# Patient Record
Sex: MALE | Race: WHITE | Employment: OTHER | URBAN - METROPOLITAN AREA
[De-identification: names, ages, dates, MRNs, and addresses within clinical notes are randomized per-mention and may not be internally consistent; named-entity substitution may affect disease eponyms.]

---

## 2017-02-27 PROBLEM — R60.0 LEG EDEMA, LEFT: Status: ACTIVE | Noted: 2017-02-27

## 2017-10-05 PROBLEM — G24.9 DYSKINESIA DUE TO PARKINSON'S DISEASE (HCC): Status: ACTIVE | Noted: 2017-10-05

## 2017-10-05 PROBLEM — G20 DYSKINESIA DUE TO PARKINSON'S DISEASE (HCC): Status: ACTIVE | Noted: 2017-10-05

## 2018-02-15 PROBLEM — M25.552 HIP PAIN, CHRONIC, LEFT: Status: ACTIVE | Noted: 2018-02-15

## 2018-02-15 PROBLEM — G89.29 HIP PAIN, CHRONIC, LEFT: Status: ACTIVE | Noted: 2018-02-15

## 2018-02-16 ENCOUNTER — HOSPITAL ENCOUNTER (OUTPATIENT)
Dept: GENERAL RADIOLOGY | Age: 78
Discharge: HOME | End: 2018-02-16
Attending: NURSE PRACTITIONER
Payer: MEDICARE

## 2018-02-16 DIAGNOSIS — M25.552 HIP PAIN, CHRONIC, LEFT: ICD-10-CM

## 2018-02-16 DIAGNOSIS — G89.29 HIP PAIN, CHRONIC, LEFT: ICD-10-CM

## 2018-02-16 PROCEDURE — 73502 X-RAY EXAM HIP UNI 2-3 VIEWS: CPT

## 2018-02-23 ENCOUNTER — APPOINTMENT (OUTPATIENT)
Dept: PHYSICAL THERAPY | Age: 78
End: 2018-02-23
Attending: PSYCHIATRY & NEUROLOGY

## 2018-02-27 ENCOUNTER — APPOINTMENT (OUTPATIENT)
Dept: PHYSICAL THERAPY | Age: 78
End: 2018-02-27
Attending: PSYCHIATRY & NEUROLOGY

## 2018-03-02 ENCOUNTER — APPOINTMENT (OUTPATIENT)
Dept: PHYSICAL THERAPY | Age: 78
End: 2018-03-02
Attending: PSYCHIATRY & NEUROLOGY

## 2018-03-06 ENCOUNTER — APPOINTMENT (OUTPATIENT)
Dept: PHYSICAL THERAPY | Age: 78
End: 2018-03-06
Attending: PSYCHIATRY & NEUROLOGY

## 2018-03-09 ENCOUNTER — APPOINTMENT (OUTPATIENT)
Dept: PHYSICAL THERAPY | Age: 78
End: 2018-03-09
Attending: PSYCHIATRY & NEUROLOGY

## 2018-03-13 ENCOUNTER — APPOINTMENT (OUTPATIENT)
Dept: PHYSICAL THERAPY | Age: 78
End: 2018-03-13
Attending: PSYCHIATRY & NEUROLOGY

## 2018-03-16 ENCOUNTER — APPOINTMENT (OUTPATIENT)
Dept: PHYSICAL THERAPY | Age: 78
End: 2018-03-16
Attending: PSYCHIATRY & NEUROLOGY

## 2018-03-16 NOTE — PERIOP NOTES
Pt verified name, date of birth, and surgical procedure. Type: 1B   Orders received and dated with pt's date of birth and correct procedure: yes   Labs per surgeon: none  Labs per anesthesia: none  EKG: NA    Phone assessment completed. Pt was given information on NPO, location of surgery, and medications to take on the day of surgery. Pt will arrive at the [facility] location. Pt will take these medications the day of surgery: sinemet, neurontin, thyroid    Pt will hold these medications and supplements: coQ10    Pt will take a shower with antibacterial soap the night prior to the day of surgery and the morning of surgery. Pt also instructed to wear a button down shirt and to bring eye drops on DOS if applicable. Pt verbalized understanding of these instructions to the best of their ability, along with realizing they must have someone to drive them home and stay with them 24 hours post op.     Pt was given the 24 hr contact number of the OR in case there is a need to cancel, the pt has questions, or the pt does not receive a call by 1700 the day prior to surgery regarding the time of arrival.

## 2018-03-18 ENCOUNTER — ANESTHESIA EVENT (OUTPATIENT)
Dept: SURGERY | Age: 78
End: 2018-03-18
Payer: MEDICARE

## 2018-03-19 ENCOUNTER — ANESTHESIA (OUTPATIENT)
Dept: SURGERY | Age: 78
End: 2018-03-19
Payer: MEDICARE

## 2018-03-19 ENCOUNTER — HOSPITAL ENCOUNTER (OUTPATIENT)
Age: 78
Setting detail: OUTPATIENT SURGERY
Discharge: HOME | End: 2018-03-19
Attending: OPHTHALMOLOGY | Admitting: OPHTHALMOLOGY
Payer: MEDICARE

## 2018-03-19 VITALS
TEMPERATURE: 98 F | WEIGHT: 166 LBS | RESPIRATION RATE: 16 BRPM | HEART RATE: 56 BPM | OXYGEN SATURATION: 98 % | DIASTOLIC BLOOD PRESSURE: 84 MMHG | SYSTOLIC BLOOD PRESSURE: 180 MMHG | BODY MASS INDEX: 23.82 KG/M2

## 2018-03-19 PROCEDURE — 74011250636 HC RX REV CODE- 250/636: Performed by: ANESTHESIOLOGY

## 2018-03-19 PROCEDURE — 77030038091: Performed by: OPHTHALMOLOGY

## 2018-03-19 PROCEDURE — 76060000032 HC ANESTHESIA 0.5 TO 1 HR: Performed by: OPHTHALMOLOGY

## 2018-03-19 PROCEDURE — 77030016149 HC PRB OPHTH LSR IRID -C: Performed by: OPHTHALMOLOGY

## 2018-03-19 PROCEDURE — 74011000250 HC RX REV CODE- 250: Performed by: OPHTHALMOLOGY

## 2018-03-19 PROCEDURE — 76210000021 HC REC RM PH II 0.5 TO 1 HR: Performed by: OPHTHALMOLOGY

## 2018-03-19 PROCEDURE — 77030038275 HC DEV VIEW LENS DISP OCLS -B: Performed by: OPHTHALMOLOGY

## 2018-03-19 PROCEDURE — 77030018838 HC SOL IRR OPTH ALCN -B: Performed by: OPHTHALMOLOGY

## 2018-03-19 PROCEDURE — 76010000138 HC OR TIME 0.5 TO 1 HR: Performed by: OPHTHALMOLOGY

## 2018-03-19 PROCEDURE — 74011250637 HC RX REV CODE- 250/637: Performed by: ANESTHESIOLOGY

## 2018-03-19 PROCEDURE — 77030032623 HC KT VITRCMY TOT PLS ALCN -F: Performed by: OPHTHALMOLOGY

## 2018-03-19 PROCEDURE — 77030026083 HC FCPS OPHTH GRSH DSP ALCN -C: Performed by: OPHTHALMOLOGY

## 2018-03-19 PROCEDURE — 77030018846 HC SOL IRR STRL H20 ICUM -A: Performed by: OPHTHALMOLOGY

## 2018-03-19 PROCEDURE — 74011000254 HC RX REV CODE- 254: Performed by: OPHTHALMOLOGY

## 2018-03-19 PROCEDURE — 74011250636 HC RX REV CODE- 250/636

## 2018-03-19 PROCEDURE — 76210000063 HC OR PH I REC FIRST 0.5 HR: Performed by: OPHTHALMOLOGY

## 2018-03-19 RX ORDER — TROPICAMIDE 10 MG/ML
1 SOLUTION/ DROPS OPHTHALMIC
Status: DISCONTINUED | OUTPATIENT
Start: 2018-03-19 | End: 2018-03-19 | Stop reason: HOSPADM

## 2018-03-19 RX ORDER — SODIUM CHLORIDE 0.9 % (FLUSH) 0.9 %
5-10 SYRINGE (ML) INJECTION AS NEEDED
Status: DISCONTINUED | OUTPATIENT
Start: 2018-03-19 | End: 2018-03-19 | Stop reason: HOSPADM

## 2018-03-19 RX ORDER — ACETAMINOPHEN 500 MG
1000 TABLET ORAL ONCE
Status: COMPLETED | OUTPATIENT
Start: 2018-03-19 | End: 2018-03-19

## 2018-03-19 RX ORDER — ATROPINE SULFATE 10 MG/ML
1 SOLUTION/ DROPS OPHTHALMIC
Status: DISPENSED | OUTPATIENT
Start: 2018-03-19 | End: 2018-03-19

## 2018-03-19 RX ORDER — SODIUM CHLORIDE 0.9 % (FLUSH) 0.9 %
5-10 SYRINGE (ML) INJECTION EVERY 8 HOURS
Status: DISCONTINUED | OUTPATIENT
Start: 2018-03-19 | End: 2018-03-19 | Stop reason: HOSPADM

## 2018-03-19 RX ORDER — TETRACAINE HYDROCHLORIDE 5 MG/ML
SOLUTION OPHTHALMIC AS NEEDED
Status: DISCONTINUED | OUTPATIENT
Start: 2018-03-19 | End: 2018-03-19 | Stop reason: HOSPADM

## 2018-03-19 RX ORDER — HYDROMORPHONE HYDROCHLORIDE 2 MG/ML
0.5 INJECTION, SOLUTION INTRAMUSCULAR; INTRAVENOUS; SUBCUTANEOUS
Status: DISCONTINUED | OUTPATIENT
Start: 2018-03-19 | End: 2018-03-19 | Stop reason: HOSPADM

## 2018-03-19 RX ORDER — ALBUTEROL SULFATE 0.83 MG/ML
2.5 SOLUTION RESPIRATORY (INHALATION) AS NEEDED
Status: DISCONTINUED | OUTPATIENT
Start: 2018-03-19 | End: 2018-03-19 | Stop reason: HOSPADM

## 2018-03-19 RX ORDER — BUPIVACAINE HYDROCHLORIDE 7.5 MG/ML
INJECTION, SOLUTION EPIDURAL; RETROBULBAR AS NEEDED
Status: DISCONTINUED | OUTPATIENT
Start: 2018-03-19 | End: 2018-03-19 | Stop reason: HOSPADM

## 2018-03-19 RX ORDER — LIDOCAINE HYDROCHLORIDE 10 MG/ML
0.1 INJECTION INFILTRATION; PERINEURAL AS NEEDED
Status: DISCONTINUED | OUTPATIENT
Start: 2018-03-19 | End: 2018-03-19 | Stop reason: HOSPADM

## 2018-03-19 RX ORDER — CIPROFLOXACIN HYDROCHLORIDE 3.5 MG/ML
1 SOLUTION/ DROPS TOPICAL
Status: DISCONTINUED | OUTPATIENT
Start: 2018-03-19 | End: 2018-03-19 | Stop reason: HOSPADM

## 2018-03-19 RX ORDER — LIDOCAINE HYDROCHLORIDE 40 MG/ML
INJECTION, SOLUTION RETROBULBAR; TOPICAL AS NEEDED
Status: DISCONTINUED | OUTPATIENT
Start: 2018-03-19 | End: 2018-03-19 | Stop reason: HOSPADM

## 2018-03-19 RX ORDER — ONDANSETRON 2 MG/ML
4 INJECTION INTRAMUSCULAR; INTRAVENOUS
Status: DISCONTINUED | OUTPATIENT
Start: 2018-03-19 | End: 2018-03-19 | Stop reason: HOSPADM

## 2018-03-19 RX ORDER — SODIUM CHLORIDE, SODIUM LACTATE, POTASSIUM CHLORIDE, CALCIUM CHLORIDE 600; 310; 30; 20 MG/100ML; MG/100ML; MG/100ML; MG/100ML
1000 INJECTION, SOLUTION INTRAVENOUS CONTINUOUS
Status: DISCONTINUED | OUTPATIENT
Start: 2018-03-19 | End: 2018-03-19 | Stop reason: HOSPADM

## 2018-03-19 RX ORDER — INDOCYANINE GREEN AND WATER 25 MG
KIT INJECTION AS NEEDED
Status: DISCONTINUED | OUTPATIENT
Start: 2018-03-19 | End: 2018-03-19 | Stop reason: HOSPADM

## 2018-03-19 RX ORDER — PROPOFOL 10 MG/ML
INJECTION, EMULSION INTRAVENOUS AS NEEDED
Status: DISCONTINUED | OUTPATIENT
Start: 2018-03-19 | End: 2018-03-19 | Stop reason: HOSPADM

## 2018-03-19 RX ADMIN — TROPICAMIDE 1 DROP: 10 SOLUTION/ DROPS OPHTHALMIC at 07:58

## 2018-03-19 RX ADMIN — PROPOFOL 10 MG: 10 INJECTION, EMULSION INTRAVENOUS at 08:35

## 2018-03-19 RX ADMIN — ACETAMINOPHEN 1000 MG: 500 TABLET, FILM COATED ORAL at 07:58

## 2018-03-19 RX ADMIN — PROPOFOL 10 MG: 10 INJECTION, EMULSION INTRAVENOUS at 08:36

## 2018-03-19 RX ADMIN — ATROPINE SULFATE 1 DROP: 10 SOLUTION/ DROPS OPHTHALMIC at 07:58

## 2018-03-19 RX ADMIN — SODIUM CHLORIDE, SODIUM LACTATE, POTASSIUM CHLORIDE, AND CALCIUM CHLORIDE 1000 ML: 600; 310; 30; 20 INJECTION, SOLUTION INTRAVENOUS at 07:57

## 2018-03-19 NOTE — BRIEF OP NOTE
BRIEF OPERATIVE NOTE    Date of Procedure: 3/19/2018   Preoperative Diagnosis: Macular pucker, right eye [H35.371]  Macular hole, right [H35.341]  Postoperative Diagnosis: Macular pucker and pseudohole, right eye    Procedure(s): VITRECTOMY POSTERIOR 25 GAUGE/ SCAR TISSUE REMOVAL with IC green staining / RIGHT  Surgeon(s) and Role:     * Nicolle Haider MD - Primary         Assistant Staff: None      Surgical Staff:  Circ-1: Radha Moss RN  Scrub Tech-1: Davon Da Silva  Event Time In   Incision Start 6970   Incision Close 0909     Anesthesia: MAC   Estimated Blood Loss: 0 cc  Specimens: * No specimens in log *   Findings:Macular pucker and pseudohole, right eye    Complications: none  Implants: * No implants in log *

## 2018-03-19 NOTE — ANESTHESIA POSTPROCEDURE EVALUATION
Post-Anesthesia Evaluation and Assessment    Patient: Dionne Charlton. MRN: [id number]  SSN: [id number]    YOB: 1940  Age: 78 y.o. Sex: male       Cardiovascular Function/Vital Signs  Visit Vitals    /84    Pulse (!) 56    Temp 36.7 °C (98 °F)    Resp 16    Wt 75.3 kg (166 lb)    SpO2 98%    BMI 23.82 kg/m2       Patient is status post MAC anesthesia for Procedure(s):  VITRECTOMY POSTERIOR 25 GAUGE/ SCAR TISSUE REMOVAL// RIGHT. Nausea/Vomiting: None    Postoperative hydration reviewed and adequate. Pain:  Pain Scale 1: Numeric (0 - 10) (03/19/18 0948)  Pain Intensity 1: 0 (03/19/18 0948)   Managed    Neurological Status:   Neuro (WDL): Within Defined Limits (03/19/18 0948)   At baseline    Mental Status and Level of Consciousness: Arousable    Pulmonary Status:   O2 Device: Room air (03/19/18 0948)   Adequate oxygenation and airway patent    Complications related to anesthesia: None    Post-anesthesia assessment completed.  No concerns    Signed By: Julito Rodriguez MD     March 19, 2018

## 2018-03-19 NOTE — IP AVS SNAPSHOT
[facility] 
 
 
 [address] 
[phone] Patient: Sandra Ware. MRN: [id number] SOS:1/8/9706 About your hospitalization You were admitted on:  March 19, 2018 You last received care in the:  [facility] OP PACU You were discharged on:  March 19, 2018 Why you were hospitalized Your primary diagnosis was:  Not on File Follow-up Information Follow up With Details Comments Contact Info Shruti Shin MD   [address] [place] [address] [address] [facility] [address] 
[phone] Discharge Orders None A check ersi indicates which time of day the medication should be taken. My Medications CHANGE how you take these medications Instructions Each Dose to Equal  
 Morning Noon Evening Bedtime  
 gabapentin 300 mg capsule Commonly known as:  NEURONTIN What changed:  See the new instructions. Your last dose was: Your next dose is: TAKE 1 CAPSULE BY MOUTH 4  TIMES DAILY CONTINUE taking these medications Instructions Each Dose to Equal  
 Morning Noon Evening Bedtime * carbidopa-levodopa  mg per tablet Commonly known as:  SINEMET Your last dose was: Your next dose is:    
   
   
 2 tabs at 7:30am and 3:30pm and 2.5 tabs at 11:30am and 7:30pm  
     
   
   
   
  
 * carbidopa-levodopa ER  mg per tablet Commonly known as:  SINEMET CR Your last dose was: Your next dose is: TAKE 1 TABLET BY MOUTH  NIGHTLY Cholecalciferol (Vitamin D3) 2,000 unit Cap capsule Commonly known as:  VITAMIN D3 Your last dose was: Your next dose is: Take 2,000 Units by mouth. 2000 Units  
    
   
   
   
  
 coenzyme q10 10 mg Cap Your last dose was: Your next dose is: Take 100 mg by mouth.  Last dose 3/16/18  
 100 mg  
    
   
 cyanocobalamin 1,000 mcg tablet Your last dose was: Your next dose is: Take 1,000 mcg by mouth daily. 1000 mcg  
    
   
   
   
  
 levothyroxine 112 mcg tablet Commonly known as:  SYNTHROID Your last dose was: Your next dose is: Take  by mouth Daily (before breakfast). minocycline 100 mg capsule Commonly known as:  Lydia Nurse Your last dose was: Your next dose is: Take  by mouth two (2) times a day. \"breaking out behind my neck\" traZODone 150 mg Tb24 Your last dose was: Your next dose is: Take 75 mg by mouth nightly. 75 mg * Notice: This list has 2 medication(s) that are the same as other medications prescribed for you. Read the directions carefully, and ask your doctor or other care provider to review them with you. STOP taking these medications   
 atropine 1 % ophthalmic solution Discharge Instructions [facility] MD Estrella Castellon MD Annamaria Nares. Trenton Lundborg, MD                    [facility] [place]. [place] Denn[place], [address] or [phone] or [phone] or [phone] Post Operative Instructions for Retina and Vitreous Surgery Patients The following are a few guidelines that you should observe for two weeks after surgery: 1. Avoid stooping over, lifting heavy weights or bumping your head. 2.  Special positioning:Face Down 45 minutes of every hour while awake. Sleep on either side with nose toward mattress 3. No extensive traveling except what is necessary. If a gas air bubble was put in your      eye at surgery - avoid air travel until you consult your doctor. 4.  You may shave after the third day. 5.  You may watch television, read, cook and wash dishes as long as it does not interfere 
      with special positioning instructions. 6.  Alcoholic beverages may be used in moderation. 7.  No sexual intercourse. 8.  You  may bathe the eyelids daily with cotton or a tissue moistened with warm tap 
     water. Do not bathe the eyeball itself. 9.  Some discharge from the operated eye is to be expected. This should gradually  
     improve. 10. Change the eye pad at least once daily. You may discontinue the eye pad whenever 
      comfortable to do so (usually three days after the operation). Wear the eye shield or 
      glasses at all times. 11. If you experience increasing pain, decreasing vision, or pus like discharge, call the 
      Office. 12. Medication Instructions: 
       Prednisolone 1% - one drop in the operated eye 4  times a day. Ofloxacin - one drop in operated eye 4 times a day. BRING ALL EYE DROPS TO YOU POSTOPERATIVE APPOINTMENT! 13. Return appointment at the [facility] On 03/20/2018 at 11:10 Voiced or demonstrated understanding:  Júnior Lorenz from Nurse PATIENT INSTRUCTIONS: 
 
After general anesthesia or intravenous sedation, for 24 hours or while taking prescription Narcotics: · Limit your activities · Do not drive and operate hazardous machinery · Do not make important personal or business decisions · Do  not drink alcoholic beverages · If you have not urinated within 8 hours after discharge, please contact your surgeon on call. *  Please give a list of your current medications to your Primary Care Provider. *  Please update this list whenever your medications are discontinued, doses are 
    changed, or new medications (including over-the-counter products) are added. *  Please carry medication information at all times in case of emergency situations. These are general instructions for a healthy lifestyle: No smoking/ No tobacco products/ Avoid exposure to second hand smoke Surgeon General's Warning:  Quitting smoking now greatly reduces serious risk to your health. Obesity, smoking, and sedentary lifestyle greatly increases your risk for illness A healthy diet, regular physical exercise & weight monitoring are important for maintaining a healthy lifestyle You may be retaining fluid if you have a history of heart failure or if you experience any of the following symptoms:  Weight gain of 3 pounds or more overnight or 5 pounds in a week, increased swelling in our hands or feet or shortness of breath while lying flat in bed. Please call your doctor as soon as you notice any of these symptoms; do not wait until your next office visit. Recognize signs and symptoms of STROKE: 
 
F-face looks uneven A-arms unable to move or move unevenly S-speech slurred or non-existent T-time-call 911 as soon as signs and symptoms begin-DO NOT go Back to bed or wait to see if you get better-TIME IS BRAIN. Introducing [facility]! Carmelita Fothergill introduces [employer] patient portal. Now you can access parts of your medical record, email your doctor's office, and request medication refills online. 1. In your internet browser, go to [email or web address] 2. Click on the First Time User? Click Here link in the Sign In box. You will see the New Member Sign Up page. 3. Enter your [facility] Access Code exactly as it appears below. You will not need to use this code after youve completed the sign-up process. If you do not sign up before the expiration date, you must request a new code. · [facility] Access Code: [email or web address] Expires: 5/7/2018 12:57 PM 
 
4. Enter the last four digits of your Social Security Number (xxxx) and Date of Birth (mm/dd/yyyy) as indicated and click Submit.  You will be taken to the next sign-up page. 5. Create a [employer] ID. This will be your [employer] login ID and cannot be changed, so think of one that is secure and easy to remember. 6. Create a [employer] password. You can change your password at any time. 7. Enter your Password Reset Question and Answer. This can be used at a later time if you forget your password. 8. Enter your e-mail address. You will receive e-mail notification when new information is available in [address] 9. Click Sign Up. You can now view and download portions of your medical record. 10. Click the Download Summary menu link to download a portable copy of your medical information. If you have questions, please visit the Frequently Asked Questions section of the [employer] website. Remember, [employer] is NOT to be used for urgent needs. For medical emergencies, dial 911. Now available from your iPhone and Android! Providers Seen During Your Hospitalization Provider Specialty Primary office phone Anne Laguna MD Ophthalmology [phone] Your Primary Care Physician (PCP) Primary Care Physician Office Phone Office Fax Ted Alejo [phone] You are allergic to the following Allergen Reactions Brompheniramine Maleate Unknown (comments) Pt does not recall med. Carisoprodol Unknown (comments) Other reaction(s): Janit Ohm Pt does not recall med. Meperidine Unknown (comments) Pt does not recall med. Penicillin Rash Phenylephrine Hcl Unknown (comments) Pt does not recall med. Zaleplon Unknown (comments) Other reaction(s): Janit Ohm Pt does not recall med. Dimetapp Cold And Flu Rash Donepezil Other (comments) Altered mental state Penicillins Rash Rofecoxib Rash Recent Documentation Weight BMI Smoking Status 75.3 kg 23.82 kg/m2 Never Smoker Emergency Contacts Name Discharge Info Relation Home Work Mobile Jana Nair  Daughter [21] [phone] Patient Belongings The following personal items are in your possession at time of discharge: 
  Dental Appliances: None  Visual Aid: Glasses      Home Medications: None   Jewelry: None  Clothing: Footwear, Pants, Shirt    Other Valuables: Eyeglasses Please provide this summary of care documentation to your next provider. Signatures-by signing, you are acknowledging that this After Visit Summary has been reviewed with you and you have received a copy. Patient Signature:  ____________________________________________________________ Date:  ____________________________________________________________  
  
Devota Dandy Provider Signature:  ____________________________________________________________ Date:  ____________________________________________________________

## 2018-03-19 NOTE — OP NOTES
[facility] [address] [address]  OPERATIVE REPORT    Erick Sanches  MR#: [id number]  : 1940  ACCOUNT #: [de-identified]   DATE OF SERVICE: 2018    PREOPERATIVE DIAGNOSES  1. Macular pucker, right eye. 2.  Macular pseudohole, right eye. 3.  Pseudophakia, right eye. POSTOPERATIVE DIAGNOSES  1. Macular pucker, right eye. 2.  Macular pseudohole, right eye. 3.  Pseudophakia, right eye. PROCEDURES PERFORMED  1. Pars plana vitrectomy, right eye, 25 gauge. 2.  Epiretinal membrane and internal limiting membrane. Removal after IC green staining. 3.  Air fluid exchange, right eye. SURGEON:  Porfirio Umaña MD    ASSISTANT:  None. ANESTHESIA:  Local monitored using 50:50 mixture of 0.75% Marcaine 4% Xylocaine. COMPLICATIONS:  None. SPECIMENS:  None. IMPLANTS:  None. BLOOD LOSS:  0 mL. DESCRIPTION OF PROCEDURE:  Patient was brought to the operating room and placed in the supine position. He was given a right-sided retrobulbar block using 7 mL of the above anesthetic mixture through a 27-gauge, inch and a quarter needle with the eye in primary position. Adequate akinesia and anesthesia was obtained with this block. The face was then prepped and draped in usual fashion. Lid speculum was put in place in the right eye. Next, 25-gauge cannulas were placed, 3.5 mm back from the limbus. Prior to turning on infusion fluid, the tip of the cannula was visualized in the vitreous space. Core vitrectomy was performed using the light pipe for illumination and vitreous cutter. There was a posterior vitreous separation. The vitreous was trimmed out to the base 360 degrees. Attention was now turned to the macula. It was stained with IC green. Using end gripping forceps, the epiretinal membrane was removed followed by the internal limiting membrane from the central macula. The peripheral retina was inspected carefully with scleral depression.   There were no defects or hemorrhages. An air fluid exchange was performed. The 25-gauge cannulas were removed in sequence. All the sclerotomy sites were tight to air. Subconjunctival space was injected with 0.5 mL of betamethasone. Prednisolone 1% and ofloxacin were applied to the eye. The eye was closed, patched and shielded.       MD MYNOR Jordan / [place]  D: 03/19/2018 09:27     T: 03/19/2018 09:58  JOB #: [id number]

## 2018-03-19 NOTE — ANESTHESIA PREPROCEDURE EVALUATION
Anesthetic History               Review of Systems / Medical History  Patient summary reviewed and pertinent labs reviewed    Pulmonary                   Neuro/Psych       CVA (possible, reports some left leg weakness although none present on exam 3/19/2018)      Comments: parkinsons Cardiovascular                  Exercise tolerance: >4 METS  Comments: Patient denies Chest pain or tightness, SOB at rest, and orthopnea. GI/Hepatic/Renal     GERD: well controlled           Endo/Other      Hypothyroidism       Other Findings   Comments: Neuropathy.          Physical Exam    Airway  Mallampati: III  TM Distance: 4 - 6 cm  Neck ROM: normal range of motion   Mouth opening: Normal     Cardiovascular    Rhythm: regular  Rate: normal      Pertinent negatives: No murmur, JVD and peripheral edema   Dental         Pulmonary  Breath sounds clear to auscultation               Abdominal         Other Findings            Anesthetic Plan    ASA: 3  Anesthesia type: MAC          Induction: Intravenous  Anesthetic plan and risks discussed with: Patient

## 2018-03-19 NOTE — DISCHARGE INSTRUCTIONS
[facility], [address] [facility] Michelle MD Neil Villafuerte MD Azalia Lipoma. MD Johann Valles. Jana Garcia MD    [phone] or [phone] or [phone] or [phone]    Post Operative Instructions for Retina and Vitreous Surgery Patients    The following are a few guidelines that you should observe for two weeks after surgery:    1. Avoid stooping over, lifting heavy weights or bumping your head. 2.  Special positioning:Face Down 45 minutes of every hour while awake. Sleep on either side with nose toward mattress    3. No extensive traveling except what is necessary. If a gas air bubble was put in your      eye at surgery - avoid air travel until you consult your doctor. 4.  You may shave after the third day. 5.  You may watch television, read, cook and wash dishes as long as it does not interfere        with special positioning instructions. 6.  Alcoholic beverages may be used in moderation. 7.  No sexual intercourse. 8.  You  may bathe the eyelids daily with cotton or a tissue moistened with warm tap       water. Do not bathe the eyeball itself. 9.  Some discharge from the operated eye is to be expected. This should gradually        improve. 10. Change the eye pad at least once daily. You may discontinue the eye pad whenever        comfortable to do so (usually three days after the operation). Wear the eye shield or        glasses at all times. 11. If you experience increasing pain, decreasing vision, or pus like discharge, call the        Office. 12. Medication Instructions:         Prednisolone 1% - one drop in the operated eye 4  times a day. Ofloxacin - one drop in operated eye 4 times a day. BRING ALL EYE DROPS TO YOU POSTOPERATIVE APPOINTMENT!     13. Return appointment at the [facility]        On 03/20/2018 at 11:10     Voiced or demonstrated understanding:  YES    DISCHARGE SUMMARY from Nurse    PATIENT INSTRUCTIONS:    After general anesthesia or intravenous sedation, for 24 hours or while taking prescription Narcotics:  · Limit your activities  · Do not drive and operate hazardous machinery  · Do not make important personal or business decisions  · Do  not drink alcoholic beverages  · If you have not urinated within 8 hours after discharge, please contact your surgeon on call. *  Please give a list of your current medications to your Primary Care Provider. *  Please update this list whenever your medications are discontinued, doses are      changed, or new medications (including over-the-counter products) are added. *  Please carry medication information at all times in case of emergency situations. These are general instructions for a healthy lifestyle:    No smoking/ No tobacco products/ Avoid exposure to second hand smoke    Surgeon General's Warning:  Quitting smoking now greatly reduces serious risk to your health. Obesity, smoking, and sedentary lifestyle greatly increases your risk for illness    A healthy diet, regular physical exercise & weight monitoring are important for maintaining a healthy lifestyle    You may be retaining fluid if you have a history of heart failure or if you experience any of the following symptoms:  Weight gain of 3 pounds or more overnight or 5 pounds in a week, increased swelling in our hands or feet or shortness of breath while lying flat in bed. Please call your doctor as soon as you notice any of these symptoms; do not wait until your next office visit. Recognize signs and symptoms of STROKE:    F-face looks uneven    A-arms unable to move or move unevenly    S-speech slurred or non-existent    T-time-call 911 as soon as signs and symptoms begin-DO NOT go       Back to bed or wait to see if you get better-TIME IS BRAIN.

## 2018-03-20 ENCOUNTER — APPOINTMENT (OUTPATIENT)
Dept: PHYSICAL THERAPY | Age: 78
End: 2018-03-20
Attending: PSYCHIATRY & NEUROLOGY

## 2018-03-23 ENCOUNTER — APPOINTMENT (OUTPATIENT)
Dept: PHYSICAL THERAPY | Age: 78
End: 2018-03-23
Attending: PSYCHIATRY & NEUROLOGY

## 2018-04-12 ENCOUNTER — APPOINTMENT (OUTPATIENT)
Dept: PHYSICAL THERAPY | Age: 78
End: 2018-04-12
Attending: PSYCHIATRY & NEUROLOGY
Payer: MEDICARE

## 2018-04-16 ENCOUNTER — HOSPITAL ENCOUNTER (OUTPATIENT)
Dept: PHYSICAL THERAPY | Age: 78
Discharge: HOME | End: 2018-04-16
Attending: PSYCHIATRY & NEUROLOGY
Payer: MEDICARE

## 2018-04-16 PROCEDURE — G8978 MOBILITY CURRENT STATUS: HCPCS

## 2018-04-16 PROCEDURE — 97161 PT EVAL LOW COMPLEX 20 MIN: CPT

## 2018-04-16 PROCEDURE — G8979 MOBILITY GOAL STATUS: HCPCS

## 2018-04-16 NOTE — PROGRESS NOTES
Ambulatory/Rehab Services H2 Model Falls Risk Assessment    Risk Factor Pts. ·   Confusion/Disorientation/Impulsivity  [x]    4 ·   Symptomatic Depression  []   2 ·   Altered Elimination  []   1 ·   Dizziness/Vertigo  []   1 ·   Gender (Male)  [x]   1 ·   Any administered antiepileptics (anticonvulsants):  [x]   2 ·   Any administered benzodiazepines:  [x]   1 ·   Visual Impairment (specify):  []   1 ·   Portable Oxygen Use  []   1 ·   Orthostatic ? BP  []   1 ·   History of Recent Falls (within 3 mos.)  []   5     Ability to Rise from Chair (choose one) Pts. ·   Ability to rise in a single movement  [x]   0 ·   Pushes up, successful in one attempt  []   1 ·   Multiple attempts, but successful  []   3 ·   Unable to rise without assistance  []   4   Total: (5 or greater = High Risk) 8     Falls Prevention Plan:   [x]                Physical Limitations to Exercise (specify): supervision   []                Mobility Assistance Device (type):   []                Exercise/Equipment Adaptation (specify):    ©2010 [facility] [employer] [address] [place] [address] Patent #[id number].  Federal Law prohibits the replication, distribution or use without written permission from [facility] [employer]

## 2018-04-16 NOTE — THERAPY EVALUATION
Wisamtevin Gong : 1940 [facility] [id number], [address] [place] [address],[address], [facility] [address].  [phone]   [phone]          OUTPATIENT PHYSICAL THERAPY:Initial Assessment 2018    ICD-10: Treatment Diagnosis: T47-Jtrvgyfeg's, R29.3-Abnormal posture, R27.8-Lack of coordination, R26.89-other gait abnormalities  Precautions/Allergies:   Brompheniramine maleate; Carisoprodol; Meperidine; Penicillin; Phenylephrine hcl; Zaleplon; Dimetapp cold and flu; Donepezil; Penicillins; and Rofecoxib falls  Fall Risk Score: 8 (? 5 = High Risk)  MD Orders: PT to eval and treat for Parkinson's MEDICAL/REFERRING DIAGNOSIS:  Parkinson's disease [G20]  · Abnormal posture [R29.3]   DATE OF ONSET: 18   REFERRING PHYSICIAN: Lele Hernández  RETURN PHYSICIAN APPOINTMENT: unknown     INITIAL ASSESSMENT:  Mr. Robert Hargrove is a 70year old male who presents to therapy today with the below functional deficits and could benefit from skilled outpatient PT to improve the deficits as well as improve overall quality of life and independence. PT will focus on Parkinson's education as well as Parkinson's specific therapy/PWR program for patient to be able to maintain gains achieved during therapy when therapy is completed. He has completed PWR and PT with me about 3 years ago and is maintaining his movement/function well. His main complaint is stiffness/pain in his low back especially in the am and noted to have decreased PROM in quads, trunk and hamstrings. Patient does not stretch at this point so will take a few sessions for education on PROM and then discharge. Will also focus on his gait technique as he is very rigid in his trunk with his gait and poor weight shift   PROBLEM LIST:  1. Decreased Balance  2. Decreased Ambulation/Gait Technique  3. Decreased Endurance  4. Rigidity  5. Bradykinesia INTERVENTIONS PLANNED:  1. Balance Exercise  2. Gait Training  3.  Home Exercise Program (HEP)  4. Manual Therapy  5. Neuromuscular Re-education/Strengthening  6. Range of Motion (ROM)  7. Therapeutic Activites  8. Therapeutic Exercise/Strengthening  9. Transfer Training   TREATMENT PLAN:  Effective Dates: 4/16/18 TO 7/15/2018 (90 days). Frequency/Duration: 2 times a week for 90 Days  GOALS: (Goals have been discussed and agreed upon with patient.)  Discharge Goals: Time Frame: 12 weeks  1. Patient will be I with HEP to maintain functional gains achieved during therapy  2. Patient will improve FGA to 25/30 for overall improved stability of gait  Rehabilitation Potential For Stated Goals: Excellent  Regarding Carlotta Lemos 's therapy, I certify that the treatment plan above will be carried out by a therapist or under their direction. Thank you for this referral,  Candelario York PT     Referring Physician Signature: Ham Tan,*              Date                    The information in this section was collected on 4/16/18 (except where otherwise noted). HISTORY:   History of Present Injury/Illness (Reason for Referral): Sparkle Rubi is a 70 y.o. male who presents with a history of parkinson's who was seen by [place] Phipps NP to Dr. Elmer Robb on 2/15. C/o leg pain in left leg and now B. It has been happening about 3months. Scans were negative. He apparently started ST around the time of this visit due to difficulty swallowing. Double vision started 2 weeks before this appointment with the MD. His PD exam was stable, so continue all medication and no changes. UA was orderd as well as Xray of Left hip was order. Results were negative for both. Just had surgery on 3/19 for his R eye. Leg pain has resolved. Mostlly slight back pain in the morning. Goes to exercise class-no stretching. Works itself with movement and is inconsistent. No falls.    BP at MD office: 129/63  HR at MD office: 58  Dominant Side:         RIGHT  Past Medical History/Comorbidities:   Mr. Mary Moody  has a past medical history of AAA (abdominal aortic aneurysm) ([facility] (7/8/2016); Pak's esophagus with esophagitis (7/8/2016); Cerebrovascular accident (CVA) ([facility].) (7/8/2016); Chronic fatigue syndrome (2/26/2016); Cognitive deficits (7/8/2016); Colon polyps (2007); Elevated PSA; Enthesopathy of ankle and tarsus (5/29/2014); GERD (gastroesophageal reflux disease) (7/8/2016); Hashimoto's thyroiditis (7/8/2016); History of nuclear stress test (1997, 5/2005); Hypotension (8/25/2015); Impaired cognition (7/8/2016); Inguinal hernia (7/8/2016); Insomnia (7/8/2016); 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 Mr. Teodora Eddy  has a past surgical history that includes hx colonoscopy (2000 & 2005); hx breast biopsy (Bilateral); hx thyroidectomy; hx hernia repair (Left, 2011); hx cataract removal (Right, 2012); hx other surgical (2006); and hx other surgical.  Current Medications:    Current Outpatient Prescriptions:     carbidopa-levodopa ER (SINEMET CR)  mg per tablet, TAKE 1 TABLET BY MOUTH  NIGHTLY, Disp: 90 Tab, Rfl: 3    carbidopa-levodopa (SINEMET)  mg per tablet, 2 tabs at 7:30am and 3:30pm and 2.5 tabs at 11:30am and 7:30pm, Disp: 360 Tab, Rfl: 3    gabapentin (NEURONTIN) 300 mg capsule, TAKE 1 CAPSULE BY MOUTH 4  TIMES DAILY (Patient taking differently: TAKE 1 CAPSULE BY MOUTH 4  TIMES DAILY- for neuropathy feet), Disp: 360 Cap, Rfl: 1    levothyroxine (SYNTHROID) 112 mcg tablet, Take  by mouth Daily (before breakfast). , Disp: , Rfl:     minocycline (MINOCIN, DYNACIN) 100 mg capsule, Take  by mouth two (2) times a day.  \"breaking out behind my neck\", Disp: , Rfl:     traZODone 150 mg Tb24, Take 75 mg by mouth nightly., Disp: 45 Tab, Rfl: 1    Cholecalciferol, Vitamin D3, (VITAMIN D3) 2,000 unit cap capsule, Take 2,000 Units by mouth., Disp: , Rfl:     coenzyme q10 10 mg cap, Take 100 mg by mouth. Last dose 3/16/18, Disp: , Rfl:     cyanocobalamin 1,000 mcg tablet, Take 1,000 mcg by mouth daily. , Disp: , Rfl:    Date Last Reviewed:  4/16/2018  Social History/Living Environment:     lives alone with one story home-1 steps to enter. No steps in home. Has walk in shower-standing to shower. No grab bars installed but is going to have them installed. Regular height toilet. Tall bed but can sit on it without stool. 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 Number of Personal Factors/Comorbidities that affect the Plan of Care: 3+: HIGH COMPLEXITY   EXAMINATION:   Sensation: intact  Skin Integrity:  intact  Neuromuscular/Motor:   · Muscle Tone:  2/4 on L and 1/4 on R    Coordination:  Foot tapping 1 on R/2 on L, finger taps/YRN/open/close 0 B  Observation/Orthostatic Postural Assessment:  Slight flexion at hips  Lower Extremity:  Grossly 5/5 but PROM:hamstring on R in supine with hip at 90-35, L 44, quad in prone: R-90, L-85. Trunk very rigid as well. He has ocmplaints of cervical spine stiffness as well. L LE more rigid than R LE.  Hip extension B 4+/5  Upper Extremity: grossly 5/5 and WFL  Additional Special Tests:  · 10m:   · Self selected: 9.91 seconds  · Fast pace: 7.16 seconds  · 5x sit to stand: 10.69 seconds-posterior lean at times but able to adjust  · 6 minute walk test: 1675 feet   Distance:  Greater than 3000 including incline/decline   Assistive Device:  none   Orthosis/Brace:  none   Amount of Assist:  Mod I   Gait Characteristics:  Rigid trunk, decreased L arm swing, decreased step/stride mostly on L, decreased david   Stairs:    · Rails:  one       · Number of Stairs:  12      · Amount of Assist:   mod I         · Pattern:  Reciprocal pattern. Fair weight shift ascending. Good weight shift descending  · SOT:  NT  · Comments:  patient is motivated and doing well. Without complaints   · Education:  Justina Reyes on purpose of PROM/stretching  FUNCTIONAL ASSESSMENT:  · Wheelchair:  na  · Bed Mobility: mod I to I including prone   · Transfers: sit to stand wihtout UE support mod I. No freezing turns in 3 steps without LOB      Body Structures Involved:  1. Joints  2. Muscles Body Functions Affected:  1. Cardio  2. Respiratory  3. Neuromusculoskeletal  4. Movement Related Activities and Participation Affected:  1. Mobility   Number of elements that affect the Plan of Care: 4+: HIGH COMPLEXITY   CLINICAL PRESENTATION:   Presentation: Stable and uncomplicated: LOW COMPLEXITY   CLINICAL DECISION MAKING:   Outcome Measure: Tool Used: MINI BEST TEST   Score:  Initial: 23/28 Most Recent: X/28    Interpretation of Score: This is a 14 item test on a three level ordinal scale with a total score of 28. There are two sections scored, but only the lower score is recorded. This is a clinical balance assessment tool that aimes to target and identify six different balance control systems. Tool Used: FUNCTIONAL GAIT ASSESSMENT   Score:  Initial: 21/30 Most Recent: X/30   Interpretation of Score: This test is a modification of the Dynamic Gait Index. It is a 10 item test with each item score 0-3 that assesses postural stability during various walking tasks. Score 30 29-24 23-18 17-13 12-7 6-1 0   Modifier CH CI CJ CK CL CM CN ?    Mobility - Walking and Moving Around:     - CURRENT STATUS: CJ - 20%-39% impaired, limited or restricted    - GOAL STATUS: CI - 1%-19% impaired, limited or restricted    - D/C STATUS:             ---------------To be determined---------------    Medical Necessity:   · Patient is expected to demonstrate progress in balance, coordination, functional technique and ambulation to increase independence with all functional tasks above. Reason for Services/Other Comments:  · Patient can benefit from skilled outpatient PT to focus on Parkinson's specific treatment to improve all above functional deficits limiting patient daily as well as increasing fall risk to improve overall quality of life. Use of outcome tool(s) and clinical judgement create a POC that gives a: Clear prediction of patient's progress: LOW COMPLEXITY   TREATMENT:   (In addition to Assessment/Re-Assessment sessions the following treatments were rendered)  Pre-treatment Symptoms/Complaints:  4/16/2018: Patient reports worse-nothing. Does not improve. Does not take any meds. At worst back pain is 8-mostly in the am, at best 0/10. Current 0/10. Leg is 0/10. Reports some occasional soreness in Left leg but otherwise states his L LE has improved and he does not have difficulty with it. His main complaint is the stiffness/pain in his low back. He reports no stretching just exercise/strength/balance  Pain: Initial:   Pain Intensity 1: 0 0 Post Session:  0       Treatment/Session Assessment:    · Response to Treatment:  Do not have previous scores from when patient came to me 3 years ago but does appear to be stable with his above scores with no falls. Will focus on PROM/stretching home program as well as his gait technique to improve trunk rigidity to see if improve back pain/overall function. · Compliance with Program/Exercises: Will assess as treatment progresses. · Recommendations/Intent for next treatment session: \"Next visit will focus on advancements to more challenging activities\".   Child's pose, PWR prone twist, hamstring and quad stretch, quadraped PWR twist/step, supine with arms above head, stand against wall  Total Treatment Duration:  PT Patient Time In/Time Out  Time In: 1030  Time Out: 1126    Lexy Montgomery, PT

## 2018-04-27 ENCOUNTER — HOSPITAL ENCOUNTER (OUTPATIENT)
Dept: PHYSICAL THERAPY | Age: 78
Discharge: HOME | End: 2018-04-27
Attending: PSYCHIATRY & NEUROLOGY
Payer: MEDICARE

## 2018-04-27 PROCEDURE — 97110 THERAPEUTIC EXERCISES: CPT

## 2018-05-01 ENCOUNTER — HOSPITAL ENCOUNTER (OUTPATIENT)
Dept: PHYSICAL THERAPY | Age: 78
Discharge: HOME | End: 2018-05-01
Attending: PSYCHIATRY & NEUROLOGY
Payer: MEDICARE

## 2018-05-01 PROCEDURE — 97110 THERAPEUTIC EXERCISES: CPT

## 2018-05-01 PROCEDURE — 97112 NEUROMUSCULAR REEDUCATION: CPT

## 2018-05-01 NOTE — PROGRESS NOTES
Mandy Glasgow. : 1940 [facility] [place] [address] [facility], [address] [place] [address],[address], Mariya Vieyra.  [phone]   [phone]          OUTPATIENT PHYSICAL THERAPY:Daily Note and Treatment Day: 2018    ICD-10: Treatment Diagnosis: E49-Bbyzpgwuo's, R29.3-Abnormal posture, R27.8-Lack of coordination, R26.89-other gait abnormalities  Precautions/Allergies:   Brompheniramine maleate; Carisoprodol; Meperidine; Penicillin; Phenylephrine hcl; Zaleplon; Dimetapp cold and flu; Donepezil; Penicillins; and Rofecoxib falls  Fall Risk Score: 8 (? 5 = High Risk)  MD Orders: PT to eval and treat for Parkinson's MEDICAL/REFERRING DIAGNOSIS:  Parkinson's disease [G20]  · Abnormal posture [R29.3]   DATE OF ONSET: 18   REFERRING PHYSICIAN: Iza Hernández Estimable  RETURN PHYSICIAN APPOINTMENT: unknown     INITIAL ASSESSMENT:  Mr. Jeri Garland is a 70year old male who presents to therapy today with the below functional deficits and could benefit from skilled outpatient PT to improve the deficits as well as improve overall quality of life and independence. PT will focus on Parkinson's education as well as Parkinson's specific therapy/PWR program for patient to be able to maintain gains achieved during therapy when therapy is completed. He has completed PWR and PT with me about 3 years ago and is maintaining his movement/function well. His main complaint is stiffness/pain in his low back especially in the am and noted to have decreased PROM in quads, trunk and hamstrings. Patient does not stretch at this point so will take a few sessions for education on PROM and then discharge. Will also focus on his gait technique as he is very rigid in his trunk with his gait and poor weight shift   PROBLEM LIST:  1. Decreased Balance  2. Decreased Ambulation/Gait Technique  3. Decreased Endurance  4. Rigidity  5. Bradykinesia INTERVENTIONS PLANNED:  1. Balance Exercise  2. Gait Training  3.  Home Exercise Program (HEP)  4. Manual Therapy  5. Neuromuscular Re-education/Strengthening  6. Range of Motion (ROM)  7. Therapeutic Activites  8. Therapeutic Exercise/Strengthening  9. Transfer Training   TREATMENT PLAN:  Effective Dates: 4/16/18 TO 7/15/2018 (90 days). Frequency/Duration: 2 times a week for 90 Days  GOALS: (Goals have been discussed and agreed upon with patient.)  Discharge Goals: Time Frame: 12 weeks  1. Patient will be I with HEP to maintain functional gains achieved during therapy  2. Patient will improve FGA to 25/30 for overall improved stability of gait  Rehabilitation Potential For Stated Goals: Excellent              The information in this section was collected on 4/16/18 (except where otherwise noted). HISTORY:   History of Present Injury/Illness (Reason for Referral): Titus Jett is a 70 y.o. male who presents with a history of parkinson's who was seen by Shayla Mcbride NP to Dr. Tatyana Mchugh on 2/15. C/o leg pain in left leg and now B. It has been happening about 3months. Scans were negative. He apparently started ST around the time of this visit due to difficulty swallowing. Double vision started 2 weeks before this appointment with the MD. His PD exam was stable, so continue all medication and no changes. UA was orderd as well as Xray of Left hip was order. Results were negative for both. Just had surgery on 3/19 for his R eye. Leg pain has resolved. Mostlly slight back pain in the morning. Goes to exercise class-no stretching. Works itself with movement and is inconsistent. No falls. 5/1/2018-Patient with no falls nor changes in medication. He does report some left elbow soreness right at olecranon but resolved as of yesterday. No hip pain with exercises    BP at MD office: 129/63  HR at MD office: 58  Dominant Side:         RIGHT  Past Medical History/Comorbidities:   Mr. Jeri Garland  has a past medical history of AAA (abdominal aortic aneurysm) ([facility] [address] (7/8/2016);  Pak's esophagus with esophagitis (7/8/2016); Cerebrovascular accident (CVA) ([facility] [address] (7/8/2016); Chronic fatigue syndrome (2/26/2016); Cognitive deficits (7/8/2016); Colon polyps (2007); Elevated PSA; Enthesopathy of ankle and tarsus (5/29/2014); GERD (gastroesophageal reflux disease) (7/8/2016); Hashimoto's thyroiditis (7/8/2016); History of nuclear stress test (1997, 5/2005); Hypotension (8/25/2015); Impaired cognition (7/8/2016); Inguinal hernia (7/8/2016); Insomnia (7/8/2016); 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 Mr. Jeri Garland  has a past surgical history that includes hx colonoscopy (2000 & 2005); hx breast biopsy (Bilateral); hx thyroidectomy; hx hernia repair (Left, 2011); hx cataract removal (Right, 2012); hx other surgical (2006); and hx other surgical.  Current Medications:    Current Outpatient Prescriptions:     carbidopa-levodopa ER (SINEMET CR)  mg per tablet, TAKE 1 TABLET BY MOUTH  NIGHTLY, Disp: 90 Tab, Rfl: 3    carbidopa-levodopa (SINEMET)  mg per tablet, 2 tabs at 7:30am and 3:30pm and 2.5 tabs at 11:30am and 7:30pm, Disp: 360 Tab, Rfl: 3    gabapentin (NEURONTIN) 300 mg capsule, TAKE 1 CAPSULE BY MOUTH 4  TIMES DAILY (Patient taking differently: TAKE 1 CAPSULE BY MOUTH 4  TIMES DAILY- for neuropathy feet), Disp: 360 Cap, Rfl: 1    levothyroxine (SYNTHROID) 112 mcg tablet, Take  by mouth Daily (before breakfast). , Disp: , Rfl:     minocycline (MINOCIN, DYNACIN) 100 mg capsule, Take  by mouth two (2) times a day.  \"breaking out behind my neck\", Disp: , Rfl:     traZODone 150 mg Tb24, Take 75 mg by mouth nightly., Disp: 45 Tab, Rfl: 1    Cholecalciferol, Vitamin D3, (VITAMIN D3) 2,000 unit cap capsule, Take 2,000 Units by mouth., Disp: , Rfl:     coenzyme q10 10 mg cap, Take 100 mg by mouth. Last dose 3/16/18, Disp: , Rfl:     cyanocobalamin 1,000 mcg tablet, Take 1,000 mcg by mouth daily. , Disp: , Rfl:    Date Last Reviewed:  5/1/2018  Social History/Living Environment:     lives alone with one story home-1 steps to enter. No steps in home. Has walk in shower-standing to shower. No grab bars installed but is going to have them installed. Regular height toilet. Tall bed but can sit on it without stool. Still driving  Prior Level of Function:  Mod I  Durable Medical Equipment (DME)  · Current DME: [place]  Work/Activity History: retired from [employer]: Never started therapy  Current Workout: 4 times a week between boxing and [employer]. Number of Personal Factors/Comorbidities that affect the Plan of Care: 3+: HIGH COMPLEXITY   EXAMINATION:   Sensation: intact  Skin Integrity:  intact  Neuromuscular/Motor:   · Muscle Tone:  2/4 on L and 1/4 on R    Coordination:  Foot tapping 1 on R/2 on L, finger taps/YRN/open/close 0 B  Observation/Orthostatic Postural Assessment:  Slight flexion at hips  Lower Extremity:  Grossly 5/5 but PROM:hamstring on R in supine with hip at 90-35, L 44, quad in prone: R-90, L-85. Trunk very rigid as well. He has ocmplaints of cervical spine stiffness as well. L LE more rigid than R LE.  Hip extension B 4+/5  Upper Extremity: grossly 5/5 and WFL  Additional Special Tests:  · 10m:   · Self selected: 9.91 seconds  · Fast pace: 7.16 seconds  · 5x sit to stand: 10.69 seconds-posterior lean at times but able to adjust  · 6 minute walk test: 1675 feet   Distance:  Greater than 3000 including incline/decline   Assistive Device:  none   Orthosis/Brace:  none   Amount of Assist:  Mod I   Gait Characteristics:  Rigid trunk, decreased L arm swing, decreased step/stride mostly on L, decreased david   Stairs:    · Rails:  one       · Number of Stairs:  12 · Amount of Assist:   mod I         · Pattern:  Reciprocal pattern. Fair weight shift ascending. Good weight shift descending  · SOT:  NT  · Comments:  patient is motivated and doing well. Without complaints   · Education:  Jomar Basurto on purpose of PROM/stretching  FUNCTIONAL ASSESSMENT:  · Wheelchair:  na  · Bed Mobility: mod I to I including prone   · Transfers: sit to stand wihtout UE support mod I. No freezing turns in 3 steps without LOB      Body Structures Involved:  1. Joints  2. Muscles Body Functions Affected:  1. Cardio  2. Respiratory  3. Neuromusculoskeletal  4. Movement Related Activities and Participation Affected:  1. Mobility   Number of elements that affect the Plan of Care: 4+: HIGH COMPLEXITY   CLINICAL PRESENTATION:   Presentation: Stable and uncomplicated: LOW COMPLEXITY   CLINICAL DECISION MAKING:   Outcome Measure: Tool Used: MINI BEST TEST   Score:  Initial: 23/28 Most Recent: X/28    Interpretation of Score: This is a 14 item test on a three level ordinal scale with a total score of 28. There are two sections scored, but only the lower score is recorded. This is a clinical balance assessment tool that aimes to target and identify six different balance control systems. Tool Used: FUNCTIONAL GAIT ASSESSMENT   Score:  Initial: 21/30 Most Recent: X/30   Interpretation of Score: This test is a modification of the Dynamic Gait Index. It is a 10 item test with each item score 0-3 that assesses postural stability during various walking tasks. Score 30 29-24 23-18 17-13 12-7 6-1 0   Modifier CH CI CJ CK CL CM CN ?    Mobility - Walking and Moving Around:     - CURRENT STATUS: CJ - 20%-39% impaired, limited or restricted    - GOAL STATUS: CI - 1%-19% impaired, limited or restricted    - D/C STATUS:             ---------------To be determined---------------    Medical Necessity:   · Patient is expected to demonstrate progress in balance, coordination, functional technique and ambulation to increase independence with all functional tasks above. Reason for Services/Other Comments:  · Patient can benefit from skilled outpatient PT to focus on Parkinson's specific treatment to improve all above functional deficits limiting patient daily as well as increasing fall risk to improve overall quality of life. Use of outcome tool(s) and clinical judgement create a POC that gives a: Clear prediction of patient's progress: LOW COMPLEXITY   TREATMENT:   (In addition to Assessment/Re-Assessment sessions the following treatments were rendered)  Pre-treatment Symptoms/Complaints:  5/1/2018: Patient reports worse-nothing. Does not improve. Does not take any meds. At worst back pain is 8-mostly in the am, at best 0/10. Current 0/10. Leg is 0/10. Reports some occasional soreness in Left leg but otherwise states his L LE has improved and he does not have difficulty with it. His main complaint is the stiffness/pain in his low back. He reports no stretching just exercise/strength/balance  Pain: Initial:   Pain Intensity 1: 0 0 Post Session:  0       TREATMENT:   (In addition to Assessment/Re-Assessment sessions the following treatments were rendered)    THERAPEUTIC EXERCISE: (20 minutes):  Exercises per grid below to improve mobility. Required moderate visual and verbal cues to promote proper body alignment. Progressed range and repetitions as indicated. Difficulty with retraction-spent extra time on this  Some pain at left elbow with quadraped PWR twist so ed on completing one at a time throughout the day and to monitor    NEUROMUSCULAR RE-EDUCATION: (34 minutes):  Exercise/activities per grid below to improve balance, coordination and posture. Required moderate visual and verbal cues to promote coordination of bilateral, upper extremity(s), lower extremity(s).   Prone PWR twist with written instructions-added to HEP  Reviewed quadraped PWR/hamstring/piriformis and laying supine at beginning of session and at end for improved recall  Standing against wall for posture then with arms above head-added to [facility] on proper exercise regime    Treatment/Session Assessment:    · Response to Treatment:  Some verbal cues on quadraped PWR moves-needed both time but improved at end of session review post writing more written instructions. Also did well with posture against wall. · Compliance with Program/Exercises: completed daily  Recommendations/Intent for next treatment session: \"Next visit will focus on advancements to more challenging activities\".   Review all HEP, discharge  Total Treatment Duration:  PT Patient Time In/Time Out  Time In: 08  Time Out: [facility], PT

## 2018-05-04 ENCOUNTER — HOSPITAL ENCOUNTER (OUTPATIENT)
Dept: PHYSICAL THERAPY | Age: 78
Discharge: HOME | End: 2018-05-04
Attending: PSYCHIATRY & NEUROLOGY
Payer: MEDICARE

## 2018-05-04 PROCEDURE — G8980 MOBILITY D/C STATUS: HCPCS

## 2018-05-04 PROCEDURE — 97530 THERAPEUTIC ACTIVITIES: CPT

## 2018-05-04 PROCEDURE — 97110 THERAPEUTIC EXERCISES: CPT

## 2018-05-04 PROCEDURE — G8979 MOBILITY GOAL STATUS: HCPCS

## 2018-05-04 NOTE — PROGRESS NOTES
Gilmer Layne. : 1940 [facility] [place] [id number], [address] [place] [address],[address], Mariya Vieyra.  [phone]   [phone]          OUTPATIENT PHYSICAL THERAPY:Daily Note, Discharge and Treatment Day: 2018    ICD-10: Treatment Diagnosis: C62-Wpwdvioqx's, R29.3-Abnormal posture, R27.8-Lack of coordination, R26.89-other gait abnormalities  Precautions/Allergies:   Brompheniramine maleate; Carisoprodol; Meperidine; Penicillin; Phenylephrine hcl; Zaleplon; Dimetapp cold and flu; Donepezil; Penicillins; and Rofecoxib falls  Fall Risk Score: 8 (? 5 = High Risk)  MD Orders: PT to eval and treat for Parkinson's MEDICAL/REFERRING DIAGNOSIS:  Parkinson's disease [G20]  · Abnormal posture [R29.3]   DATE OF ONSET: 18   REFERRING PHYSICIAN: Payam Hernández  RETURN PHYSICIAN APPOINTMENT: unknown     INITIAL ASSESSMENT:  Mr. Carmen Pena is a 70year old male who presents to therapy today with the below functional deficits and could benefit from skilled outpatient PT to improve the deficits as well as improve overall quality of life and independence. PT will focus on Parkinson's education as well as Parkinson's specific therapy/PWR program for patient to be able to maintain gains achieved during therapy when therapy is completed. He has completed PWR and PT with me about 3 years ago and is maintaining his movement/function well. His main complaint is stiffness/pain in his low back especially in the am and noted to have decreased PROM in quads, trunk and hamstrings. Patient does not stretch at this point so will take a few sessions for education on PROM and then discharge. Will also focus on his gait technique as he is very rigid in his trunk with his gait and poor weight shift  2018-patient has done well. Just needed HEP for PROM/stretching as he was very rigid in his trunk/B hips causing functional limitation and pain.  Patient is having no pain at this time and is feeling good. Janae Gonzalez on attention to task is also all patient needed reminding on as he was able to ambualte without difficulty and consistent big step/stride after this. He met all goals 2/2 from initial evlauation. He can complete on his own. \"Yeah I feel more stretched out. \"   PROBLEM LIST:  1. Decreased Balance  2. Decreased Ambulation/Gait Technique  3. Decreased Endurance  4. Rigidity  5. Bradykinesia INTERVENTIONS PLANNED:  1. Balance Exercise  2. Gait Training  3. Home Exercise Program (HEP)  4. Manual Therapy  5. Neuromuscular Re-education/Strengthening  6. Range of Motion (ROM)  7. Therapeutic Activites  8. Therapeutic Exercise/Strengthening  9. Transfer Training   TREATMENT PLAN:  Effective Dates: 4/16/18 TO 7/15/2018 (90 days). Frequency/Duration: 2 times a week for 90 Days  GOALS: (Goals have been discussed and agreed upon with patient.)  Discharge Goals: Time Frame: 12 weeks  1. Patient will be I with HEP to maintain functional gains achieved during therapy -met 5/4/2018  2. Patient will improve FGA to 25/30 for overall improved stability of gait  5/4/2018-met  Rehabilitation Potential For Stated Goals: Excellent              The information in this section was collected on 4/16/18 (except where otherwise noted). HISTORY:   History of Present Injury/Illness (Reason for Referral): Ester Forrester is a 70 y.o. male who presents with a history of parkinson's who was seen by Choco Diaz NP to Dr. Lisa Amin on 2/15. C/o leg pain in left leg and now B. It has been happening about 3months. Scans were negative. He apparently started ST around the time of this visit due to difficulty swallowing. Double vision started 2 weeks before this appointment with the MD. His PD exam was stable, so continue all medication and no changes. UA was orderd as well as Xray of Left hip was order. Results were negative for both. Just had surgery on 3/19 for his R eye. Leg pain has resolved. Mostlly slight back pain in the morning. Goes to exercise class-no stretching. Works itself with movement and is inconsistent. No falls. 0/9/6060-MZXEGHS with no falls nor changes in medication. \"I feel good. \"    BP at MD office: 129/63  HR at MD office: 58  Dominant Side:         RIGHT  Past Medical History/Comorbidities:   Mr. Gerardo Cramer  has a past medical history of AAA (abdominal aortic aneurysm) ([facility] (7/8/2016); Pak's esophagus with esophagitis (7/8/2016); Cerebrovascular accident (CVA) ([facility] (7/8/2016); Chronic fatigue syndrome (2/26/2016); Cognitive deficits (7/8/2016); Colon polyps (2007); Elevated PSA; Enthesopathy of ankle and tarsus (5/29/2014); GERD (gastroesophageal reflux disease) (7/8/2016); Hashimoto's thyroiditis (7/8/2016); History of nuclear stress test (1997, 5/2005); Hypotension (8/25/2015); Impaired cognition (7/8/2016); Inguinal hernia (7/8/2016); Insomnia (7/8/2016); 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   Mr. Gerardo Cramer  has a past surgical history that includes hx colonoscopy (2000 & 2005); hx breast biopsy (Bilateral); hx thyroidectomy; hx hernia repair (Left, 2011); hx cataract removal (Right, 2012); hx other surgical (2006); and hx other surgical.  Current Medications:    Current Outpatient Prescriptions:     carbidopa-levodopa ER (SINEMET CR)  mg per tablet, TAKE 1 TABLET BY MOUTH  NIGHTLY, Disp: 90 Tab, Rfl: 3    carbidopa-levodopa (SINEMET)  mg per tablet, 2 tabs at 7:30am and 3:30pm and 2.5 tabs at 11:30am and 7:30pm, Disp: 360 Tab, Rfl: 3    gabapentin (NEURONTIN) 300 mg capsule, TAKE 1 CAPSULE BY MOUTH 4  TIMES DAILY (Patient taking differently: TAKE 1 CAPSULE BY MOUTH 4  TIMES DAILY- for neuropathy feet), Disp: 360 Cap, Rfl: 1    levothyroxine (SYNTHROID) 112 mcg tablet, Take  by mouth Daily (before breakfast). , Disp: , Rfl:     minocycline (MINOCIN, DYNACIN) 100 mg capsule, Take  by mouth two (2) times a day. \"breaking out behind my neck\", Disp: , Rfl:     traZODone 150 mg Tb24, Take 75 mg by mouth nightly., Disp: 45 Tab, Rfl: 1    Cholecalciferol, Vitamin D3, (VITAMIN D3) 2,000 unit cap capsule, Take 2,000 Units by mouth., Disp: , Rfl:     coenzyme q10 10 mg cap, Take 100 mg by mouth. Last dose 3/16/18, Disp: , Rfl:     cyanocobalamin 1,000 mcg tablet, Take 1,000 mcg by mouth daily. , Disp: , Rfl:    Date Last Reviewed:  5/4/2018  Social History/Living Environment:     lives alone with one story home-1 steps to enter. No steps in home. Has walk in shower-standing to shower. No grab bars installed but is going to have them installed. Regular height toilet. Tall bed but can sit on it without stool. Still driving  Prior Level of Function:  Mod I  Durable Medical Equipment (DME)  · Current DME: [place]  Work/Activity History: retired from [employer]: Never started therapy  Current Workout: 4 times a week between boxing and [employer]. Number of Personal Factors/Comorbidities that affect the Plan of Care: 3+: HIGH COMPLEXITY   EXAMINATION:   Sensation: intact  Skin Integrity:  intact  Neuromuscular/Motor:   · Muscle Tone:  2/4 on L and 1/4 on R    Coordination:  Foot tapping 1 on R/2 on L, finger taps/YRN/open/close 0 B  Observation/Orthostatic Postural Assessment:  Slight flexion at hips  Lower Extremity:  Grossly 5/5 but PROM:hamstring on R in supine with hip at 90-35, L 44, quad in prone: R-90, L-85. Trunk very rigid as well. He has ocmplaints of cervical spine stiffness as well. L LE more rigid than R LE.  Hip extension B 4+/5  Upper Extremity: grossly 5/5 and [facility]  Additional Special Tests:  · 10m:   · Self selected: 9.91 seconds 5/4/2018-6.12 seconds  · Fast pace: 7.16 seconds 5/4/2018-5.22 seconds  · 5x sit to stand: 10.69 seconds-posterior lean at times but able to adjust  5/4/2018-8.22 seconds with good forward lean  · 6 minute walk test: 1675 feet   Distance:  Greater than 3000 including incline/decline   Assistive Device:  none   Orthosis/Brace:  none   Amount of Assist:  Mod I   Gait Characteristics:  Rigid trunk, decreased L arm swing, decreased step/stride mostly on L, decreased david   Stairs:    · Rails:  one       · Number of Stairs:  12      · Amount of Assist:   mod I         · Pattern:  Reciprocal pattern. Fair weight shift ascending. Good weight shift descending  · SOT:  NT  · Comments:  patient is motivated and doing well. Without complaints   · Education:  Terrence Irizarry on purpose of PROM/stretching  FUNCTIONAL ASSESSMENT:  · Wheelchair:  na  · Bed Mobility: mod I to I including prone   · Transfers: sit to stand wihtout UE support mod I. No freezing turns in 3 steps without LOB      Body Structures Involved:  1. Joints  2. Muscles Body Functions Affected:  1. Cardio  2. Respiratory  3. Neuromusculoskeletal  4. Movement Related Activities and Participation Affected:  1. Mobility   Number of elements that affect the Plan of Care: 4+: HIGH COMPLEXITY   CLINICAL PRESENTATION:   Presentation: Stable and uncomplicated: LOW COMPLEXITY   CLINICAL DECISION MAKING:   Outcome Measure: Tool Used: MINI BEST TEST   Score:  Initial: 23/28 Most Recent: 26/28 5/4/2018   Interpretation of Score: This is a 14 item test on a three level ordinal scale with a total score of 28. There are two sections scored, but only the lower score is recorded. This is a clinical balance assessment tool that aimes to target and identify six different balance control systems. Tool Used: FUNCTIONAL GAIT ASSESSMENT   Score:  Initial: 21/30 Most Recent: 27/30 5/4/2018   Interpretation of Score: This test is a modification of the Dynamic Gait Index.   It is a 10 item test with each item score 0-3 that assesses postural stability during various walking tasks. Score 30 29-24 23-18 17-13 12-7 6-1 0   Modifier CH CI CJ CK CL CM CN ? Mobility - Walking and Moving Around:     - GOAL STATUS: CI - 1%-19% impaired, limited or restricted    - D/C STATUS:             CI - 1%-19% impaired, limited or restricted    Medical Necessity:   · Patient is expected to demonstrate progress in balance, coordination, functional technique and ambulation to increase independence with all functional tasks above. Reason for Services/Other Comments:  · Patient can benefit from skilled outpatient PT to focus on Parkinson's specific treatment to improve all above functional deficits limiting patient daily as well as increasing fall risk to improve overall quality of life. Use of outcome tool(s) and clinical judgement create a POC that gives a: Clear prediction of patient's progress: LOW COMPLEXITY   TREATMENT:   (In addition to Assessment/Re-Assessment sessions the following treatments were rendered)  Pre-treatment Symptoms/Complaints:  5/4/2018: Patient reports worse-nothing. Does not improve. Does not take any meds. At worst back pain is 8-mostly in the am, at best 0/10. Current 0/10. Leg is 0/10. Reports some occasional soreness in Left leg but otherwise states his L LE has improved and he does not have difficulty with it. His main complaint is the stiffness/pain in his low back. He reports no stretching just exercise/strength/balance  Pain: Initial:   Pain Intensity 1: 0 0 Post Session:  0       TREATMENT:   (In addition to Assessment/Re-Assessment sessions the following treatments were rendered)    THERAPEUTIC EXERCISE: (13 minutes):  Exercises per grid below to improve mobility. Required minimal visual and verbal cues to promote proper body alignment. Progressed range as indicated. Reviewed all of previous HEP-patient able to complete well. He reports his elbow is feeling better than last time. No hip pain.  Completed them all safely  Also clemencia on attention to task, purpose of PROM and COG    Treatment/Session Assessment:    · Response to Treatment:  Patient toleraetd very well. He is competent with his HEP and is ready for discharge. · Compliance with Program/Exercises: completed daily  Recommendations/Intent for next treatment session: \"Next visit will focus on advancements to more challenging activities\".   discharge  Total Treatment Duration:  PT Patient Time In/Time Out  Time In: 1350  Time Out: 6408 [facility], PT

## 2018-08-20 PROBLEM — G20 DEMENTIA DUE TO PARKINSON'S DISEASE WITHOUT BEHAVIORAL DISTURBANCE (HCC): Status: ACTIVE | Noted: 2018-08-20

## 2018-08-20 PROBLEM — R29.6 FREQUENT FALLS: Status: ACTIVE | Noted: 2018-08-20

## 2018-08-20 PROBLEM — F02.80 DEMENTIA DUE TO PARKINSON'S DISEASE WITHOUT BEHAVIORAL DISTURBANCE (HCC): Status: ACTIVE | Noted: 2018-08-20

## 2019-06-11 PROBLEM — R41.0 EPISODE OF CONFUSION: Status: ACTIVE | Noted: 2019-06-11

## 2019-06-14 ENCOUNTER — HOSPITAL ENCOUNTER (OUTPATIENT)
Dept: CT IMAGING | Age: 79
Discharge: HOME | End: 2019-06-14
Attending: NURSE PRACTITIONER
Payer: MEDICARE

## 2019-06-14 DIAGNOSIS — R41.0 EPISODE OF CONFUSION: ICD-10-CM

## 2019-06-14 PROCEDURE — 70450 CT HEAD/BRAIN W/O DYE: CPT

## 2019-08-14 ENCOUNTER — APPOINTMENT (OUTPATIENT)
Dept: CT IMAGING | Age: 79
End: 2019-08-14
Attending: EMERGENCY MEDICINE
Payer: MEDICARE

## 2019-08-14 ENCOUNTER — HOSPITAL ENCOUNTER (OUTPATIENT)
Age: 79
Setting detail: OBSERVATION
Discharge: HOME HEALTH | End: 2019-08-15
Attending: EMERGENCY MEDICINE | Admitting: FAMILY MEDICINE
Payer: MEDICARE

## 2019-08-14 DIAGNOSIS — R29.6 FREQUENT FALLS: Chronic | ICD-10-CM

## 2019-08-14 DIAGNOSIS — G20 DEMENTIA DUE TO PARKINSON'S DISEASE WITHOUT BEHAVIORAL DISTURBANCE (HCC): Chronic | ICD-10-CM

## 2019-08-14 DIAGNOSIS — R41.0 EPISODE OF CONFUSION: Primary | ICD-10-CM

## 2019-08-14 DIAGNOSIS — F02.80 DEMENTIA DUE TO PARKINSON'S DISEASE WITHOUT BEHAVIORAL DISTURBANCE (HCC): Chronic | ICD-10-CM

## 2019-08-14 PROBLEM — R41.82 ALTERED MENTAL STATUS: Status: ACTIVE | Noted: 2019-08-14

## 2019-08-14 LAB
ALBUMIN SERPL-MCNC: 3.4 G/DL (ref 3.2–4.6)
ALBUMIN/GLOB SERPL: 1 {RATIO} (ref 1.2–3.5)
ALP SERPL-CCNC: 122 U/L (ref 50–136)
ALT SERPL-CCNC: 14 U/L (ref 12–65)
ANION GAP SERPL CALC-SCNC: 8 MMOL/L (ref 7–16)
APPEARANCE UR: CLEAR
AST SERPL-CCNC: 9 U/L (ref 15–37)
ATRIAL RATE: 66 BPM
BASOPHILS # BLD: 0.1 K/UL (ref 0–0.2)
BASOPHILS NFR BLD: 1 % (ref 0–2)
BILIRUB SERPL-MCNC: 0.8 MG/DL (ref 0.2–1.1)
BILIRUB UR QL: NEGATIVE
BUN SERPL-MCNC: 18 MG/DL (ref 8–23)
CALCIUM SERPL-MCNC: 8.8 MG/DL (ref 8.3–10.4)
CALCULATED R AXIS, ECG10: -66 DEGREES
CALCULATED T AXIS, ECG11: 73 DEGREES
CHLORIDE SERPL-SCNC: 109 MMOL/L (ref 98–107)
CK SERPL-CCNC: 217 U/L (ref 21–215)
CO2 SERPL-SCNC: 25 MMOL/L (ref 21–32)
COLOR UR: YELLOW
CREAT SERPL-MCNC: 1.17 MG/DL (ref 0.8–1.5)
DIAGNOSIS, 93000: NORMAL
DIFFERENTIAL METHOD BLD: ABNORMAL
EOSINOPHIL # BLD: 0 K/UL (ref 0–0.8)
EOSINOPHIL NFR BLD: 0 % (ref 0.5–7.8)
ERYTHROCYTE [DISTWIDTH] IN BLOOD BY AUTOMATED COUNT: 12.7 % (ref 11.9–14.6)
GLOBULIN SER CALC-MCNC: 3.3 G/DL (ref 2.3–3.5)
GLUCOSE SERPL-MCNC: 81 MG/DL (ref 65–100)
GLUCOSE UR STRIP.AUTO-MCNC: NEGATIVE MG/DL
HCT VFR BLD AUTO: 41.1 % (ref 41.1–50.3)
HGB BLD-MCNC: 13.5 G/DL (ref 13.6–17.2)
HGB UR QL STRIP: NEGATIVE
IMM GRANULOCYTES # BLD AUTO: 0 K/UL (ref 0–0.5)
IMM GRANULOCYTES NFR BLD AUTO: 0 % (ref 0–5)
KETONES UR QL STRIP.AUTO: NEGATIVE MG/DL
LACTATE BLD-SCNC: 1.36 MMOL/L (ref 0.5–1.9)
LEUKOCYTE ESTERASE UR QL STRIP.AUTO: NEGATIVE
LYMPHOCYTES # BLD: 1.5 K/UL (ref 0.5–4.6)
LYMPHOCYTES NFR BLD: 15 % (ref 13–44)
MCH RBC QN AUTO: 32.4 PG (ref 26.1–32.9)
MCHC RBC AUTO-ENTMCNC: 32.8 G/DL (ref 31.4–35)
MCV RBC AUTO: 98.6 FL (ref 79.6–97.8)
MONOCYTES # BLD: 0.5 K/UL (ref 0.1–1.3)
MONOCYTES NFR BLD: 5 % (ref 4–12)
NEUTS SEG # BLD: 8 K/UL (ref 1.7–8.2)
NEUTS SEG NFR BLD: 79 % (ref 43–78)
NITRITE UR QL STRIP.AUTO: NEGATIVE
NRBC # BLD: 0 K/UL (ref 0–0.2)
PH UR STRIP: 6.5 [PH] (ref 5–9)
PLATELET # BLD AUTO: 260 K/UL (ref 150–450)
PMV BLD AUTO: 9.4 FL (ref 9.4–12.3)
POTASSIUM SERPL-SCNC: 4.2 MMOL/L (ref 3.5–5.1)
PROT SERPL-MCNC: 6.7 G/DL (ref 6.3–8.2)
PROT UR STRIP-MCNC: NEGATIVE MG/DL
Q-T INTERVAL, ECG07: 414 MS
QRS DURATION, ECG06: 90 MS
QTC CALCULATION (BEZET), ECG08: 406 MS
RBC # BLD AUTO: 4.17 M/UL (ref 4.23–5.6)
SODIUM SERPL-SCNC: 142 MMOL/L (ref 136–145)
SP GR UR REFRACTOMETRY: 1.01 (ref 1–1.02)
TROPONIN I BLD-MCNC: 0 NG/ML (ref 0.02–0.05)
UROBILINOGEN UR QL STRIP.AUTO: 1 EU/DL (ref 0.2–1)
VENTRICULAR RATE, ECG03: 58 BPM
WBC # BLD AUTO: 10.1 K/UL (ref 4.3–11.1)

## 2019-08-14 PROCEDURE — 81003 URINALYSIS AUTO W/O SCOPE: CPT

## 2019-08-14 PROCEDURE — 74011250636 HC RX REV CODE- 250/636: Performed by: FAMILY MEDICINE

## 2019-08-14 PROCEDURE — 85025 COMPLETE CBC W/AUTO DIFF WBC: CPT

## 2019-08-14 PROCEDURE — 83605 ASSAY OF LACTIC ACID: CPT

## 2019-08-14 PROCEDURE — 80053 COMPREHEN METABOLIC PANEL: CPT

## 2019-08-14 PROCEDURE — 93005 ELECTROCARDIOGRAM TRACING: CPT | Performed by: EMERGENCY MEDICINE

## 2019-08-14 PROCEDURE — 96360 HYDRATION IV INFUSION INIT: CPT | Performed by: EMERGENCY MEDICINE

## 2019-08-14 PROCEDURE — 84484 ASSAY OF TROPONIN QUANT: CPT

## 2019-08-14 PROCEDURE — 99285 EMERGENCY DEPT VISIT HI MDM: CPT | Performed by: EMERGENCY MEDICINE

## 2019-08-14 PROCEDURE — 99218 HC RM OBSERVATION: CPT

## 2019-08-14 PROCEDURE — 70450 CT HEAD/BRAIN W/O DYE: CPT

## 2019-08-14 PROCEDURE — 82550 ASSAY OF CK (CPK): CPT

## 2019-08-14 RX ORDER — SODIUM CHLORIDE 9 MG/ML
75 INJECTION, SOLUTION INTRAVENOUS CONTINUOUS
Status: DISCONTINUED | OUTPATIENT
Start: 2019-08-14 | End: 2019-08-15

## 2019-08-14 RX ADMIN — SODIUM CHLORIDE 75 ML/HR: 900 INJECTION, SOLUTION INTRAVENOUS at 23:45

## 2019-08-15 ENCOUNTER — HOME HEALTH ADMISSION (OUTPATIENT)
Dept: HOME HEALTH SERVICES | Facility: HOME HEALTH | Age: 79
End: 2019-08-15
Payer: MEDICARE

## 2019-08-15 VITALS
WEIGHT: 144.62 LBS | SYSTOLIC BLOOD PRESSURE: 153 MMHG | HEART RATE: 51 BPM | HEIGHT: 70 IN | BODY MASS INDEX: 20.7 KG/M2 | TEMPERATURE: 98.7 F | DIASTOLIC BLOOD PRESSURE: 75 MMHG | RESPIRATION RATE: 18 BRPM | OXYGEN SATURATION: 99 %

## 2019-08-15 PROBLEM — R41.82 ALTERED MENTAL STATUS: Status: RESOLVED | Noted: 2019-08-14 | Resolved: 2019-08-15

## 2019-08-15 PROBLEM — F02.80 DEMENTIA DUE TO PARKINSON'S DISEASE WITHOUT BEHAVIORAL DISTURBANCE (HCC): Chronic | Status: ACTIVE | Noted: 2018-08-20

## 2019-08-15 PROBLEM — R29.6 FREQUENT FALLS: Chronic | Status: ACTIVE | Noted: 2018-08-20

## 2019-08-15 PROBLEM — G20 DEMENTIA DUE TO PARKINSON'S DISEASE WITHOUT BEHAVIORAL DISTURBANCE (HCC): Chronic | Status: ACTIVE | Noted: 2018-08-20

## 2019-08-15 LAB
ANION GAP SERPL CALC-SCNC: 10 MMOL/L (ref 7–16)
BASOPHILS # BLD: 0.1 K/UL (ref 0–0.2)
BASOPHILS NFR BLD: 1 % (ref 0–2)
BUN SERPL-MCNC: 19 MG/DL (ref 8–23)
CALCIUM SERPL-MCNC: 8.4 MG/DL (ref 8.3–10.4)
CHLORIDE SERPL-SCNC: 109 MMOL/L (ref 98–107)
CO2 SERPL-SCNC: 25 MMOL/L (ref 21–32)
CREAT SERPL-MCNC: 0.97 MG/DL (ref 0.8–1.5)
DIFFERENTIAL METHOD BLD: ABNORMAL
EOSINOPHIL # BLD: 0.1 K/UL (ref 0–0.8)
EOSINOPHIL NFR BLD: 1 % (ref 0.5–7.8)
ERYTHROCYTE [DISTWIDTH] IN BLOOD BY AUTOMATED COUNT: 12.7 % (ref 11.9–14.6)
GLUCOSE SERPL-MCNC: 59 MG/DL (ref 65–100)
HCT VFR BLD AUTO: 40.2 % (ref 41.1–50.3)
HGB BLD-MCNC: 13 G/DL (ref 13.6–17.2)
IMM GRANULOCYTES # BLD AUTO: 0 K/UL (ref 0–0.5)
IMM GRANULOCYTES NFR BLD AUTO: 0 % (ref 0–5)
LYMPHOCYTES # BLD: 1.5 K/UL (ref 0.5–4.6)
LYMPHOCYTES NFR BLD: 20 % (ref 13–44)
MCH RBC QN AUTO: 32.3 PG (ref 26.1–32.9)
MCHC RBC AUTO-ENTMCNC: 32.3 G/DL (ref 31.4–35)
MCV RBC AUTO: 100 FL (ref 79.6–97.8)
MONOCYTES # BLD: 0.5 K/UL (ref 0.1–1.3)
MONOCYTES NFR BLD: 6 % (ref 4–12)
NEUTS SEG # BLD: 5.5 K/UL (ref 1.7–8.2)
NEUTS SEG NFR BLD: 72 % (ref 43–78)
NRBC # BLD: 0 K/UL (ref 0–0.2)
PLATELET # BLD AUTO: 231 K/UL (ref 150–450)
PMV BLD AUTO: 9.6 FL (ref 9.4–12.3)
POTASSIUM SERPL-SCNC: 3.8 MMOL/L (ref 3.5–5.1)
RBC # BLD AUTO: 4.02 M/UL (ref 4.23–5.6)
SODIUM SERPL-SCNC: 144 MMOL/L (ref 136–145)
WBC # BLD AUTO: 7.7 K/UL (ref 4.3–11.1)

## 2019-08-15 PROCEDURE — 99218 HC RM OBSERVATION: CPT

## 2019-08-15 PROCEDURE — 77030032490 HC SLV COMPR SCD KNE COVD -B

## 2019-08-15 PROCEDURE — 80048 BASIC METABOLIC PNL TOTAL CA: CPT

## 2019-08-15 PROCEDURE — 74011000302 HC RX REV CODE- 302: Performed by: FAMILY MEDICINE

## 2019-08-15 PROCEDURE — 85025 COMPLETE CBC W/AUTO DIFF WBC: CPT

## 2019-08-15 PROCEDURE — 96361 HYDRATE IV INFUSION ADD-ON: CPT

## 2019-08-15 PROCEDURE — 74011250637 HC RX REV CODE- 250/637: Performed by: FAMILY MEDICINE

## 2019-08-15 PROCEDURE — 36415 COLL VENOUS BLD VENIPUNCTURE: CPT

## 2019-08-15 PROCEDURE — 86580 TB INTRADERMAL TEST: CPT | Performed by: FAMILY MEDICINE

## 2019-08-15 RX ORDER — RIVASTIGMINE TARTRATE 3 MG/1
6 CAPSULE ORAL 2 TIMES DAILY WITH MEALS
Status: DISCONTINUED | OUTPATIENT
Start: 2019-08-15 | End: 2019-08-15 | Stop reason: HOSPADM

## 2019-08-15 RX ORDER — GABAPENTIN 300 MG/1
300 CAPSULE ORAL 3 TIMES DAILY
Status: DISCONTINUED | OUTPATIENT
Start: 2019-08-15 | End: 2019-08-15 | Stop reason: HOSPADM

## 2019-08-15 RX ORDER — SODIUM CHLORIDE 0.9 % (FLUSH) 0.9 %
5-40 SYRINGE (ML) INJECTION AS NEEDED
Status: DISCONTINUED | OUTPATIENT
Start: 2019-08-15 | End: 2019-08-15 | Stop reason: HOSPADM

## 2019-08-15 RX ORDER — DROXIDOPA 100 MG/1
100 CAPSULE ORAL 3 TIMES DAILY
Status: DISCONTINUED | OUTPATIENT
Start: 2019-08-15 | End: 2019-08-15 | Stop reason: HOSPADM

## 2019-08-15 RX ORDER — SODIUM CHLORIDE 0.9 % (FLUSH) 0.9 %
5-40 SYRINGE (ML) INJECTION EVERY 8 HOURS
Status: DISCONTINUED | OUTPATIENT
Start: 2019-08-15 | End: 2019-08-15 | Stop reason: HOSPADM

## 2019-08-15 RX ORDER — ONDANSETRON 2 MG/ML
4 INJECTION INTRAMUSCULAR; INTRAVENOUS
Status: DISCONTINUED | OUTPATIENT
Start: 2019-08-15 | End: 2019-08-15 | Stop reason: HOSPADM

## 2019-08-15 RX ORDER — CARBIDOPA AND LEVODOPA 25; 100 MG/1; MG/1
2.5 TABLET ORAL 4 TIMES DAILY
Status: DISCONTINUED | OUTPATIENT
Start: 2019-08-15 | End: 2019-08-15 | Stop reason: HOSPADM

## 2019-08-15 RX ORDER — ACETAMINOPHEN 325 MG/1
650 TABLET ORAL
Status: DISCONTINUED | OUTPATIENT
Start: 2019-08-15 | End: 2019-08-15 | Stop reason: HOSPADM

## 2019-08-15 RX ORDER — MIDODRINE HYDROCHLORIDE 5 MG/1
5 TABLET ORAL
Status: DISCONTINUED | OUTPATIENT
Start: 2019-08-15 | End: 2019-08-15

## 2019-08-15 RX ORDER — LANOLIN ALCOHOL/MO/W.PET/CERES
1000 CREAM (GRAM) TOPICAL DAILY
Status: DISCONTINUED | OUTPATIENT
Start: 2019-08-15 | End: 2019-08-15 | Stop reason: HOSPADM

## 2019-08-15 RX ORDER — LEVOTHYROXINE SODIUM 112 UG/1
112 TABLET ORAL
Status: DISCONTINUED | OUTPATIENT
Start: 2019-08-15 | End: 2019-08-15 | Stop reason: HOSPADM

## 2019-08-15 RX ORDER — AMANTADINE HYDROCHLORIDE 100 MG/1
100 CAPSULE, GELATIN COATED ORAL 2 TIMES DAILY
Status: DISCONTINUED | OUTPATIENT
Start: 2019-08-15 | End: 2019-08-15 | Stop reason: HOSPADM

## 2019-08-15 RX ORDER — CARBIDOPA AND LEVODOPA 25; 100 MG/1; MG/1
2 TABLET, EXTENDED RELEASE ORAL
Status: DISCONTINUED | OUTPATIENT
Start: 2019-08-15 | End: 2019-08-15 | Stop reason: HOSPADM

## 2019-08-15 RX ADMIN — RIVASTIGMINE TARTRATE 6 MG: 3 CAPSULE ORAL at 08:40

## 2019-08-15 RX ADMIN — CYANOCOBALAMIN TAB 1000 MCG 1000 MCG: 1000 TAB at 08:50

## 2019-08-15 RX ADMIN — CARBIDOPA AND LEVODOPA 2.5 TABLET: 25; 100 TABLET ORAL at 11:54

## 2019-08-15 RX ADMIN — LEVOTHYROXINE SODIUM 112 MCG: 0.11 TABLET ORAL at 05:18

## 2019-08-15 RX ADMIN — GABAPENTIN 300 MG: 300 CAPSULE ORAL at 08:50

## 2019-08-15 RX ADMIN — CARBIDOPA AND LEVODOPA 2.5 TABLET: 25; 100 TABLET ORAL at 08:40

## 2019-08-15 RX ADMIN — Medication 5 ML: at 01:27

## 2019-08-15 RX ADMIN — AMANTADINE HYDROCHLORIDE 100 MG: 100 CAPSULE ORAL at 08:41

## 2019-08-15 RX ADMIN — CARBIDOPA AND LEVODOPA 2 TABLET: 25; 100 TABLET, EXTENDED RELEASE ORAL at 01:38

## 2019-08-15 RX ADMIN — TUBERCULIN PURIFIED PROTEIN DERIVATIVE 5 UNITS: 5 INJECTION, SOLUTION INTRADERMAL at 05:16

## 2019-08-15 NOTE — ED PROVIDER NOTES
That assist him through the night. He was last seen a little bit before 8:00 with medication set out. He did not answer when phone calls were placed they went to check on him and found him lying in the bushes. Not quite certain how long he had been there. Within the house a shower was running wand 8Am meds were not taken. . No history of hitting his head. No area of pain other than chronic LBP that seems his baseline. Did not appear to try to get up. No speech or vision changes. No change in baseline motor or sensory  Mental status not at baseline  No recent changes in meds  No noted infectious changes    The history is provided by the patient. Fall   The accident occurred 6 to 12 hours ago. He landed on grass. There was no blood loss. Pain location: slight to back. The pain is mild. He was not ambulatory at the scene. There was entrapment after the fall. There was no drug use involved in the accident. There was no alcohol use involved in the accident. Pertinent negatives include no fever, no nausea, no vomiting, no headaches and no laceration. The risk factors include being elderly (Parkinsonism). He has tried nothing for the symptoms.         Past Medical History:   Diagnosis Date    AAA (abdominal aortic aneurysm) ([facility] 7/8/2016    pt is not aware of this-     Pak's esophagus with esophagitis 7/8/2016    no meds- not daily    Cerebrovascular accident (CVA) ([facility] 7/8/2016    They thought I did but they didn't find anything on the tests \"I blacked out\" no admission to the hospital    Chronic fatigue syndrome 2/26/2016    Cognitive deficits 7/8/2016    Colon polyps 2007    Elevated PSA     Enthesopathy of ankle and tarsus 5/29/2014    GERD (gastroesophageal reflux disease) 7/8/2016    Hashimoto's thyroiditis 7/8/2016    History of nuclear stress test 1997, 5/2005    Hypotension 8/25/2015    Impaired cognition 7/8/2016    Inguinal hernia 7/8/2016    Insomnia 7/8/2016    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         Past Surgical History:   Procedure Laterality Date    HX BREAST BIOPSY Bilateral     sterotactic    HX CATARACT REMOVAL Right 2012    HX COLONOSCOPY  2000 & 2005    HX HERNIA REPAIR Left 2011    HX OTHER SURGICAL  2006    esophagogastroduodenoscopy    HX OTHER SURGICAL      deep leg/ankle tumor excision benign leg dumor    HX THYROIDECTOMY      total         Family History:   Problem Relation Age of Onset    Cancer Mother         Pancreas CA    Cancer Father         Brain CA    Heart Disease Father     Heart Failure Father     No Known Problems Sister     Heart Attack Brother         MI at 52 y.o.    Diabetes Maternal Uncle     Diabetes Paternal Uncle     Arthritis-osteo Sister     Thyroid Disease Neg Hx        Social History     Socioeconomic History    Marital status:      Spouse name: Not on file    Number of children: Not on file    Years of education: Not on file    Highest education level: Not on file   Occupational History    Not on file   Social Needs    Financial resource strain: Not on file    Food insecurity:     Worry: Not on file     Inability: Not on file    Transportation needs:     Medical: Not on file     Non-medical: Not on file   Tobacco Use    Smoking status: Never Smoker    Smokeless tobacco: Never Used   Substance and Sexual Activity    Alcohol use: No    Drug use: No    Sexual activity: Not on file   Lifestyle    Physical activity:     Days per week: Not on file     Minutes per session: Not on file    Stress: Not on file   Relationships    Social connections:     Talks on phone: Not on file     Gets together: Not on file     Attends Hindu service: Not on file     Active member of club or organization: Not on file     Attends meetings of clubs or organizations: Not on file     Relationship status: Not on file    Intimate partner violence:     Fear of current or ex partner: Not on file     Emotionally abused: Not on file     Physically abused: Not on file     Forced sexual activity: Not on file   Other Topics Concern    Not on file   Social History Narrative    Not on file         ALLERGIES: Brompheniramine maleate; Carisoprodol; Meperidine; Penicillin; Phenylephrine hcl; Zaleplon; Dimetapp cold and flu; Donepezil; Penicillins; and Rofecoxib    Review of Systems   Constitutional: Negative for diaphoresis and fever. HENT: Negative. Respiratory: Negative. Cardiovascular: Negative. Gastrointestinal: Negative. Negative for nausea and vomiting. Genitourinary:        No incontinence   Neurological: Negative for headaches. Psychiatric/Behavioral: Positive for decreased concentration and hallucinations. Negative for agitation and behavioral problems. All other systems reviewed and are negative. Vitals:    08/14/19 2344 08/15/19 0110 08/15/19 0123 08/15/19 0244   BP: 188/83 162/70  160/78   Pulse: (!) 55 (!) 57  (!) 50   Resp: 26 18  18   Temp:  97.8 °F (36.6 °C)  98.2 °F (36.8 °C)   SpO2: 98% 97%  97%   Weight:   65.6 kg (144 lb 10 oz)    Height:                Physical Exam   Constitutional: He appears well-developed and well-nourished. No distress. HENT:   Head: Atraumatic. Eyes: No scleral icterus. Neck: Neck supple. Cardiovascular: Normal rate and intact distal pulses. Pulmonary/Chest: Effort normal. No stridor. No respiratory distress. Abdominal: Soft. He exhibits no distension. There is no tenderness. There is no guarding. Musculoskeletal: He exhibits no edema or deformity. Right shoulder: Normal.        Left shoulder: Normal.        Right elbow: Normal.       Left elbow: Normal.        Right wrist: Normal.        Left wrist: Normal.        Right hip: Normal.        Left hip: Normal.        Right knee: Normal.        Left knee: Normal.        Right ankle: Normal.        Left ankle: Normal.        Cervical back: Normal.        Thoracic back: He exhibits tenderness. He exhibits no bony tenderness, no swelling, no edema and no spasm. Lumbar back: Normal.   Neurological: No sensory deficit. He exhibits normal muscle tone. Some hallucinations picking at the air. He thinks he is at the emergency room but is uncertain at other times he is less clear than   Skin: Skin is warm and dry. No laceration noted. No bruise or eris   Psychiatric: Thought content normal. His affect is not inappropriate. He is actively hallucinating. He is not aggressive. Thought content is not paranoid and not delusional.   Nursing note and vitals reviewed. MDM  Number of Diagnoses or Management Options  Dementia due to Parkinson's disease without behavioral disturbance [facility]):   Episode of confusion:   Frequent falls:   Diagnosis management comments: Had a prolonged period where he was outside. He has not returned to his baseline clarity. Consideration for infectious issues was undertaken with no identified at present. But no manifestations of head trauma. Head CT is negative for acute changes. He has no fever cough or sputum or significant pulmonary symptoms. Urinalysis is without significant abnormalities. He does not appear to be somebody nor is her medications. He does not have any bruises or marks or even changes on his clothes consistent with trying to get up. He has no chest pain.        Amount and/or Complexity of Data Reviewed  Clinical lab tests: ordered and reviewed  Tests in the radiology section of CPT®: reviewed and ordered  Decide to obtain previous medical records or to obtain history from someone other than the patient: yes  Obtain history from someone other than the patient: yes  Discuss the patient with other providers: yes  Independent visualization of images, tracings, or specimens: yes    Risk of Complications, Morbidity, and/or Mortality  Presenting problems: high  Diagnostic procedures: low  Management options: moderate    Patient Progress  Patient progress: stable         Procedures  Recent Results (from the past 12 hour(s))   EKG, 12 LEAD, INITIAL    Collection Time: 08/14/19  7:38 PM   Result Value Ref Range    Ventricular Rate 58 BPM    Atrial Rate 66 BPM    QRS Duration 90 ms    Q-T Interval 414 ms    QTC Calculation (Bezet) 406 ms    Calculated R Axis -66 degrees    Calculated T Axis 73 degrees    Diagnosis       !!! Poor data quality, interpretation may be adversely affected  Sinus rhythm  Left axis deviation  Nonspecific T wave abnormality  Abnormal ECG  No previous ECGs available  Confirmed by ST RAGHU HERNANDEZ MD (), PAIRS MONTOYA ([id number]) on 8/14/2019 10:48:23 PM     CK    Collection Time: 08/14/19  7:43 PM   Result Value Ref Range     (H) 21 - 215 U/L   CBC WITH AUTOMATED DIFF    Collection Time: 08/14/19  7:45 PM   Result Value Ref Range    WBC 10.1 4.3 - 11.1 K/uL    RBC 4.17 (L) 4.23 - 5.6 M/uL    HGB 13.5 (L) 13.6 - 17.2 g/dL    HCT 41.1 41.1 - 50.3 %    MCV 98.6 (H) 79.6 - 97.8 FL    MCH 32.4 26.1 - 32.9 PG    MCHC 32.8 31.4 - 35.0 g/dL    RDW 12.7 11.9 - 14.6 %    PLATELET 358 163 - 728 K/uL    MPV 9.4 9.4 - 12.3 FL    ABSOLUTE NRBC 0.00 0.0 - 0.2 K/uL    DF AUTOMATED      NEUTROPHILS 79 (H) 43 - 78 %    LYMPHOCYTES 15 13 - 44 %    MONOCYTES 5 4.0 - 12.0 %    EOSINOPHILS 0 (L) 0.5 - 7.8 %    BASOPHILS 1 0.0 - 2.0 %    IMMATURE GRANULOCYTES 0 0.0 - 5.0 %    ABS. NEUTROPHILS 8.0 1.7 - 8.2 K/UL    ABS. LYMPHOCYTES 1.5 0.5 - 4.6 K/UL    ABS. MONOCYTES 0.5 0.1 - 1.3 K/UL    ABS.  EOSINOPHILS 0.0 0.0 - 0.8 K/UL ABS. BASOPHILS 0.1 0.0 - 0.2 K/UL    ABS. IMM. GRANS. 0.0 0.0 - 0.5 K/UL   METABOLIC PANEL, COMPREHENSIVE    Collection Time: 08/14/19  7:45 PM   Result Value Ref Range    Sodium 142 136 - 145 mmol/L    Potassium 4.2 3.5 - 5.1 mmol/L    Chloride 109 (H) 98 - 107 mmol/L    CO2 25 21 - 32 mmol/L    Anion gap 8 7 - 16 mmol/L    Glucose 81 65 - 100 mg/dL    BUN 18 8 - 23 MG/DL    Creatinine 1.17 0.8 - 1.5 MG/DL    GFR est AA >60 >60 ml/min/1.73m2    GFR est non-AA >60 >60 ml/min/1.73m2    Calcium 8.8 8.3 - 10.4 MG/DL    Bilirubin, total 0.8 0.2 - 1.1 MG/DL    ALT (SGPT) 14 12 - 65 U/L    AST (SGOT) 9 (L) 15 - 37 U/L    Alk.  phosphatase 122 50 - 136 U/L    Protein, total 6.7 6.3 - 8.2 g/dL    Albumin 3.4 3.2 - 4.6 g/dL    Globulin 3.3 2.3 - 3.5 g/dL    A-G Ratio 1.0 (L) 1.2 - 3.5     URINALYSIS W/ RFLX MICROSCOPIC    Collection Time: 08/14/19  7:45 PM   Result Value Ref Range    Color YELLOW      Appearance CLEAR      Specific gravity 1.008 1.001 - 1.023      pH (UA) 6.5 5.0 - 9.0      Protein NEGATIVE  NEG mg/dL    Glucose NEGATIVE  mg/dL    Ketone NEGATIVE  NEG mg/dL    Bilirubin NEGATIVE  NEG      Blood NEGATIVE  NEG      Urobilinogen 1.0 0.2 - 1.0 EU/dL    Nitrites NEGATIVE  NEG      Leukocyte Esterase NEGATIVE  NEG     POC TROPONIN-I    Collection Time: 08/14/19  7:47 PM   Result Value Ref Range    Troponin-I (POC) 0 (L) 0.02 - 0.05 ng/ml   POC LACTIC ACID    Collection Time: 08/14/19  7:50 PM   Result Value Ref Range    Lactic Acid (POC) 1.36 0.5 - 1.9 mmol/L

## 2019-08-15 NOTE — PROGRESS NOTES
Dual skin assessment with Monty Sena RN reveals skin intact. Scars noted on back. Moles over torso. Face reddened.

## 2019-08-15 NOTE — ED NOTES
TRANSFER - OUT REPORT:    Verbal report given to 8th floor nurse(name) on [address].  being transferred to 822(unit) for routine progression of care       Report consisted of patients Situation, Background, Assessment and   Recommendations(SBAR). Information from the following report(s) SBAR and ED Summary was reviewed with the receiving nurse. Lines:   Peripheral IV 08/14/19 Left Antecubital (Active)   Site Assessment Clean, dry, & intact 8/14/2019  7:44 PM   Phlebitis Assessment 0 8/14/2019  7:44 PM   Infiltration Assessment 0 8/14/2019  7:44 PM   Dressing Status Clean, dry, & intact 8/14/2019  7:44 PM   Dressing Type 4 X 4 8/14/2019  7:44 PM   Hub Color/Line Status Pink 8/14/2019  7:44 PM   Alcohol Cap Used Yes 8/14/2019  7:44 PM        Opportunity for questions and clarification was provided.       Patient transported with:   [employer]

## 2019-08-15 NOTE — PROGRESS NOTES
Patient discharged via wheelchair to private auto. Respirations even/unlabored. No acute distress noted. No further needs.

## 2019-08-15 NOTE — ED NOTES
Patient in bed resting. Patient denies pain. Family at bedside. Patient continues to have hallucinations. Patient continues to be A&O x1. Call light within reach.

## 2019-08-15 NOTE — PROGRESS NOTES
Patient's family requested home health care at discharge, stating that they \"were promised\" that patient would receive home health. Although PT/OT have not had the opportunity to work with the patient, he has a history of falls, and therefore would benefit from some evaluation and training in the home. Will also give family contact information for [facility] to assist them with decisions for patient's care. Case Management will remain available to assist as appropriate/needed. Care Management Interventions  PCP Verified by CM:  Yes  Transition of Care Consult (CM Consult): Discharge Planning  Discharge Durable Medical Equipment: No  Physical Therapy Consult: Yes  Occupational Therapy Consult: No  Speech Therapy Consult: No  Current Support Network: Lives Alone, Own Home  Confirm Follow Up Transport: Family  Plan discussed with Pt/Family/Caregiver: Yes  Freedom of Choice Offered: Yes  Discharge Location  Discharge Placement: Home with home health

## 2019-08-15 NOTE — DISCHARGE INSTRUCTIONS
DISCHARGE SUMMARY from Nurse    PATIENT INSTRUCTIONS:    After general anesthesia or intravenous sedation, for 24 hours or while taking prescription Narcotics:  · Limit your activities  · Do not drive and operate hazardous machinery  · Do not make important personal or business decisions  · Do  not drink alcoholic beverages  · If you have not urinated within 8 hours after discharge, please contact your surgeon on call. What to do at Home:  Recommended activity: Activity as tolerated. If you experience any of the following symptoms temp > 101.5, unrelieved pain ,nausea or vomiting, shortness of breath or fatigue not relieved with rest, please follow up with MD.    *  Please give a list of your current medications to your Primary Care Provider. *  Please update this list whenever your medications are discontinued, doses are      changed, or new medications (including over-the-counter products) are added. *  Please carry medication information at all times in case of emergency situations. These are general instructions for a healthy lifestyle:    No smoking/ No tobacco products/ Avoid exposure to second hand smoke  Surgeon General's Warning:  Quitting smoking now greatly reduces serious risk to your health. Obesity, smoking, and sedentary lifestyle greatly increases your risk for illness    A healthy diet, regular physical exercise & weight monitoring are important for maintaining a healthy lifestyle    You may be retaining fluid if you have a history of heart failure or if you experience any of the following symptoms:  Weight gain of 3 pounds or more overnight or 5 pounds in a week, increased swelling in our hands or feet or shortness of breath while lying flat in bed. Please call your doctor as soon as you notice any of these symptoms; do not wait until your next office visit. The discharge information has been reviewed with the patient. The patient verbalized understanding.   Discharge medications reviewed with the patient and appropriate educational materials and side effects teaching were provided. Patient Education        Preventing Falls: Care Instructions  Your Care Instructions    Getting around your home safely can be a challenge if you have injuries or health problems that make it easy for you to fall. Loose rugs and furniture in walkways are among the dangers for many older people who have problems walking or who have poor eyesight. People who have conditions such as arthritis, osteoporosis, or dementia also have to be careful not to fall. You can make your home safer with a few simple measures. Follow-up care is a key part of your treatment and safety. Be sure to make and go to all appointments, and call your doctor if you are having problems. It's also a good idea to know your test results and keep a list of the medicines you take. How can you care for yourself at home? Taking care of yourself  · You may get dizzy if you do not drink enough water. To prevent dehydration, drink plenty of fluids, enough so that your urine is light yellow or clear like water. Choose water and other caffeine-free clear liquids. If you have kidney, heart, or liver disease and have to limit fluids, talk with your doctor before you increase the amount of fluids you drink. · Exercise regularly to improve your strength, muscle tone, and balance. Walk if you can. Swimming may be a good choice if you cannot walk easily. · Have your vision and hearing checked each year or any time you notice a change. If you have trouble seeing and hearing, you might not be able to avoid objects and could lose your balance. · Know the side effects of the medicines you take. Ask your doctor or pharmacist whether the medicines you take can affect your balance. Sleeping pills or sedatives can affect your balance. · Limit the amount of alcohol you drink. Alcohol can impair your balance and other senses.   · Ask your doctor whether calluses or corns on your feet need to be removed. If you wear loose-fitting shoes because of calluses or corns, you can lose your balance and fall. · Talk to your doctor if you have numbness in your feet. Preventing falls at home  · Remove raised doorway thresholds, throw rugs, and clutter. Repair loose carpet or raised areas in the floor. · Move furniture and electrical cords to keep them out of walking paths. · Use nonskid floor wax, and wipe up spills right away, especially on ceramic tile floors. · If you use a walker or cane, put rubber tips on it. If you use crutches, clean the bottoms of them regularly with an abrasive pad, such as steel wool. · Keep your house well lit, especially [place], and outside walkways. Use night-lights in areas such as hallways and bathrooms. Add extra light switches or use remote switches (such as switches that go on or off when you clap your hands) to make it easier to turn lights on if you have to get up during the night. · Install sturdy handrails on stairways. · Move items in your cabinets so that the things you use a lot are on the lower shelves (about waist level). · Keep a cordless phone and a flashlight with new batteries by your bed. If possible, put a phone in each of the main rooms of your house, or carry a cell phone in case you fall and cannot reach a phone. Or, you can wear a device around your neck or wrist. You push a button that sends a signal for help. · Wear low-heeled shoes that fit well and give your feet good support. Use footwear with nonskid soles. Check the heels and soles of your shoes for wear. Repair or replace worn heels or soles. · Do not wear socks without shoes on wood floors. · Walk on the grass when the sidewalks are slippery. If you live in an area that gets snow and ice in the winter, sprinkle salt on slippery steps and sidewalks.   Preventing falls in the bath  · Install grab bars and nonskid mats inside and outside your shower or tub and near the toilet and sinks. · Use shower chairs and bath benches. · Use a hand-held shower head that will allow you to sit while showering. · Get into a tub or shower by putting the weaker leg in first. Get out of a tub or shower with your strong side first.  · Repair loose toilet seats and consider installing a raised toilet seat to make getting on and off the toilet easier. · Keep your bathroom door unlocked while you are in the shower. Where can you learn more? Go to http://molly-latosha.info/. Enter [phone] [id number] [phone] in the search box to learn more about \"Preventing Falls: Care Instructions. \"  Current as of: November 7, 2018  Content Version: 12.1  © 0944-8935 [employer]. Care instructions adapted under license by [employer] (which disclaims liability or warranty for this information). If you have questions about a medical condition or this instruction, always ask your healthcare professional. [address] any warranty or liability for your use of this information.          ___________________________________________________________________________________________________________________________________

## 2019-08-15 NOTE — PROGRESS NOTES
TRANSFER - IN REPORT:    [facility] on [address]  being received from ED for routine progression of care      Report consisted of patients Situation, Background, Assessment and   Recommendations(SBAR). Information from the following report(s) ED Summary was reviewed with the receiving nurse. Opportunity for questions and clarification was provided. Assessment completed upon patients arrival to unit and care assumed. Family with patient.

## 2019-08-15 NOTE — PROGRESS NOTES
[address]  Face to Face Encounter    Patients Name: Baltazar Bowen. YOB: 1940    Ordering Physician: Hamilton Ortega MD    Primary Diagnosis: Altered mental status [R41.82]    Date of Face to Face:   8/15/2019                                  Face to Face Encounter findings are related to primary reason for home care:   yes. 1. I certify that the patient needs intermittent care as follows: physical therapy: strengthening, stretching/ROM, transfer training, gait/stair training, balance training and pt/caregiver education  occupational therapy:  ADL safety (ie. cooking, bathing, dressing), ROM and pt/caregiver education    2. I certify that this patient is homebound, that is: 1) patient requires the use of a walker device, special transportation, or assistance of another to leave the home; or 2) patient's condition makes leaving the home medically contraindicated; and 3) patient has a normal inability to leave the home and leaving the home requires considerable and taxing effort. Patient may leave the home for infrequent and short duration for medical reasons, and occasional absences for non-medical reasons. Homebound status is due to the following functional limitations: Patient with strength deficits limiting the performance of all ADL's without caregiver assistance or the use of an assistive device. Patient with poor safety awareness and is at risk for falls without assistance of another person and the use of an assistive device. Patient with poor ambulation endurance limiting their safe ability to ascend/descend the required number of steps to leave the home. 3. I certify that this patient is under my care and that I, or a nurse practitioner or [address] [place] [address], or clinical nurse specialist, or certified nurse midwife, working with me, had a Face-to-Face Encounter that meets the physician Face-to-Face Encounter requirements.   The following are the clinical findings from the Face-to-Face encounter that support the need for skilled services and is a summary of the encounter: Medical Record    See discharge summary and summary of the patient's illness      Janna Munoz RN  8/15/2019      THE FOLLOWING TO BE COMPLETED BY THE COMMUNITY PHYSICIAN:    I concur with the findings described above from the F2F encounter that this patient is homebound and in need of a skilled service.     Certifying Physician: _____________________________________      Printed Certifying Physician Name: _____________________________________    Date: _________________

## 2019-08-15 NOTE — PROGRESS NOTES
Patient family member states they do not take the Northera unless the patient sbp is equal to or below 130 while sitting. Family states medication is $1100.00 a bottle.

## 2019-08-15 NOTE — PROGRESS NOTES
Discharge instructions, follow up information, and medication list provided and explained to the patient and patient's sister at bedside. IV removed from left AC, no remote telemetry on. Opportunity for questions provided. Sister is requesting to speak with Case Management prior to leaving, CM made aware. Per sister, patient's son-in-law is coming to transport patient home. Instructed to call once ready to leave.

## 2019-08-15 NOTE — PROGRESS NOTES
Patient hospitalized under Observation status overnight after a fall at home. Patient reports that it was a mechanical fall after getting his foot caught. He was medically screened and deemed stable for discharge by attending MD. Sister voiced concerns about some ongoing, intermittent confusion. Attending suggested that FDC may be the best solution if the patient and family agree. Unfortunately, this is not something that can be arranged from the hospital. Case Management will remain available to assist as needed. Care Management Interventions  PCP Verified by CM:  Yes  Transition of Care Consult (CM Consult): Discharge Planning  Discharge Durable Medical Equipment: No  Physical Therapy Consult: Yes  Occupational Therapy Consult: No  Speech Therapy Consult: No  Current Support Network: Lives Alone, Own Home  Confirm Follow Up Transport: Family  Plan discussed with Pt/Family/Caregiver: Yes  Freedom of Choice Offered: Yes  Discharge Location  Discharge Placement: Home

## 2019-08-15 NOTE — DISCHARGE SUMMARY
Hospitalist Discharge Summary     Admit Date:  2019  7:37 PM   Name:  Gray Grmim. Age:  70 y.o.  :  1940   MRN:  [id number]   PCP:  Mary Shin MD  Treatment Team: Attending Provider: Edel Fitzgerald MD; Utilization Review: Stu Reid RN; Staff Nurse: Davis Mohan; Physical Therapist: Gregg Lyman    Problem List for this Hospitalization:  Hospital Problems as of 8/15/2019 Date Reviewed: 2019          Codes Class Noted - Resolved POA    * (Principal) Altered mental status ICD-10-CM: R41.82  ICD-9-CM: 780.97  2019 - Present Unknown        Frequent falls ICD-10-CM: R29.6  ICD-9-CM: V15.88  2018 - Present Yes        Dementia due to Parkinson's disease without behavioral disturbance ([facility] [address] ICD-10-CM: Marco A Santos, F02.80  ICD-9-CM: 332.0, 294.10  2018 - Present Yes        Cognitive deficits ICD-10-CM: R41.89  ICD-9-CM: 294.9  2016 - Present Yes        Hypotension ICD-10-CM: I95.9  ICD-9-CM: 458.9  2015 - Present Yes                Admission HPI from 2019:    \"Patient is a 78yoM with PMH significant for dementia associated with Parkinson's disease, hypotension with h/o falls who presents after being found in his yard. He was last seen well this morning around 8am.  His family could not get a hold of him, and went by to check on him, and he was in his front yard, laying in a bush. He reports that he remembers the fall and when the ambulance came to get him. His family reports that he missed his noon and evening doses of medications. Patient has a h/o falls secondary to hypotension. He also has a history of some AMS and hallucinations associated with his dementia/Parkinson's, which improved/resolved after starting a medications earlier this year. He was altered on EMS arrival and was having reported visual hallucinations.     On ER evaluation, he tells me that he feels fine. He has no complaints. He denies any pain/injury 2/2 his fall.   He denies active hallucinations at this time. He is oriented x 2-3 (only did not get month correct, but did know person, place, year). \"    Hospital Course:  Pt was placed in observation. Pt says his foot got caught on something and he simply tripped and fell. He did not have LOC and no subsequent pain. No complaints today and feels fine; alert and oriented. Sister is concerned that he has ongoing chronic issues with hallucinations which can be a manifestation of parkinson's. She says he does not take his meds consistently. I reminded her gently that these issues are not grounds for admission per se. I suggested that at some point he may need to transition to detention and that would be up to the family and patient to decide. He also has an appointment with Neurology next week and he should keep this appointment. He has no indication for hospital stay. CT head in ER was negative as was labwork. No med changes. Compliance stressed. Disposition: [place] Health Care [facility] [place]  Activity: PT/OT per Home Health  Diet: DIET REGULAR  DIET NUTRITIONAL SUPPLEMENTS All Meals; Ensure Enlive  Code Status: Full Code      Follow up instructions, discharge meds at bottom of this note. Plan was discussed with pt, sister. All questions answered. Patient was stable at time of discharge. Patient will call a physician or return if any concerns. Diagnostic Imaging/Tests:   Ct Head Wo Cont    Result Date: 8/14/2019  EXAM: Noncontrast CT head. INDICATION: Altered mental status. COMPARISON: Prior CT head on June 14, 2019. TECHNIQUE: Noncontrast CT images of the head were obtained. Radiation dose reduction techniques were used for this study. Our CT scanners use one or all of the following:  Automated exposure control, adjustment of the mA or kV according to patient size, iterative reconstruction. FINDINGS: Brain volume is appropriate for age. No acute infarct, hemorrhage or mass is identified.  There is no mass effect, midline shift or depressed fracture. The mastoids are clear. There is minimal mucosal thickening in the bilateral maxillary sinuses. IMPRESSION: No acute intracranial process. Echocardiogram results:  No results found for this visit on 08/14/19.     Procedures done this admission:  * No surgery found *    All Micro Results     None          Labs: Results:       BMP, Mg, Phos Recent Labs     08/15/19  0717 08/14/19  1945    142   K 3.8 4.2   * 109*   CO2 25 25   AGAP 10 8   BUN 19 18   CREA 0.97 1.17   CA 8.4 8.8   GLU 59* 81      CBC Recent Labs     08/15/19  0717 08/14/19  1945   WBC 7.7 10.1   RBC 4.02* 4.17*   HGB 13.0* 13.5*   HCT 40.2* 41.1    260   GRANS 72 79*   LYMPH 20 15   EOS 1 0*   MONOS 6 5   BASOS 1 1   IG 0 0   ANEU 5.5 8.0   ABL 1.5 1.5   BIJAN 0.1 0.0   ABM 0.5 0.5   ABB 0.1 0.1   AIG 0.0 0.0      LFT Recent Labs     08/14/19 1945   SGOT 9*   ALT 14      TP 6.7   ALB 3.4   GLOB 3.3   AGRAT 1.0*      Cardiac Testing Lab Results   Component Value Date/Time     (H) 08/14/2019 07:43 PM      Coagulation Tests No results found for: PTP, INR, APTT   A1c No results found for: HBA1C, HGBE8, VGC4SQCO   Lipid Panel No results found for: CHOL, CHOLPOCT, CHOLX, CHLST, CHOLV, 393609, HDL, LDL, LDLC, DLDLP, 702542, VLDLC, VLDL, TGLX, TRIGL, TRIGP, TGLPOCT, CHHD, CHHDX   Thyroid Panel No results found for: TSH, T4, FT4, TT3, T3U, TSHEXT     Most Recent UA Lab Results   Component Value Date/Time    Color YELLOW 08/14/2019 07:45 PM    Appearance CLEAR 08/14/2019 07:45 PM    Specific gravity 1.008 08/14/2019 07:45 PM    pH (UA) 6.5 08/14/2019 07:45 PM    Protein NEGATIVE  08/14/2019 07:45 PM    Glucose NEGATIVE  08/14/2019 07:45 PM    Ketone NEGATIVE  08/14/2019 07:45 PM    Bilirubin NEGATIVE  08/14/2019 07:45 PM    Blood NEGATIVE  08/14/2019 07:45 PM    Urobilinogen 1.0 08/14/2019 07:45 PM    Nitrites NEGATIVE  08/14/2019 07:45 PM    Leukocyte Esterase NEGATIVE  08/14/2019 07:45 PM Allergies   Allergen Reactions    Brompheniramine Maleate Unknown (comments)     Pt does not recall med.  Carisoprodol Unknown (comments)     Other reaction(s): Rash-A, Unknown-A  Pt does not recall med.  Meperidine Unknown (comments)     Pt does not recall med.  Penicillin Rash    Phenylephrine Hcl Unknown (comments)     Pt does not recall med.  Zaleplon Unknown (comments)     Other reaction(s): Rash-A, Unknown-A  Pt does not recall med.      Dimetapp Cold And Flu Rash    Donepezil Other (comments)     Altered mental state      Penicillins Rash    Rofecoxib Rash     Immunization History   Administered Date(s) Administered    Influenza High Dose Vaccine PF 02/03/2014, 12/22/2014    Influenza Vaccine PF 01/02/2012, 01/23/2013    Pneumococcal Conjugate (PCV-13) 02/10/2015    Pneumococcal Polysaccharide (PPSV-23) 12/02/2009    TB Skin Test (PPD) Intradermal 08/15/2019    Td, Adsorbed PF 12/02/2009    Tdap 02/03/2014       All Labs from Last 24 Hrs:  Recent Results (from the past 24 hour(s))   EKG, 12 LEAD, INITIAL    Collection Time: 08/14/19  7:38 PM   Result Value Ref Range    Ventricular Rate 58 BPM    Atrial Rate 66 BPM    QRS Duration 90 ms    Q-T Interval 414 ms    QTC Calculation (Bezet) 406 ms    Calculated R Axis -66 degrees    Calculated T Axis 73 degrees    Diagnosis       !!! Poor data quality, interpretation may be adversely affected  Sinus rhythm  Left axis deviation  Nonspecific T wave abnormality  Abnormal ECG  No previous ECGs available  Confirmed by ST RAGHU HERNANDEZ MD (), PARIS MONTOYA ([id number]) on 8/14/2019 10:48:23 PM     CK    Collection Time: 08/14/19  7:43 PM   Result Value Ref Range     (H) 21 - 215 U/L   CBC WITH AUTOMATED DIFF    Collection Time: 08/14/19  7:45 PM   Result Value Ref Range    WBC 10.1 4.3 - 11.1 K/uL    RBC 4.17 (L) 4.23 - 5.6 M/uL    HGB 13.5 (L) 13.6 - 17.2 g/dL    HCT 41.1 41.1 - 50.3 %    MCV 98.6 (H) 79.6 - 97.8 FL    MCH 32.4 26.1 - 32.9 PG MCHC 32.8 31.4 - 35.0 g/dL    RDW 12.7 11.9 - 14.6 %    PLATELET 467 050 - 820 K/uL    MPV 9.4 9.4 - 12.3 FL    ABSOLUTE NRBC 0.00 0.0 - 0.2 K/uL    DF AUTOMATED      NEUTROPHILS 79 (H) 43 - 78 %    LYMPHOCYTES 15 13 - 44 %    MONOCYTES 5 4.0 - 12.0 %    EOSINOPHILS 0 (L) 0.5 - 7.8 %    BASOPHILS 1 0.0 - 2.0 %    IMMATURE GRANULOCYTES 0 0.0 - 5.0 %    ABS. NEUTROPHILS 8.0 1.7 - 8.2 K/UL    ABS. LYMPHOCYTES 1.5 0.5 - 4.6 K/UL    ABS. MONOCYTES 0.5 0.1 - 1.3 K/UL    ABS. EOSINOPHILS 0.0 0.0 - 0.8 K/UL    ABS. BASOPHILS 0.1 0.0 - 0.2 K/UL    ABS. IMM. GRANS. 0.0 0.0 - 0.5 K/UL   METABOLIC PANEL, COMPREHENSIVE    Collection Time: 08/14/19  7:45 PM   Result Value Ref Range    Sodium 142 136 - 145 mmol/L    Potassium 4.2 3.5 - 5.1 mmol/L    Chloride 109 (H) 98 - 107 mmol/L    CO2 25 21 - 32 mmol/L    Anion gap 8 7 - 16 mmol/L    Glucose 81 65 - 100 mg/dL    BUN 18 8 - 23 MG/DL    Creatinine 1.17 0.8 - 1.5 MG/DL    GFR est AA >60 >60 ml/min/1.73m2    GFR est non-AA >60 >60 ml/min/1.73m2    Calcium 8.8 8.3 - 10.4 MG/DL    Bilirubin, total 0.8 0.2 - 1.1 MG/DL    ALT (SGPT) 14 12 - 65 U/L    AST (SGOT) 9 (L) 15 - 37 U/L    Alk.  phosphatase 122 50 - 136 U/L    Protein, total 6.7 6.3 - 8.2 g/dL    Albumin 3.4 3.2 - 4.6 g/dL    Globulin 3.3 2.3 - 3.5 g/dL    A-G Ratio 1.0 (L) 1.2 - 3.5     URINALYSIS W/ RFLX MICROSCOPIC    Collection Time: 08/14/19  7:45 PM   Result Value Ref Range    Color YELLOW      Appearance CLEAR      Specific gravity 1.008 1.001 - 1.023      pH (UA) 6.5 5.0 - 9.0      Protein NEGATIVE  NEG mg/dL    Glucose NEGATIVE  mg/dL    Ketone NEGATIVE  NEG mg/dL    Bilirubin NEGATIVE  NEG      Blood NEGATIVE  NEG      Urobilinogen 1.0 0.2 - 1.0 EU/dL    Nitrites NEGATIVE  NEG      Leukocyte Esterase NEGATIVE  NEG     POC TROPONIN-I    Collection Time: 08/14/19  7:47 PM   Result Value Ref Range    Troponin-I (POC) 0 (L) 0.02 - 0.05 ng/ml   POC LACTIC ACID    Collection Time: 08/14/19  7:50 PM   Result Value Ref Range Lactic Acid (POC) 1.36 0.5 - 1.9 mmol/L   METABOLIC PANEL, BASIC    Collection Time: 08/15/19  7:17 AM   Result Value Ref Range    Sodium 144 136 - 145 mmol/L    Potassium 3.8 3.5 - 5.1 mmol/L    Chloride 109 (H) 98 - 107 mmol/L    CO2 25 21 - 32 mmol/L    Anion gap 10 7 - 16 mmol/L    Glucose 59 (L) 65 - 100 mg/dL    BUN 19 8 - 23 MG/DL    Creatinine 0.97 0.8 - 1.5 MG/DL    GFR est AA >60 >60 ml/min/1.73m2    GFR est non-AA >60 >60 ml/min/1.73m2    Calcium 8.4 8.3 - 10.4 MG/DL   CBC WITH AUTOMATED DIFF    Collection Time: 08/15/19  7:17 AM   Result Value Ref Range    WBC 7.7 4.3 - 11.1 K/uL    RBC 4.02 (L) 4.23 - 5.6 M/uL    HGB 13.0 (L) 13.6 - 17.2 g/dL    HCT 40.2 (L) 41.1 - 50.3 %    .0 (H) 79.6 - 97.8 FL    MCH 32.3 26.1 - 32.9 PG    MCHC 32.3 31.4 - 35.0 g/dL    RDW 12.7 11.9 - 14.6 %    PLATELET 661 824 - 919 K/uL    MPV 9.6 9.4 - 12.3 FL    ABSOLUTE NRBC 0.00 0.0 - 0.2 K/uL    DF AUTOMATED      NEUTROPHILS 72 43 - 78 %    LYMPHOCYTES 20 13 - 44 %    MONOCYTES 6 4.0 - 12.0 %    EOSINOPHILS 1 0.5 - 7.8 %    BASOPHILS 1 0.0 - 2.0 %    IMMATURE GRANULOCYTES 0 0.0 - 5.0 %    ABS. NEUTROPHILS 5.5 1.7 - 8.2 K/UL    ABS. LYMPHOCYTES 1.5 0.5 - 4.6 K/UL    ABS. MONOCYTES 0.5 0.1 - 1.3 K/UL    ABS. EOSINOPHILS 0.1 0.0 - 0.8 K/UL    ABS. BASOPHILS 0.1 0.0 - 0.2 K/UL    ABS. IMM.  GRANS. 0.0 0.0 - 0.5 K/UL       Current Med List in Hospital:   Current Facility-Administered Medications   Medication Dose Route Frequency    cyanocobalamin tablet 1,000 mcg  1,000 mcg Oral DAILY    levothyroxine (SYNTHROID) tablet 112 mcg  112 mcg Oral ACB    gabapentin (NEURONTIN) capsule 300 mg  300 mg Oral TID    amantadine HCl (SYMMETREL) capsule 100 mg  100 mg Oral BID    carbidopa-levodopa ER (SINEMET CR)  mg per tablet 2 Tab  2 Tab Oral QHS    rivastigmine tartrate (EXELON) capsule 6 mg  6 mg Oral BID WITH MEALS    [Held by provider] droxidopa (NORTHERA) capsule 100 mg (Patient Supplied)  100 mg Oral TID   [facility] carbidopa-levodopa (SINEMET)  mg per tablet 2.5 Tab  2.5 Tab Oral QID    sodium chloride (NS) flush 5-40 mL  5-40 mL IntraVENous Q8H    sodium chloride (NS) flush 5-40 mL  5-40 mL IntraVENous PRN    acetaminophen (TYLENOL) tablet 650 mg  650 mg Oral Q4H PRN    ondansetron (ZOFRAN) injection 4 mg  4 mg IntraVENous Q4H PRN    tuberculin injection 5 Units  5 Units IntraDERMal ONCE       Discharge Exam:  Patient Vitals for the past 24 hrs:   Temp Pulse Resp BP SpO2   08/15/19 0720 98.5 °F (36.9 °C) (!) 55 17 153/72 98 %   08/15/19 0244 98.2 °F (36.8 °C) (!) 50 18 160/78 97 %   08/15/19 0110 97.8 °F (36.6 °C) (!) 57 18 162/70 97 %   08/14/19 2344  (!) 55 26 188/83 98 %   08/14/19 2232  (!) 54  171/81 100 %   08/14/19 1937 98.8 °F (37.1 °C) 61 16 (!) 203/86 99 %     Oxygen Therapy  O2 Sat (%): 98 % (08/15/19 0720)  Pulse via Oximetry: 110 beats per minute (08/14/19 2344)  O2 Device: Room air (08/15/19 0740)    Intake/Output Summary (Last 24 hours) at 8/15/2019 1051  Last data filed at 8/15/2019 0930  Gross per 24 hour   Intake 833.75 ml   Output 775 ml   Net 58.75 ml       *Note that automatically entered I/Os may not be accurate; dependent on patient compliance with collection and accurate  by assistants. General:    Well nourished. Alert. Eyes:   Normal sclerae. Extraocular movements intact. ENT:  Normocephalic, atraumatic. Moist mucous membranes  CV:   Regular rate and rhythm. No murmur, rub, or gallop. Lungs:  Clear to auscultation bilaterally. No wheezing, rhonchi, or rales. Abdomen: Soft, nontender, nondistended. Bowel sounds normal.   Extremities: Warm and dry. No cyanosis or edema. Neurologic: CN II-XII grossly intact. Sensation intact. Skin:     No rashes or jaundice. Psych:  Normal mood and affect. Discharge Info:   Current Discharge Medication List      CONTINUE these medications which have NOT CHANGED    Details   droxidopa (NORTHERA) 300 mg cap Take  by mouth. 300mg TID depending on BP reading      carbidopa-levodopa (SINEMET)  mg per tablet Take 2.5 Tabs by mouth four (4) times daily. Qty: 900 Tab, Refills: 3    Associated Diagnoses: Parkinson's disease ([address]      amantadine HCl (SYMMETREL) 100 mg tablet 1 tab BID  Qty: 180 Tab, Refills: 3      rivastigmine tartrate (EXELON) 6 mg capsule Take  by mouth two (2) times daily (with meals). carbidopa-levodopa ER (SINEMET CR)  mg per tablet Take 1 Tab by mouth nightly. Qty: 90 Tab, Refills: 3      gabapentin (NEURONTIN) 300 mg capsule TAKE 1 CAPSULE BY MOUTH 4  TIMES DAILY  Qty: 360 Cap, Refills: 1    Associated Diagnoses: Neuropathy involving both lower extremities      levothyroxine (SYNTHROID) 112 mcg tablet Take  by mouth Daily (before breakfast). Cholecalciferol, Vitamin D3, (VITAMIN D3) 2,000 unit cap capsule Take 2,000 Units by mouth.      coenzyme q10 10 mg cap Take 100 mg by mouth. Last dose 3/16/18      cyanocobalamin 1,000 mcg tablet Take 1,000 mcg by mouth daily. Follow Up Orders:  No orders of the defined types were placed in this encounter. Follow-up Information     Follow up With Specialties Details Why Contact Info    Neurology   keep your upcoming appointment           Time spent in patient discharge planning and coordination 35 minutes.     Signed:  Stephanie Valdes MD

## 2019-08-15 NOTE — PROGRESS NOTES
Pt alert and oriented to self and place. Reoriented to time and situation. Posey intact, SR up x 3, bed low locked. Door open. Call light within reach. No distress. Will monitor.

## 2019-08-15 NOTE — PROGRESS NOTES
LYUBOV recevied from [address]. Patient remains in stable condition with respirations even/unlabored. No acute distress noted at this time. Patient on room air. Call light remains within reach, patient encouraged to call nurse prn assist. Will continue to monitor per policy.

## 2019-08-15 NOTE — ED NOTES
Incontinent care with linen change given. Patient turned and repositioned in the bed. Patient denies pain.  Family at bedside

## 2019-08-15 NOTE — H&P
HOSPITALIST H&P/CONSULT  NAME:  Tracy Hughes. Age:  70 y.o.  :   1940   MRN:   [id number]  PCP: Ibis Tse MD  Consulting MD:  Treatment Team: Attending Provider: Lux Brizuela MD  HPI:   Patient is a 78yoM with PMH significant for dementia associated with Parkinson's disease, hypotension with h/o falls who presents after being found in his yard. He was last seen well this morning around 8am.  His family could not get a hold of him, and went by to check on him, and he was in his front yard, laying in a bush. He reports that he remembers the fall and when the ambulance came to get him. His family reports that he missed his noon and evening doses of medications. Patient has a h/o falls secondary to hypotension. He also has a history of some AMS and hallucinations associated with his dementia/Parkinson's, which improved/resolved after starting a medications earlier this year. He was altered on EMS arrival and was having reported visual hallucinations. On ER evaluation, he tells me that he feels fine. He has no complaints. He denies any pain/injury 2/2 his fall. He denies active hallucinations at this time. He is oriented x 2-3 (only did not get month correct, but did know person, place, year).     Complete ROS done and is as stated in HPI or otherwise negative  Past Medical History:   Diagnosis Date    AAA (abdominal aortic aneurysm) ([facility] [address] 2016    pt is not aware of this-     Pak's esophagus with esophagitis 2016    no meds- not daily    Cerebrovascular accident (CVA) ([facility] [address] 2016    They thought I did but they didn't find anything on the tests \"I blacked out\" no admission to the hospital    Chronic fatigue syndrome 2016    Cognitive deficits 2016    Colon polyps 2007    Elevated PSA     Enthesopathy of ankle and tarsus 2014    GERD (gastroesophageal reflux disease) 2016    Hashimoto's thyroiditis 2016    History of nuclear stress test , 2005    Hypotension 2015    Impaired cognition 2016    Inguinal hernia 2016    Insomnia 2016    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  Tibial neuropathy 11/3/2015    Unsteady gait 2015    Overview: With 2 falls in past month. Past Surgical History:   Procedure Laterality Date    HX BREAST BIOPSY Bilateral     sterotactic    HX CATARACT REMOVAL Right 2012    HX COLONOSCOPY   &     HX HERNIA REPAIR Left 2011    HX OTHER SURGICAL  2006    esophagogastroduodenoscopy    HX OTHER SURGICAL      deep leg/ankle tumor excision benign leg dumor    HX THYROIDECTOMY      total      Prior to Admission Medications   Prescriptions Last Dose Informant Patient Reported? Taking? Cholecalciferol, Vitamin D3, (VITAMIN D3) 2,000 unit cap capsule Not Taking at Unknown time  Yes No   Sig: Take 2,000 Units by mouth. amantadine HCl (SYMMETREL) 100 mg tablet 2019 at Unknown time  No Yes   Si tab BID   carbidopa-levodopa (SINEMET)  mg per tablet 2019 at Unknown time  No Yes   Sig: Take 2.5 Tabs by mouth four (4) times daily. carbidopa-levodopa ER (SINEMET CR)  mg per tablet 2019  No No   Sig: Take 1 Tab by mouth nightly. coenzyme q10 10 mg cap Not Taking at Unknown time  Yes No   Sig: Take 100 mg by mouth. Last dose 3/16/18   cyanocobalamin 1,000 mcg tablet Unknown at Unknown time  Yes No   Sig: Take 1,000 mcg by mouth daily. droxidopa (NORTHERA) 300 mg cap 2019 at Unknown time  Yes Yes   Sig: Take  by mouth.  300mg TID depending on BP reading gabapentin (NEURONTIN) 300 mg capsule 2019  No No   Sig: TAKE 1 CAPSULE BY MOUTH 4  TIMES DAILY   Patient taking differently: 1 tid and 3 HS   levothyroxine (SYNTHROID) 112 mcg tablet 2019  Yes No   Sig: Take  by mouth Daily (before breakfast). rivastigmine tartrate (EXELON) 6 mg capsule 2019  Yes No   Sig: Take  by mouth two (2) times daily (with meals). Facility-Administered Medications: None     Allergies   Allergen Reactions    Brompheniramine Maleate Unknown (comments)     Pt does not recall med.  Carisoprodol Unknown (comments)     Other reaction(s): Rash-A, Unknown-A  Pt does not recall med.  Meperidine Unknown (comments)     Pt does not recall med.  Penicillin Rash    Phenylephrine Hcl Unknown (comments)     Pt does not recall med.  Zaleplon Unknown (comments)     Other reaction(s): Rash-A, Unknown-A  Pt does not recall med.      Dimetapp Cold And Flu Rash    Donepezil Other (comments)     Altered mental state      Penicillins Rash    Rofecoxib Rash      Social History     Tobacco Use    Smoking status: Never Smoker    Smokeless tobacco: Never Used   Substance Use Topics    Alcohol use: No      Family History   Problem Relation Age of Onset    Cancer Mother         Pancreas CA    Cancer Father         Brain CA    Heart Disease Father     Heart Failure Father     No Known Problems Sister     Heart Attack Brother         MI at 52 y.o.    Diabetes Maternal Uncle     Diabetes Paternal Uncle     Arthritis-osteo Sister     Thyroid Disease Neg Hx       Objective:     Visit Vitals  /78 (BP 1 Location: Right arm, BP Patient Position: At rest)   Pulse (!) 50 Comment: told rn   Temp 98.2 °F (36.8 °C)   Resp 18   Ht 5' 10\" (1.778 m)   Wt 65.6 kg (144 lb 10 oz)   SpO2 97%   BMI 20.75 kg/m²      Temp (24hrs), Av.3 °F (36.8 °C), Min:97.8 °F (36.6 °C), Max:98.8 °F (37.1 °C)    Oxygen Therapy  O2 Sat (%): 97 % (08/15/19 0244)  Pulse via Oximetry: 110 beats per minute (08/14/19 2344)  O2 Device: Room air (08/14/19 1937)  Physical Exam:  General:    Alert, cooperative, no distress, appears stated age. Head:   Normocephalic, without obvious abnormality, atraumatic. Nose:  Nares normal. No drainage or sinus tenderness. Lungs:   Clear to auscultation bilaterally. No Wheezing or Rhonchi. No rales. Heart:   Regular rate and rhythm,  no murmur, rub or gallop. Abdomen:   Soft, non-tender. Not distended. Bowel sounds normal.   Extremities: No cyanosis. No edema. No clubbing  Skin:     Texture, turgor normal. No rashes or lesions.   Not Jaundiced  Neurologic: Alert and oriented x 3, no focal deficits   Data Review:   Recent Results (from the past 24 hour(s))   EKG, 12 LEAD, INITIAL    Collection Time: 08/14/19  7:38 PM   Result Value Ref Range    Ventricular Rate 58 BPM    Atrial Rate 66 BPM    QRS Duration 90 ms    Q-T Interval 414 ms    QTC Calculation (Bezet) 406 ms    Calculated R Axis -66 degrees    Calculated T Axis 73 degrees    Diagnosis       !!! Poor data quality, interpretation may be adversely affected  Sinus rhythm  Left axis deviation  Nonspecific T wave abnormality  Abnormal ECG  No previous ECGs available  Confirmed by ST RAGHU HERNANDEZ MD (), PARIS MONTOYA ([id number]) on 8/14/2019 10:48:23 PM     CK    Collection Time: 08/14/19  7:43 PM   Result Value Ref Range     (H) 21 - 215 U/L   CBC WITH AUTOMATED DIFF    Collection Time: 08/14/19  7:45 PM   Result Value Ref Range    WBC 10.1 4.3 - 11.1 K/uL    RBC 4.17 (L) 4.23 - 5.6 M/uL    HGB 13.5 (L) 13.6 - 17.2 g/dL    HCT 41.1 41.1 - 50.3 %    MCV 98.6 (H) 79.6 - 97.8 FL    MCH 32.4 26.1 - 32.9 PG    MCHC 32.8 31.4 - 35.0 g/dL    RDW 12.7 11.9 - 14.6 %    PLATELET 838 279 - 556 K/uL    MPV 9.4 9.4 - 12.3 FL    ABSOLUTE NRBC 0.00 0.0 - 0.2 K/uL    DF AUTOMATED      NEUTROPHILS 79 (H) 43 - 78 %    LYMPHOCYTES 15 13 - 44 %    MONOCYTES 5 4.0 - 12.0 %    EOSINOPHILS 0 (L) 0.5 - 7.8 %    BASOPHILS 1 0.0 - 2.0 % IMMATURE GRANULOCYTES 0 0.0 - 5.0 %    ABS. NEUTROPHILS 8.0 1.7 - 8.2 K/UL    ABS. LYMPHOCYTES 1.5 0.5 - 4.6 K/UL    ABS. MONOCYTES 0.5 0.1 - 1.3 K/UL    ABS. EOSINOPHILS 0.0 0.0 - 0.8 K/UL    ABS. BASOPHILS 0.1 0.0 - 0.2 K/UL    ABS. IMM. GRANS. 0.0 0.0 - 0.5 K/UL   METABOLIC PANEL, COMPREHENSIVE    Collection Time: 08/14/19  7:45 PM   Result Value Ref Range    Sodium 142 136 - 145 mmol/L    Potassium 4.2 3.5 - 5.1 mmol/L    Chloride 109 (H) 98 - 107 mmol/L    CO2 25 21 - 32 mmol/L    Anion gap 8 7 - 16 mmol/L    Glucose 81 65 - 100 mg/dL    BUN 18 8 - 23 MG/DL    Creatinine 1.17 0.8 - 1.5 MG/DL    GFR est AA >60 >60 ml/min/1.73m2    GFR est non-AA >60 >60 ml/min/1.73m2    Calcium 8.8 8.3 - 10.4 MG/DL    Bilirubin, total 0.8 0.2 - 1.1 MG/DL    ALT (SGPT) 14 12 - 65 U/L    AST (SGOT) 9 (L) 15 - 37 U/L    Alk. phosphatase 122 50 - 136 U/L    Protein, total 6.7 6.3 - 8.2 g/dL    Albumin 3.4 3.2 - 4.6 g/dL    Globulin 3.3 2.3 - 3.5 g/dL    A-G Ratio 1.0 (L) 1.2 - 3.5     URINALYSIS W/ RFLX MICROSCOPIC    Collection Time: 08/14/19  7:45 PM   Result Value Ref Range    Color YELLOW      Appearance CLEAR      Specific gravity 1.008 1.001 - 1.023      pH (UA) 6.5 5.0 - 9.0      Protein NEGATIVE  NEG mg/dL    Glucose NEGATIVE  mg/dL    Ketone NEGATIVE  NEG mg/dL    Bilirubin NEGATIVE  NEG      Blood NEGATIVE  NEG      Urobilinogen 1.0 0.2 - 1.0 EU/dL    Nitrites NEGATIVE  NEG      Leukocyte Esterase NEGATIVE  NEG     POC TROPONIN-I    Collection Time: 08/14/19  7:47 PM   Result Value Ref Range    Troponin-I (POC) 0 (L) 0.02 - 0.05 ng/ml   POC LACTIC ACID    Collection Time: 08/14/19  7:50 PM   Result Value Ref Range    Lactic Acid (POC) 1.36 0.5 - 1.9 mmol/L     Imaging /Procedures /Studies     Assessment and Plan:      Active Hospital Problems    Diagnosis Date Noted    Altered mental status 08/14/2019    Dementia due to Parkinson's disease without behavioral disturbance ([facility] [address] 08/20/2018    Frequent falls 08/20/2018    Cognitive deficits 07/08/2016    Hypotension 08/25/2015       PLAN  AMS  - Somewhat better than presentation  - Did receive home meds in ER from his medications  - Will continue home meds as this has helped with his AMS in the past  - Will hold off on further work up at this time given h/o similar symptoms with missing home medications    Parkinson's disease/Dementia  - Home medications    Hypotension/Falls  - BP is robust of current  - Fall precautions  - Will consult with PT, unclear if pt would benefit from STR        Anticipated discharge: 1-2 days, pending clinical course    Signed By: Veronica Ko MD     August 15, 2019

## 2019-08-15 NOTE — PROGRESS NOTES
Pt awake all night. Confused at present. States, \" I walked down here to get a little exercise. I need to go back to my room\"  Pt reoriented to place, time and situation. Clute alarm  Intact, SR upx 3, bed low locked. Door open. Will update oncoming nurse.

## 2019-08-16 ENCOUNTER — HOME CARE VISIT (OUTPATIENT)
Dept: SCHEDULING | Facility: HOME HEALTH | Age: 79
End: 2019-08-16
Payer: MEDICARE

## 2019-08-16 VITALS
SYSTOLIC BLOOD PRESSURE: 120 MMHG | WEIGHT: 142 LBS | OXYGEN SATURATION: 99 % | TEMPERATURE: 97.8 F | BODY MASS INDEX: 20.33 KG/M2 | RESPIRATION RATE: 15 BRPM | HEART RATE: 60 BPM | HEIGHT: 70 IN | DIASTOLIC BLOOD PRESSURE: 80 MMHG

## 2019-08-16 PROCEDURE — G0151 HHCP-SERV OF PT,EA 15 MIN: HCPCS

## 2019-08-16 PROCEDURE — 400013 HH SOC

## 2019-08-16 PROCEDURE — 3331090001 HH PPS REVENUE CREDIT

## 2019-08-16 PROCEDURE — 3331090002 HH PPS REVENUE DEBIT

## 2019-08-17 PROCEDURE — 3331090002 HH PPS REVENUE DEBIT

## 2019-08-17 PROCEDURE — 3331090001 HH PPS REVENUE CREDIT

## 2019-08-18 PROCEDURE — 3331090002 HH PPS REVENUE DEBIT

## 2019-08-18 PROCEDURE — 3331090001 HH PPS REVENUE CREDIT

## 2019-08-19 PROCEDURE — 3331090002 HH PPS REVENUE DEBIT

## 2019-08-19 PROCEDURE — 3331090001 HH PPS REVENUE CREDIT

## 2019-08-20 ENCOUNTER — HOME CARE VISIT (OUTPATIENT)
Dept: SCHEDULING | Facility: HOME HEALTH | Age: 79
End: 2019-08-20
Payer: MEDICARE

## 2019-08-20 VITALS
RESPIRATION RATE: 16 BRPM | SYSTOLIC BLOOD PRESSURE: 142 MMHG | TEMPERATURE: 97.7 F | DIASTOLIC BLOOD PRESSURE: 70 MMHG | HEART RATE: 60 BPM

## 2019-08-20 VITALS
RESPIRATION RATE: 17 BRPM | SYSTOLIC BLOOD PRESSURE: 142 MMHG | HEART RATE: 52 BPM | DIASTOLIC BLOOD PRESSURE: 70 MMHG | TEMPERATURE: 97.7 F

## 2019-08-20 PROCEDURE — G0151 HHCP-SERV OF PT,EA 15 MIN: HCPCS

## 2019-08-20 PROCEDURE — G0152 HHCP-SERV OF OT,EA 15 MIN: HCPCS

## 2019-08-20 PROCEDURE — 3331090001 HH PPS REVENUE CREDIT

## 2019-08-20 PROCEDURE — 3331090002 HH PPS REVENUE DEBIT

## 2019-08-21 PROCEDURE — 3331090002 HH PPS REVENUE DEBIT

## 2019-08-21 PROCEDURE — 3331090001 HH PPS REVENUE CREDIT

## 2019-08-22 ENCOUNTER — HOME CARE VISIT (OUTPATIENT)
Dept: SCHEDULING | Facility: HOME HEALTH | Age: 79
End: 2019-08-22
Payer: MEDICARE

## 2019-08-22 PROBLEM — G20 PSYCHOSIS DUE TO PARKINSON'S DISEASE (HCC): Status: ACTIVE | Noted: 2019-08-22

## 2019-08-22 PROCEDURE — 3331090001 HH PPS REVENUE CREDIT

## 2019-08-22 PROCEDURE — 3331090002 HH PPS REVENUE DEBIT

## 2019-08-22 PROCEDURE — G0157 HHC PT ASSISTANT EA 15: HCPCS

## 2019-08-23 VITALS
TEMPERATURE: 97.2 F | DIASTOLIC BLOOD PRESSURE: 60 MMHG | SYSTOLIC BLOOD PRESSURE: 120 MMHG | HEART RATE: 76 BPM | RESPIRATION RATE: 16 BRPM

## 2019-08-23 PROCEDURE — 3331090001 HH PPS REVENUE CREDIT

## 2019-08-23 PROCEDURE — 3331090002 HH PPS REVENUE DEBIT

## 2019-08-24 PROCEDURE — 3331090002 HH PPS REVENUE DEBIT

## 2019-08-24 PROCEDURE — 3331090001 HH PPS REVENUE CREDIT

## 2019-08-25 PROCEDURE — 3331090001 HH PPS REVENUE CREDIT

## 2019-08-25 PROCEDURE — 3331090002 HH PPS REVENUE DEBIT

## 2019-08-26 PROCEDURE — 3331090002 HH PPS REVENUE DEBIT

## 2019-08-26 PROCEDURE — 3331090001 HH PPS REVENUE CREDIT

## 2019-08-27 ENCOUNTER — HOME CARE VISIT (OUTPATIENT)
Dept: SCHEDULING | Facility: HOME HEALTH | Age: 79
End: 2019-08-27
Payer: MEDICARE

## 2019-08-27 VITALS
RESPIRATION RATE: 16 BRPM | HEART RATE: 72 BPM | TEMPERATURE: 97.3 F | SYSTOLIC BLOOD PRESSURE: 136 MMHG | DIASTOLIC BLOOD PRESSURE: 70 MMHG

## 2019-08-27 PROCEDURE — G0157 HHC PT ASSISTANT EA 15: HCPCS

## 2019-08-27 PROCEDURE — 3331090001 HH PPS REVENUE CREDIT

## 2019-08-27 PROCEDURE — 3331090002 HH PPS REVENUE DEBIT

## 2019-08-28 PROCEDURE — 3331090001 HH PPS REVENUE CREDIT

## 2019-08-28 PROCEDURE — 3331090002 HH PPS REVENUE DEBIT

## 2019-08-29 ENCOUNTER — HOME CARE VISIT (OUTPATIENT)
Dept: SCHEDULING | Facility: HOME HEALTH | Age: 79
End: 2019-08-29
Payer: MEDICARE

## 2019-08-29 VITALS
DIASTOLIC BLOOD PRESSURE: 78 MMHG | SYSTOLIC BLOOD PRESSURE: 122 MMHG | HEART RATE: 74 BPM | TEMPERATURE: 97.3 F | RESPIRATION RATE: 16 BRPM

## 2019-08-29 PROCEDURE — G0157 HHC PT ASSISTANT EA 15: HCPCS

## 2019-08-29 PROCEDURE — 3331090001 HH PPS REVENUE CREDIT

## 2019-08-29 PROCEDURE — 3331090002 HH PPS REVENUE DEBIT

## 2019-08-30 PROCEDURE — 3331090001 HH PPS REVENUE CREDIT

## 2019-08-30 PROCEDURE — 3331090002 HH PPS REVENUE DEBIT

## 2019-08-31 PROCEDURE — 3331090001 HH PPS REVENUE CREDIT

## 2019-08-31 PROCEDURE — 3331090002 HH PPS REVENUE DEBIT

## 2019-09-01 PROCEDURE — 3331090002 HH PPS REVENUE DEBIT

## 2019-09-01 PROCEDURE — 3331090001 HH PPS REVENUE CREDIT

## 2019-09-02 PROCEDURE — 3331090001 HH PPS REVENUE CREDIT

## 2019-09-02 PROCEDURE — 3331090002 HH PPS REVENUE DEBIT

## 2019-09-03 PROCEDURE — 3331090002 HH PPS REVENUE DEBIT

## 2019-09-03 PROCEDURE — 3331090001 HH PPS REVENUE CREDIT

## 2019-09-04 ENCOUNTER — HOME CARE VISIT (OUTPATIENT)
Dept: SCHEDULING | Facility: HOME HEALTH | Age: 79
End: 2019-09-04
Payer: MEDICARE

## 2019-09-04 VITALS
HEART RATE: 68 BPM | SYSTOLIC BLOOD PRESSURE: 120 MMHG | TEMPERATURE: 97.5 F | DIASTOLIC BLOOD PRESSURE: 70 MMHG | RESPIRATION RATE: 16 BRPM

## 2019-09-04 PROCEDURE — G0157 HHC PT ASSISTANT EA 15: HCPCS

## 2019-09-04 PROCEDURE — 3331090002 HH PPS REVENUE DEBIT

## 2019-09-04 PROCEDURE — 3331090001 HH PPS REVENUE CREDIT

## 2019-09-05 ENCOUNTER — HOME CARE VISIT (OUTPATIENT)
Dept: SCHEDULING | Facility: HOME HEALTH | Age: 79
End: 2019-09-05
Payer: MEDICARE

## 2019-09-05 VITALS
DIASTOLIC BLOOD PRESSURE: 73 MMHG | RESPIRATION RATE: 17 BRPM | HEART RATE: 60 BPM | SYSTOLIC BLOOD PRESSURE: 148 MMHG | TEMPERATURE: 97.7 F

## 2019-09-05 PROCEDURE — 3331090001 HH PPS REVENUE CREDIT

## 2019-09-05 PROCEDURE — G0151 HHCP-SERV OF PT,EA 15 MIN: HCPCS

## 2019-09-05 PROCEDURE — 3331090002 HH PPS REVENUE DEBIT

## 2020-01-26 ENCOUNTER — APPOINTMENT (OUTPATIENT)
Dept: CT IMAGING | Age: 80
End: 2020-01-26
Attending: EMERGENCY MEDICINE
Payer: MEDICARE

## 2020-01-26 ENCOUNTER — HOSPITAL ENCOUNTER (EMERGENCY)
Age: 80
Discharge: HOME | End: 2020-01-26
Attending: EMERGENCY MEDICINE
Payer: MEDICARE

## 2020-01-26 VITALS
HEIGHT: 70 IN | RESPIRATION RATE: 16 BRPM | HEART RATE: 65 BPM | TEMPERATURE: 97.7 F | OXYGEN SATURATION: 98 % | SYSTOLIC BLOOD PRESSURE: 130 MMHG | DIASTOLIC BLOOD PRESSURE: 70 MMHG | BODY MASS INDEX: 18.47 KG/M2 | WEIGHT: 129 LBS

## 2020-01-26 DIAGNOSIS — S01.01XA LACERATION OF SCALP, INITIAL ENCOUNTER: Primary | ICD-10-CM

## 2020-01-26 PROCEDURE — 70450 CT HEAD/BRAIN W/O DYE: CPT

## 2020-01-26 PROCEDURE — 90471 IMMUNIZATION ADMIN: CPT

## 2020-01-26 PROCEDURE — 99283 EMERGENCY DEPT VISIT LOW MDM: CPT

## 2020-01-26 PROCEDURE — 75810000293 HC SIMP/SUPERF WND  RPR

## 2020-01-26 PROCEDURE — 74011250636 HC RX REV CODE- 250/636: Performed by: EMERGENCY MEDICINE

## 2020-01-26 PROCEDURE — 90715 TDAP VACCINE 7 YRS/> IM: CPT | Performed by: EMERGENCY MEDICINE

## 2020-01-26 RX ADMIN — TETANUS TOXOID, REDUCED DIPHTHERIA TOXOID AND ACELLULAR PERTUSSIS VACCINE, ADSORBED 0.5 ML: 5; 2.5; 8; 8; 2.5 SUSPENSION INTRAMUSCULAR at 22:30

## 2020-01-27 NOTE — ED NOTES
I have reviewed discharge instructions with the patient. The patient verbalized understanding. Patient left ED via Discharge Method: wheelchair to Home with son and daughter. Opportunity for questions and clarification provided. Patient given 0 scripts. To continue your aftercare when you leave the hospital, you may receive an automated call from our care team to check in on how you are doing. This is a free service and part of our promise to provide the best care and service to meet your aftercare needs.  If you have questions, or wish to unsubscribe from this service please call [phone]. Thank you for Choosing our [facility] Emergency Department.

## 2020-01-27 NOTE — ED TRIAGE NOTES
Pt states that he lost his balance tonight and fell. Hitting the left side of his. Laceration to the right side of the scalp noted with bleeding controlled.   Questionable LOC

## 2020-01-27 NOTE — ED PROVIDER NOTES
Presents with laceration to the scalp after a fall at home this evening. The patient does have a Parkinson's diagnosis and has occasional falls although he states his Parkinson's has been under good control of late. He denies loss of consciousness but was found lying on the floor by family. He denies any other injuries and has no other complaints    The history is provided by the patient. Fall   The accident occurred 3 to 5 hours ago. The fall occurred while walking. He fell from a height of ground level. He landed on hard floor. The volume of blood lost was minimal. The point of impact was the head. The pain is present in the head. The pain is at a severity of 6/10. The pain is moderate. He was not ambulatory at the scene. There was no entrapment after the fall. There was no drug use involved in the accident. There was no alcohol use involved in the accident. Associated symptoms include laceration. Pertinent negatives include no visual change, no numbness, no nausea, no vomiting, no headaches, no extremity weakness, no loss of consciousness and no tingling. The risk factors include being elderly (Parkinson's disease). The symptoms are aggravated by pressure on injury. He has tried nothing for the symptoms. The treatment provided no relief. It is unknown when the patient last had a tetanus shot. Head Injury    The incident occurred 3 to 5 hours ago. He came to the ER via walk-in. The injury mechanism was a direct blow. The volume of blood lost was minimal. The quality of the pain is described as dull. The pain is at a severity of 6/10. The pain is moderate. The pain has been constant since the injury. Pertinent negatives include no numbness, no blurred vision, no vomiting, no disorientation, no weakness and no memory loss. He was found alert and oriented by EMS personnel. There was no loss of consciousness.         Past Medical History:   Diagnosis Date    AAA (abdominal aortic aneurysm) ([facility] [address] 7/8/2016    pt is not aware of this-     Pak's esophagus with esophagitis 7/8/2016    no meds- not daily    Cerebrovascular accident (CVA) ([facility] [address] 7/8/2016    They thought I did but they didn't find anything on the tests \"I blacked out\" no admission to the hospital    Chronic fatigue syndrome 2/26/2016    Cognitive deficits 7/8/2016    Colon polyps 2007    Elevated PSA     Enthesopathy of ankle and tarsus 5/29/2014    GERD (gastroesophageal reflux disease) 7/8/2016    Hashimoto's thyroiditis 7/8/2016    History of nuclear stress test 1997, 5/2005    Hypotension 8/25/2015    Impaired cognition 7/8/2016    Inguinal hernia 7/8/2016    Insomnia 7/8/2016    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         Past Surgical History:   Procedure Laterality Date    HX BREAST BIOPSY Bilateral     sterotactic    HX CATARACT REMOVAL Right 2012    HX COLONOSCOPY  2000 & 2005    HX HERNIA REPAIR Left 2011    HX OTHER SURGICAL  2006    esophagogastroduodenoscopy    HX OTHER SURGICAL      deep leg/ankle tumor excision benign leg dumor    HX THYROIDECTOMY      total         Family History:   Problem Relation Age of Onset    Cancer Mother         Pancreas CA    Cancer Father         Brain CA    Heart Disease Father     Heart Failure Father     No Known Problems Sister     Heart Attack Brother         MI at 52 y.o.    Diabetes Maternal Uncle     Diabetes Paternal Uncle     Arthritis-osteo Sister     Thyroid Disease Neg Hx        Social History Socioeconomic History    Marital status:      Spouse name: Not on file    Number of children: Not on file    Years of education: Not on file    Highest education level: Not on file   Occupational History    Not on file   Social Needs    Financial resource strain: Not on file    Food insecurity:     Worry: Not on file     Inability: Not on file    Transportation needs:     Medical: Not on file     Non-medical: Not on file   Tobacco Use    Smoking status: Never Smoker    Smokeless tobacco: Never Used   Substance and Sexual Activity    Alcohol use: No    Drug use: No    Sexual activity: Not Currently   Lifestyle    Physical activity:     Days per week: Not on file     Minutes per session: Not on file    Stress: Not on file   Relationships    Social connections:     Talks on phone: Not on file     Gets together: Not on file     Attends Mosque service: Not on file     Active member of club or organization: Not on file     Attends meetings of clubs or organizations: Not on file     Relationship status: Not on file    Intimate partner violence:     Fear of current or ex partner: Not on file     Emotionally abused: Not on file     Physically abused: Not on file     Forced sexual activity: Not on file   Other Topics Concern    Not on file   Social History Narrative    Not on file         ALLERGIES: Brompheniramine maleate; Carisoprodol; Meperidine; Penicillin; Phenylephrine hcl; Zaleplon; Dimetapp cold and flu; Donepezil; Penicillins; and Rofecoxib    Review of Systems   Eyes: Negative for blurred vision. Gastrointestinal: Negative for nausea and vomiting. Musculoskeletal: Negative for extremity weakness. Neurological: Negative for tingling, loss of consciousness, weakness, numbness and headaches. Psychiatric/Behavioral: Negative for memory loss. All other systems reviewed and are negative.       Vitals:    01/26/20 2046   BP: 134/73   Pulse: 63   Resp: 16   Temp: 97.7 °F (36.5 °C) SpO2: 98%   Weight: 58.5 kg (129 lb)   Height: 5' 10\" (1.778 m)            Physical Exam  Vitals signs and nursing note reviewed. Constitutional:       General: He is not in acute distress. Appearance: Normal appearance. He is normal weight. He is not ill-appearing, toxic-appearing or diaphoretic. HENT:      Head: Normocephalic. Comments: 3 cm laceration noted to the right occipital area. No active bleeding. Dried blood in the hairline on the scalp. Right Ear: Tympanic membrane normal.      Left Ear: Tympanic membrane normal.      Nose: Nose normal.      Mouth/Throat:      Mouth: Mucous membranes are moist.      Pharynx: Oropharynx is clear. Eyes:      Extraocular Movements: Extraocular movements intact. Conjunctiva/sclera: Conjunctivae normal.      Pupils: Pupils are equal, round, and reactive to light. Neck:      Musculoskeletal: Normal range of motion and neck supple. No muscular tenderness. Cardiovascular:      Rate and Rhythm: Normal rate. Pulmonary:      Effort: Pulmonary effort is normal.   Chest:      Chest wall: No tenderness. Abdominal:      Tenderness: There is no tenderness. There is no right CVA tenderness or left CVA tenderness. Musculoskeletal: Normal range of motion. General: No tenderness or signs of injury. Skin:     General: Skin is warm and dry. Capillary Refill: Capillary refill takes less than 2 seconds. Findings: Laceration present. Neurological:      General: No focal deficit present. Mental Status: He is alert and oriented to person, place, and time. Mental status is at baseline.    Psychiatric:         Mood and Affect: Mood normal.         Behavior: Behavior normal.          MDM  Number of Diagnoses or Management Options  Laceration of scalp, initial encounter:      Amount and/or Complexity of Data Reviewed  Tests in the radiology section of CPT®: ordered and reviewed  Independent visualization of images, tracings, or specimens: yes    Risk of Complications, Morbidity, and/or Mortality  Presenting problems: low  Diagnostic procedures: low  Management options: low    Patient Progress  Patient progress: improved         Wound Repair  Date/Time: 1/26/2020 10:44 PM  Performed by: attendingPreparation: skin prepped with ChloraPrep  Pre-procedure re-eval: Immediately prior to the procedure, the patient was reevaluated and found suitable for the planned procedure and any planned medications. Time out: Immediately prior to the procedure a time out was called to verify the correct patient, procedure, equipment, staff and marking as appropriate. .  Location details: scalp  Wound length:2.6 - 7.5 cm  Anesthesia: local infiltration    Anesthesia:  Local Anesthetic: lidocaine 1% with epinephrine  Anesthetic total: 3 mL  Foreign bodies: no foreign bodies  Irrigation solution: saline  Irrigation method: jet lavage  Skin closure: staples  Number of sutures: 6  Approximation: close  Patient tolerance: Patient tolerated the procedure well with no immediate complications  My total time at bedside, performing this procedure was 1-15 minutes.

## 2020-01-27 NOTE — DISCHARGE INSTRUCTIONS
Patient Education        Cuts Closed With Staples: Care Instructions  Your Care Instructions  A cut can happen anywhere on your body. The doctor used staples to close the cut. Staples easily and quickly close a cut, which helps the cut heal.  Sometimes a cut can injure tendons, blood vessels, or nerves. If the cut went deep and through the skin, the doctor may have put in a layer of stitches below the staples. The deeper layer of stitches brings the deep part of the cut together. These stitches will dissolve and don't need to be removed. The staples in the upper layer are what you see on the cut. You may have a bandage. You will need to have the staples removed, usually in 7 to 14 days. The doctor has checked you carefully, but problems can develop later. If you notice any problems or new symptoms, get medical treatment right away. Follow-up care is a key part of your treatment and safety. Be sure to make and go to all appointments, and call your doctor if you are having problems. It's also a good idea to know your test results and keep a list of the medicines you take. How can you care for yourself at home? · Keep the cut dry for the first 24 to 48 hours. After this, you can shower if your doctor okays it. Pat the cut dry. · Don't soak the cut, such as in a bathtub. Your doctor will tell you when it's safe to get the cut wet. · If your doctor told you how to care for your cut, follow your doctor's instructions. If you did not get instructions, follow this general advice:  ? After the first 24 to 48 hours, wash around the cut with clean water 2 times a day. Don't use hydrogen peroxide or alcohol, which can slow healing. ? You may cover the cut with a thin layer of petroleum jelly, such as Vaseline, and a nonstick bandage. ? Apply more petroleum jelly and replace the bandage as needed. · Avoid any activity that could cause your cut to reopen. · Do not remove the staples on your own.  Your doctor will tell you when to come back to have the staples removed. · Take pain medicines exactly as directed. ? If the doctor gave you a prescription medicine for pain, take it as prescribed. ? If you are not taking a prescription pain medicine, ask your doctor if you can take an over-the-counter medicine. When should you call for help? Call your doctor now or seek immediate medical care if:    · You have new pain, or your pain gets worse.     · The skin near the cut is cold or pale or changes color.     · You have tingling, weakness, or numbness near the cut.     · The cut starts to bleed, and blood soaks through the bandage. Oozing small amounts of blood is normal.     · You have trouble moving the area near the cut.     · You have symptoms of infection, such as:  ? Increased pain, swelling, warmth, or redness around the cut.  ? Red streaks leading from the cut.  ? Pus draining from the cut.  ? A fever.    Watch closely for changes in your health, and be sure to contact your doctor if:    · You do not get better as expected. Where can you learn more? Go to http://molly-latosha.info/. Enter M937 in the search box to learn more about \"Cuts Closed With Staples: Care Instructions. \"  Current as of: June 26, 2019  Content Version: 12.2  © 5151-8169 [employer]. Care instructions adapted under license by [employer] (which disclaims liability or warranty for this information). If you have questions about a medical condition or this instruction, always ask your healthcare professional. [place] [address] any warranty or liability for your use of this information.

## 2020-06-12 PROBLEM — R35.1 NOCTURIA: Status: ACTIVE | Noted: 2020-06-12

## 2020-12-02 ENCOUNTER — HOSPICE ADMISSION (OUTPATIENT)
Dept: HOSPICE | Facility: HOSPICE | Age: 80
End: 2020-12-02

## 2021-04-01 ENCOUNTER — HOSPITAL ENCOUNTER (EMERGENCY)
Age: 81
Discharge: ANOTHER HOSPITAL | DRG: 083 | End: 2021-04-01
Attending: EMERGENCY MEDICINE
Payer: MEDICARE

## 2021-04-01 ENCOUNTER — APPOINTMENT (OUTPATIENT)
Dept: GENERAL RADIOLOGY | Age: 81
DRG: 083 | End: 2021-04-01
Attending: EMERGENCY MEDICINE
Payer: MEDICARE

## 2021-04-01 ENCOUNTER — APPOINTMENT (OUTPATIENT)
Dept: CT IMAGING | Age: 81
DRG: 083 | End: 2021-04-01
Attending: EMERGENCY MEDICINE
Payer: MEDICARE

## 2021-04-01 ENCOUNTER — HOSPITAL ENCOUNTER (INPATIENT)
Age: 81
LOS: 5 days | Discharge: NURSING, REHAB OR LONG-TERM CARE | DRG: 083 | End: 2021-04-06
Attending: INTERNAL MEDICINE | Admitting: INTERNAL MEDICINE
Payer: MEDICARE

## 2021-04-01 VITALS
DIASTOLIC BLOOD PRESSURE: 94 MMHG | OXYGEN SATURATION: 100 % | HEART RATE: 99 BPM | RESPIRATION RATE: 20 BRPM | SYSTOLIC BLOOD PRESSURE: 154 MMHG | TEMPERATURE: 97.8 F

## 2021-04-01 DIAGNOSIS — R41.0 CONFUSION: ICD-10-CM

## 2021-04-01 DIAGNOSIS — R41.89 COGNITIVE DEFICITS: Chronic | ICD-10-CM

## 2021-04-01 DIAGNOSIS — I95.1 ORTHOSTATIC HYPOTENSION: Chronic | ICD-10-CM

## 2021-04-01 DIAGNOSIS — E87.1 HYPONATREMIA: ICD-10-CM

## 2021-04-01 DIAGNOSIS — G90.3 NEUROLOGIC ORTHOSTATIC HYPOTENSION (HCC): Chronic | ICD-10-CM

## 2021-04-01 DIAGNOSIS — G20 DYSKINESIA DUE TO PARKINSON'S DISEASE (HCC): ICD-10-CM

## 2021-04-01 DIAGNOSIS — G20 DEMENTIA DUE TO PARKINSON'S DISEASE WITHOUT BEHAVIORAL DISTURBANCE (HCC): Chronic | ICD-10-CM

## 2021-04-01 DIAGNOSIS — G20 PARKINSON DISEASE (HCC): Chronic | ICD-10-CM

## 2021-04-01 DIAGNOSIS — F02.80 DEMENTIA DUE TO PARKINSON'S DISEASE WITHOUT BEHAVIORAL DISTURBANCE (HCC): Chronic | ICD-10-CM

## 2021-04-01 DIAGNOSIS — S06.5XAA SUBDURAL HEMATOMA: ICD-10-CM

## 2021-04-01 DIAGNOSIS — G24.9 DYSKINESIA DUE TO PARKINSON'S DISEASE (HCC): ICD-10-CM

## 2021-04-01 DIAGNOSIS — S09.90XA CLOSED HEAD INJURY, INITIAL ENCOUNTER: ICD-10-CM
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
## 2021-04-01 DIAGNOSIS — S06.5XAA BILATERAL SUBDURAL HEMATOMAS: Primary | ICD-10-CM

## 2021-04-01 DIAGNOSIS — R29.6 FREQUENT FALLS: Chronic | ICD-10-CM

## 2021-04-01 LAB
ALBUMIN SERPL-MCNC: 3.8 G/DL (ref 3.2–4.6)
ALBUMIN/GLOB SERPL: 1.1 {RATIO} (ref 1.2–3.5)
ALP SERPL-CCNC: 122 U/L (ref 50–136)
ALT SERPL-CCNC: 9 U/L (ref 12–65)
ANION GAP SERPL CALC-SCNC: 7 MMOL/L (ref 7–16)
APPEARANCE UR: CLEAR
AST SERPL-CCNC: 19 U/L (ref 15–37)
BASOPHILS # BLD: 0.1 K/UL (ref 0–0.2)
BASOPHILS NFR BLD: 1 % (ref 0–2)
BILIRUB SERPL-MCNC: 0.6 MG/DL (ref 0.2–1.1)
BILIRUB UR QL: ABNORMAL
BUN SERPL-MCNC: 28 MG/DL (ref 8–23)
CALCIUM SERPL-MCNC: 9.3 MG/DL (ref 8.3–10.4)
CHLORIDE SERPL-SCNC: 100 MMOL/L (ref 98–107)
CO2 SERPL-SCNC: 25 MMOL/L (ref 21–32)
COLOR UR: YELLOW
CREAT SERPL-MCNC: 1.18 MG/DL (ref 0.8–1.5)
DIFFERENTIAL METHOD BLD: ABNORMAL
EOSINOPHIL # BLD: 0.1 K/UL (ref 0–0.8)
EOSINOPHIL NFR BLD: 1 % (ref 0.5–7.8)
ERYTHROCYTE [DISTWIDTH] IN BLOOD BY AUTOMATED COUNT: 12.3 % (ref 11.9–14.6)
GLOBULIN SER CALC-MCNC: 3.5 G/DL (ref 2.3–3.5)
GLUCOSE SERPL-MCNC: 81 MG/DL (ref 65–100)
GLUCOSE UR STRIP.AUTO-MCNC: NEGATIVE MG/DL
HCT VFR BLD AUTO: 35 % (ref 41.1–50.3)
HGB BLD-MCNC: 12.1 G/DL (ref 13.6–17.2)
HGB UR QL STRIP: NEGATIVE
IMM GRANULOCYTES # BLD AUTO: 0 K/UL (ref 0–0.5)
IMM GRANULOCYTES NFR BLD AUTO: 0 % (ref 0–5)
KETONES UR QL STRIP.AUTO: ABNORMAL MG/DL
LACTATE SERPL-SCNC: 1.1 MMOL/L (ref 0.4–2)
LEUKOCYTE ESTERASE UR QL STRIP.AUTO: NEGATIVE
LYMPHOCYTES # BLD: 1.6 K/UL (ref 0.5–4.6)
LYMPHOCYTES NFR BLD: 20 % (ref 13–44)
MCH RBC QN AUTO: 33.9 PG (ref 26.1–32.9)
MCHC RBC AUTO-ENTMCNC: 34.6 G/DL (ref 31.4–35)
MCV RBC AUTO: 98 FL (ref 79.6–97.8)
MONOCYTES # BLD: 0.6 K/UL (ref 0.1–1.3)
MONOCYTES NFR BLD: 7 % (ref 4–12)
NEUTS SEG # BLD: 6 K/UL (ref 1.7–8.2)
NEUTS SEG NFR BLD: 72 % (ref 43–78)
NITRITE UR QL STRIP.AUTO: NEGATIVE
NRBC # BLD: 0 K/UL (ref 0–0.2)
PH UR STRIP: 5 [PH] (ref 5–9)
PLATELET # BLD AUTO: 232 K/UL (ref 150–450)
PMV BLD AUTO: 8.9 FL (ref 9.4–12.3)
POTASSIUM SERPL-SCNC: 4.5 MMOL/L (ref 3.5–5.1)
PROT SERPL-MCNC: 7.3 G/DL (ref 6.3–8.2)
PROT UR STRIP-MCNC: NEGATIVE MG/DL
RBC # BLD AUTO: 3.57 M/UL (ref 4.23–5.6)
SODIUM SERPL-SCNC: 132 MMOL/L (ref 136–145)
SP GR UR REFRACTOMETRY: 1.02 (ref 1–1.02)
UROBILINOGEN UR QL STRIP.AUTO: 1 EU/DL (ref 0.2–1)
WBC # BLD AUTO: 8.4 K/UL (ref 4.3–11.1)

## 2021-04-01 PROCEDURE — 86580 TB INTRADERMAL TEST: CPT | Performed by: INTERNAL MEDICINE

## 2021-04-01 PROCEDURE — 83605 ASSAY OF LACTIC ACID: CPT

## 2021-04-01 PROCEDURE — 70450 CT HEAD/BRAIN W/O DYE: CPT

## 2021-04-01 PROCEDURE — 74011250637 HC RX REV CODE- 250/637: Performed by: INTERNAL MEDICINE

## 2021-04-01 PROCEDURE — 85025 COMPLETE CBC W/AUTO DIFF WBC: CPT

## 2021-04-01 PROCEDURE — 99285 EMERGENCY DEPT VISIT HI MDM: CPT

## 2021-04-01 PROCEDURE — 73502 X-RAY EXAM HIP UNI 2-3 VIEWS: CPT

## 2021-04-01 PROCEDURE — 93005 ELECTROCARDIOGRAM TRACING: CPT | Performed by: EMERGENCY MEDICINE

## 2021-04-01 PROCEDURE — 81003 URINALYSIS AUTO W/O SCOPE: CPT

## 2021-04-01 PROCEDURE — 74011000250 HC RX REV CODE- 250: Performed by: INTERNAL MEDICINE

## 2021-04-01 PROCEDURE — 80053 COMPREHEN METABOLIC PANEL: CPT

## 2021-04-01 PROCEDURE — 71045 X-RAY EXAM CHEST 1 VIEW: CPT

## 2021-04-01 PROCEDURE — 74011250636 HC RX REV CODE- 250/636: Performed by: EMERGENCY MEDICINE

## 2021-04-01 PROCEDURE — 2709999900 HC NON-CHARGEABLE SUPPLY

## 2021-04-01 PROCEDURE — 65610000001 HC ROOM ICU GENERAL

## 2021-04-01 PROCEDURE — 96374 THER/PROPH/DIAG INJ IV PUSH: CPT

## 2021-04-01 PROCEDURE — 74011000302 HC RX REV CODE- 302: Performed by: INTERNAL MEDICINE

## 2021-04-01 RX ORDER — LABETALOL HYDROCHLORIDE 5 MG/ML
10 INJECTION, SOLUTION INTRAVENOUS ONCE
Status: COMPLETED | OUTPATIENT
Start: 2021-04-02 | End: 2021-04-01

## 2021-04-01 RX ORDER — HYDRALAZINE HYDROCHLORIDE 20 MG/ML
20 INJECTION INTRAMUSCULAR; INTRAVENOUS
Status: COMPLETED | OUTPATIENT
Start: 2021-04-01 | End: 2021-04-01

## 2021-04-01 RX ORDER — LEVOTHYROXINE SODIUM 112 UG/1
112 TABLET ORAL
Status: DISCONTINUED | OUTPATIENT
Start: 2021-04-02 | End: 2021-04-04

## 2021-04-01 RX ORDER — CARBIDOPA AND LEVODOPA 25; 100 MG/1; MG/1
2 TABLET, EXTENDED RELEASE ORAL
Status: DISCONTINUED | OUTPATIENT
Start: 2021-04-01 | End: 2021-04-06 | Stop reason: HOSPADM

## 2021-04-01 RX ORDER — ESCITALOPRAM OXALATE 10 MG/1
10 TABLET ORAL DAILY
Status: DISCONTINUED | OUTPATIENT
Start: 2021-04-02 | End: 2021-04-06 | Stop reason: HOSPADM

## 2021-04-01 RX ORDER — ACETAMINOPHEN 325 MG/1
650 TABLET ORAL
Status: DISCONTINUED | OUTPATIENT
Start: 2021-04-01 | End: 2021-04-06 | Stop reason: HOSPADM

## 2021-04-01 RX ORDER — DROXIDOPA 300 MG/1
600 CAPSULE ORAL 3 TIMES DAILY
Status: DISCONTINUED | OUTPATIENT
Start: 2021-04-01 | End: 2021-04-02

## 2021-04-01 RX ORDER — AMANTADINE HYDROCHLORIDE 100 MG/1
100 CAPSULE, GELATIN COATED ORAL 2 TIMES DAILY
Status: DISCONTINUED | OUTPATIENT
Start: 2021-04-02 | End: 2021-04-06 | Stop reason: HOSPADM

## 2021-04-01 RX ORDER — CARBIDOPA AND LEVODOPA 25; 250 MG/1; MG/1
1 TABLET ORAL 4 TIMES DAILY
Status: DISCONTINUED | OUTPATIENT
Start: 2021-04-02 | End: 2021-04-06 | Stop reason: HOSPADM

## 2021-04-01 RX ORDER — LATANOPROST 50 UG/ML
1 SOLUTION/ DROPS OPHTHALMIC
Status: DISCONTINUED | OUTPATIENT
Start: 2021-04-01 | End: 2021-04-06 | Stop reason: HOSPADM

## 2021-04-01 RX ORDER — SODIUM CHLORIDE 0.9 % (FLUSH) 0.9 %
5-40 SYRINGE (ML) INJECTION EVERY 8 HOURS
Status: DISCONTINUED | OUTPATIENT
Start: 2021-04-01 | End: 2021-04-06 | Stop reason: HOSPADM

## 2021-04-01 RX ORDER — POLYETHYLENE GLYCOL 3350 17 G/17G
17 POWDER, FOR SOLUTION ORAL DAILY PRN
Status: DISCONTINUED | OUTPATIENT
Start: 2021-04-01 | End: 2021-04-06 | Stop reason: HOSPADM

## 2021-04-01 RX ORDER — POLYETHYLENE GLYCOL 3350 17 G/17G
17 POWDER, FOR SOLUTION ORAL DAILY
Status: DISCONTINUED | OUTPATIENT
Start: 2021-04-02 | End: 2021-04-06 | Stop reason: HOSPADM

## 2021-04-01 RX ORDER — NALOXONE HYDROCHLORIDE 0.4 MG/ML
0.4 INJECTION, SOLUTION INTRAMUSCULAR; INTRAVENOUS; SUBCUTANEOUS AS NEEDED
Status: DISCONTINUED | OUTPATIENT
Start: 2021-04-01 | End: 2021-04-06 | Stop reason: HOSPADM

## 2021-04-01 RX ORDER — SODIUM CHLORIDE 0.9 % (FLUSH) 0.9 %
5-40 SYRINGE (ML) INJECTION AS NEEDED
Status: DISCONTINUED | OUTPATIENT
Start: 2021-04-01 | End: 2021-04-06 | Stop reason: HOSPADM

## 2021-04-01 RX ORDER — FLUDROCORTISONE ACETATE 0.1 MG/1
0.1 TABLET ORAL EVERY 24 HOURS
Status: DISCONTINUED | OUTPATIENT
Start: 2021-04-02 | End: 2021-04-06 | Stop reason: HOSPADM

## 2021-04-01 RX ORDER — LORAZEPAM 2 MG/ML
1 INJECTION INTRAMUSCULAR
Status: CANCELLED | OUTPATIENT
Start: 2021-04-01

## 2021-04-01 RX ORDER — RIVASTIGMINE TARTRATE 3 MG/1
6 CAPSULE ORAL 2 TIMES DAILY WITH MEALS
Status: DISCONTINUED | OUTPATIENT
Start: 2021-04-02 | End: 2021-04-06 | Stop reason: HOSPADM

## 2021-04-01 RX ORDER — OXYBUTYNIN CHLORIDE 5 MG/1
5 TABLET ORAL
Status: DISCONTINUED | OUTPATIENT
Start: 2021-04-01 | End: 2021-04-06 | Stop reason: HOSPADM

## 2021-04-01 RX ORDER — GABAPENTIN 100 MG/1
100 CAPSULE ORAL 3 TIMES DAILY
Status: DISCONTINUED | OUTPATIENT
Start: 2021-04-01 | End: 2021-04-06 | Stop reason: HOSPADM

## 2021-04-01 RX ADMIN — SODIUM CHLORIDE 1000 ML: 900 INJECTION, SOLUTION INTRAVENOUS at 16:00

## 2021-04-01 RX ADMIN — HYDRALAZINE HYDROCHLORIDE 20 MG: 20 INJECTION INTRAMUSCULAR; INTRAVENOUS at 18:52

## 2021-04-01 RX ADMIN — TUBERCULIN PURIFIED PROTEIN DERIVATIVE 5 UNITS: 5 INJECTION, SOLUTION INTRADERMAL at 23:05

## 2021-04-01 RX ADMIN — LATANOPROST 1 DROP: 50 SOLUTION OPHTHALMIC at 23:06

## 2021-04-01 RX ADMIN — GABAPENTIN 100 MG: 100 CAPSULE ORAL at 23:06

## 2021-04-01 RX ADMIN — Medication 10 ML: at 23:23

## 2021-04-01 RX ADMIN — LABETALOL HYDROCHLORIDE 10 MG: 5 INJECTION INTRAVENOUS at 23:17

## 2021-04-01 NOTE — ED TRIAGE NOTES
Pt presents from [address] with increased falls, weakness and confusion since the weekend. He had 2 falls on 3/29 and 1 fall on 3/30. Today pt was unable to follow simple commands. He presents to ED with with sister. Pt has a hx of Parkinson's.

## 2021-04-01 NOTE — ED PROVIDER NOTES
Patient with a history of Parkinson's and orthostatic hypotension. Has had a couple of falls recently with 1 on March 29 and one on March 30. Injured his right hip and has a left forehead contusion with small hematoma. Unsure if he lost consciousness. States he was evaluated at that time. Has had increased weakness and confusion over the past couple of days. Denies any headache. Has chronic blurry vision. No chest pain or shortness of breath. No abdominal pain. No diarrhea or constipation. No dysuria hematuria. Patient has baseline hallucinations which have been worse recently. Nursing home was more concerned about his recent confusion. The history is provided by the patient. No  was used. Altered mental status   This is a new problem. The current episode started more than 2 days ago. The problem has been gradually worsening. Associated symptoms include confusion, weakness and hallucinations. Pertinent negatives include no numbness. Mental status baseline is normal.  His past medical history is significant for CVA and head trauma. Fall  Pertinent negatives include no fever, no numbness, no abdominal pain, no nausea, no vomiting, no hematuria and no headaches.         Past Medical History:   Diagnosis Date    AAA (abdominal aortic aneurysm) ([facility] [address] 7/8/2016    pt is not aware of this-     Pak's esophagus with esophagitis 7/8/2016    no meds- not daily    Cerebrovascular accident (CVA) ([address] 7/8/2016    They thought I did but they didn't find anything on the tests \"I blacked out\" no admission to the hospital    Chronic fatigue syndrome 2/26/2016    Cognitive deficits 7/8/2016    Colon polyps 2007    Elevated PSA     Enthesopathy of ankle and tarsus 5/29/2014    GERD (gastroesophageal reflux disease) 7/8/2016    Hashimoto's thyroiditis 7/8/2016    History of nuclear stress test 1997, 5/2005    Hypotension 8/25/2015    Impaired cognition 7/8/2016    Inguinal hernia 7/8/2016    Insomnia 7/8/2016    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         Past Surgical History:   Procedure Laterality Date    HX BREAST BIOPSY Bilateral     sterotactic    HX CATARACT REMOVAL Right 2012    HX COLONOSCOPY  2000 & 2005    HX HERNIA REPAIR Left 2011    HX OTHER SURGICAL  2006    esophagogastroduodenoscopy    HX OTHER SURGICAL      deep leg/ankle tumor excision benign leg dumor    HX THYROIDECTOMY      total         Family History:   Problem Relation Age of Onset    Cancer Mother         Pancreas CA    Cancer Father         Brain CA    Heart Disease Father     Heart Failure Father     No Known Problems Sister     Heart Attack Brother         MI at 52 y.o.    Diabetes Maternal Uncle     Diabetes Paternal Uncle     Arthritis-osteo Sister     Thyroid Disease Neg Hx        Social History     Socioeconomic History    Marital status:      Spouse name: Not on file    Number of children: Not on file    Years of education: Not on file    Highest education level: Not on file   Occupational History    Not on file   Social Needs    Financial resource strain: Not on file    Food insecurity     Worry: Not on file     Inability: Not on file    Transportation needs     Medical: Not on file     Non-medical: Not on file   Tobacco Use    Smoking status: Never Smoker    Smokeless tobacco: Never Used   Substance and Sexual Activity    Alcohol use: No    Drug use: No    Sexual activity: Not Currently Lifestyle    Physical activity     Days per week: Not on file     Minutes per session: Not on file    Stress: Not on file   Relationships    Social connections     Talks on phone: Not on file     Gets together: Not on file     Attends [employer] service: Not on file     Active member of club or organization: Not on file     Attends meetings of clubs or organizations: Not on file     Relationship status: Not on file    Intimate partner violence     Fear of current or ex partner: Not on file     Emotionally abused: Not on file     Physically abused: Not on file     Forced sexual activity: Not on file   Other Topics Concern    Not on file   Social History Narrative    Not on file         ALLERGIES: Brompheniramine maleate, Carisoprodol, Meperidine, Penicillin, Phenylephrine hcl, Zaleplon, Dimetapp cold and flu, Donepezil, Penicillins, and Rofecoxib    Review of Systems   Constitutional: Positive for fatigue. Negative for chills and fever. HENT: Negative for rhinorrhea and sore throat. Eyes: Negative for pain and redness. Respiratory: Negative for chest tightness, shortness of breath and wheezing. Cardiovascular: Negative for chest pain and leg swelling. Gastrointestinal: Negative for abdominal pain, diarrhea, nausea and vomiting. Genitourinary: Negative for dysuria and hematuria. Musculoskeletal: Negative for back pain, gait problem, neck pain and neck stiffness. Skin: Negative for color change and rash. Neurological: Positive for weakness. Negative for facial asymmetry, speech difficulty, light-headedness, numbness and headaches. Psychiatric/Behavioral: Positive for confusion and hallucinations. All other systems reviewed and are negative. Vitals:    04/01/21 1448   BP: 103/68   Pulse: 87   Resp: 18   Temp: 97.8 °F (36.6 °C)   SpO2: 98%            Physical Exam  Constitutional:       Appearance: He is well-developed. HENT:      Head: Normocephalic.      Eyes:      Extraocular Movements: Extraocular movements intact. Pupils: Pupils are equal, round, and reactive to light. Neck:      Musculoskeletal: Normal range of motion and neck supple. No muscular tenderness. Cardiovascular:      Rate and Rhythm: Normal rate and regular rhythm. Pulmonary:      Effort: Pulmonary effort is normal.      Breath sounds: Normal breath sounds. Chest:      Chest wall: No tenderness. Abdominal:      General: Bowel sounds are normal.      Palpations: Abdomen is soft. Tenderness: There is no abdominal tenderness. Musculoskeletal: Normal range of motion. General: Tenderness (right hip) present. Skin:     General: Skin is warm and dry. Neurological:      General: No focal deficit present. Mental Status: He is alert and oriented to person, place, and time. MDM  Number of Diagnoses or Management Options  Bilateral subdural hematomas (HCC)  Closed head injury, initial encounter  Confusion  Fall, initial encounter  Diagnosis management comments: Patient with bilateral acute on chronic subdural hematomas. Fall yesterday and the day before. Left forehead hematoma with ecchymosis. Some confusion and weakness today at the nursing home. Discussed with neurosurgery with no acute intervention at this time. Will admit to the hospitalist.    ICH (Intracerebral Hemorrhage Score)   Component ICH Score Points  GCS score 0  13-15 0  5-12 1  3-4 2  ICH volume, cm3             Use ABC/2 score   0  <30 0  =30 1  Intraventricular hemorrhage 0  No 0  Yes 1  Infratentorial origin of ICH 0  No 0  Yes 1  Age, years 0  <80 0  =80 1  Total ICH Score 0-6 0        ICH Score Mortality Prediction   6    100% mortality in studies  5    100% mortality in studies   4    97% mortality in studies   3    72% mortality in studies   2    26% mortality in studies   1    13% mortality in studies     ICH volume, volume on initial CT calculated using ABC/2 method.              Amount and/or Complexity of Data Reviewed  Clinical lab tests: ordered and reviewed  Tests in the radiology section of CPT®: ordered and reviewed  Tests in the medicine section of CPT®: ordered and reviewed    Patient Progress  Patient progress: stable         Critical Care  Performed by: Emil Pan MD  Authorized by: Emil Pan MD     Critical care provider statement:     Critical care time (minutes):  65    Critical care time was exclusive of:  Separately billable procedures and treating other patients    Critical care was necessary to treat or prevent imminent or life-threatening deterioration of the following conditions:  CNS failure or compromise and trauma    Critical care was time spent personally by me on the following activities:  Development of treatment plan with patient or surrogate, discussions with consultants, evaluation of patient's response to treatment, examination of patient, obtaining history from patient or surrogate, ordering and performing treatments and interventions, ordering and review of laboratory studies, ordering and review of radiographic studies, pulse oximetry, re-evaluation of patient's condition and review of old charts    I assumed direction of critical care for this patient from another provider in my specialty: no            EKG: nonspecific ST and T waves changes. Sinus arrhythmia rate 77         CT HEAD WO CONT (Final result)  Result time 04/01/21 16:40:26  Final result by Miladis Antunez MD (04/01/21 16:40:26)                Impression:    1.  Small bilateral acute on chronic subdural hematomas about the frontal lobes   without significant mass effect at this time. This report was made using voice transcription. Despite my best efforts to avoid   any, transcription errors may persist. If there is any question about the   accuracy of the report or need for clarification, then please call [phone], or text me through perfectserv for clarification or correction.      The critical finding of acute on chronic subdural hematomas was discussed with   Dr. Dmitry Rico by myself personally at 4:35 PM on 4/1/2021. Narrative:    CT HEAD WITHOUT CONTRAST, 4/1/2021     History: Increased falls. Weakness and confusion since the weekend. Comparison: CT head without contrast 1/26/2020     Technique:   5 mm axial scans from the skull base to the vertex. All CT scans   performed at this facility use one or all of the following: Automated exposure   control, adjustment of the mA and/or kVp according to patient's size, iterative   reconstruction. Findings:  No abnormal hyperdense collections are seen along the bilateral   frontal lobes best appreciated on axial image 10. The appearance suggests acute   on chronic subdural hematomas with the left subdural hematoma measuring up to 5   mm in width, and the right subdural hematoma measuring up to 3 mm in width.  No   significant mass effect is currently seen. Specifically, no significant   herniation is suggested.  No evidence for acute hydrocephalus is seen.  No   abnormal edema pattern is seen in a vascular distribution to suggest large   artery infarction. Evaluation with bone windows shows no acute osseous changes.  The visualized   sinuses, middle ears, and mastoid air cells are well aerated.                     XR CHEST PORT (Final result)  Result time 04/01/21 15:54:22  Final result by Kaylen Espino MD (04/01/21 15:54:22)                Impression:    No acute findings in the chest             Narrative:    Chest X-ray     INDICATION: Increasing falls and confusion     A portable AP view of the chest was obtained. FINDINGS: The lungs are clear.  There are no infiltrates or effusions.  The heart   size is normal.  The bony thorax is intact.                       XR HIP RT W OR WO PELV 2-3 VWS (Final result)  Result time 04/01/21 15:55:06  Final result by Kaylen Espino MD (04/01/21 15:55:06)                Impression: No evidence of right hip fracture             Narrative:    Right Hip     INDICATION: Nallely Sienna, Pain     Three views of the right hip were obtained     FINDINGS: There is no significant joint space narrowing.  There is no evidence   of fracture or dislocation. No bony abnormality is seen in the proximal right   femur or adjacent pelvis.                       Results Include:    Recent Results (from the past 24 hour(s))   CBC WITH AUTOMATED DIFF    Collection Time: 04/01/21  3:15 PM   Result Value Ref Range    WBC 8.4 4.3 - 11.1 K/uL    RBC 3.57 (L) 4.23 - 5.6 M/uL    HGB 12.1 (L) 13.6 - 17.2 g/dL    HCT 35.0 (L) 41.1 - 50.3 %    MCV 98.0 (H) 79.6 - 97.8 FL    MCH 33.9 (H) 26.1 - 32.9 PG    MCHC 34.6 31.4 - 35.0 g/dL    RDW 12.3 11.9 - 14.6 %    PLATELET 530 734 - 700 K/uL    MPV 8.9 (L) 9.4 - 12.3 FL    ABSOLUTE NRBC 0.00 0.0 - 0.2 K/uL    DF AUTOMATED      NEUTROPHILS 72 43 - 78 %    LYMPHOCYTES 20 13 - 44 %    MONOCYTES 7 4.0 - 12.0 %    EOSINOPHILS 1 0.5 - 7.8 %    BASOPHILS 1 0.0 - 2.0 %    IMMATURE GRANULOCYTES 0 0.0 - 5.0 %    ABS. NEUTROPHILS 6.0 1.7 - 8.2 K/UL    ABS. LYMPHOCYTES 1.6 0.5 - 4.6 K/UL    ABS. MONOCYTES 0.6 0.1 - 1.3 K/UL    ABS. EOSINOPHILS 0.1 0.0 - 0.8 K/UL    ABS. BASOPHILS 0.1 0.0 - 0.2 K/UL    ABS. IMM. GRANS. 0.0 0.0 - 0.5 K/UL   METABOLIC PANEL, COMPREHENSIVE    Collection Time: 04/01/21  3:15 PM   Result Value Ref Range    Sodium 132 (L) 136 - 145 mmol/L    Potassium 4.5 3.5 - 5.1 mmol/L    Chloride 100 98 - 107 mmol/L    CO2 25 21 - 32 mmol/L    Anion gap 7 7 - 16 mmol/L    Glucose 81 65 - 100 mg/dL    BUN 28 (H) 8 - 23 MG/DL    Creatinine 1.18 0.8 - 1.5 MG/DL    GFR est AA >60 >60 ml/min/1.73m2    GFR est non-AA >60 >60 ml/min/1.73m2    Calcium 9.3 8.3 - 10.4 MG/DL    Bilirubin, total 0.6 0.2 - 1.1 MG/DL    ALT (SGPT) 9 (L) 12 - 65 U/L    AST (SGOT) 19 15 - 37 U/L    Alk.  phosphatase 122 50 - 136 U/L    Protein, total 7.3 6.3 - 8.2 g/dL    Albumin 3.8 3.2 - 4.6 g/dL    Globulin 3.5 2.3 - 3.5 g/dL    A-G Ratio 1.1 (L) 1.2 - 3.5     URINALYSIS W/ RFLX MICROSCOPIC    Collection Time: 04/01/21  4:12 PM   Result Value Ref Range    Color YELLOW      Appearance CLEAR      Specific gravity 1.023 1.001 - 1.023      pH (UA) 5.0 5.0 - 9.0      Protein Negative NEG mg/dL    Glucose Negative mg/dL    Ketone TRACE (A) NEG mg/dL    Bilirubin SMALL (A) NEG      Blood Negative NEG      Urobilinogen 1.0 0.2 - 1.0 EU/dL    Nitrites Negative NEG      Leukocyte Esterase Negative NEG     LACTIC ACID    Collection Time: 04/01/21  4:12 PM   Result Value Ref Range    Lactic acid 1.1 0.4 - 2.0 MMOL/L

## 2021-04-01 NOTE — ED NOTES
ICU Bed request initiated with Sagar Diaz. Pt to be admitted to Dr. Braxton Gonzalez for subdural hematomas.

## 2021-04-01 NOTE — H&P
[employer] Hospitalist   History and Physical       Name:  Mady Clark. Age:80 y.o. Sex:male   :  1940    MRN:  [id number]   PCP:  Carolina Harley MD      Admit Date:  No admission date for patient encounter. 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 male with medical history of parkinsons disease, frequent falls and orthostatic hypotension who presented to ED with falls. He had 2 falls on 3-29 and 1 on 3-31. CT head shows small bilateral acute on chronic SDH of frontal lobes without mass effect. He is accompanied per his sister Rimma Phipps who give history. ROS limited per mentation and interactions.        Review of Systems:  A 14 point review of systems not done due to mentation      Past Medical History:   Diagnosis Date    AAA (abdominal aortic aneurysm) ([facility] 2016    pt is not aware of this-     Pak's esophagus with esophagitis 2016    no meds- not daily    Cerebrovascular accident (CVA) ([facility] 2016    They thought I did but they didn't find anything on the tests \"I blacked out\" no admission to the hospital    Chronic fatigue syndrome 2016    Cognitive deficits 2016    Colon polyps 2007    Elevated PSA     Enthesopathy of ankle and tarsus 2014    GERD (gastroesophageal reflux disease) 2016    Hashimoto's thyroiditis 2016  History of nuclear stress test 1997, 5/2005    Hypotension 8/25/2015    Impaired cognition 7/8/2016    Inguinal hernia 7/8/2016    Insomnia 7/8/2016    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 Past Surgical History:   Procedure Laterality Date    HX BREAST BIOPSY Bilateral     sterotactic    HX CATARACT REMOVAL Right 2012    HX COLONOSCOPY  2000 & 2005    HX HERNIA REPAIR Left 2011    HX OTHER SURGICAL  2006    esophagogastroduodenoscopy    HX OTHER SURGICAL      deep leg/ankle tumor excision benign leg dumor    HX THYROIDECTOMY      total       Family History : reviewed  Family History   Problem Relation Age of Onset    Cancer Mother         Pancreas CA    Cancer Father         Brain CA    Heart Disease Father     Heart Failure Father     No Known Problems Sister     Heart Attack Brother         MI at 52 y.o.    Diabetes Maternal Uncle     Diabetes Paternal Uncle     Arthritis-osteo Sister     Thyroid Disease Neg Hx         Social History     Tobacco Use    Smoking status: Never Smoker    Smokeless tobacco: Never Used   Substance Use Topics    Alcohol use: No       Allergies   Allergen Reactions    Brompheniramine Maleate Unknown (comments)     Pt does not recall med.  Carisoprodol Unknown (comments)     Other reaction(s): Rash-A, Unknown-A  Pt does not recall med.  Meperidine Unknown (comments)     Pt does not recall med.      Penicillin Rash    Phenylephrine Hcl Unknown (comments)     Pt does not recall med.  Zaleplon Unknown (comments)     Other reaction(s): Rash-A, Unknown-A  Pt does not recall med.  Dimetapp Cold And Flu Rash    Donepezil Other (comments)     Altered mental state      Penicillins Rash    Rofecoxib Rash       Immunization History   Administered Date(s) Administered    Influenza High Dose Vaccine PF 02/03/2014, 12/22/2014    Influenza Vaccine PF 01/02/2012, 01/23/2013    Pneumococcal Conjugate (PCV-13) 02/10/2015    Pneumococcal Polysaccharide (PPSV-23) 12/02/2009    TB Skin Test (PPD) Intradermal 08/15/2019    Td, Adsorbed PF 12/02/2009    Tdap 02/03/2014, 01/26/2020         PTA Medications:  Current Outpatient Medications   Medication Instructions    amantadine HCL (SYMMETREL) 100 mg tablet TAKE 1 TABLET BY MOUTH TWO  TIMES DAILY    carbidopa-levodopa (SINEMET)  mg per tablet TAKE 1 TABLET BY MOUTH AT  6AM, 10AM, 2PM, AND 6PM  STRICT TIMES DUE TO  ADVANCED PD    carbidopa-levodopa ER (SINEMET CR)  mg per tablet 1 tab at 10 pm strict time    droxidopa (Northera) 300 mg cap 2 caps at 6am, 1 cap at 12pm, 1 cap at 5pm for 3 days then increase to 2 caps 6am, 2 caps 12pm, 1 cap 5pm for 3 days then increase to 2 caps TID. <BR>Monitor BP& HR sitting and standing in the AM and PM for 10 days and send readings to Dr. Ruthie Nuñez.  escitalopram oxalate (LEXAPRO) 10 mg, Oral, DAILY    fludrocortisone (FLORINEF) 0.1 mg tablet TAKE 1 TABLET BY MOUTH AT  6AM    gabapentin (NEURONTIN) 100 mg, Oral, 3 TIMES DAILY    latanoprost (XALATAN) 0.005 % ophthalmic solution 1 Drop, Right Eye, EVERY BEDTIME    levothyroxine (SYNTHROID) 112 mcg tablet Oral, DAILY BEFORE BREAKFAST    melatonin 3 mg tablet 3 mg tab Q 9pm for RBD    OTHER 1) mestinon 1/2 tab Q day for 5 days and stop<BR><BR>2)Florinef 0.1mg 1 tab Qday (stays on 1 tab BID until northera can be increased).     oxybutynin (DITROPAN) 5 mg tablet 1 tab at 9pm (not to start until 11/10/20)    polyethylene glycol (MIRALAX) 17 g, Oral, DAILY    rivastigmine tartrate (EXELON) 6 mg, Oral, 2 TIMES DAILY WITH MEALS       Objective:     No data found. There is no height or weight on file to calculate BMI. Physical Exam:    General: No acute distress, elderly, no distress   HEENT: Pupils equal and reactive to light and accommodation, oropharynx is clear, hematoma left forehead   Neck: Supple  Lungs: Clear to auscultation bilaterally anterior   Cardiovascular: Regular rate and rhythm with normal S1 and S2 , no edema   Abdomen: Soft, nontender, nondistended, normoactive bowel sounds   Extremities: No cyanosis   Neuro: diffuse tremor   Psych: flat  affect       Data Reviewed: I have reviewed all labs, meds, and studies. Recent Results (from the past 24 hour(s))   CBC WITH AUTOMATED DIFF    Collection Time: 04/01/21  3:15 PM   Result Value Ref Range    WBC 8.4 4.3 - 11.1 K/uL    RBC 3.57 (L) 4.23 - 5.6 M/uL    HGB 12.1 (L) 13.6 - 17.2 g/dL    HCT 35.0 (L) 41.1 - 50.3 %    MCV 98.0 (H) 79.6 - 97.8 FL    MCH 33.9 (H) 26.1 - 32.9 PG    MCHC 34.6 31.4 - 35.0 g/dL    RDW 12.3 11.9 - 14.6 %    PLATELET 809 227 - 352 K/uL    MPV 8.9 (L) 9.4 - 12.3 FL    ABSOLUTE NRBC 0.00 0.0 - 0.2 K/uL    DF AUTOMATED      NEUTROPHILS 72 43 - 78 %    LYMPHOCYTES 20 13 - 44 %    MONOCYTES 7 4.0 - 12.0 %    EOSINOPHILS 1 0.5 - 7.8 %    BASOPHILS 1 0.0 - 2.0 %    IMMATURE GRANULOCYTES 0 0.0 - 5.0 %    ABS. NEUTROPHILS 6.0 1.7 - 8.2 K/UL    ABS. LYMPHOCYTES 1.6 0.5 - 4.6 K/UL    ABS. MONOCYTES 0.6 0.1 - 1.3 K/UL    ABS. EOSINOPHILS 0.1 0.0 - 0.8 K/UL    ABS. BASOPHILS 0.1 0.0 - 0.2 K/UL    ABS. IMM.  GRANS. 0.0 0.0 - 0.5 K/UL   METABOLIC PANEL, COMPREHENSIVE    Collection Time: 04/01/21  3:15 PM   Result Value Ref Range    Sodium 132 (L) 136 - 145 mmol/L    Potassium 4.5 3.5 - 5.1 mmol/L    Chloride 100 98 - 107 mmol/L    CO2 25 21 - 32 mmol/L    Anion gap 7 7 - 16 mmol/L    Glucose 81 65 - 100 mg/dL    BUN 28 (H) 8 - 23 MG/DL    Creatinine 1.18 0.8 - 1.5 MG/DL    GFR est AA >60 >60 ml/min/1.73m2    GFR est non-AA >60 >60 ml/min/1.73m2    Calcium 9.3 8.3 - 10.4 MG/DL    Bilirubin, total 0.6 0.2 - 1.1 MG/DL    ALT (SGPT) 9 (L) 12 - 65 U/L    AST (SGOT) 19 15 - 37 U/L    Alk. phosphatase 122 50 - 136 U/L    Protein, total 7.3 6.3 - 8.2 g/dL    Albumin 3.8 3.2 - 4.6 g/dL    Globulin 3.5 2.3 - 3.5 g/dL    A-G Ratio 1.1 (L) 1.2 - 3.5     URINALYSIS W/ RFLX MICROSCOPIC    Collection Time: 04/01/21  4:12 PM   Result Value Ref Range    Color YELLOW      Appearance CLEAR      Specific gravity 1.023 1.001 - 1.023      pH (UA) 5.0 5.0 - 9.0      Protein Negative NEG mg/dL    Glucose Negative mg/dL    Ketone TRACE (A) NEG mg/dL    Bilirubin SMALL (A) NEG      Blood Negative NEG      Urobilinogen 1.0 0.2 - 1.0 EU/dL    Nitrites Negative NEG      Leukocyte Esterase Negative NEG     LACTIC ACID    Collection Time: 04/01/21  4:12 PM   Result Value Ref Range    Lactic acid 1.1 0.4 - 2.0 MMOL/L       EKG Results     None          All Micro Results     None          Other Studies:  Xr Hip Rt W Or Wo Pelv 2-3 Vws    Result Date: 4/1/2021  Right Hip INDICATION: Abby Sizer, Pain Three views of the right hip were obtained FINDINGS: There is no significant joint space narrowing. There is no evidence of fracture or dislocation. No bony abnormality is seen in the proximal right femur or adjacent pelvis. No evidence of right hip fracture     Ct Head Wo Cont    Result Date: 4/1/2021  CT HEAD WITHOUT CONTRAST, 4/1/2021 History: Increased falls. Weakness and confusion since the weekend. Comparison: CT head without contrast 1/26/2020 Technique:   5 mm axial scans from the skull base to the vertex. All CT scans performed at this facility use one or all of the following: Automated exposure control, adjustment of the mA and/or kVp according to patient's size, iterative reconstruction.  Findings:  No abnormal hyperdense collections are seen along the bilateral frontal lobes best appreciated on axial image 10. The appearance suggests acute on chronic subdural hematomas with the left subdural hematoma measuring up to 5 mm in width, and the right subdural hematoma measuring up to 3 mm in width. No significant mass effect is currently seen. Specifically, no significant herniation is suggested. No evidence for acute hydrocephalus is seen. No abnormal edema pattern is seen in a vascular distribution to suggest large artery infarction. Evaluation with bone windows shows no acute osseous changes. The visualized sinuses, middle ears, and mastoid air cells are well aerated. 1.  Small bilateral acute on chronic subdural hematomas about the frontal lobes without significant mass effect at this time. This report was made using voice transcription. Despite my best efforts to avoid any, transcription errors may persist. If there is any question about the accuracy of the report or need for clarification, then please call [phone], or text me through [employer]serv for clarification or correction. The critical finding of acute on chronic subdural hematomas was discussed with Dr. Choco Bassett by myself personally at 4:35 PM on 4/1/2021. Xr Chest Port    Result Date: 4/1/2021  Chest X-ray INDICATION: Increasing falls and confusion A portable AP view of the chest was obtained. FINDINGS: The lungs are clear. There are no infiltrates or effusions. The heart size is normal.  The bony thorax is intact.       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    April 1, 2021  6:46 PM

## 2021-04-02 ENCOUNTER — APPOINTMENT (OUTPATIENT)
Dept: MRI IMAGING | Age: 81
DRG: 083 | End: 2021-04-02
Attending: INTERNAL MEDICINE
Payer: MEDICARE

## 2021-04-02 ENCOUNTER — APPOINTMENT (OUTPATIENT)
Dept: CT IMAGING | Age: 81
DRG: 083 | End: 2021-04-02
Attending: FAMILY MEDICINE
Payer: MEDICARE

## 2021-04-02 LAB
ANION GAP SERPL CALC-SCNC: 6 MMOL/L (ref 7–16)
ATRIAL RATE: 92 BPM
BUN SERPL-MCNC: 20 MG/DL (ref 8–23)
CALCIUM SERPL-MCNC: 8.9 MG/DL (ref 8.3–10.4)
CALCULATED P AXIS, ECG09: 52 DEGREES
CALCULATED R AXIS, ECG10: -73 DEGREES
CALCULATED T AXIS, ECG11: 64 DEGREES
CHLORIDE SERPL-SCNC: 102 MMOL/L (ref 98–107)
CO2 SERPL-SCNC: 28 MMOL/L (ref 21–32)
CREAT SERPL-MCNC: 0.88 MG/DL (ref 0.8–1.5)
DIAGNOSIS, 93000: NORMAL
ERYTHROCYTE [DISTWIDTH] IN BLOOD BY AUTOMATED COUNT: 12.2 % (ref 11.9–14.6)
GLUCOSE SERPL-MCNC: 73 MG/DL (ref 65–100)
HCT VFR BLD AUTO: 38.2 % (ref 41.1–50.3)
HGB BLD-MCNC: 12.7 G/DL (ref 13.6–17.2)
MCH RBC QN AUTO: 33.2 PG (ref 26.1–32.9)
MCHC RBC AUTO-ENTMCNC: 33.2 G/DL (ref 31.4–35)
MCV RBC AUTO: 99.7 FL (ref 79.6–97.8)
MM INDURATION POC: 1 MM (ref 0–5)
NRBC # BLD: 0 K/UL (ref 0–0.2)
P-R INTERVAL, ECG05: 168 MS
PLATELET # BLD AUTO: 227 K/UL (ref 150–450)
PMV BLD AUTO: 8.9 FL (ref 9.4–12.3)
POTASSIUM SERPL-SCNC: 3.9 MMOL/L (ref 3.5–5.1)
PPD POC: NEGATIVE NEGATIVE
Q-T INTERVAL, ECG07: 390 MS
QRS DURATION, ECG06: 96 MS
QTC CALCULATION (BEZET), ECG08: 482 MS
RBC # BLD AUTO: 3.83 M/UL (ref 4.23–5.6)
SODIUM SERPL-SCNC: 136 MMOL/L (ref 138–145)
VENTRICULAR RATE, ECG03: 92 BPM
WBC # BLD AUTO: 6.9 K/UL (ref 4.3–11.1)

## 2021-04-02 PROCEDURE — 99222 1ST HOSP IP/OBS MODERATE 55: CPT | Performed by: PHYSICIAN ASSISTANT

## 2021-04-02 PROCEDURE — 99233 SBSQ HOSP IP/OBS HIGH 50: CPT | Performed by: PSYCHIATRY & NEUROLOGY

## 2021-04-02 PROCEDURE — 93005 ELECTROCARDIOGRAM TRACING: CPT

## 2021-04-02 PROCEDURE — 97112 NEUROMUSCULAR REEDUCATION: CPT

## 2021-04-02 PROCEDURE — 70551 MRI BRAIN STEM W/O DYE: CPT

## 2021-04-02 PROCEDURE — 97166 OT EVAL MOD COMPLEX 45 MIN: CPT

## 2021-04-02 PROCEDURE — 80048 BASIC METABOLIC PNL TOTAL CA: CPT

## 2021-04-02 PROCEDURE — APPSS45 APP SPLIT SHARED TIME 31-45 MINUTES: Performed by: NURSE PRACTITIONER

## 2021-04-02 PROCEDURE — 85027 COMPLETE CBC AUTOMATED: CPT

## 2021-04-02 PROCEDURE — 97530 THERAPEUTIC ACTIVITIES: CPT

## 2021-04-02 PROCEDURE — 74011000250 HC RX REV CODE- 250: Performed by: PSYCHIATRY & NEUROLOGY

## 2021-04-02 PROCEDURE — 92610 EVALUATE SWALLOWING FUNCTION: CPT

## 2021-04-02 PROCEDURE — 2709999900 HC NON-CHARGEABLE SUPPLY

## 2021-04-02 PROCEDURE — 36415 COLL VENOUS BLD VENIPUNCTURE: CPT

## 2021-04-02 PROCEDURE — 97162 PT EVAL MOD COMPLEX 30 MIN: CPT

## 2021-04-02 PROCEDURE — 70450 CT HEAD/BRAIN W/O DYE: CPT

## 2021-04-02 PROCEDURE — 65610000001 HC ROOM ICU GENERAL

## 2021-04-02 PROCEDURE — 74011250637 HC RX REV CODE- 250/637: Performed by: INTERNAL MEDICINE

## 2021-04-02 RX ORDER — LABETALOL HYDROCHLORIDE 5 MG/ML
10 INJECTION, SOLUTION INTRAVENOUS
Status: DISCONTINUED | OUTPATIENT
Start: 2021-04-02 | End: 2021-04-06 | Stop reason: HOSPADM

## 2021-04-02 RX ORDER — DROXIDOPA 300 MG/1
300 CAPSULE ORAL 3 TIMES DAILY
Status: DISCONTINUED | OUTPATIENT
Start: 2021-04-02 | End: 2021-04-06 | Stop reason: HOSPADM

## 2021-04-02 RX ADMIN — LEVOTHYROXINE SODIUM 112 MCG: 0.11 TABLET ORAL at 08:47

## 2021-04-02 RX ADMIN — POLYETHYLENE GLYCOL 3350 17 G: 17 POWDER, FOR SOLUTION ORAL at 08:47

## 2021-04-02 RX ADMIN — LATANOPROST 1 DROP: 50 SOLUTION OPHTHALMIC at 21:04

## 2021-04-02 RX ADMIN — ESCITALOPRAM OXALATE 10 MG: 10 TABLET ORAL at 08:47

## 2021-04-02 RX ADMIN — CARBIDOPA AND LEVODOPA 1 TABLET: 25; 250 TABLET ORAL at 17:03

## 2021-04-02 RX ADMIN — CARBIDOPA AND LEVODOPA 1 TABLET: 25; 250 TABLET ORAL at 09:42

## 2021-04-02 RX ADMIN — CARBIDOPA AND LEVODOPA 1 TABLET: 25; 250 TABLET ORAL at 14:13

## 2021-04-02 RX ADMIN — OXYBUTYNIN CHLORIDE 5 MG: 5 TABLET ORAL at 01:17

## 2021-04-02 RX ADMIN — GABAPENTIN 100 MG: 100 CAPSULE ORAL at 21:04

## 2021-04-02 RX ADMIN — FLUDROCORTISONE ACETATE 0.1 MG: 0.1 TABLET ORAL at 05:23

## 2021-04-02 RX ADMIN — GABAPENTIN 100 MG: 100 CAPSULE ORAL at 15:16

## 2021-04-02 RX ADMIN — CARBIDOPA AND LEVODOPA 1 TABLET: 25; 250 TABLET ORAL at 05:23

## 2021-04-02 RX ADMIN — RIVASTIGMINE TARTRATE 6 MG: 3 CAPSULE ORAL at 08:47

## 2021-04-02 RX ADMIN — CARBIDOPA AND LEVODOPA 2 TABLET: 25; 100 TABLET, EXTENDED RELEASE ORAL at 21:04

## 2021-04-02 RX ADMIN — Medication 10 ML: at 05:23

## 2021-04-02 RX ADMIN — OXYBUTYNIN CHLORIDE 5 MG: 5 TABLET ORAL at 21:04

## 2021-04-02 RX ADMIN — AMANTADINE HYDROCHLORIDE 100 MG: 100 CAPSULE ORAL at 14:13

## 2021-04-02 RX ADMIN — AMANTADINE HYDROCHLORIDE 100 MG: 100 CAPSULE ORAL at 05:23

## 2021-04-02 RX ADMIN — RIVASTIGMINE TARTRATE 6 MG: 3 CAPSULE ORAL at 17:03

## 2021-04-02 RX ADMIN — Medication 20 ML: at 21:04

## 2021-04-02 RX ADMIN — Medication 10 ML: at 14:13

## 2021-04-02 RX ADMIN — LABETALOL HYDROCHLORIDE 10 MG: 5 INJECTION INTRAVENOUS at 20:16

## 2021-04-02 RX ADMIN — GABAPENTIN 100 MG: 100 CAPSULE ORAL at 08:47

## 2021-04-02 RX ADMIN — CARBIDOPA AND LEVODOPA 2 TABLET: 25; 100 TABLET, EXTENDED RELEASE ORAL at 01:17

## 2021-04-02 NOTE — ROUTINE PROCESS
TRANSFER - OUT REPORT: 
 
Verbal report given to Mitali on Maylon Gabriel.  being transferred to ICU rm # [phone]  for routine progression of care Report consisted of patients Situation, Background, Assessment and  
Recommendations(SBAR). Information from the following report(s) SBAR was reviewed with the receiving nurse. Lines:  
Peripheral IV 04/01/21 Right Antecubital (Active) Opportunity for questions and clarification was provided. Patient transported with: 
 Monitor

## 2021-04-02 NOTE — PROGRESS NOTES
Review of the HCT performed today shows a small bilateral frontal acute SDH. No significant mass affect or midline shift. Unchanged from yesterdays HCT. This requires no neurosurgical intervention. Recommend repeat scan in 5-7 days or sooner if neurological deterioration.     Soniya Kelley MD  Neurosurgery  [phone]

## 2021-04-02 NOTE — PROGRESS NOTES
0405 Code S called - patient with new left sided facial droop; left arm drift; patient with new difficulty finding words - Dr. Shonda Eddy notified patient taken to CT STAT - Per MD no teleneuro needed to monitor results of patient condition and CT results.

## 2021-04-02 NOTE — PROGRESS NOTES
SPEECH LANGUAGE PATHOLOGY: DYSPHAGIA- Initial Assessment    NAME/AGE/GENDER: Chadd Caldera is a 68 y.o. male  DATE: 4/2/2021  PRIMARY DIAGNOSIS: Subdural hematoma ([facility] [S06.5X9A]      ICD-10: Treatment Diagnosis: R13.12 Dysphagia, Oropharyngeal Phase    RECOMMENDATIONS   DIET:    Regular Consistency   Thin Liquids    MEDICATIONS: With liquid     ASPIRATION PRECAUTIONS  · Slow rate of intake  · Small bites/sips  · Upright at 90 degrees during meal     COMPENSATORY STRATEGIES/MODIFICATIONS  · 1:1 assistance with meals due to vision issues     RECOMMENDATIONS for CONTINUED SPEECH THERAPY:   YES: Anticipate need for ongoing speech therapy during this hospitalization and at next level of care. ASSESSMENT   Patient presents with oropharyngeal swallow function that is within normal limits. No s/sx of dysphagia identified. Recommend regular diet diet/thin liquids. Medications with liquid wash. He will require 1:1 assistance with meals due to visual deficits. No dysphagia treatment needed. Will follow for cognitive-linguistic assessment at next session. EDUCATION:  · Recommendations discussed with Patient, MD and RN  CONTINUATION OF SKILLED SERVICES/MEDICAL NECESSITY:   Patient continues to require skilled intervention due to cognitive-linguistic deficits. REHABILITATION POTENTIAL FOR STATED GOALS: Good    PLAN    FREQUENCY/DURATION: Assessment of cognitive-linguistic abilities to be completed at next session. Frequency and duration to be determined by findings/recommendations.     - Recommendations for next treatment session: Next treatment session will address assessment of cognitive-linguistic abilities     SUBJECTIVE   Seen with AM meal. Attempting to self feed. Oriented to person and place.      Oxygen Device: Room air  Pain: Pain Scale 1: Numeric (0 - 10)  Pain Intensity 1: 0    History of Present Injury/Illness: Mr. Isaac Shell  has a past medical history of AAA (abdominal aortic aneurysm) ([facility] (7/8/2016), Pak's esophagus with esophagitis (7/8/2016), Cerebrovascular accident (CVA) ([facility] (7/8/2016), Chronic fatigue syndrome (2/26/2016), Cognitive deficits (7/8/2016), Colon polyps (2007), Elevated PSA, Enthesopathy of ankle and tarsus (5/29/2014), GERD (gastroesophageal reflux disease) (7/8/2016), Hashimoto's thyroiditis (7/8/2016), History of nuclear stress test (1997, 5/2005), Hypotension (8/25/2015), Impaired cognition (7/8/2016), Inguinal hernia (7/8/2016), Insomnia (7/8/2016), Lesion of lateral popliteal nerve (10/10/2015), Mixed anxiety depressive disorder (7/20/2015), Neuropathy involving both lower extremities (11/3/2015), Parkinson's disease ([facility] (07/08/2016), Peyronie's disease (7/8/2016), Polyneuropathy (2/26/2016), Postoperative hypothyroidism (6/9/2016), 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 Colby Kurtz He also  has a past surgical history that includes hx colonoscopy (2000 & 2005); hx breast biopsy (Bilateral); hx thyroidectomy; hx hernia repair (Left, 2011); hx cataract removal (Right, 2012); hx other surgical (2006); and hx other surgical. PRECAUTIONS/ALLERGIES: Brompheniramine maleate, Carisoprodol, Meperidine, Penicillin, Phenylephrine hcl, Zaleplon, Dimetapp cold and flu, Donepezil, Penicillins, and Rofecoxib     Problem List:  (Impairments causing functional limitations):  1. Oropharyngeal dysphagia- No symptoms identified  2. Previous Dysphagia: NONE REPORTED  Diet Prior to Evaluation: Regular diet/thin liquids    Orientation:  Person  Place    Cognitive-Linguistic Screening:   Alertness  o Alert   Speech Production:   o Fully intelligible   Expressive Language:  o Fluent speech and Able to effectively communicate wants and needs   Receptive Language:  o Answers yes/no questions appropriately and Follows commands   Cognition:   o Pleasantly confused. Impulsive and attempting to get out of bed.  Poor insight into visual deficits and need for assistance  Prior Level of Function: Lives in snf  Recommendations: Given results of screening, further evaluation is indicated to assess cognitive-linguistic abilities. OBJECTIVE   Oral Motor:   · Labial: No impairment  · Dentition: Intact  · Oral Hygiene: Adequate  · Lingual: No impairment    Dysphagia evaluation:   Patient presented with thin liquid via cup and straw, puree, mixed, and solid consistencies. Appropriate oral prep with all textures. Timely swallow initiation, and single swallows upon palpation. Adequate oral clearing. No overt signs or symptoms of airway compromise observed with liquid or solid textures. During session, patient attempting to self-feed AM meal. He was noted to overshoot objects when attempting to pick them up. Impaired ability to place objects in mouth despite assistance. 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 He will require 1:1 assistance with meals. Tool Used: Dysphagia Outcome and Severity Scale (AICHA)    Score Comments   Normal Diet  [x] 7 With no strategies or extra time needed   Functional Swallow  [] 6 May have mild oral or pharyngeal delay   Mild Dysphagia  [] 5 Which may require one diet consistency restricted    Mild-Moderate Dysphagia  [] 4 With 1-2 diet consistencies restricted   Moderate Dysphagia  [] 3 With 2 or more diet consistencies restricted   Moderate-Severe Dysphagia  [] 2 With partial PO strategies (trials with ST only)   Severe Dysphagia  [] 1 With inability to tolerate any PO safely      Score:  Initial: 7 Most Recent: x (Date 04/02/21 )   Interpretation of Tool: The Dysphagia Outcome and Severity Scale (AICHA) is a simple, easy-to-use, 7-point scale developed to systematically rate the functional severity of dysphagia based on objective assessment and make recommendations for diet level, independence level, and type of nutrition.        Current Medications:   Current Facility-Administered Medications on File Prior to Encounter Medication Dose Route Frequency Provider Last Rate Last Admin    [COMPLETED] sodium chloride 0.9 % bolus infusion 1,000 mL  1,000 mL IntraVENous ONCE Emmanuelle Lemus III, MD   Stopped at 04/01/21 1900    [COMPLETED] hydrALAZINE (APRESOLINE) 20 mg/mL injection 20 mg  20 mg IntraVENous NOW Emmanuelle Lemus III, MD   20 mg at 04/01/21 9045     Current Outpatient Medications on File Prior to Encounter   Medication Sig Dispense Refill    carbidopa-levodopa (SINEMET)  mg per tablet TAKE 1 TABLET BY MOUTH AT  6AM, 10AM, 2PM, AND 6PM  STRICT TIMES DUE TO  ADVANCED  Tab 3    fludrocortisone (FLORINEF) 0.1 mg tablet TAKE 1 TABLET BY MOUTH AT  6AM 90 Tab 1    melatonin 3 mg tablet 3 mg tab Q 9pm for RBD 90 Tab 3    OTHER 1) mestinon 1/2 tab Q day for 5 days and stop    2)Florinef 0.1mg 1 tab Qday (stays on 1 tab BID until northera can be increased). 1 Act 0    droxidopa (Northera) 300 mg cap 2 caps at 6am, 1 cap at 12pm, 1 cap at 5pm for 3 days then increase to 2 caps 6am, 2 caps 12pm, 1 cap 5pm for 3 days then increase to 2 caps TID. Monitor BP& HR sitting and standing in the AM and PM for 10 days and send readings to Dr. Rosa Skinner. 270 Cap 3    gabapentin (NEURONTIN) 100 mg capsule Take 1 Cap by mouth three (3) times daily. 270 Cap 3    escitalopram oxalate (LEXAPRO) 10 mg tablet Take 1 Tab by mouth daily. 90 Tab 3    oxybutynin (DITROPAN) 5 mg tablet 1 tab at 9pm (not to start until 11/10/20) 90 Tab 3    polyethylene glycol (MIRALAX) 17 gram packet Take 1 Packet by mouth daily. 90 Packet 3    rivastigmine tartrate (EXELON) 6 mg capsule Take 1 Cap by mouth two (2) times daily (with meals).  180 Cap 3    amantadine HCL (SYMMETREL) 100 mg tablet TAKE 1 TABLET BY MOUTH TWO  TIMES DAILY (Patient taking differently: TKE 1 TABLET BY MOUTH AT 6AM AND 2PM) 180 Tab 3    carbidopa-levodopa ER (SINEMET CR)  mg per tablet 1 tab at 10 pm strict time (Patient taking differently: 1 tab at 9pm strict time) 90 Tab 3    latanoprost (XALATAN) 0.005 % ophthalmic solution Administer 1 Drop to right eye nightly.  levothyroxine (SYNTHROID) 112 mcg tablet Take  by mouth Daily (before breakfast).           INTERDISCIPLINARY COLLABORATION: RN and MD    After treatment position/precautions:  · Upright in bed  · RN notified   · Neurology at bedside    Total Treatment Duration:   Time In: 0170  Time Out: [facility]

## 2021-04-02 NOTE — PROGRESS NOTES
Bedside and Verbal shift change report given to [address] Fernando RN (oncoming nurse) by Zaida Duarte RN (offgoing nurse). Report included the following information SBAR, Kardex, ED Summary, OR Summary, Procedure Summary, Intake/Output, Accordion, Recent Results, Med Rec Status, Cardiac Rhythm NSR, Alarm Parameters , Pre Procedure Checklist, Quality Measures and Dual Neuro Assessment.

## 2021-04-02 NOTE — PROGRESS NOTES
Hospitalist Progress Note     Admit Date:  2021 11:00 PM   Name:  Wendy Matamoros. Age:  68 y.o.  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 is a 68 y.o. male with medical history of parkinsons disease, frequent falls and orthostatic hypotension who presented to ED with falls. He had 2 falls on 3-29 and 1 on 3-31. CT head shows small bilateral acute on chronic SDH of frontal lobes without mass effect. He is accompanied per his sister Wei Rose who give history. ROS limited per mentation and interactions.      2021  Pt awake alert  So doing ok  Repeat ct head done early this - no change in hematoma    Objective:     Patient Vitals for the past 24 hrs:   Temp Pulse Resp BP SpO2   21 0700  89 (!) 40 139/64 100 %   21 0630  (!) 112 (!) 40 104/61 100 %   21 0600  (!) 102 15 130/81 100 %   21 0530  92 27 134/62 100 %   21 0500  (!) 111 29 132/74 100 %   21 0430  97 14 129/72 100 %   21 0400  99 29 134/72 100 %   21 0330  94 30 125/75 100 %   21 0300 97.6 °F (36.4 °C) 97 29 116/65 100 %   21 0230  (!) 102 15 (!) 146/67 100 %   21 0200  100 20 138/63 100 %   21 0130  (!) 59 14 (!) 152/69 100 %   04/02/21 0100  61 (!) 36 133/71 100 %   04/02/21 0030  83 15 136/63 100 %   04/02/21 0000  90 25 (!) 141/79 100 %   04/01/21 2330  92 13 132/70 98 %   04/01/21 2317  98  (!) 170/89    04/01/21 2303  (!) 101 19  92 %   04/01/21 2302 98.6 °F (37 °C) 96 22 (!) 155/89 93 %   04/01/21 2301  97 17  93 %     Oxygen Therapy  O2 Sat (%): 100 % (04/02/21 0700)  Pulse via Oximetry: 94 beats per minute (04/02/21 0700)  O2 Device: Room air (04/02/21 0300)    Intake/Output Summary (Last 24 hours) at 4/2/2021 0802  Last data filed at 4/2/2021 0514  Gross per 24 hour   Intake    Output 300 ml   Net -300 ml         General:    Well nourished. Alert. HEENT- left pupil dilated ,both reacting to light . Visual field problems. ? Left facial droop  CV:   RRR. No murmur, rub, or gallop. Lungs:   Clear to auscultation bilaterally. No wheezing, rhonchi, or rales. Abdomen:   Soft, nontender, nondistended. Extremities: Warm and dry. No cyanosis or edema. Skin:     No rashes or jaundice. Data Review:  I have reviewed all labs, meds, telemetry events, and studies from the last 24 hours. Recent Results (from the past 24 hour(s))   CBC WITH AUTOMATED DIFF    Collection Time: 04/01/21  3:15 PM   Result Value Ref Range    WBC 8.4 4.3 - 11.1 K/uL    RBC 3.57 (L) 4.23 - 5.6 M/uL    HGB 12.1 (L) 13.6 - 17.2 g/dL    HCT 35.0 (L) 41.1 - 50.3 %    MCV 98.0 (H) 79.6 - 97.8 FL    MCH 33.9 (H) 26.1 - 32.9 PG    MCHC 34.6 31.4 - 35.0 g/dL    RDW 12.3 11.9 - 14.6 %    PLATELET 517 055 - 778 K/uL    MPV 8.9 (L) 9.4 - 12.3 FL    ABSOLUTE NRBC 0.00 0.0 - 0.2 K/uL    DF AUTOMATED      NEUTROPHILS 72 43 - 78 %    LYMPHOCYTES 20 13 - 44 %    MONOCYTES 7 4.0 - 12.0 %    EOSINOPHILS 1 0.5 - 7.8 %    BASOPHILS 1 0.0 - 2.0 %    IMMATURE GRANULOCYTES 0 0.0 - 5.0 %    ABS. NEUTROPHILS 6.0 1.7 - 8.2 K/UL    ABS. LYMPHOCYTES 1.6 0.5 - 4.6 K/UL    ABS. MONOCYTES 0.6 0.1 - 1.3 K/UL    ABS.  EOSINOPHILS 0.1 0.0 - 0.8 K/UL    ABS. BASOPHILS 0.1 0.0 - 0.2 K/UL    ABS. IMM. GRANS. 0.0 0.0 - 0.5 K/UL   METABOLIC PANEL, COMPREHENSIVE    Collection Time: 04/01/21  3:15 PM   Result Value Ref Range    Sodium 132 (L) 136 - 145 mmol/L    Potassium 4.5 3.5 - 5.1 mmol/L    Chloride 100 98 - 107 mmol/L    CO2 25 21 - 32 mmol/L    Anion gap 7 7 - 16 mmol/L    Glucose 81 65 - 100 mg/dL    BUN 28 (H) 8 - 23 MG/DL    Creatinine 1.18 0.8 - 1.5 MG/DL    GFR est AA >60 >60 ml/min/1.73m2    GFR est non-AA >60 >60 ml/min/1.73m2    Calcium 9.3 8.3 - 10.4 MG/DL    Bilirubin, total 0.6 0.2 - 1.1 MG/DL    ALT (SGPT) 9 (L) 12 - 65 U/L    AST (SGOT) 19 15 - 37 U/L    Alk. phosphatase 122 50 - 136 U/L    Protein, total 7.3 6.3 - 8.2 g/dL    Albumin 3.8 3.2 - 4.6 g/dL    Globulin 3.5 2.3 - 3.5 g/dL    A-G Ratio 1.1 (L) 1.2 - 3.5     URINALYSIS W/ RFLX MICROSCOPIC    Collection Time: 04/01/21  4:12 PM   Result Value Ref Range    Color YELLOW      Appearance CLEAR      Specific gravity 1.023 1.001 - 1.023      pH (UA) 5.0 5.0 - 9.0      Protein Negative NEG mg/dL    Glucose Negative mg/dL    Ketone TRACE (A) NEG mg/dL    Bilirubin SMALL (A) NEG      Blood Negative NEG      Urobilinogen 1.0 0.2 - 1.0 EU/dL    Nitrites Negative NEG      Leukocyte Esterase Negative NEG     LACTIC ACID    Collection Time: 04/01/21  4:12 PM   Result Value Ref Range    Lactic acid 1.1 0.4 - 2.0 MMOL/L   EKG, 12 LEAD, INITIAL    Collection Time: 04/02/21  2:39 AM   Result Value Ref Range    Ventricular Rate 92 BPM    Atrial Rate 92 BPM    P-R Interval 168 ms    QRS Duration 96 ms    Q-T Interval 390 ms    QTC Calculation (Bezet) 482 ms    Calculated P Axis 52 degrees    Calculated R Axis -73 degrees    Calculated T Axis 64 degrees    Diagnosis       Sinus rhythm with Premature supraventricular complexes  Left anterior fascicular block  Prolonged QT  Abnormal ECG  When compared with ECG of 14-AUG-2019 19:38,  Premature supraventricular complexes are now Present  Vent.  rate has increased BY  34 BPM  Criteria for Septal infarct are no longer Present  QT has lengthened  Confirmed by WILMA WILKINS (), DESHAWN POND ([id number]) on 4/2/2021 3:27:81 AM     METABOLIC PANEL, BASIC    Collection Time: 04/02/21  3:29 AM   Result Value Ref Range    Sodium 136 (L) 138 - 145 mmol/L    Potassium 3.9 3.5 - 5.1 mmol/L    Chloride 102 98 - 107 mmol/L    CO2 28 21 - 32 mmol/L    Anion gap 6 (L) 7 - 16 mmol/L    Glucose 73 65 - 100 mg/dL    BUN 20 8 - 23 MG/DL    Creatinine 0.88 0.8 - 1.5 MG/DL    GFR est AA >60 >60 ml/min/1.73m2    GFR est non-AA >60 >60 ml/min/1.73m2    Calcium 8.9 8.3 - 10.4 MG/DL   CBC W/O DIFF    Collection Time: 04/02/21  3:29 AM   Result Value Ref Range    WBC 6.9 4.3 - 11.1 K/uL    RBC 3.83 (L) 4.23 - 5.6 M/uL    HGB 12.7 (L) 13.6 - 17.2 g/dL    HCT 38.2 (L) 41.1 - 50.3 %    MCV 99.7 (H) 79.6 - 97.8 FL    MCH 33.2 (H) 26.1 - 32.9 PG    MCHC 33.2 31.4 - 35.0 g/dL    RDW 12.2 11.9 - 14.6 %    PLATELET 038 079 - 010 K/uL    MPV 8.9 (L) 9.4 - 12.3 FL    ABSOLUTE NRBC 0.00 0.0 - 0.2 K/uL        All Micro Results     None          Current Meds:  Current Facility-Administered Medications   Medication Dose Route Frequency    tuberculin injection 5 Units  5 Units IntraDERMal ONCE    sodium chloride (NS) flush 5-40 mL  5-40 mL IntraVENous Q8H    sodium chloride (NS) flush 5-40 mL  5-40 mL IntraVENous PRN    acetaminophen (TYLENOL) tablet 650 mg  650 mg Oral Q6H PRN    naloxone (NARCAN) injection 0.4 mg  0.4 mg IntraVENous PRN    polyethylene glycol (MIRALAX) packet 17 g  17 g Oral DAILY PRN    amantadine HCL (SYMMETREL) capsule 100 mg  100 mg Oral BID    carbidopa-levodopa (SINEMET)  mg per tablet 1 Tab  1 Tab Oral QID    carbidopa-levodopa ER (SINEMET CR)  mg per tablet 2 Tab  2 Tab Oral QHS    droxidopa cap 600 mg (Patient Supplied)  600 mg Oral TID    escitalopram oxalate (LEXAPRO) tablet 10 mg  10 mg Oral DAILY    fludrocortisone (FLORINEF) tablet 0.1 mg  0.1 mg Oral Q24H    gabapentin (NEURONTIN) capsule 100 mg  100 mg Oral TID    latanoprost (XALATAN) 0.005 % ophthalmic solution 1 Drop  1 Drop Right Eye QHS    levothyroxine (SYNTHROID) tablet 112 mcg  112 mcg Oral ACB    oxybutynin (DITROPAN) tablet 5 mg  5 mg Oral QHS    polyethylene glycol (MIRALAX) packet 17 g  17 g Oral DAILY    rivastigmine tartrate (EXELON) capsule 6 mg  6 mg Oral BID WITH MEALS    tuberculin injection 5 Units  5 Units IntraDERMal ONCE       Other Studies (last 24 hours):  Xr Hip Rt W Or Wo Pelv 2-3 Vws    Result Date: 4/1/2021  Right Hip INDICATION: Amada Levels, Pain Three views of the right hip were obtained FINDINGS: There is no significant joint space narrowing. There is no evidence of fracture or dislocation. No bony abnormality is seen in the proximal right femur or adjacent pelvis. No evidence of right hip fracture     Ct Head Wo Cont    Result Date: 4/1/2021  CT HEAD WITHOUT CONTRAST, 4/1/2021 History: Increased falls. Weakness and confusion since the weekend. Comparison: CT head without contrast 1/26/2020 Technique:   5 mm axial scans from the skull base to the vertex. All CT scans performed at this facility use one or all of the following: Automated exposure control, adjustment of the mA and/or kVp according to patient's size, iterative reconstruction. Findings:  No abnormal hyperdense collections are seen along the bilateral frontal lobes best appreciated on axial image 10. The appearance suggests acute on chronic subdural hematomas with the left subdural hematoma measuring up to 5 mm in width, and the right subdural hematoma measuring up to 3 mm in width. No significant mass effect is currently seen. Specifically, no significant herniation is suggested. No evidence for acute hydrocephalus is seen. No abnormal edema pattern is seen in a vascular distribution to suggest large artery infarction. Evaluation with bone windows shows no acute osseous changes.   The visualized sinuses, middle ears, and mastoid air cells are well aerated. 1.  Small bilateral acute on chronic subdural hematomas about the frontal lobes without significant mass effect at this time. This report was made using voice transcription. Despite my best efforts to avoid any, transcription errors may persist. If there is any question about the accuracy of the report or need for clarification, then please call [phone], or text me through perfectserv for clarification or correction. The critical finding of acute on chronic subdural hematomas was discussed with Dr. Judi Sheikh by myself personally at 4:35 PM on 4/1/2021. Xr Chest Port    Result Date: 4/1/2021  Chest X-ray INDICATION: Increasing falls and confusion A portable AP view of the chest was obtained. FINDINGS: The lungs are clear. There are no infiltrates or effusions. The heart size is normal.  The bony thorax is intact. No acute findings in the chest     Ct Code Neuro Head Wo Contrast    Result Date: 4/2/2021  Clinical history: Code stroke. Left-sided gaze facial droop TECHNIQUE: Axial and coronal CT of the head without IV contrast. CT dose reduction was achieved through use of a standardized protocol tailored for this examination and automatic exposure control for dose modulation. COMPARISON: Yesterday. FINDINGS: Small right frontal acute subdural hemorrhage, measuring approximately 3 mm in thickness. Similar small left frontal acute subdural hemorrhage measuring approximately 4 mm in thickness. No significant mass effect. There is no midline shift or hydrocephalus. There is no CT evidence of acute territorial infarction. Cerebellum on the brainstem are grossly unremarkable. Periventricular hypodensities, nonspecific and likely due to chronic small vessel changes. Included globes appear intact. The paranasal sinuses and the mastoid air cells are aerated. There is no skull fracture. 1. Small bilateral frontal lobes acute subdural hemorrhage. No significant mass effect. No midline shift. 2. No CT evidence of acute territorial infarction. 3. No significant change compared to CT from yesterday.       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

## 2021-04-02 NOTE — PROGRESS NOTES
Problem: Falls - Risk of  Goal: *Absence of Falls  Description: Document Nathaniel Montgomery Fall Risk and appropriate interventions in the flowsheet. Outcome: Progressing Towards Goal  Note: Fall Risk Interventions:  Mobility Interventions: Bed/chair exit alarm, Communicate number of staff needed for ambulation/transfer, Patient to call before getting OOB, PT Consult for mobility concerns, PT Consult for assist device competence, Strengthening exercises (ROM-active/passive), Utilize gait belt for transfers/ambulation              Elimination Interventions: Bed/chair exit alarm, Call light in reach, Patient to call for help with toileting needs, Toileting schedule/hourly rounds    History of Falls Interventions: Bed/chair exit alarm, Door open when patient unattended, Evaluate medications/consider consulting pharmacy, Room close to nurse's station, Utilize gait belt for transfer/ambulation, Assess for delayed presentation/identification of injury for 48 hrs (comment for end date), Vital signs minimum Q4HRs X 24 hrs (comment for end date)         Problem: Patient Education: Go to Patient Education Activity  Goal: Patient/Family Education  Outcome: Progressing Towards Goal     Problem: Pressure Injury - Risk of  Goal: *Prevention of pressure injury  Description: Document Reza Scale and appropriate interventions in the flowsheet.   Outcome: Progressing Towards Goal  Note: Pressure Injury Interventions:       Moisture Interventions: Absorbent underpads, Apply protective barrier, creams and emollients, Assess need for specialty bed, Check for incontinence Q2 hours and as needed, Contain wound drainage, Internal/External urinary devices, Maintain skin hydration (lotion/cream), Minimize layers, Moisture barrier, Offer toileting Q_hr    Activity Interventions: Assess need for specialty bed, PT/OT evaluation, Pressure redistribution bed/mattress(bed type)    Mobility Interventions: Assess need for specialty bed, Float heels, HOB 30 degrees or less, Pressure redistribution bed/mattress (bed type), PT/OT evaluation, Turn and reposition approx.  every two hours(pillow and wedges)    Nutrition Interventions: Document food/fluid/supplement intake    Friction and Shear Interventions: Apply protective barrier, creams and emollients, Feet elevated on foot rest, Foam dressings/transparent film/skin sealants, HOB 30 degrees or less, Lift sheet, Lift team/patient mobility team, Minimize layers, Transferring/repositioning devices                Problem: Patient Education: Go to Patient Education Activity  Goal: Patient/Family Education  Outcome: Progressing Towards Goal     Problem: Patient Education: Go to Patient Education Activity  Goal: Patient/Family Education  Outcome: Progressing Towards Goal

## 2021-04-02 NOTE — PROGRESS NOTES
Code stroke    Code stroke was called due to patient having a change in his neuro evaluation. Nursing noted that his pupils were now unequal, he had left facial droop, left arm drift, and a change in mental status. He is admitted for acute on chronic subdural hematomas. Repeat CT scan was ordered and has now been read as no significant changes. Exam of patient after CT now shows his pupils to be equal and reactive. He has a little bit of facial flattening on the left. He did not have any left arm drift on my exam.  He is only oriented to person, but he is alert and attempts to follow commands. Neurology and neurosurgery have been consulted for later today.   No change to current management    Macie Al MD

## 2021-04-02 NOTE — CONSULTS
Rajwinder Catherine MD  Medical Director  [address] [facility], [address] [facility]  Tel: [phone]       Physical Medicine & Rehabilitation Consult    Subjective:     Date of Consultation:  April 2, 2021  Referring Physician: Hospitalist service / MD Nick Chenellis Esteban. is a 68 y.o. white male who is being evaluated at the request of Hospitalist service for consideration for inpatient rehabilitation following fall, subdural hematoma. HPI: The patient is a right-hand dominant 72yo WM with PMH including Parkinson disease with PD dementia, hypotension and B LE neuropathy with 3 recent falls (3/29 x 2, 3/31 x 1). He presented to the ED from [facility] [address] with increased falls, weakness and confusion and was admitted to the ICU after imaging identified small bilateral frontal acute-on-chronic subdural hematomas. Neurosurgery declined intervention. Labs with hyponatremia, U/A non-infectious. Code S initiated overnight due to change in neuro evaluation; repeat head CT unchanged. SLP cleared patient for regular diet, thin liquids. Neurology recommended brain MRI and noted patient not safe to live alone. Physical and occupational therapies were initiated, and patient was found to have mobility and self-care deficits. He was awake, alert and agreeable to exam this afternoon. He exhibited clear thoughts alternating with confusion and confabulation. He reports he was previously functionally independent at [facility]; per discussion between CM and daughter, patient requires assistance from staff for ADLs due to PDD and visual deficits. Patient reports using a wheelchair for mobility. He is retired but was previously [employer].       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 ([facility] (10/5/2017)    Dementia due to Parkinson's disease without behavioral disturbance (HCC) (8/20/2018)    Orthostatic hypotension (4/1/2021)    Hyponatremia (4/1/2021)      Past Medical History:   Diagnosis Date    AAA (abdominal aortic aneurysm) ([facility] 7/8/2016    pt is not aware of this-     Pak's esophagus with esophagitis 7/8/2016    no meds- not daily    Cerebrovascular accident (CVA) ([facility] [address] 7/8/2016    They thought I did but they didn't find anything on the tests \"I blacked out\" no admission to the hospital    Chronic fatigue syndrome 2/26/2016    Cognitive deficits 7/8/2016    Colon polyps 2007    Elevated PSA     Enthesopathy of ankle and tarsus 5/29/2014    GERD (gastroesophageal reflux disease) 7/8/2016    Hashimoto's thyroiditis 7/8/2016    History of nuclear stress test 1997, 5/2005    Hypotension 8/25/2015    Impaired cognition 7/8/2016    Inguinal hernia 7/8/2016    Insomnia 7/8/2016    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        Past Surgical History:   Procedure Laterality Date    HX BREAST BIOPSY Bilateral     sterotactic    HX CATARACT REMOVAL Right 2012    HX COLONOSCOPY  2000 & 2005    HX HERNIA REPAIR Left 2011    HX OTHER SURGICAL  2006    esophagogastroduodenoscopy    HX OTHER SURGICAL      deep leg/ankle tumor excision benign leg dumor    HX THYROIDECTOMY      total      Family History   Problem Relation Age of Onset    Cancer Mother         Pancreas CA    Cancer Father         Brain CA    Heart Disease Father     Heart Failure Father     No Known Problems Sister     Heart Attack Brother         MI at 52 y.o.    Diabetes Maternal Uncle     Diabetes Paternal Uncle     Arthritis-osteo Sister     Thyroid Disease Neg Hx       Social History     Tobacco Use    Smoking status: Never Smoker    Smokeless tobacco: Never Used   Substance Use Topics    Alcohol use: No     Prior to Admission medications    Medication Sig Start Date End Date Taking? Authorizing Provider   carbidopa-levodopa (SINEMET)  mg per tablet TAKE 1 TABLET BY MOUTH AT  6AM, 10AM, 2PM, AND 6PM  STRICT TIMES DUE TO  ADVANCED PD 11/15/20   iAlyn Hernández MD   fludrocortisone (FLORINEF) 0.1 mg tablet TAKE 1 TABLET BY MOUTH AT  6AM 11/3/20   Ailyn Hernández MD   melatonin 3 mg tablet 3 mg tab Q 9pm for RBD 10/30/20   Jacey Otto MD   OTHER 1) mestinon 1/2 tab Q day for 5 days and stop    2)Florinef 0.1mg 1 tab Qday (stays on 1 tab BID until northera can be increased). 10/30/20   Ailyn Hernández MD   droxidopa (Northera) 300 mg cap 2 caps at 6am, 1 cap at 12pm, 1 cap at 5pm for 3 days then increase to 2 caps 6am, 2 caps 12pm, 1 cap 5pm for 3 days then increase to 2 caps TID. Monitor BP& HR sitting and standing in the AM and PM for 10 days and send readings to Dr. Meenu Cevallos. 10/30/20   Ailyn Hernández MD   gabapentin (NEURONTIN) 100 mg capsule Take 1 Cap by mouth three (3) times daily. 10/30/20   Ailyn Hernández MD   escitalopram oxalate (LEXAPRO) 10 mg tablet Take 1 Tab by mouth daily. 10/30/20   Ailyn Hernández MD   oxybutynin (DITROPAN) 5 mg tablet 1 tab at 9pm (not to start until 11/10/20) 10/30/20   Jacey Otto MD   polyethylene glycol (MIRALAX) 17 gram packet Take 1 Packet by mouth daily. 10/30/20   Ailyn Hernández MD   rivastigmine tartrate (EXELON) 6 mg capsule Take 1 Cap by mouth two (2) times daily (with meals).  7/22/20   Ailyn Hernández MD   amantadine HCL (SYMMETREL) 100 mg tablet TAKE 1 TABLET BY MOUTH TWO  TIMES DAILY  Patient taking differently: TKE 1 TABLET BY MOUTH AT 6AM AND 2PM 7/13/20   Agustín Hernández MD   carbidopa-levodopa ER (SINEMET CR)  mg per tablet 1 tab at 10 pm strict time  Patient taking differently: 1 tab at 9pm strict time 3/10/20   He Mahoney MD   latanoprost (XALATAN) 0.005 % ophthalmic solution Administer 1 Drop to right eye nightly. Provider, Historical   levothyroxine (SYNTHROID) 112 mcg tablet Take  by mouth Daily (before breakfast). Provider, Historical     Allergies   Allergen Reactions    Brompheniramine Maleate Unknown (comments)     Pt does not recall med.  Carisoprodol Unknown (comments)     Other reaction(s): Rash-A, Unknown-A  Pt does not recall med.  Meperidine Unknown (comments)     Pt does not recall med.  Penicillin Rash    Phenylephrine Hcl Unknown (comments)     Pt does not recall med.  Zaleplon Unknown (comments)     Other reaction(s): Rash-A, Unknown-A  Pt does not recall med.      Dimetapp Cold And Flu Rash    Donepezil Other (comments)     Altered mental state      Penicillins Rash    Rofecoxib Rash        Review of Systems:   Constitutional: negative for fevers, chills, fatigue and malaise  Eyes: negative for visual disturbance  Ears, nose, mouth, throat, and face: negative for hearing loss, nasal congestion, sore throat and hoarseness  Respiratory: negative for cough, sputum, hemoptysis or dyspnea on exertion  Cardiovascular: negative for chest pain, palpitations  Gastrointestinal: negative for dysphagia, nausea, vomiting, change in bowel habits and abdominal pain  Genitourinary: negative for frequency, dysuria and urinary incontinence  Musculoskeletal: positive for arthralgias and muscle weakness, negative for neck pain and back pain  Neurological: positive for paresthesia, coordination problems, gait problems, tremor and weakness, negative for headaches, dizziness, seizures, memory problems and speech problems    Objective:     Vitals:  Blood pressure 111/66, pulse (!) 101, temperature 98.4 °F (36.9 °C), resp. rate 29, height 5' 10\" (1.778 m), weight 126 lb 1.7 oz (57.2 kg), SpO2 98 %.   Temp (24hrs), Av.3 °F (36.8 °C), Min:97.6 °F (36.4 °C), Max:98.6 °F (37 °C)    Intake and Output:   1901 -  0700  In: -   Out: 300 [Urine:300]    Physical Exam:  General: Alert, thin, cooperative WM in no acute distress; pleasantly interactive but confused at times  Skin:  Warm, moist with thin texture, scattered ecchymoses and abrasions on exposed extremities from recent falls  Eyes:  Sclera are clear, extraocular muscles intact without nystagmus  HENT:  Normocephalic; nares patent, oral mucosa moist, neck supple; trachea midline  Respiratory: Lungs clear to auscultation bilaterally, breathing is non-labored   Chest:  Symmetric throughout one respiratory excursion  CV:  Regular rate, somewhat irregular underlying rhythm, no appreciable murmur  Abdomen: Soft, nontender, not distended; bowel sounds normoactive   Extremities: UE strength at biceps, triceps, finger flexion 4+/5 (R), 4-/5 (L),  strength symmetric; lifts B LE off bed against gravity, strength at hip flexion 4-/5, knee flexion 4/5, ankle DF/PF 4/5 bilaterally; no edema, cyanosis, clubbing  Neurological: Oriented to person, city, president, self-corrects year from \"\" to \"2-0-2-1\"; exhibits clear thoughts alternating with confusion and confabulation, impaired attention, easily distracted and tangential; follows ~75% 1-step commands, <25% multi-step commands, facial symmetry to cheek puff / brow furrow / grin / smile; EOM intact without nystagmus, visual fields are full to finger confrontation; dysmetria with finger-to-nose, L>>R, diminished but symmetric rapid alternating movements; able to read, perform simple math, identify common object and state its use      Labs/Studies:  Recent Results (from the past 72 hour(s))   CBC WITH AUTOMATED DIFF    Collection Time: 04/01/21  3:15 PM   Result Value Ref Range    WBC 8.4 4.3 - 11.1 K/uL    RBC 3.57 (L) 4.23 - 5.6 M/uL    HGB 12.1 (L) 13.6 - 17.2 g/dL    HCT 35.0 (L) 41.1 - 50.3 %    MCV 98.0 (H) 79.6 - 97.8 FL    MCH 33.9 (H) 26.1 - 32.9 PG    MCHC 34.6 31.4 - 35.0 g/dL    RDW 12.3 11.9 - 14.6 %    PLATELET 291 790 - 823 K/uL    MPV 8.9 (L) 9.4 - 12.3 FL    ABSOLUTE NRBC 0.00 0.0 - 0.2 K/uL    DF AUTOMATED      NEUTROPHILS 72 43 - 78 %    LYMPHOCYTES 20 13 - 44 %    MONOCYTES 7 4.0 - 12.0 %    EOSINOPHILS 1 0.5 - 7.8 %    BASOPHILS 1 0.0 - 2.0 %    IMMATURE GRANULOCYTES 0 0.0 - 5.0 %    ABS. NEUTROPHILS 6.0 1.7 - 8.2 K/UL    ABS. LYMPHOCYTES 1.6 0.5 - 4.6 K/UL    ABS. MONOCYTES 0.6 0.1 - 1.3 K/UL    ABS. EOSINOPHILS 0.1 0.0 - 0.8 K/UL    ABS. BASOPHILS 0.1 0.0 - 0.2 K/UL    ABS. IMM. GRANS. 0.0 0.0 - 0.5 K/UL   METABOLIC PANEL, COMPREHENSIVE    Collection Time: 04/01/21  3:15 PM   Result Value Ref Range    Sodium 132 (L) 136 - 145 mmol/L    Potassium 4.5 3.5 - 5.1 mmol/L    Chloride 100 98 - 107 mmol/L    CO2 25 21 - 32 mmol/L    Anion gap 7 7 - 16 mmol/L    Glucose 81 65 - 100 mg/dL    BUN 28 (H) 8 - 23 MG/DL    Creatinine 1.18 0.8 - 1.5 MG/DL    GFR est AA >60 >60 ml/min/1.73m2    GFR est non-AA >60 >60 ml/min/1.73m2    Calcium 9.3 8.3 - 10.4 MG/DL    Bilirubin, total 0.6 0.2 - 1.1 MG/DL    ALT (SGPT) 9 (L) 12 - 65 U/L    AST (SGOT) 19 15 - 37 U/L    Alk.  phosphatase 122 50 - 136 U/L    Protein, total 7.3 6.3 - 8.2 g/dL    Albumin 3.8 3.2 - 4.6 g/dL    Globulin 3.5 2.3 - 3.5 g/dL    A-G Ratio 1.1 (L) 1.2 - 3.5     URINALYSIS W/ RFLX MICROSCOPIC    Collection Time: 04/01/21  4:12 PM   Result Value Ref Range    Color YELLOW      Appearance CLEAR      Specific gravity 1.023 1.001 - 1.023      pH (UA) 5.0 5.0 - 9.0      Protein Negative NEG mg/dL    Glucose Negative mg/dL    Ketone TRACE (A) NEG mg/dL    Bilirubin SMALL (A) NEG      Blood Negative NEG      Urobilinogen 1.0 0.2 - 1.0 EU/dL    Nitrites Negative NEG      Leukocyte Esterase Negative NEG     LACTIC ACID    Collection Time: 04/01/21  4:12 PM   Result Value Ref Range    Lactic acid 1.1 0.4 - 2.0 MMOL/L   EKG, 12 LEAD, INITIAL    Collection Time: 04/02/21  2:39 AM   Result Value Ref Range    Ventricular Rate 92 BPM    Atrial Rate 92 BPM    P-R Interval 168 ms    QRS Duration 96 ms    Q-T Interval 390 ms    QTC Calculation (Bezet) 482 ms    Calculated P Axis 52 degrees    Calculated R Axis -73 degrees    Calculated T Axis 64 degrees    Diagnosis       Sinus rhythm with Premature supraventricular complexes  Left anterior fascicular block  Prolonged QT  Abnormal ECG  When compared with ECG of 14-AUG-2019 19:38,  Premature supraventricular complexes are now Present  Vent.  rate has increased BY  34 BPM  Criteria for Septal infarct are no longer Present  QT has lengthened  Confirmed by WILMA WILKINS (), DESHAWN POND ([id number]) on 4/2/2021 5:13:82 AM     METABOLIC PANEL, BASIC    Collection Time: 04/02/21  3:29 AM   Result Value Ref Range    Sodium 136 (L) 138 - 145 mmol/L    Potassium 3.9 3.5 - 5.1 mmol/L    Chloride 102 98 - 107 mmol/L    CO2 28 21 - 32 mmol/L    Anion gap 6 (L) 7 - 16 mmol/L    Glucose 73 65 - 100 mg/dL    BUN 20 8 - 23 MG/DL    Creatinine 0.88 0.8 - 1.5 MG/DL    GFR est AA >60 >60 ml/min/1.73m2    GFR est non-AA >60 >60 ml/min/1.73m2    Calcium 8.9 8.3 - 10.4 MG/DL   CBC W/O DIFF    Collection Time: 04/02/21  3:29 AM   Result Value Ref Range    WBC 6.9 4.3 - 11.1 K/uL    RBC 3.83 (L) 4.23 - 5.6 M/uL    HGB 12.7 (L) 13.6 - 17.2 g/dL    HCT 38.2 (L) 41.1 - 50.3 %    MCV 99.7 (H) 79.6 - 97.8 FL    MCH 33.2 (H) 26.1 - 32.9 PG    MCHC 33.2 31.4 - 35.0 g/dL    RDW 12.2 11.9 - 14.6 %    PLATELET 912 657 - 975 K/uL    MPV 8.9 (L) 9.4 - 12.3 FL    ABSOLUTE NRBC 0.00 0.0 - 0.2 K/uL       Functional Exam:   Per PT: required min assist for bed mobility and transfers, orthostatics with mobility limited assessment and treatment; patient unsteady with mobility, slight posterior lean in standing at EOB, performed marching and sitting/standing at bedside, fair standing balance  Per OT: required min assist for bathing, dressing, toileting and mod assist for feeding/grooming    Ambulation:  Activity and Safety  Activity Level: Bed Rest  Ambulate: No (Comment)  Activity: Chair position in bed, In bed, Resting quietly  Activity Assistance: Complete care  Weight Bearing Status: WBAT (Weight Bearing as Tolerated)  Mode of Transportation: Stretcher  Repositioned: Sitting(bed in chair mode)  Patient Turned: Other (comment)(Bed in chair mode )  Head of Bed Elevated: HOB 90  Activity Response:  Tolerated well  Assistive Device: Fall prevention device  Safety Measures: Bed/Chair alarm on, Bed/Chair-Wheels locked, Bed in low position, Call light within reach, Emergency bedside equipment, Fall prevention (comment), Gripper socks, Self-releasing roll belt, Side rails X 3     Impression / Assessment:     Principal Problem:    Subdural hematoma ([facility] (4/1/2021)    Active Problems:    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 ([facility] [address] (10/5/2017)      Dementia due to Parkinson's disease without behavioral disturbance ([facility] (8/20/2018)      Orthostatic hypotension (4/1/2021)      Hyponatremia (4/1/2021)      Plan / Recommendations / Medical Decision Making:     Recommendations:   Continue Acute Rehab Program  Coordination of rehab / medical care  Counseling of PM&R care issues management  Monitoring and management of medical conditions per plan of care / orders     · 72yo WM with Parkinson disease with PD dementia, hypotension, hypoNa and B LE neuropathy with 3 recent falls, small bilateral frontal acute-on-chronic subdural hematomas  · Concerned that patient may not tolerate 3h of daily intense therapy as required by Medicare and provided at [facility]; he may be better served at skilled nursing facility rehab  · We will continue to follow    Discussion with Family / Caregiver / Staff    Reviewed Therapies / [place] / Dianna Stage / Records    Thank you very much for this referral. We appreciate the opportunity to participate in this patient's care. We will continue to follow. Lico Ravi PA-C  Physician Assistant with [facility]  Milena Gonzáles MD, Medical Director

## 2021-04-02 NOTE — CONSULTS
Consult    Patient: Bel Garrett MRN: [id number]     YOB: 1940  Age: 68 y.o. Sex: male      Subjective: Bel Garrett is a 68 y.o. male who is being seen for subdural hematoma in the setting of Parkinson disease and recent falls. The patient is followed as an outpatient by neurology for Parkinson disease and Parkinson disease dementia. The patient has a history of multiple falls. Patient has a history of orthostatic hypotension and is on droxidopa. The patient presented to the emergency department after having 2 falls on 3/29 and one fall on 3/31. CT scan of the head revealed small acute subdural hematomas. No reason for surgical intervention. The patient is now in the ICU being monitored. He has signs and symptoms of delirium.     Past Medical History:   Diagnosis Date    AAA (abdominal aortic aneurysm) ([address] 7/8/2016    pt is not aware of this-     Pak's esophagus with esophagitis 7/8/2016    no meds- not daily    Cerebrovascular accident (CVA) ([address] 7/8/2016    They thought I did but they didn't find anything on the tests \"I blacked out\" no admission to the hospital    Chronic fatigue syndrome 2/26/2016    Cognitive deficits 7/8/2016    Colon polyps 2007    Elevated PSA     Enthesopathy of ankle and tarsus 5/29/2014    GERD (gastroesophageal reflux disease) 7/8/2016    Hashimoto's thyroiditis 7/8/2016    History of nuclear stress test 1997, 5/2005    Hypotension 8/25/2015    Impaired cognition 7/8/2016    Inguinal hernia 7/8/2016    Insomnia 7/8/2016    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       Past Surgical History:   Procedure Laterality Date    HX BREAST BIOPSY Bilateral     sterotactic    HX CATARACT REMOVAL Right 2012    HX COLONOSCOPY  2000 & 2005    HX HERNIA REPAIR Left 2011    HX OTHER SURGICAL  2006    esophagogastroduodenoscopy    HX OTHER SURGICAL      deep leg/ankle tumor excision benign leg dumor    HX THYROIDECTOMY      total      Family History   Problem Relation Age of Onset    Cancer Mother         Pancreas CA    Cancer Father         Brain CA    Heart Disease Father     Heart Failure Father     No Known Problems Sister     Heart Attack Brother         MI at 52 y.o.    Diabetes Maternal Uncle     Diabetes Paternal Uncle     Arthritis-osteo Sister     Thyroid Disease Neg Hx      Social History     Tobacco Use    Smoking status: Never Smoker    Smokeless tobacco: Never Used   Substance Use Topics    Alcohol use: No      Current Facility-Administered Medications   Medication Dose Route Frequency Provider Last Rate Last Admin    labetaloL (NORMODYNE;TRANDATE) injection 10 mg  10 mg IntraVENous Q6H PRN Luis Grewal DO        tuberculin injection 5 Units  5 Units IntraDERMal ONCE Nathan Segundo MD        sodium chloride (NS) flush 5-40 mL  5-40 mL IntraVENous Q8H Adwoa Segundo MD   10 mL at 04/02/21 0523    sodium chloride (NS) flush 5-40 mL  5-40 mL IntraVENous PRN Nathan Segundo MD        acetaminophen (TYLENOL) tablet 650 mg  650 mg Oral Q6H PRN Nathan Segundo MD        naloxone (NARCAN) injection 0.4 mg  0.4 mg IntraVENous PRN Nathan Segundo MD        polyethylene glycol (MIRALAX) packet 17 g  17 g Oral DAILY PRN Nathan Segundo MD        amantadine HCL (SYMMETREL) capsule 100 mg  100 mg Oral BID Nathan Segundo MD   100 mg at 04/02/21 0523    carbidopa-levodopa (SINEMET)  mg per tablet 1 Tab  1 Tab Oral QID Nnamdi Whitten MD   1 Tab at 04/02/21 0942    carbidopa-levodopa ER (SINEMET CR)  mg per tablet 2 Tab  2 Tab Oral QHS Danny Segundo MD   2 Tab at 04/02/21 0117    droxidopa cap 600 mg (Patient Supplied)  600 mg Oral TID Nnamdi Whitten MD   Stopped at 04/01/21 2300    escitalopram oxalate (LEXAPRO) tablet 10 mg  10 mg Oral DAILY Danny Segundo MD   10 mg at 04/02/21 0847    fludrocortisone (FLORINEF) tablet 0.1 mg  0.1 mg Oral Q24H Adwoa Segundo MD   0.1 mg at 04/02/21 3697    gabapentin (NEURONTIN) capsule 100 mg  100 mg Oral TID Danny Segundo MD   100 mg at 04/02/21 0847    latanoprost (XALATAN) 0.005 % ophthalmic solution 1 Drop  1 Drop Right Eye QHS Danny Segundo MD   1 Drop at 04/01/21 2306    levothyroxine (SYNTHROID) tablet 112 mcg  112 mcg Oral ACB Danny Segundo MD   112 mcg at 04/02/21 0847    oxybutynin (DITROPAN) tablet 5 mg  5 mg Oral QHS Adwoa Segundo MD   5 mg at 04/02/21 0117    polyethylene glycol (MIRALAX) packet 17 g  17 g Oral DAILY Nnamdi Whitten MD   17 g at 04/02/21 0847    rivastigmine tartrate (EXELON) capsule 6 mg  6 mg Oral BID WITH MEALS Danny Segundo MD   6 mg at 04/02/21 0847    tuberculin injection 5 Units  5 Units IntraDERMal ONCE Danny Segundo MD   5 Units at 04/01/21 2305        Allergies   Allergen Reactions    Brompheniramine Maleate Unknown (comments)     Pt does not recall med.  Carisoprodol Unknown (comments)     Other reaction(s): Rash-A, Unknown-A  Pt does not recall med.  Meperidine Unknown (comments)     Pt does not recall med.  Penicillin Rash    Phenylephrine Hcl Unknown (comments)     Pt does not recall med.  Zaleplon Unknown (comments)     Other reaction(s): Rash-A, Unknown-A  Pt does not recall med.      Dimetapp Cold And Flu Rash    Donepezil Other (comments)     Altered mental state      Penicillins Rash    Rofecoxib Rash       Review of Systems:  Could not accurately obtain due to delirium        Objective:     Vitals:    04/02/21 0530 04/02/21 0600 04/02/21 0630 04/02/21 0700   BP: 134/62 130/81 104/61 139/64   Pulse: 92 (!) 102 (!) 112 89   Resp: 27 15 (!) 40 (!) 40   Temp:       SpO2: 100% 100% 100% 100%   Weight:       Height:            Physical Exam:  General - Well developed, well nourished, in no apparent distress. Pleasant and conversant but confused. HEENT - Normocephalic, atraumatic. Conjunctiva, tympanic membranes, and oropharynx are clear. Neck - Supple without masses, no bruits   Cardiovascular - Regular rate and rhythm. Normal S1, S2 without murmurs, rubs, or gallops. Lungs - Clear to auscultation. Abdomen - Soft, nontender with normal bowel sounds. Extremities - Peripheral pulses intact. No edema and no rashes. Neurological examination -  Comprehension is impaired to complex commands. Attention is poor. 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 On cranial nerve examination pupils are equal round and reactive to light. Fundoscopic examination is normal. Face is symmetric and sensation is intact to light touch. Hearing is intact to finger rustle bilaterally. Motor examination - There is normal muscle tone and bulk. Power is full throughout. Muscle stretch reflexes are normoactive and there are no pathological reflexes present. Sensation is intact to light touch, pinprick, vibration and proprioception in all extremities. Subtle bilateral pill-rolling tremor.           Results for orders placed or performed during the hospital encounter of 04/01/21   EKG, 12 LEAD, INITIAL   Result Value Ref Range    Ventricular Rate 92 BPM    Atrial Rate 92 BPM    P-R Interval 168 ms    QRS Duration 96 ms    Q-T Interval 390 ms    QTC Calculation (Bezet) 482 ms    Calculated P Axis 52 degrees    Calculated R Axis -73 degrees    Calculated T Axis 64 degrees    Diagnosis       Sinus rhythm with Premature supraventricular complexes  Left anterior fascicular block  Prolonged QT  Abnormal ECG  When compared with ECG of 14-AUG-2019 19:38,  Premature supraventricular complexes are now Present  Vent. rate has increased BY  34 BPM  Criteria for Septal infarct are no longer Present  QT has lengthened  Confirmed by Lonnie Manzo MD (), Jaxson Arnold ([id number]) on 4/2/2021 6:41:27 AM          Most recent CTA Personally Reviewed  Results from [place] encounter on 04/01/21   CT CODE NEURO HEAD WO CONTRAST    Narrative Clinical history: Code stroke. Left-sided gaze facial droop    TECHNIQUE: Axial and coronal CT of the head without IV contrast. CT dose  reduction was achieved through use of a standardized protocol tailored for this  examination and automatic exposure control for dose modulation. COMPARISON: Yesterday. FINDINGS:    Small right frontal acute subdural hemorrhage, measuring approximately 3 mm in  thickness. Similar small left frontal acute subdural hemorrhage measuring  approximately 4 mm in thickness. No significant mass effect. There is no midline  shift or hydrocephalus. There is no CT evidence of acute territorial infarction. Cerebellum on the  brainstem are grossly unremarkable. Periventricular hypodensities, nonspecific  and likely due to chronic small vessel changes. Included globes appear intact. The paranasal sinuses and the mastoid air cells  are aerated. There is no skull fracture. Impression 1. Small bilateral frontal lobes acute subdural hemorrhage. No significant mass  effect. No midline shift. 2. No CT evidence of acute territorial infarction. 3. No significant change compared to CT from yesterday. Assessment:     79-year-old man with Parkinson disease, PDD, and orthostatic hypotension who has acute subdural hematomas after multiple falls.   Orthostatic hypotension is likely playing a role in his falls. 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 This should be correlated with brain MRI. Plan:     Recommend brain MRI    Continue home Parkinson medications, including droxidopa for orthostatic hypotension    If blood pressure is greater than 160 then give labetalol 10 mg given his small subdural hematomas. More strict blood pressure parameters are ill-advised in the setting of Parkinson disease and orthostatic hypotension    I am unaware of the patient's living situation. He would not be safe to live alone.     Signed By: Idania Lebron DO     April 2, 2021

## 2021-04-02 NOTE — PROGRESS NOTES
Bedside and Verbal shift change report given to [address] (oncoming nurse) by Bridgett Taylor RN (offgoing nurse). Report included the following information SBAR, Kardex, ED Summary, Intake/Output, MAR, Recent Results, Med Rec Status, Cardiac Rhythm NSR/ST, Alarm Parameters  and Dual Neuro Assessment.

## 2021-04-02 NOTE — PROGRESS NOTES
Transfer report received from Mele Macdonald, [address] [facility] at 2110 from [facility] ER. SBAR/Review of Systems completed. Patient arrived to unit via EMS / stretcher. Patient A&OX4 with some intermittent confusion. No immediate s/sx distress noted. Assessment completed. Patient with scattered bruises from previous falls, an abrasion to his mid back and bilateral posterior elbows. Per primary diagnosis patient with left sided bruising from fall  (MD confirmed hematoma). Patient with several recent falls prior to this hospitalization. Patient oriented to unit, fall/safety education provided and patient instructed how to use call light to request help and to not get OOB without assistance with the patient with good recall and teachback.

## 2021-04-02 NOTE — PROGRESS NOTES
Pt seen in ICU s/p admission SDH. Currently slightly confused but able to follow commands. Spoke with daughter, Palak Conklin. Pt lives at [facility]. Staff assist with dressing and feeding due to visual deficits and Parkinson's. He has access to w/c, walker, and cane if needed. Discussed possible d/c needs of HH or STR. Will ask Dr. Matilde Lubin to Count includes the [facility] after discussing with PT. Pt has been to [facility] in past and daughter would agree to this if needed. PPD for any potential rehab needs. PT/OT/ST following. Care Management Interventions  PCP Verified by CM: Yes  Mode of Transport at Discharge:  Other (see comment)  Transition of Care Consult (CM Consult): Assisted Living([facility])  Discharge Durable Medical Equipment: (has access to walker, cane, w/c)  Current Support Network: Assisted Living  Confirm Follow Up Transport: Family  The Plan for Transition of Care is Related to the Following Treatment Goals : HH vs STR/IRC  Name of the Patient Representative Who was Provided with a Choice of Provider and Agrees with the Discharge Plan: Kathie lawton of Choice List was Provided with Basic Dialogue that Supports the Patient's Individualized Plan of Care/Goals, Treatment Preferences and Shares the Quality Data Associated with the Providers?: Yes  [employer] Information Provided?: (MCR/supplemental)  Discharge Location  Discharge Placement: Unable to determine at this time

## 2021-04-02 NOTE — ACP (ADVANCE CARE PLANNING)
Advance Care Planning     General Advance Care Planning (ACP) Conversation      Date of Conversation: 4/1/2021  Conducted with: Healthcare Decision Maker: Next of Kin by law (only applies in absence of a Healthcare Power of  or Legal Guardian)    Healthcare Decision Maker:     Primary Decision Maker: Tamica Danielson - Daughter - [phone]    Secondary Decision Maker: Ashley gamble - Sister - [phone]  Click here to [address] including selection of the Healthcare Decision Maker Relationship (ie \"Primary\")  Today we documented Decision Maker(s) consistent with ACP documents on file. Content/Action Overview: Daughter Kennedy Avila is only child, and legal NOK. Pt does have LW/HCPOA per daughter, but not on file. States she is primary and sister is secondary. Has ACP document(s) NOT on file - requested patient to provide/requested daughter to send.      Length of Voluntary ACP Conversation in minutes:  16 minutes    Radha Deluca RN

## 2021-04-02 NOTE — PROGRESS NOTES
ACUTE PHYSICAL THERAPY GOALS:  (Developed with and agreed upon by patient and/or caregiver. )  LTG:  (1.)Mr. Lemos will move from supine to sit and sit to supine , scoot up and down and roll side to side in bed with INDEPENDENT within 7 treatment day(s). (2.)Mr. Lemos will transfer from bed to chair and chair to bed with CONTACT GUARD ASSIST using the least restrictive device within 7 treatment day(s). (3.)Mr. Lemos will ambulate with CONTACT GUARD ASSIST for 250+ feet with the least restrictive device within 7 treatment day(s). (4.)Mr. Lemos will perform static and dynamic standing balance activities x 10 min with CGA within 7 treatment days. ________________________________________________________________________________________________       PHYSICAL THERAPY ASSESSMENT: Initial Assessment and AM PT Treatment Day # 1      Diana Godoy is a 68 y.o. male   PRIMARY DIAGNOSIS: Subdural hematoma (HCC)  Subdural hematoma ([facility] [S06.5X9A]       Reason for Referral:    ICD-10: Treatment Diagnosis: Generalized Muscle Weakness (M62.81)  Difficulty in walking, Not elsewhere classified (R26.2)  History of falling (Z91.81)  INPATIENT[place] / [place] / Product Type: Medicare /     ASSESSMENT:     REHAB RECOMMENDATIONS:   Recommendation to date pending progress:  [address] [place] [address] vs. Short-term Rehab  Equipment:    To Be Determined     PRIOR LEVEL OF FUNCTION:  (Prior to Hospitalization) INITIAL/CURRENT LEVEL OF FUNCTION:  (Most Recently Demonstrated)   Bed Mobility:   Independent  Sit to Stand:   Independent  Transfers:   Independent  Gait/Mobility:   Independent -falls with mobility, Parkinson's Bed Mobility:   Minimal Assistance  Sit to Stand:   Minimal Assistance  Transfers:   Not tested -due to BP  Gait/Mobility:   Not tested     ASSESSMENT:  Mr. Kiel Garcia presents to hospital on 21 with 2 falls, unable to follow commands, lives in [place]. Pt alert but confused this date, some possible aphasia noted. Pt with history of Parkinson's, CVA, orthostatics. Pt reports independent at baseline for ADLs, ambulation. Pt states has cane, walker, w/c but not using at this time. Pt MIN A for bed mobility and transfers. Pt with orthostatics with mobility, BP noted below, limited assessment and treatment to EOB this date. Pt unsteady at times with mobility, slight posterior lean in standing at EOB. Pt performed marching in sitting and standing at beside, fair + standing balance. Pt is a fall risk at this time, safety concerns for returning home to [place] alone. PT to cont to follow for acute care needs to address deficits noted above. SUBJECTIVE:   Mr. Ana Irby states, \"I have fallen alot. \"    SOCIAL HISTORY/LIVING ENVIRONMENT: states independent at baseline, falls  Home Environment: Assisted living  One/Two Story Residence: One story  Living Alone: Yes  Support Systems: Family member(s)  OBJECTIVE:     PAIN: VITAL SIGNS: LINES/DRAINS:   Pre Treatment: Pain Screen  Pain Scale 1: Numeric (0 - 10)  Pain Intensity 1: 0  Post Treatment: 0 Vital Signs  BP: 132/70  MAP (Calculated): 91  BP Patient Position: Supine  Additional Blood Pressure/Pulse Data  BP 2: 115/68  MAP 2 (Calculated): 84  Patient Position 2: Sitting  BP 3: 92/53  MAP 3 (Calculated): 66  Patient Position 3: Standing  BP 4: (!) 78/50  MAP 4 (Calculated): (!) 59  Patient Position 4: Sitting(after standing and marching in place) Silverio Catheter and IV  O2 Device: Room air     GROSS EVALUATION:  B LE Within Functional Limits Abnormal/ Functional Abnormal/ Non-Functional (see comments) Not Tested Comments:   AROM [x] [] [] []    PROM [x] [] [] []    Strength [x] [] [] []    Balance [] [x] [] [] Unsteady sitting and standing   Posture [] [x] [] []    Sensation [] [x] [] [] neuropathy   Coordination [] [x] [] []    Tone [x] [] [] []    Edema [x] [] [] []    Activity Tolerance [] [x] [] []     [] [] [] [] COGNITION/  PERCEPTION: Intact Impaired   (see comments) Comments:   Orientation [] [x] Confused, oriented to self only   Vision [x] []    Hearing [x] []    Command Following [x] []    Safety Awareness [] [x]     [] []      MOBILITY: I Mod I S SBA CGA Min Mod Max Total  NT x2 Comments:   Bed Mobility    Rolling [] [] [] [] [] [x] [] [] [] [] []    Supine to Sit [] [] [] [] [] [x] [] [] [] [] []    Scooting [] [] [] [] [x] [x] [] [] [] [] []    Sit to Supine [] [] [] [] [] [x] [] [] [] [] []    Transfers    Sit to Stand [] [] [] [] [] [x] [] [] [] [] [x]    Bed to Chair [] [] [] [] [] [] [] [] [] [x] []    Stand to Sit [] [] [] [] [] [x] [] [] [] [] [x]    I=Independent, Mod I=Modified Independent, S=Supervision, SBA=Standby Assistance, CGA=Contact Guard Assistance,   Min=Minimal Assistance, Mod=Moderate Assistance, Max=Maximal Assistance, Total=Total Assistance, NT=Not Tested  GAIT: I Mod I S SBA CGA Min Mod Max Total  NT x2 Comments:   Level of Assistance [] [] [] [] [] [] [] [] [] [x] [] Due to low BP no ambulation today   Distance     DME N/A    Gait Quality     Weightbearing Status N/A     I=Independent, Mod I=Modified Independent, S=Supervision, SBA=Standby Assistance, CGA=Contact Guard Assistance,   Min=Minimal Assistance, Mod=Moderate Assistance, Max=Maximal Assistance, Total=Total Assistance, NT=Not Tested    [employer] AM-PAC 6 Clicks   Basic Mobility Inpatient Short Form       How much difficulty does the patient currently have. .. Unable A Lot A Little None   1. Turning over in bed (including adjusting bedclothes, sheets and blankets)? [] 1   [] 2   [x] 3   [] 4   2. Sitting down on and standing up from a chair with arms ( e.g., wheelchair, bedside commode, etc.)   [] 1   [] 2   [x] 3   [] 4   3. Moving from lying on back to sitting on the side of the bed? [] 1   [] 2   [x] 3   [] 4   How much help from another person does the patient currently need. .. Total A Lot A Little None   4. Moving to and from a bed to a chair (including a wheelchair)? [] 1   [] 2   [x] 3   [] 4   5. Need to walk in hospital room? [] 1   [] 2   [x] 3   [] 4   6. Climbing 3-5 steps with a railing? [] 1   [x] 2   [] 3   [] 4   © 2007, Trustees of [facility] [employer], under license to [employer]. All rights reserved     Score:  Initial: 17 Most Recent: X (Date: -- )    Interpretation of Tool:  Represents activities that are increasingly more difficult (i.e. Bed mobility, Transfers, Gait). PLAN:   FREQUENCY/DURATION: PT Plan of Care: 3 times/week for duration of hospital stay or until stated goals are met, whichever comes first.    PROBLEM LIST:   (Skilled intervention is medically necessary to address:)  1. Decreased ADL/Functional Activities  2. Decreased Activity Tolerance  3. Decreased Balance  4. Decreased Cognition  5. Decreased Coordination  6. Decreased Gait Ability  7. Decreased Strength  8. Decreased Transfer Abilities   INTERVENTIONS PLANNED:   (Benefits and precautions of physical therapy have been discussed with the patient.)  1. Therapeutic Activity  2. Therapeutic Exercise/HEP  3. Neuromuscular Re-education  4. Gait Training  5. Manual Therapy  6. Education     TREATMENT:     EVALUATION: Moderate Complexity : (Untimed Charge)    TREATMENT:   ($$ Therapeutic Activity: 23-37 mins    )  Therapeutic Activity (23 Minutes): Therapeutic activity included Rolling, Supine to Sit, Sit to Supine, Transfer Training, Ambulation on level ground, Sitting balance  and Standing balance to improve functional Mobility, Strength and Activity tolerance.      Co-Treatment PT/OT necessary due to patient's decreased overall endurance/tolerance levels, as well as need for high level skilled assistance to complete functional transfers/mobility and functional tasks    TREATMENT GRID:  N/A    AFTER TREATMENT POSITION/PRECAUTIONS:  Bed, Needs within reach, RN notified and chair position    INTERDISCIPLINARY COLLABORATION:  RN/PCT, PT/PTA and OT/DHALIWAL    TOTAL TREATMENT DURATION:  PT Patient Time In/Time Out  Time In: 0950  Time Out: [facility], PT

## 2021-04-02 NOTE — PROGRESS NOTES
ACUTE OT GOALS:  (Developed with and agreed upon by patient and/or caregiver.)  1. Patient will bathe and dress total body with supervision and adaptive device as needed. 2. Patient will toilet with modified independence and adaptive device as needed. 3. Patient will tolerate 30 minutes of OT treatment with up to 2 rest breaks to increase activity tolerance for ADLs. 4. Patient will complete functional mobility for ADLs with supervision. 5. Patient will demonstrate modified independence with HEP to increase strength/coordination/proprioception in BUEs for increased safety and independence with functional tasks. 6. Patient will demonstrate improved visual skills for ADLs with min cues.      Timeframe: 7 visits     OCCUPATIONAL THERAPY ASSESSMENT: Initial Assessment and Daily Note OT Treatment Day # 1    Von Sierra is a 68 y.o. male   PRIMARY DIAGNOSIS: Subdural hematoma ([address]  Subdural hematoma ([facility] [M86.1A1O]       Reason for Referral: SDH, falls  ICD-10: Treatment Diagnosis: Generalized Muscle Weakness (M62.81)  Other lack of cordination (R27.8)  Repeated Falls (R29.6)  IN[place] / [place] / Product Type: Medicare /   ASSESSMENT:     REHAB RECOMMENDATIONS:   Recommendation to date pending progress:  [address] [place] [address] vs. Short-term Rehab    May eventually need OP vision therapy   Equipment:    To Be Determined     PRIOR LEVEL OF FUNCTION:  (Prior to Hospitalization)  INITIAL/CURRENT LEVEL OF FUNCTION:  (Based on today's evaluation)   Bathing:   Independent  Dressing:   Independent  Feeding/Grooming:   Independent  Toileting:   Independent  Functional Mobility:   Independent Bathing:   Minimal Assistance  Dressing:   Minimal Assistance  Feeding/Grooming:   Moderate Assistance  Toileting:   Minimal Assistance  Functional Mobility:   Minimal Assistance     ASSESSMENT:  Mr. 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 Hx PD, [place]. Pt typically independent with ADLs and ambulation. Pt with some expressive aphasia, deficits in vision, proprioception vs depth perception, balance, BP, and endurance impacting mobility and ADLs. Pt practiced bed mobility with SBA and functional transfers with CGA-Charlie. Orthostatics positive, see below. Practiced seated exercises before sit > stand however pt still with significant BP drop in standing. Pt demonstrates mild confusion and difficulty with word finding. Pt is functioning below baseline and would benefit from continued OT to increase safety and independence. SUBJECTIVE:   Mr. Luciano Wen states, \"I'm at [address]. \"    SOCIAL HISTORY/LIVING ENVIRONMENT: Hx [place], [place]. Pt typically independent with ADLs and ambulation.    Home Environment: Assisted living  One/Two Story Residence: One story  Living Alone: Yes  Support Systems: Family member(s)    OBJECTIVE:     PAIN: VITAL SIGNS: LINES/DRAINS:   Pre Treatment: Pain Screen  Pain Scale 1: Numeric (0 - 10)  Pain Intensity 1: 0  Post Treatment: same Vital Signs  BP: 132/70  MAP (Calculated): 91  BP 1 Location: Right arm  BP 1 Method: Automatic  BP Patient Position: Supine  Additional Blood Pressure/Pulse Data  BP 2: 115/68  MAP 2 (Calculated): 84  Patient Position 2: Sitting  BP 3: 92/53  MAP 3 (Calculated): 66  Patient Position 3: Standing  BP 4: (!) 78/50  MAP 4 (Calculated): (!) 59  Patient Position 4: Standing;Post activity Silverio Catheter, IV and ICU monitoring   O2 Device: Room air     GROSS EVALUATION:  BUEs Within Functional Limits Abnormal/ Functional Abnormal/ Non-Functional (see comments) Not Tested Comments:   AROM [x] [] [] []    PROM [] [] [] []    Strength [] [x] [] [] generally decreased, equal   Balance [] [x] [] []    Posture [] [x] [] []    Sensation [] [] [] [] Likely intact, difficult to assess 2° difficulty with word finding   Coordination [] [x] [x] [] Impaired, pt over reaching, decreased proprio vs depth perception   Tone [] [] [] []    Edema [x] [] [] []    Activity Tolerance [] [x] [] []     [] [] [] []      COGNITION/  PERCEPTION: Intact Impaired   (see comments) Comments:   Orientation [] [x]    Vision [] [x] Decreased but equal visual fields. Near/distance acuity intact with glasses. Decreased tracking B   Hearing [x] []    Judgment/ Insight [] [x]    Attention [x] []    Memory [] []    Command Following [x] []    Emotional Regulation [x] []     [] []      ACTIVITIES OF DAILY LIVING: I Mod I S SBA CGA Min Mod Max Total NT Comments   BASIC ADLs:              Bathing/ Showering [] [] [] [] [] [] [] [] [] [x]    Toileting [] [] [] [] [] [] [] [] [] [x]    Dressing [] [] [] [] [x] [] [] [] [] [] Doff/don sock   Feeding [] [] [] [] [] [] [] [] [] [x]    Grooming [] [] [] [] [] [] [] [] [] [x]    Personal Device Care [] [] [] [] [] [] [] [] [] [x]    Functional Mobility [] [] [] [] [x] [x] [] [] [] [] Sit > stand trials   I=Independent, Mod I=Modified Independent, S=Supervision, SBA=Standby Assistance, CGA=Contact Guard Assistance,   Min=Minimal Assistance, Mod=Moderate Assistance, Max=Maximal Assistance, Total=Total Assistance, NT=Not Tested    MOBILITY: I Mod I S SBA CGA Min Mod Max Total  NT x2 Comments:   Supine to sit [] [] [] [x] [] [] [] [] [] [] []    Sit to supine [] [] [] [x] [] [] [] [] [] [] []    Sit to stand [] [] [] [] [x] [] [] [] [] [] [x]    Bed to chair [] [] [] [] [] [] [] [] [] [x] []    I=Independent, Mod I=Modified Independent, S=Supervision, SBA=Standby Assistance, CGA=Contact Guard Assistance,   Min=Minimal Assistance, Mod=Moderate Assistance, Max=Maximal Assistance, Total=Total Assistance, NT=Not Tested    [employer] AM-PAC 6 Clicks   Daily Activity Inpatient Short Form        How much help from another person does the patient currently need. .. Total A Lot A Little None   1. Putting on and taking off regular lower body clothing? [] 1   [] 2   [x] 3   [] 4   2. Bathing (including washing, rinsing, drying)? [] 1   [] 2   [x] 3   [] 4   3. Toileting, which includes using toilet, bedpan or urinal?   [] 1   [] 2   [x] 3   [] 4   4. Putting on and taking off regular upper body clothing? [] 1   [] 2   [x] 3   [] 4   5. Taking care of personal grooming such as brushing teeth? [] 1   [x] 2   [] 3   [] 4   6. Eating meals? [] 1   [x] 2   [] 3   [] 4   © 2007, Trustees of [address] [place] [address] [address] [address], under license to [employer]. All rights reserved     Score:  Initial: 16 4/2/21 Most Recent: X (Date: -- )   Interpretation of Tool:  Represents activities that are increasingly more difficult (i.e. Bed mobility, Transfers, Gait). PLAN:   FREQUENCY/DURATION: OT Plan of Care: 3 times/week for duration of hospital stay or until stated goals are met, whichever comes first.    PROBLEM LIST:   (Skilled intervention is medically necessary to address:)  1. Decreased ADL/Functional Activities  2. Decreased Activity Tolerance  3. Decreased Balance  4. Decreased Cognition  5. Decreased Coordination  6. Decreased Gait Ability  7. Decreased Strength  8. Decreased Transfer Abilities   INTERVENTIONS PLANNED:   (Benefits and precautions of occupational therapy have been discussed with the patient.)  1. Self Care Training  2. Therapeutic Activity  3. Therapeutic Exercise/HEP  4. Neuromuscular Re-education  5. Gait Training  6.  Education     TREATMENT:     EVALUATION: Moderate Complexity : (Untimed Charge)    TREATMENT:   ( $$ Neuromuscular Re-Education: 23-37 mins   )  Co-Treatment PT/OT necessary due to patient's decreased overall endurance/tolerance levels, as well as need for high level skilled assistance to complete functional transfers/mobility and functional tasks  Neuromuscular Re-education (23 Minutes): Neuromuscular Re-education included Balance Training, Coordination training, Postural training, Sitting balance training, Standing balance training and Visual field awareness training to improve Balance, Coordination, Functional Mobility, Postural Control and Proprioception.     TREATMENT GRID:  N/A    AFTER TREATMENT POSITION/PRECAUTIONS:  Bed, Needs within reach, Restraints , RN notified, Visitors at bedside and Lap belt in place    INTERDISCIPLINARY COLLABORATION:  RN/PCT, PT/PTA and OT/DHALIWAL    TOTAL TREATMENT DURATION:  OT Patient Time In/Time Out  Time In: 0950  Time Out: [address], OTR/L

## 2021-04-02 NOTE — PROGRESS NOTES
Initial visit was made, emotional support and a spiritual presence were provided. He was transferred from HOS[facility] [address].        Kush Forrester, [address] [facility], [facility]

## 2021-04-03 PROBLEM — G20 PSYCHOSIS DUE TO PARKINSON'S DISEASE (HCC): Chronic | Status: ACTIVE | Noted: 2019-08-22

## 2021-04-03 PROBLEM — G24.9 DYSKINESIA DUE TO PARKINSON'S DISEASE (HCC): Chronic | Status: ACTIVE | Noted: 2017-10-05

## 2021-04-03 PROBLEM — G20 DYSKINESIA DUE TO PARKINSON'S DISEASE (HCC): Chronic | Status: ACTIVE | Noted: 2017-10-05

## 2021-04-03 LAB
ANION GAP SERPL CALC-SCNC: 6 MMOL/L (ref 7–16)
BASOPHILS # BLD: 0.1 K/UL (ref 0–0.2)
BASOPHILS NFR BLD: 1 % (ref 0–2)
BUN SERPL-MCNC: 20 MG/DL (ref 8–23)
CALCIUM SERPL-MCNC: 9 MG/DL (ref 8.3–10.4)
CHLORIDE SERPL-SCNC: 102 MMOL/L (ref 98–107)
CO2 SERPL-SCNC: 27 MMOL/L (ref 21–32)
CREAT SERPL-MCNC: 0.77 MG/DL (ref 0.8–1.5)
DIFFERENTIAL METHOD BLD: ABNORMAL
EOSINOPHIL # BLD: 0.1 K/UL (ref 0–0.8)
EOSINOPHIL NFR BLD: 2 % (ref 0.5–7.8)
ERYTHROCYTE [DISTWIDTH] IN BLOOD BY AUTOMATED COUNT: 12.2 % (ref 11.9–14.6)
GLUCOSE SERPL-MCNC: 93 MG/DL (ref 65–100)
HCT VFR BLD AUTO: 37.2 % (ref 41.1–50.3)
HGB BLD-MCNC: 12.4 G/DL (ref 13.6–17.2)
IMM GRANULOCYTES # BLD AUTO: 0 K/UL (ref 0–0.5)
IMM GRANULOCYTES NFR BLD AUTO: 0 % (ref 0–5)
LYMPHOCYTES # BLD: 1.5 K/UL (ref 0.5–4.6)
LYMPHOCYTES NFR BLD: 22 % (ref 13–44)
MCH RBC QN AUTO: 33.2 PG (ref 26.1–32.9)
MCHC RBC AUTO-ENTMCNC: 33.3 G/DL (ref 31.4–35)
MCV RBC AUTO: 99.7 FL (ref 79.6–97.8)
MM INDURATION POC: 0 MM (ref 0–5)
MM INDURATION POC: 0 MM (ref 0–5)
MONOCYTES # BLD: 0.5 K/UL (ref 0.1–1.3)
MONOCYTES NFR BLD: 8 % (ref 4–12)
NEUTS SEG # BLD: 4.6 K/UL (ref 1.7–8.2)
NEUTS SEG NFR BLD: 67 % (ref 43–78)
NRBC # BLD: 0 K/UL (ref 0–0.2)
PLATELET # BLD AUTO: 229 K/UL (ref 150–450)
PMV BLD AUTO: 8.8 FL (ref 9.4–12.3)
POTASSIUM SERPL-SCNC: 4.2 MMOL/L (ref 3.5–5.1)
PPD POC: NEGATIVE NEGATIVE
PPD POC: NEGATIVE NEGATIVE
RBC # BLD AUTO: 3.73 M/UL (ref 4.23–5.6)
SODIUM SERPL-SCNC: 135 MMOL/L (ref 138–145)
WBC # BLD AUTO: 6.8 K/UL (ref 4.3–11.1)

## 2021-04-03 PROCEDURE — 80048 BASIC METABOLIC PNL TOTAL CA: CPT

## 2021-04-03 PROCEDURE — 85025 COMPLETE CBC W/AUTO DIFF WBC: CPT

## 2021-04-03 PROCEDURE — 36415 COLL VENOUS BLD VENIPUNCTURE: CPT

## 2021-04-03 PROCEDURE — 99232 SBSQ HOSP IP/OBS MODERATE 35: CPT | Performed by: PSYCHIATRY & NEUROLOGY

## 2021-04-03 PROCEDURE — 65660000000 HC RM CCU STEPDOWN

## 2021-04-03 PROCEDURE — 92526 ORAL FUNCTION THERAPY: CPT

## 2021-04-03 PROCEDURE — 74011250637 HC RX REV CODE- 250/637: Performed by: INTERNAL MEDICINE

## 2021-04-03 PROCEDURE — 74011000250 HC RX REV CODE- 250: Performed by: PSYCHIATRY & NEUROLOGY

## 2021-04-03 RX ADMIN — AMANTADINE HYDROCHLORIDE 100 MG: 100 CAPSULE ORAL at 05:36

## 2021-04-03 RX ADMIN — OXYBUTYNIN CHLORIDE 5 MG: 5 TABLET ORAL at 21:15

## 2021-04-03 RX ADMIN — RIVASTIGMINE TARTRATE 6 MG: 3 CAPSULE ORAL at 09:41

## 2021-04-03 RX ADMIN — CARBIDOPA AND LEVODOPA 1 TABLET: 25; 250 TABLET ORAL at 17:33

## 2021-04-03 RX ADMIN — Medication 10 ML: at 22:00

## 2021-04-03 RX ADMIN — AMANTADINE HYDROCHLORIDE 100 MG: 100 CAPSULE ORAL at 14:19

## 2021-04-03 RX ADMIN — LABETALOL HYDROCHLORIDE 10 MG: 5 INJECTION INTRAVENOUS at 17:34

## 2021-04-03 RX ADMIN — CARBIDOPA AND LEVODOPA 2 TABLET: 25; 100 TABLET, EXTENDED RELEASE ORAL at 21:15

## 2021-04-03 RX ADMIN — DROXIDOPA 300 MG: 300 CAPSULE ORAL at 17:33

## 2021-04-03 RX ADMIN — DROXIDOPA 300 MG: 300 CAPSULE ORAL at 09:41

## 2021-04-03 RX ADMIN — CARBIDOPA AND LEVODOPA 1 TABLET: 25; 250 TABLET ORAL at 14:19

## 2021-04-03 RX ADMIN — CARBIDOPA AND LEVODOPA 1 TABLET: 25; 250 TABLET ORAL at 05:36

## 2021-04-03 RX ADMIN — ESCITALOPRAM OXALATE 10 MG: 10 TABLET ORAL at 09:41

## 2021-04-03 RX ADMIN — Medication 5 ML: at 14:19

## 2021-04-03 RX ADMIN — GABAPENTIN 100 MG: 100 CAPSULE ORAL at 21:15

## 2021-04-03 RX ADMIN — DROXIDOPA 300 MG: 300 CAPSULE ORAL at 21:21

## 2021-04-03 RX ADMIN — LEVOTHYROXINE SODIUM 112 MCG: 0.11 TABLET ORAL at 05:36

## 2021-04-03 RX ADMIN — GABAPENTIN 100 MG: 100 CAPSULE ORAL at 09:41

## 2021-04-03 RX ADMIN — RIVASTIGMINE TARTRATE 6 MG: 3 CAPSULE ORAL at 17:33

## 2021-04-03 RX ADMIN — GABAPENTIN 100 MG: 100 CAPSULE ORAL at 17:33

## 2021-04-03 RX ADMIN — CARBIDOPA AND LEVODOPA 1 TABLET: 25; 250 TABLET ORAL at 09:41

## 2021-04-03 RX ADMIN — Medication 10 ML: at 05:36

## 2021-04-03 RX ADMIN — FLUDROCORTISONE ACETATE 0.1 MG: 0.1 TABLET ORAL at 05:36

## 2021-04-03 NOTE — PROGRESS NOTES
SPEECH LANGUAGE PATHOLOGY: DYSPHAGIA- Daily Note 1    NAME/AGE/GENDER: Diana Godoy is a 80 y.o. male  DATE: 4/3/2021  PRIMARY DIAGNOSIS: [facility] ([facility] [S06.5X9A]      ICD-10: Treatment Diagnosis: R13.12 Dysphagia, Oropharyngeal Phase    RECOMMENDATIONS   DIET:    Regular Consistency   Thin Liquids    MEDICATIONS: With liquid     ASPIRATION PRECAUTIONS  · Slow rate of intake  · Small bites/sips  · Upright at 90 degrees during meal     COMPENSATORY STRATEGIES/MODIFICATIONS  · 1:1 assistance with meals due to vision issues and MITTS     RECOMMENDATIONS for CONTINUED SPEECH THERAPY:   YES: Anticipate need for ongoing speech therapy during this hospitalization and at next level of care. ASSESSMENT   Patient presents with oropharyngeal swallow function that is within normal limits. No s/sx of dysphagia identified. Recommend regular diet diet/thin liquids. Medications with liquid wash. He will require 1:1 assistance with meals due to visual deficits and in MITTS. No dysphagia treatment needed. Will follow for cognitive-linguistic assessment. EDUCATION:  · Recommendations discussed with RN  CONTINUATION OF SKILLED SERVICES/MEDICAL NECESSITY:   Patient continues to require skilled intervention due to cognitive-linguistic deficits. REHABILITATION POTENTIAL FOR STATED GOALS: Good    PLAN    FREQUENCY/DURATION: Assessment of cognitive-linguistic abilities to be completed at next session.  Frequency and duration to be determined by findings/recommendations.     - Recommendations for next treatment session: Next treatment session will address assessment of cognitive-linguistic abilities     SUBJECTIVE   Seen with AM meal.   Oxygen Device: Room air  Pain: Pain Scale 1: Numeric (0 - 10)  Pain Intensity 1: 0    History of Present Injury/Illness: Mr. Kiel Garcia  has a past medical history of AAA (abdominal aortic aneurysm) ([facility] [address] (7/8/2016), Pak's esophagus with esophagitis (7/8/2016), Cerebrovascular accident (CVA) ([facility] (7/8/2016), Chronic fatigue syndrome (2/26/2016), Cognitive deficits (7/8/2016), Colon polyps (2007), Elevated PSA, Enthesopathy of ankle and tarsus (5/29/2014), GERD (gastroesophageal reflux disease) (7/8/2016), Hashimoto's thyroiditis (7/8/2016), History of nuclear stress test (1997, 5/2005), Hypotension (8/25/2015), Impaired cognition (7/8/2016), Inguinal hernia (7/8/2016), Insomnia (7/8/2016), 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 Nuno Mike He also  has a past surgical history that includes hx colonoscopy (2000 & 2005); hx breast biopsy (Bilateral); hx thyroidectomy; hx hernia repair (Left, 2011); hx cataract removal (Right, 2012); hx other surgical (2006); and hx other surgical. PRECAUTIONS/ALLERGIES: Brompheniramine maleate, Carisoprodol, Meperidine, Penicillin, Phenylephrine hcl, Zaleplon, Dimetapp cold and flu, Donepezil, Penicillins, and Rofecoxib     Problem List:  (Impairments causing functional limitations):  1. Oropharyngeal dysphagia- No symptoms identified  2. Previous Dysphagia: NONE REPORTED  Diet Prior to Evaluation: Regular diet/thin liquids      OBJECTIVE   Oral Motor:   · Labial: No impairment  · Dentition: Intact  · Oral Hygiene: Adequate  · Lingual: No impairment    Dysphagia treatment:   Patient set up with breakfast meal. Pt needs to be 1;1 assist with meals; poor vision and in mitts. SLP fed pt. Pt only wanted a few bites. Pt presented with thin liquid via cup (spilled) did better with straw; no overt signs/sx of aspiration. Pt accepted 1 bit of mechanical soft, hash brown. Refused muffin and eggs. Reported to RN to be on feeding list and ?  Ensures on all trays. Tool Used: Dysphagia Outcome and Severity Scale (AICHA)    Score Comments   Normal Diet  [x] 7 With no strategies or extra time needed   Functional Swallow  [] 6 May have mild oral or pharyngeal delay   Mild Dysphagia  [] 5 Which may require one diet consistency restricted    Mild-Moderate Dysphagia  [] 4 With 1-2 diet consistencies restricted   Moderate Dysphagia  [] 3 With 2 or more diet consistencies restricted   Moderate-Severe Dysphagia  [] 2 With partial PO strategies (trials with ST only)   Severe Dysphagia  [] 1 With inability to tolerate any PO safely      Score:  Initial: 7 Most Recent: x (Date 04/03/21 )   Interpretation of Tool: The Dysphagia Outcome and Severity Scale (AICHA) is a simple, easy-to-use, 7-point scale developed to systematically rate the functional severity of dysphagia based on objective assessment and make recommendations for diet level, independence level, and type of nutrition. Current Medications:   No current facility-administered medications on file prior to encounter. Current Outpatient Medications on File Prior to Encounter   Medication Sig Dispense Refill    carbidopa-levodopa (SINEMET)  mg per tablet TAKE 1 TABLET BY MOUTH AT  6AM, 10AM, 2PM, AND 6PM  STRICT TIMES DUE TO  ADVANCED  Tab 3    fludrocortisone (FLORINEF) 0.1 mg tablet TAKE 1 TABLET BY MOUTH AT  6AM 90 Tab 1    melatonin 3 mg tablet 3 mg tab Q 9pm for RBD 90 Tab 3    OTHER 1) mestinon 1/2 tab Q day for 5 days and stop    2)Florinef 0.1mg 1 tab Qday (stays on 1 tab BID until northera can be increased). 1 Act 0    droxidopa (Northera) 300 mg cap 2 caps at 6am, 1 cap at 12pm, 1 cap at 5pm for 3 days then increase to 2 caps 6am, 2 caps 12pm, 1 cap 5pm for 3 days then increase to 2 caps TID. Monitor BP& HR sitting and standing in the AM and PM for 10 days and send readings to Dr. Keri Shah.  270 Cap 3    gabapentin (NEURONTIN) 100 mg capsule Take 1 Cap by mouth three (3) times daily. 270 Cap 3    escitalopram oxalate (LEXAPRO) 10 mg tablet Take 1 Tab by mouth daily. 90 Tab 3    oxybutynin (DITROPAN) 5 mg tablet 1 tab at 9pm (not to start until 11/10/20) 90 Tab 3    polyethylene glycol (MIRALAX) 17 gram packet Take 1 Packet by mouth daily. 90 Packet 3    rivastigmine tartrate (EXELON) 6 mg capsule Take 1 Cap by mouth two (2) times daily (with meals). 180 Cap 3    amantadine HCL (SYMMETREL) 100 mg tablet TAKE 1 TABLET BY MOUTH TWO  TIMES DAILY (Patient taking differently: TKE 1 TABLET BY MOUTH AT 6AM AND 2PM) 180 Tab 3    carbidopa-levodopa ER (SINEMET CR)  mg per tablet 1 tab at 10 pm strict time (Patient taking differently: 1 tab at 9pm strict time) 90 Tab 3    latanoprost (XALATAN) 0.005 % ophthalmic solution Administer 1 Drop to right eye nightly.  levothyroxine (SYNTHROID) 112 mcg tablet Take  by mouth Daily (before breakfast).           INTERDISCIPLINARY COLLABORATION: RN    After treatment position/precautions:  · Upright in bed  · Mitts in place  · RN notified       Total Treatment Duration:   Time In: 0815  Time Out: Williams Ware MA/CCC/SLP

## 2021-04-03 NOTE — PROGRESS NOTES
04/03/21 1918   NIH Stroke Scale   LOC 0   LOC Questions 0   LOC Commands 0   Best Gaze 1   Visual 0   Facial Palsy 1   Motor Right Arm 0   Motor Left Arm 0   Motor Right Leg 0   Motor Left Leg 0   Limb Ataxia 2   Sensory 0   Best Language 0   Dysarthria 0   Extinction and Inattention 0   Total 4        04/03/21 1918   NIH Stroke Scale   LOC 0   LOC Questions 0   LOC Commands 0   Best Gaze 1   Visual 0   Facial Palsy 1   Motor Right Arm 0   Motor Left Arm 0   Motor Right Leg 0   Motor Left Leg 0   Limb Ataxia 2   Sensory 0   Best Language 0   Dysarthria 0   Extinction and Inattention 0   Total 4

## 2021-04-03 NOTE — PROGRESS NOTES
TRANSFER - OUT REPORT:    Verbal report given to Shannon MICHAEL(name) on Felicia Benedict.  being transferred to 7th floor (unit) for routine progression of care       Report consisted of patients Situation, Background, Assessment and   Recommendations(SBAR). Information from the following report(s) SBAR, Kardex, Procedure Summary, Intake/Output, MAR, Med Rec Status, Cardiac Rhythm NSR/SB and Dual Neuro Assessment was reviewed with the receiving nurse. Opportunity for questions and clarification was provided.       Patient transported with:   Patient's medications from home  Registered Nurse

## 2021-04-03 NOTE — PROGRESS NOTES
Hospitalist Progress Note     Admit Date:  2021 11:00 PM   Name:  Cole Gillis. Age:  80 y.o.  :  1940   MRN:  [id number]   PCP:  Mark Golden MD  Treatment Team: Attending Provider: Jade Palm MD; Consulting Provider: Pippa Diaz DO; Consulting Provider: Sammie Freire MD; Occupational Therapist: Bobby De La Rosa OTR/CARON; Care Manager: Dorinda Garcia RN; Utilization Review: Dav Montemayor; Consulting Provider: Erick Meléndez MD  Presenting Complaint: No chief complaint on file.     Initial Admission Diagnosis: Subdural hematoma ([facility] [S06.5X9A]     Assessment and Plan:     Hospital Problems as of 4/3/2021 Date Reviewed: 2020          Codes Class Noted - Resolved POA    * (Principal) Subdural hematoma (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) ICD-10-CM: G20  ICD-9-CM: 332.0  2019 - Present Yes        Frequent falls (Chronic) ICD-10-CM: R29.6  ICD-9-CM: V15.88  2018 - Present Yes        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) ICD-10-CM: G20, F02.80  ICD-9-CM: 332.0, 294.10  2018 - Present Yes        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 (Chronic) ICD-10-CM: G20  ICD-9-CM: 332.0  2016 - Present Yes        Cognitive deficits (Chronic) ICD-10-CM: R41.89  ICD-9-CM: 294.9  2016 - Present Yes              Plan:  SDH  21 - No changes to plan   -no antiplatelets or AC  -repeat CT - per neurosurgery as outpatient    HypoNa  04/03/21 - No changes to plan   -monitor    Parkinsons  04/03/21 - No changes to plan   -cont home meds as shown  -no neuroleptics    Hypothyroid  04/03/21 - no TSH on file. Check in AM  -cont home synthroid    DC planning:    -would not be safe to live alone per neurology. Referral to SNF then family will need to place in [facility] or other arrangements most likely unless he has supervision at home. -CM following  -PPD done.    -Ordered COVID testing for 4/4    Other listed chronic conditions stable, continue current management. Diet:  DIET CARDIAC  DVT ppx:  SCDs    Hospital Course: Leah Spangler is a 68 y.o. male with medical history of parkinsons disease, frequent falls and orthostatic hypotension who presented to ED with falls. He had 2 falls on 3-29 and 1 on 3-31. CT head shows small bilateral acute on chronic SDH of frontal lobes without mass effect. Admitted to ICU per protocol. Stabilized and transferred to floor. CM consulted to work on placement at SNF    24hr Events/Subjective:   Pleasantly confused. Denies HA, vision changes. Wants his mittens off.   No fevers    ROS/history limited by chronic dementia  Objective:     Patient Vitals for the past 24 hrs:   Temp Pulse Resp BP SpO2   04/03/21 1200 98 °F (36.7 °C) 86 20 (!) 146/83 97 %   04/03/21 0800 97.5 °F (36.4 °C) 89 20 (!) 150/81 96 %   04/03/21 0400 97.9 °F (36.6 °C) 94 20 (!) 153/77 96 %   04/03/21 0115 97.4 °F (36.3 °C) 65 20 (!) 156/70 100 %   04/03/21 0046  60 20 130/71 100 %   04/03/21 0032  (!) 54 22 (!) 146/88 92 %   04/03/21 0015  (!) 59 20 113/79 100 %   04/03/21 0000  67 18 119/69 100 %   04/02/21 2346  98 (!) 31 112/61 98 %   04/02/21 2331  60 18 114/66 99 %   04/02/21 2315  65 29 119/61 100 %   04/02/21 2300 98.2 °F (36.8 °C) 65 19 120/63 100 %   04/02/21 2246  95 18 119/73 100 %   04/02/21 2232  69 21 (!) 131/57 100 %   04/02/21 2223  72  (!) 151/94 100 %   04/02/21 2215  (!) 57 14 (!) 145/70 100 %   04/02/21 2200  (!) 59 21 (!) 150/74 99 %   04/02/21 2146  99 19 (!) 165/76 100 %   04/02/21 2131  (!) 58 23 (!) 166/87 92 %   04/02/21 2115  63 26 (!) 161/80 100 %   04/02/21 2104  86 16  100 %   04/02/21 2046  62  (!) 166/105 96 %   04/02/21 2031  60 22 (!) 173/82 100 %   04/02/21 2016  97  (!) 184/92    04/02/21 2015  79 19 (!) 184/92 98 %   04/02/21 1950  (!) 59 29 (!) 185/86 100 %   04/02/21 1931  97 (!) 32 (!) 155/93 98 %   04/02/21 1909 98 °F (36.7 °C) 96 16 (!) 144/77 100 %   04/02/21 1802  100 25 (!) 140/66 (!) 80 %   04/02/21 1745  96 (!) 31 121/70 97 %   04/02/21 1731  (!) 105 (!) 36 122/72 (!) 84 %   04/02/21 1716  95 (!) 58 127/68 (!) 83 %   04/02/21 1701  (!) 104 29 136/79 99 %   04/02/21 1645  (!) 119 (!) 31 107/61 100 %   04/02/21 1630  98 20 (!) 106/59 100 %   04/02/21 1616  (!) 101 29 111/66 98 %   04/02/21 1601 98.7 °F (37.1 °C) (!) 103 (!) 40 113/67 100 %   04/02/21 1545  96 (!) 33 111/60 92 %   04/02/21 1530  94 26 (!) 103/55 100 %   04/02/21 1516  93 30 116/66 97 %   04/02/21 1501  (!) 105 27 116/60 100 %   04/02/21 1445  98 27 120/68 100 %   04/02/21 1430  (!) 106 30 132/67 (!) 78 %   04/02/21 1416  (!) 107 (!) 46 124/63 91 %   04/02/21 1401  95 17 (!) 100/56 94 %     Oxygen Therapy  O2 Sat (%): 97 % (04/03/21 1200)  Pulse via Oximetry: 63 beats per minute (04/03/21 0046)  O2 Device: Room air (04/02/21 2300)    Estimated body mass index is 18.09 kg/m² as calculated from the following:    Height as of this encounter: 5' 10\" (1.778 m). Weight as of this encounter: 57.2 kg (126 lb 1.7 oz). Intake/Output Summary (Last 24 hours) at 4/3/2021 1359  Last data filed at 4/2/2021 1707  Gross per 24 hour   Intake 240 ml   Output 150 ml   Net 90 ml       *Note that automatically entered I/Os may not be accurate; dependent on patient compliance with collection and accurate  by techs. General:    Well nourished.   No overt distress  CV:   RRR. No edema. No JVD  Lungs:   Even,  Unlabored  Abdomen:    nondistended. Extremities: Warm and dry. No cyanosis   Skin:     No rashes. Normal coloration  Neuro:  No gross focal deficits. Data Reviewed:  I have reviewed all labs, meds, and studies from the last 24 hours. See all results below. Last 24hr Labs:  Recent Results (from the past 24 hour(s))   PLEASE READ & DOCUMENT PPD TEST IN 24 HRS    Collection Time: 04/02/21 10:38 PM   Result Value Ref Range    PPD Negative Negative    mm Induration 1 0 - 5 mm   METABOLIC PANEL, BASIC    Collection Time: 04/03/21  5:54 AM   Result Value Ref Range    Sodium 135 (L) 138 - 145 mmol/L    Potassium 4.2 3.5 - 5.1 mmol/L    Chloride 102 98 - 107 mmol/L    CO2 27 21 - 32 mmol/L    Anion gap 6 (L) 7 - 16 mmol/L    Glucose 93 65 - 100 mg/dL    BUN 20 8 - 23 MG/DL    Creatinine 0.77 (L) 0.8 - 1.5 MG/DL    GFR est AA >60 >60 ml/min/1.73m2    GFR est non-AA >60 >60 ml/min/1.73m2    Calcium 9.0 8.3 - 10.4 MG/DL   CBC WITH AUTOMATED DIFF    Collection Time: 04/03/21  5:54 AM   Result Value Ref Range    WBC 6.8 4.3 - 11.1 K/uL    RBC 3.73 (L) 4.23 - 5.6 M/uL    HGB 12.4 (L) 13.6 - 17.2 g/dL    HCT 37.2 (L) 41.1 - 50.3 %    MCV 99.7 (H) 79.6 - 97.8 FL    MCH 33.2 (H) 26.1 - 32.9 PG    MCHC 33.3 31.4 - 35.0 g/dL    RDW 12.2 11.9 - 14.6 %    PLATELET 946 326 - 581 K/uL    MPV 8.8 (L) 9.4 - 12.3 FL    ABSOLUTE NRBC 0.00 0.0 - 0.2 K/uL    DF AUTOMATED      NEUTROPHILS 67 43 - 78 %    LYMPHOCYTES 22 13 - 44 %    MONOCYTES 8 4.0 - 12.0 %    EOSINOPHILS 2 0.5 - 7.8 %    BASOPHILS 1 0.0 - 2.0 %    IMMATURE GRANULOCYTES 0 0.0 - 5.0 %    ABS. NEUTROPHILS 4.6 1.7 - 8.2 K/UL    ABS. LYMPHOCYTES 1.5 0.5 - 4.6 K/UL    ABS. MONOCYTES 0.5 0.1 - 1.3 K/UL    ABS. EOSINOPHILS 0.1 0.0 - 0.8 K/UL    ABS. BASOPHILS 0.1 0.0 - 0.2 K/UL    ABS. IMM.  GRANS. 0.0 0.0 - 0.5 K/UL       Current Meds:  Current Facility-Administered Medications   Medication Dose Route Frequency    labetaloL (NORMODYNE;TRANDATE) injection 10 mg  10 mg IntraVENous Q6H PRN    droxidopa cap 300 mg (Patient Supplied)  300 mg Oral TID    tuberculin injection 5 Units  5 Units IntraDERMal ONCE    sodium chloride (NS) flush 5-40 mL  5-40 mL IntraVENous Q8H    sodium chloride (NS) flush 5-40 mL  5-40 mL IntraVENous PRN    acetaminophen (TYLENOL) tablet 650 mg  650 mg Oral Q6H PRN    naloxone (NARCAN) injection 0.4 mg  0.4 mg IntraVENous PRN    polyethylene glycol (MIRALAX) packet 17 g  17 g Oral DAILY PRN    amantadine HCL (SYMMETREL) capsule 100 mg  100 mg Oral BID    carbidopa-levodopa (SINEMET)  mg per tablet 1 Tab  1 Tab Oral QID    carbidopa-levodopa ER (SINEMET CR)  mg per tablet 2 Tab  2 Tab Oral QHS    escitalopram oxalate (LEXAPRO) tablet 10 mg  10 mg Oral DAILY    fludrocortisone (FLORINEF) tablet 0.1 mg  0.1 mg Oral Q24H    gabapentin (NEURONTIN) capsule 100 mg  100 mg Oral TID    latanoprost (XALATAN) 0.005 % ophthalmic solution 1 Drop  1 Drop Right Eye QHS    levothyroxine (SYNTHROID) tablet 112 mcg  112 mcg Oral ACB    oxybutynin (DITROPAN) tablet 5 mg  5 mg Oral QHS    polyethylene glycol (MIRALAX) packet 17 g  17 g Oral DAILY    rivastigmine tartrate (EXELON) capsule 6 mg  6 mg Oral BID WITH MEALS       Other Studies:  No results found for this visit on 04/01/21. Mri Brain Wo Cont    Result Date: 4/2/2021  MRI OF THE BRAIN WITHOUT CONTRAST HISTORY: Subdural hematoma COMPARISON: CT brain dated 4/2/2021 TECHNIQUE: Multiplanar multiecho imaging was performed without intravenous contrast on a 1.5 Isabella MRI scanner. FINDINGS: * BRAIN: -  Bilateral frontal subdural hematomas subacute in appearance. No evidence of significant mass effect on the brain. Hemosiderin deposition in the left posterior inferior temporal lobe without corresponding diffusion or FLAIR signal. -  No hydrocephalus. -  There are scattered nonspecific white matter changes on FLAIR sequence.  Given patient's age, these are usually chronic small vessel white matter ischemic changes. -  Unremarkable sella turcica and craniocervical junction. * PARANASAL SINUSES: Clear * MASTOIDS: Bilateral mastoiditis. * CALVARIUM AND SCALP: No calvarial or scalp abnormality is seen. * OTHER FINDINGS: None. Bilateral subacute frontal subdural hematomas without significant mass effect in the brain. Chronic hemorrhage in left posterior inferior temporal lobe. Bilateral mastoiditis. Date Of Dictation: 4/2/2021 7:12 PM This report will be available to the patient on the [facility] patient portal 36 hours after dictation.       All Micro Results     None          SARS-CoV-2 Lab Results  \"Novel Coronavirus\" Test: No results found for: COV2NT   \"Emergent Disease\" Test: No results found for: EDPR  \"SARS-COV-2\" Test: No results found for: XGCOVT  Rapid Test: No results found for: COVR         Signed:  Hamlet Beckett MD

## 2021-04-03 NOTE — PROGRESS NOTES
Arterial line started by anesthesia in left radial artery. Patient was given a patient stroke education book. Patient educated on signs and symptoms of a stroke and importance of getting to the nearest ED if symptoms occur. Pt educated on the risk factors of stroke .      Medication side effect sheet provided to patient upon arrival.

## 2021-04-03 NOTE — PROGRESS NOTES
TRANSFER - IN REPORT:    Verbal report received from Josesito Richey RN(name) on [employer].  being received from ICU(unit) for routine progression of care      Report consisted of patients Situation, Background, Assessment and   Recommendations(SBAR). Information from the following report(s) SBAR, Kardex, Intake/Output, MAR, Recent Results and Cardiac Rhythm NSR was reviewed with the receiving nurse. Opportunity for questions and clarification was provided. Assessment will be completed upon patients arrival to unit and care assumed.

## 2021-04-03 NOTE — PROGRESS NOTES
04/03/21 0648   NIH Stroke Scale   Interval Other (comment)  (dual with Adrian Ramirez RN)   LOC 0   LOC Questions 0   LOC Commands 0   Best Gaze 1   Visual 0   Facial Palsy 1   Motor Right Arm 0   Motor Left Arm 0   Motor Right Leg 0   Motor Left Leg 0   Limb Ataxia 2   Sensory 0   Best Language 0   Dysarthria 0   Extinction and Inattention 1   Total 5

## 2021-04-03 NOTE — PROGRESS NOTES
Neurology Daily Progress Note     Assessment:     82-year-old man with Parkinson disease, PDD, and orthostatic hypotension who has acute subdural hematomas after multiple falls. Orthostatic hypotension is likely playing a role in his falls. 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 Plan:     Continue home Parkinson medications, including droxidopa for orthostatic hypotension     If blood pressure is greater than 160 then give labetalol 10 mg given his small subdural hematomas. More strict blood pressure parameters are ill-advised in the setting of Parkinson disease and orthostatic hypotension     I am unaware of the patient's living situation. He would not be safe to live alone. No antithrombotics     Subjective: Interval history:    Stable but confused. No complaints today. History:    Wendy Mercado is a 80 y.o. male who is being seen for subdural hematoma    Review of systems negative with exception of pertinent positives and negatives noted above.        Objective:     Vitals:    04/03/21 0046 04/03/21 0115 04/03/21 0400 04/03/21 0800   BP: 130/71 (!) 156/70 (!) 153/77 (!) 150/81   Pulse: 60 65 94 89   Resp: 20 20 20 20   Temp:  97.4 °F (36.3 °C) 97.9 °F (36.6 °C) 97.5 °F (36.4 °C)   SpO2: 100% 100% 96% 96%   Weight:       Height:              Current Facility-Administered Medications:     labetaloL (NORMODYNE;TRANDATE) injection 10 mg, 10 mg, IntraVENous, Q6H PRN, Luis Grewal DO, 10 mg at 04/02/21 2016    droxidopa cap 300 mg (Patient Supplied), 300 mg, Oral, TID, Galina Hou MD, 300 mg at 04/03/21 0941    tuberculin injection 5 Units, 5 Units, IntraDERMal, ONCE, Adwoa Segundo MD    sodium chloride (NS) flush 5-40 mL, 5-40 mL, IntraVENous, Q8H, Adwoa Segundo MD, 10 mL at 04/03/21 0536    sodium chloride (NS) flush 5-40 mL, 5-40 mL, IntraVENous, PRN, Sharon Segundo MD    acetaminophen (TYLENOL) tablet 650 mg, 650 mg, Oral, Q6H PRN, Sharon Segundo MD    naloxone (NARCAN) injection 0.4 mg, 0.4 mg, IntraVENous, PRN, Sharon Segundo MD    polyethylene glycol (MIRALAX) packet 17 g, 17 g, Oral, DAILY PRN, Sharon Segundo MD    amantadine HCL (SYMMETREL) capsule 100 mg, 100 mg, Oral, BID, Sharon Segundo MD, 100 mg at 04/03/21 0536    carbidopa-levodopa (SINEMET)  mg per tablet 1 Tab, 1 Tab, Oral, QID, Boris Marquez MD, 1 Tab at 04/03/21 0941    carbidopa-levodopa ER (SINEMET CR)  mg per tablet 2 Tab, 2 Tab, Oral, QHS, Boris Marquez MD, 2 Tab at 04/02/21 2104    escitalopram oxalate (LEXAPRO) tablet 10 mg, 10 mg, Oral, DAILY, Sharon Segundo MD, 10 mg at 04/03/21 0941    fludrocortisone (FLORINEF) tablet 0.1 mg, 0.1 mg, Oral, Q24H, Sharon Segundo MD, 0.1 mg at 04/03/21 0536    gabapentin (NEURONTIN) capsule 100 mg, 100 mg, Oral, TID, Adwoa Segundo MD, 100 mg at 04/03/21 0941    latanoprost (XALATAN) 0.005 % ophthalmic solution 1 Drop, 1 Drop, Right Eye, QHS, Sharon Segundo MD, 1 Drop at 04/02/21 2104    levothyroxine (SYNTHROID) tablet 112 mcg, 112 mcg, Oral, ACB, Sharon Segundo MD, 112 mcg at 04/03/21 0536    oxybutynin (DITROPAN) tablet 5 mg, 5 mg, Oral, QHS, Adwoa Segundo MD, 5 mg at 04/02/21 2104    polyethylene glycol (MIRALAX) packet 17 g, 17 g, Oral, DAILY, Adwoa Segundo MD, 17 g at 04/02/21 0847    rivastigmine tartrate (EXELON) capsule 6 mg, 6 mg, Oral, BID WITH MEALS, Sharon Segundo MD, 6 mg at 04/03/21 0941    Recent Results (from the past 12 hour(s))   METABOLIC PANEL, BASIC    Collection Time: 04/03/21  5:54 AM   Result Value Ref Range    Sodium 135 (L) 138 - 145 mmol/L    Potassium 4.2 3.5 - 5.1 mmol/L    Chloride 102 98 - 107 mmol/L    CO2 27 21 - 32 mmol/L    Anion gap 6 (L) 7 - 16 mmol/L    Glucose 93 65 - 100 mg/dL    BUN 20 8 - 23 MG/DL    Creatinine 0.77 (L) 0.8 - 1.5 MG/DL    GFR est AA >60 >60 ml/min/1.73m2    GFR est non-AA >60 >60 ml/min/1.73m2    Calcium 9.0 8.3 - 10.4 MG/DL   CBC WITH AUTOMATED DIFF    Collection Time: 04/03/21  5:54 AM   Result Value Ref Range    WBC 6.8 4.3 - 11.1 K/uL    RBC 3.73 (L) 4.23 - 5.6 M/uL    HGB 12.4 (L) 13.6 - 17.2 g/dL    HCT 37.2 (L) 41.1 - 50.3 %    MCV 99.7 (H) 79.6 - 97.8 FL    MCH 33.2 (H) 26.1 - 32.9 PG    MCHC 33.3 31.4 - 35.0 g/dL    RDW 12.2 11.9 - 14.6 %    PLATELET 168 184 - 245 K/uL    MPV 8.8 (L) 9.4 - 12.3 FL    ABSOLUTE NRBC 0.00 0.0 - 0.2 K/uL    DF AUTOMATED      NEUTROPHILS 67 43 - 78 %    LYMPHOCYTES 22 13 - 44 %    MONOCYTES 8 4.0 - 12.0 %    EOSINOPHILS 2 0.5 - 7.8 %    BASOPHILS 1 0.0 - 2.0 %    IMMATURE GRANULOCYTES 0 0.0 - 5.0 %    ABS. NEUTROPHILS 4.6 1.7 - 8.2 K/UL    ABS. LYMPHOCYTES 1.5 0.5 - 4.6 K/UL    ABS. MONOCYTES 0.5 0.1 - 1.3 K/UL    ABS. EOSINOPHILS 0.1 0.0 - 0.8 K/UL    ABS. BASOPHILS 0.1 0.0 - 0.2 K/UL    ABS. IMM. GRANS. 0.0 0.0 - 0.5 K/UL         Physical Exam:  Neurological examination -  Comprehension is impaired to complex commands. Attention is poor. 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 On cranial nerve examination pupils are equal round and reactive to light. Fundoscopic examination is normal. Face is symmetric and sensation is intact to light touch. Hearing is intact to finger rustle bilaterally. Motor examination - There is normal muscle tone and bulk. Power is full throughout. Muscle stretch reflexes are normoactive and there are no pathological reflexes present. Sensation is intact to light touch, pinprick, vibration and proprioception in all extremities. Subtle bilateral pill-rolling tremor.       Signed By: Alex Davis DO     April 3, 2021

## 2021-04-04 PROBLEM — E43 SEVERE PROTEIN-CALORIE MALNUTRITION (HCC): Status: ACTIVE | Noted: 2021-04-04

## 2021-04-04 PROBLEM — I10 HTN (HYPERTENSION): Status: ACTIVE | Noted: 2021-04-04

## 2021-04-04 LAB
ANION GAP SERPL CALC-SCNC: 6 MMOL/L (ref 7–16)
BUN SERPL-MCNC: 16 MG/DL (ref 8–23)
CALCIUM SERPL-MCNC: 9.5 MG/DL (ref 8.3–10.4)
CHLORIDE SERPL-SCNC: 101 MMOL/L (ref 98–107)
CO2 SERPL-SCNC: 28 MMOL/L (ref 21–32)
CREAT SERPL-MCNC: 0.82 MG/DL (ref 0.8–1.5)
GLUCOSE SERPL-MCNC: 93 MG/DL (ref 65–100)
POTASSIUM SERPL-SCNC: 3.8 MMOL/L (ref 3.5–5.1)
SODIUM SERPL-SCNC: 135 MMOL/L (ref 136–145)
TSH SERPL DL<=0.005 MIU/L-ACNC: 6.15 UIU/ML (ref 0.36–3.74)

## 2021-04-04 PROCEDURE — 99232 SBSQ HOSP IP/OBS MODERATE 35: CPT | Performed by: PSYCHIATRY & NEUROLOGY

## 2021-04-04 PROCEDURE — 65660000000 HC RM CCU STEPDOWN

## 2021-04-04 PROCEDURE — 80048 BASIC METABOLIC PNL TOTAL CA: CPT

## 2021-04-04 PROCEDURE — 84443 ASSAY THYROID STIM HORMONE: CPT

## 2021-04-04 PROCEDURE — 74011250637 HC RX REV CODE- 250/637: Performed by: INTERNAL MEDICINE

## 2021-04-04 PROCEDURE — 2709999900 HC NON-CHARGEABLE SUPPLY

## 2021-04-04 PROCEDURE — 74011000250 HC RX REV CODE- 250: Performed by: PSYCHIATRY & NEUROLOGY

## 2021-04-04 PROCEDURE — 36415 COLL VENOUS BLD VENIPUNCTURE: CPT

## 2021-04-04 RX ORDER — LEVOTHYROXINE SODIUM 125 UG/1
125 TABLET ORAL
Status: DISCONTINUED | OUTPATIENT
Start: 2021-04-05 | End: 2021-04-06 | Stop reason: HOSPADM

## 2021-04-04 RX ORDER — LISINOPRIL 5 MG/1
10 TABLET ORAL DAILY
Status: DISCONTINUED | OUTPATIENT
Start: 2021-04-04 | End: 2021-04-06 | Stop reason: HOSPADM

## 2021-04-04 RX ADMIN — CARBIDOPA AND LEVODOPA 1 TABLET: 25; 250 TABLET ORAL at 18:00

## 2021-04-04 RX ADMIN — LABETALOL HYDROCHLORIDE 10 MG: 5 INJECTION INTRAVENOUS at 16:48

## 2021-04-04 RX ADMIN — RIVASTIGMINE TARTRATE 6 MG: 3 CAPSULE ORAL at 08:27

## 2021-04-04 RX ADMIN — RIVASTIGMINE TARTRATE 6 MG: 3 CAPSULE ORAL at 16:46

## 2021-04-04 RX ADMIN — LABETALOL HYDROCHLORIDE 10 MG: 5 INJECTION INTRAVENOUS at 12:10

## 2021-04-04 RX ADMIN — CARBIDOPA AND LEVODOPA 1 TABLET: 25; 250 TABLET ORAL at 05:42

## 2021-04-04 RX ADMIN — Medication 10 ML: at 16:47

## 2021-04-04 RX ADMIN — Medication 5 ML: at 21:22

## 2021-04-04 RX ADMIN — DROXIDOPA 300 MG: 300 CAPSULE ORAL at 16:47

## 2021-04-04 RX ADMIN — ESCITALOPRAM OXALATE 10 MG: 10 TABLET ORAL at 08:27

## 2021-04-04 RX ADMIN — CARBIDOPA AND LEVODOPA 1 TABLET: 25; 250 TABLET ORAL at 08:27

## 2021-04-04 RX ADMIN — GABAPENTIN 100 MG: 100 CAPSULE ORAL at 08:27

## 2021-04-04 RX ADMIN — OXYBUTYNIN CHLORIDE 5 MG: 5 TABLET ORAL at 21:22

## 2021-04-04 RX ADMIN — Medication 10 ML: at 05:43

## 2021-04-04 RX ADMIN — GABAPENTIN 100 MG: 100 CAPSULE ORAL at 16:47

## 2021-04-04 RX ADMIN — AMANTADINE HYDROCHLORIDE 100 MG: 100 CAPSULE ORAL at 16:47

## 2021-04-04 RX ADMIN — CARBIDOPA AND LEVODOPA 1 TABLET: 25; 250 TABLET ORAL at 16:47

## 2021-04-04 RX ADMIN — LATANOPROST 1 DROP: 50 SOLUTION OPHTHALMIC at 21:27

## 2021-04-04 RX ADMIN — DROXIDOPA 300 MG: 300 CAPSULE ORAL at 21:23

## 2021-04-04 RX ADMIN — AMANTADINE HYDROCHLORIDE 100 MG: 100 CAPSULE ORAL at 05:42

## 2021-04-04 RX ADMIN — LISINOPRIL 10 MG: 5 TABLET ORAL at 11:44

## 2021-04-04 RX ADMIN — DROXIDOPA 300 MG: 300 CAPSULE ORAL at 08:29

## 2021-04-04 RX ADMIN — FLUDROCORTISONE ACETATE 0.1 MG: 0.1 TABLET ORAL at 05:42

## 2021-04-04 RX ADMIN — LEVOTHYROXINE SODIUM 112 MCG: 0.11 TABLET ORAL at 05:43

## 2021-04-04 RX ADMIN — CARBIDOPA AND LEVODOPA 2 TABLET: 25; 100 TABLET, EXTENDED RELEASE ORAL at 21:21

## 2021-04-04 RX ADMIN — GABAPENTIN 100 MG: 100 CAPSULE ORAL at 21:21

## 2021-04-04 NOTE — PROGRESS NOTES
Hospitalist Progress Note     Admit Date:  2021 11:00 PM   Name:  Ruthie Esteban. Age:  80 y.o.  :  1940   MRN:  [id number]   PCP:  Darling Barrera MD  Treatment Team: Attending Provider: Rasheed Bowers MD; Consulting Provider: Vane Latif DO; Consulting Provider: Kingston Chance MD; Care Manager: Gin Craig RN; Utilization Review: Liya Pride; Consulting Provider: Micah Hurtado MD; Charge Nurse: Judit Castano  Presenting Complaint: No chief complaint on file.     Initial Admission Diagnosis: Subdural hematoma ([facility] [S06.5X9A]     Assessment and Plan:     Hospital Problems as of 2021 Date Reviewed: 2020          Codes Class Noted - Resolved POA    HTN (hypertension) ICD-10-CM: I10  ICD-9-CM: 401.9  2021 - Present Yes        Severe protein-calorie malnutrition ([facility] ICD-10-CM: E43  ICD-9-CM: 262  2021 - Present Yes        * (Principal) Subdural hematoma (HCC) ICD-10-CM: J82.5C1H  ICD-9-CM: 432.1  2021 - Present Yes        Orthostatic hypotension (Chronic) ICD-10-CM: I95.1  ICD-9-CM: 458.0  2021 - Present Yes        Hyponatremia ICD-10-CM: E87.1  ICD-9-CM: 276.1  2021 - Present Yes        Psychosis due to Parkinson's disease ([facility] (Chronic) ICD-10-CM: Ron Prose  ICD-9-CM: 332.0  2019 - Present Yes        Frequent falls (Chronic) ICD-10-CM: R29.6  ICD-9-CM: V15.88  2018 - Present Yes        Dementia due to Parkinson's disease without behavioral disturbance ([facility] (Chronic) ICD-10-CM: Ron Prose, F02.80  ICD-9-CM: 332.0, 294.10  2018 - Present Yes        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 Start lisinopril 10mg, labetalol 10mg PRN. Goal SBP <160. HypoNa  04/04/21 - No changes to plan   -monitor  -encourage fluids    Malnutrition, looks severe  04/04/21-new issue. RD consulted. feeding assist and encouragement    Parkinsons  04/04/21 - No changes to plan   -cont home meds as shown  -no neuroleptics    Hypothyroid  04/04/21 - TSH low, increase synthroid    DC planning:    -would not be safe to live alone per neurology. Referral to SNF then family will need to place in [facility] or other arrangements most likely unless he has supervision at home. -CM following  -PPD done.    -Ordered COVID testing for 4/4    Other listed chronic conditions stable, continue current management. Diet:  DIET CARDIAC  DVT ppx:  SCDs    Hospital Course: Lesa Hogan is a 68 y.o. male with medical history of parkinsons disease, frequent falls and orthostatic hypotension who presented to ED with falls. He had 2 falls on 3-29 and 1 on 3-31. CT head shows small bilateral acute on chronic SDH of frontal lobes without mass effect. Admitted to ICU per protocol. Stabilized and transferred to floor. CM consulted to work on placement at SNF    24hr Events/Subjective:   Pt denies complaints but would like to eat, needs assistance. I informed unit secretary to get patient care aide to assist.  He denies pain, other complaints.   No fevers, SOB    ROS/history limited by chronic dementia  Objective:     Patient Vitals for the past 24 hrs:   Temp Pulse Resp BP SpO2   04/04/21 0800 98.4 °F (36.9 °C) 82 19 (!) 154/68 99 %   04/04/21 0400 98 °F (36.7 °C) 64 20 (!) 163/75 99 %   04/04/21 0000 98.1 °F (36.7 °C) 87 19 (!) 143/78 98 %   04/03/21 2000 98 °F (36.7 °C) 80 19 (!) 157/96 96 %   04/03/21 1600 98.8 °F (37.1 °C) 68 19 (!) 164/79 98 %   04/03/21 1200 98 °F (36.7 °C) 86 20 (!) 146/83 97 %     Oxygen Therapy  O2 Sat (%): 99 % (04/04/21 0800)  Pulse via Oximetry: 63 beats per minute (04/03/21 0046)  O2 Device: Room air (04/02/21 2300)    Estimated body mass index is 18.09 kg/m² as calculated from the following:    Height as of this encounter: 5' 10\" (1.778 m). Weight as of this encounter: 57.2 kg (126 lb 1.7 oz). No intake or output data in the 24 hours ending 04/04/21 1024    *Note that automatically entered I/Os may not be accurate; dependent on patient compliance with collection and accurate  by techs. General:    Well nourished. No overt distress  CV:   RRR. No edema. No JVD  Lungs:   Even,  Unlabored  Abdomen:    nondistended. Extremities: Warm and dry. No cyanosis   Skin:     No rashes. Normal coloration  Neuro:  No gross focal deficits. Data Reviewed:  I have reviewed all labs, meds, and studies from the last 24 hours. See all results below.     Last 24hr Labs:  Recent Results (from the past 24 hour(s))   PLEASE READ & DOCUMENT PPD TEST IN 48 HRS    Collection Time: 04/03/21 11:05 PM   Result Value Ref Range    PPD Negative Negative    mm Induration 0 0 - 5 mm   METABOLIC PANEL, BASIC    Collection Time: 04/04/21  5:56 AM   Result Value Ref Range    Sodium 135 (L) 136 - 145 mmol/L    Potassium 3.8 3.5 - 5.1 mmol/L    Chloride 101 98 - 107 mmol/L    CO2 28 21 - 32 mmol/L    Anion gap 6 (L) 7 - 16 mmol/L    Glucose 93 65 - 100 mg/dL    BUN 16 8 - 23 MG/DL    Creatinine 0.82 0.8 - 1.5 MG/DL    GFR est AA >60 >60 ml/min/1.73m2    GFR est non-AA >60 >60 ml/min/1.73m2    Calcium 9.5 8.3 - 10.4 MG/DL   TSH 3RD GENERATION    Collection Time: 04/04/21  5:56 AM   Result Value Ref Range    TSH 6.150 (H) 0.358 - 3.740 uIU/mL       Current Meds:  Current Facility-Administered Medications   Medication Dose Route Frequency    [START ON 4/5/2021] levothyroxine (SYNTHROID) tablet 125 mcg 125 mcg Oral ACB    lisinopriL (PRINIVIL, ZESTRIL) tablet 10 mg  10 mg Oral DAILY    labetaloL (NORMODYNE;TRANDATE) injection 10 mg  10 mg IntraVENous Q6H PRN    droxidopa cap 300 mg (Patient Supplied)  300 mg Oral TID    sodium chloride (NS) flush 5-40 mL  5-40 mL IntraVENous Q8H    sodium chloride (NS) flush 5-40 mL  5-40 mL IntraVENous PRN    acetaminophen (TYLENOL) tablet 650 mg  650 mg Oral Q6H PRN    naloxone (NARCAN) injection 0.4 mg  0.4 mg IntraVENous PRN    polyethylene glycol (MIRALAX) packet 17 g  17 g Oral DAILY PRN    amantadine HCL (SYMMETREL) capsule 100 mg  100 mg Oral BID    carbidopa-levodopa (SINEMET)  mg per tablet 1 Tab  1 Tab Oral QID    carbidopa-levodopa ER (SINEMET CR)  mg per tablet 2 Tab  2 Tab Oral QHS    escitalopram oxalate (LEXAPRO) tablet 10 mg  10 mg Oral DAILY    fludrocortisone (FLORINEF) tablet 0.1 mg  0.1 mg Oral Q24H    gabapentin (NEURONTIN) capsule 100 mg  100 mg Oral TID    latanoprost (XALATAN) 0.005 % ophthalmic solution 1 Drop  1 Drop Right Eye QHS    oxybutynin (DITROPAN) tablet 5 mg  5 mg Oral QHS    polyethylene glycol (MIRALAX) packet 17 g  17 g Oral DAILY    rivastigmine tartrate (EXELON) capsule 6 mg  6 mg Oral BID WITH MEALS       Other Studies:  No results found for this visit on 04/01/21. No results found.     All Micro Results     None          SARS-CoV-2 Lab Results  \"Novel Coronavirus\" Test: No results found for: COV2NT   \"Emergent Disease\" Test: No results found for: EDPR  \"SARS-COV-2\" Test: No results found for: XGCOVT  Rapid Test: No results found for: COVR         Signed:  Anyi Patel MD

## 2021-04-04 NOTE — PROGRESS NOTES
Problem: Falls - Risk of  Goal: *Absence of Falls  Description: Document Lisset James Fall Risk and appropriate interventions in the flowsheet. Outcome: Progressing Towards Goal  Note: Fall Risk Interventions:  Mobility Interventions: Communicate number of staff needed for ambulation/transfer    Mentation Interventions: Door open when patient unattended    Medication Interventions: Teach patient to arise slowly    Elimination Interventions: Toileting schedule/hourly rounds    History of Falls Interventions: Bed/chair exit alarm         Problem: Pressure Injury - Risk of  Goal: *Prevention of pressure injury  Description: Document Reza Scale and appropriate interventions in the flowsheet.   Outcome: Progressing Towards Goal  Note: Pressure Injury Interventions:       Moisture Interventions: Absorbent underpads    Activity Interventions: Increase time out of bed    Mobility Interventions: Pressure redistribution bed/mattress (bed type)    Nutrition Interventions: Offer support with meals,snacks and hydration    Friction and Shear Interventions: Minimize layers

## 2021-04-04 NOTE — PROGRESS NOTES
04/04/21 0714   NIH Stroke Scale   Interval Other (comment)  (Dual NIH with  Karla Azul)   LOC 0   LOC Questions 0   LOC Commands 0   Best Gaze 1   Visual 0   Facial Palsy 1   Motor Right Arm 0   Motor Left Arm 0   Motor Right Leg 0   Motor Left Leg 0   Limb Ataxia 2   Sensory 0   Best Language 0   Dysarthria 0   Extinction and Inattention 0   Total 4

## 2021-04-04 NOTE — PROGRESS NOTES
Neurology Daily Progress Note     Assessment:     79-year-old man with Parkinson disease, PDD, and orthostatic hypotension who has acute subdural hematomas after multiple falls. Orthostatic hypotension is likely playing a role in his falls. 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 Plan:     Continue home Parkinson medications, including droxidopa for orthostatic hypotension     If blood pressure is greater than 160 then give labetalol 10 mg given his small subdural hematomas. More strict blood pressure parameters are ill-advised in the setting of Parkinson disease and orthostatic hypotension     I am unaware of the patient's living situation. He would not be safe to live alone. No antithrombotics     Subjective: Interval history:    More awake and alert. Able to tell me he is at [address] [place] [address] due to falls. Knows age. History:    Cole Gillis. is a 80 y.o. male who is being seen for subdural hematoma    Review of systems negative with exception of pertinent positives and negatives noted above.        Objective:     Vitals:    04/01/21 2247 04/04/21 0000 04/04/21 0400 04/04/21 0800   BP:  (!) 143/78 (!) 163/75 (!) 154/68   Pulse:  87 64 82   Resp:  19 20 19   Temp:  98.1 °F (36.7 °C) 98 °F (36.7 °C) 98.4 °F (36.9 °C)   SpO2:  98% 99% 99%   Weight: 126 lb 1.7 oz (57.2 kg)      Height: 5' 10\" (1.778 m)             Current Facility-Administered Medications:     labetaloL (NORMODYNE;TRANDATE) injection 10 mg, 10 mg, IntraVENous, Q6H PRN, Luis Grewal DO, 10 mg at 04/03/21 1734    droxidopa cap 300 mg (Patient Supplied), 300 mg, Oral, TID, Galina Fuentes MD, 300 mg at 04/04/21 0829    sodium chloride (NS) flush 5-40 mL, 5-40 mL, IntraVENous, Q8H, Adwoa Segundo MD, 10 mL at 04/04/21 0543    sodium chloride (NS) flush 5-40 mL, 5-40 mL, IntraVENous, PRN, Obi Segundo MD    acetaminophen (TYLENOL) tablet 650 mg, 650 mg, Oral, Q6H PRN, Obi Segundo MD    naloxone (NARCAN) injection 0.4 mg, 0.4 mg, IntraVENous, PRN, Obi Segundo MD    polyethylene glycol (MIRALAX) packet 17 g, 17 g, Oral, DAILY PRN, Obi Segundo MD    amantadine HCL (SYMMETREL) capsule 100 mg, 100 mg, Oral, BID, Obi Segundo MD, 100 mg at 04/04/21 0542    carbidopa-levodopa (SINEMET)  mg per tablet 1 Tab, 1 Tab, Oral, QID, Irma Thompson MD, 1 Tab at 04/04/21 0827    carbidopa-levodopa ER (SINEMET CR)  mg per tablet 2 Tab, 2 Tab, Oral, QHS, Irma Thompson MD, 2 Tab at 04/03/21 2115    escitalopram oxalate (LEXAPRO) tablet 10 mg, 10 mg, Oral, DAILY, Irma Thompson MD, 10 mg at 04/04/21 0827    fludrocortisone (FLORINEF) tablet 0.1 mg, 0.1 mg, Oral, Q24H, Irma Thompson MD, 0.1 mg at 04/04/21 0542    gabapentin (NEURONTIN) capsule 100 mg, 100 mg, Oral, TID, Irma Thompson MD, 100 mg at 04/04/21 0827    latanoprost (XALATAN) 0.005 % ophthalmic solution 1 Drop, 1 Drop, Right Eye, QHS, Obi Segundo MD, 1 Drop at 04/02/21 2104    levothyroxine (SYNTHROID) tablet 112 mcg, 112 mcg, Oral, ACB, Adwoa Segundo MD, 112 mcg at 04/04/21 0543    oxybutynin (DITROPAN) tablet 5 mg, 5 mg, Oral, QHS, Obi Segundo MD, 5 mg at 04/03/21 2115    polyethylene glycol (MIRALAX) packet 17 g, 17 g, Oral, DAILY, Adwoa Segundo MD, 17 g at 04/02/21 0847    rivastigmine tartrate (EXELON) capsule 6 mg, 6 mg, Oral, BID WITH MEALS, Obi Segundo MD, 6 mg at 04/04/21 0827    Recent Results (from the past 12 hour(s))   PLEASE READ & DOCUMENT PPD TEST IN 48 HRS    Collection Time: 04/03/21 11:05 PM   Result Value Ref Range    PPD Negative Negative    mm Induration 0 0 - 5 mm   METABOLIC PANEL, BASIC    Collection Time: 04/04/21  5:56 AM   Result Value Ref Range Sodium 135 (L) 136 - 145 mmol/L    Potassium 3.8 3.5 - 5.1 mmol/L    Chloride 101 98 - 107 mmol/L    CO2 28 21 - 32 mmol/L    Anion gap 6 (L) 7 - 16 mmol/L    Glucose 93 65 - 100 mg/dL    BUN 16 8 - 23 MG/DL    Creatinine 0.82 0.8 - 1.5 MG/DL    GFR est AA >60 >60 ml/min/1.73m2    GFR est non-AA >60 >60 ml/min/1.73m2    Calcium 9.5 8.3 - 10.4 MG/DL   TSH 3RD GENERATION    Collection Time: 04/04/21  5:56 AM   Result Value Ref Range    TSH 6.150 (H) 0.358 - 3.740 uIU/mL         Physical Exam:  Neurological examination -  Comprehension is impaired to complex commands. Attention is improved. 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 On cranial nerve examination pupils are equal round and reactive to light. Fundoscopic examination is normal. Face is symmetric and sensation is intact to light touch. Hearing is intact to finger rustle bilaterally. Motor examination - There is normal muscle tone and bulk. Power is full throughout. Muscle stretch reflexes are normoactive and there are no pathological reflexes present. Sensation is intact to light touch, pinprick, vibration and proprioception in all extremities. Subtle bilateral pill-rolling tremor.       Signed By: Christo Gottlieb DO     April 4, 2021

## 2021-04-04 NOTE — PROGRESS NOTES
04/04/21 1900   NIH Stroke Scale   Interval Other (comment)   LOC 0   LOC Questions 0   LOC Commands 0   Best Gaze 1   Visual 0   Facial Palsy 1   Motor Right Arm 0   Motor Left Arm 0   Motor Right Leg 0   Motor Left Leg 0   Limb Ataxia 1   Sensory 0   Best Language 0   Dysarthria 0   Extinction and Inattention 0   Total 3

## 2021-04-05 PROBLEM — E44.0 MODERATE PROTEIN-CALORIE MALNUTRITION (HCC): Status: ACTIVE | Noted: 2021-04-04

## 2021-04-05 PROCEDURE — 74011250637 HC RX REV CODE- 250/637: Performed by: INTERNAL MEDICINE

## 2021-04-05 PROCEDURE — 87635 SARS-COV-2 COVID-19 AMP PRB: CPT

## 2021-04-05 PROCEDURE — 97535 SELF CARE MNGMENT TRAINING: CPT

## 2021-04-05 PROCEDURE — 2709999900 HC NON-CHARGEABLE SUPPLY

## 2021-04-05 PROCEDURE — 99232 SBSQ HOSP IP/OBS MODERATE 35: CPT | Performed by: NURSE PRACTITIONER

## 2021-04-05 PROCEDURE — 97530 THERAPEUTIC ACTIVITIES: CPT

## 2021-04-05 PROCEDURE — 65660000000 HC RM CCU STEPDOWN

## 2021-04-05 PROCEDURE — U0005 INFEC AGEN DETEC AMPLI PROBE: HCPCS

## 2021-04-05 PROCEDURE — 92523 SPEECH SOUND LANG COMPREHEN: CPT

## 2021-04-05 RX ADMIN — CARBIDOPA AND LEVODOPA 1 TABLET: 25; 250 TABLET ORAL at 17:29

## 2021-04-05 RX ADMIN — Medication 5 ML: at 04:53

## 2021-04-05 RX ADMIN — LEVOTHYROXINE SODIUM 125 MCG: 0.12 TABLET ORAL at 04:53

## 2021-04-05 RX ADMIN — CARBIDOPA AND LEVODOPA 2 TABLET: 25; 100 TABLET, EXTENDED RELEASE ORAL at 21:37

## 2021-04-05 RX ADMIN — DROXIDOPA 300 MG: 300 CAPSULE ORAL at 17:28

## 2021-04-05 RX ADMIN — GABAPENTIN 100 MG: 100 CAPSULE ORAL at 10:50

## 2021-04-05 RX ADMIN — Medication 10 ML: at 21:37

## 2021-04-05 RX ADMIN — ESCITALOPRAM OXALATE 10 MG: 10 TABLET ORAL at 10:50

## 2021-04-05 RX ADMIN — GABAPENTIN 100 MG: 100 CAPSULE ORAL at 17:30

## 2021-04-05 RX ADMIN — GABAPENTIN 100 MG: 100 CAPSULE ORAL at 21:37

## 2021-04-05 RX ADMIN — RIVASTIGMINE TARTRATE 6 MG: 3 CAPSULE ORAL at 17:29

## 2021-04-05 RX ADMIN — AMANTADINE HYDROCHLORIDE 100 MG: 100 CAPSULE ORAL at 04:52

## 2021-04-05 RX ADMIN — POLYETHYLENE GLYCOL 3350 17 G: 17 POWDER, FOR SOLUTION ORAL at 10:50

## 2021-04-05 RX ADMIN — LATANOPROST 1 DROP: 50 SOLUTION OPHTHALMIC at 21:38

## 2021-04-05 RX ADMIN — RIVASTIGMINE TARTRATE 6 MG: 3 CAPSULE ORAL at 10:50

## 2021-04-05 RX ADMIN — DROXIDOPA 300 MG: 300 CAPSULE ORAL at 21:37

## 2021-04-05 RX ADMIN — Medication 5 ML: at 14:00

## 2021-04-05 RX ADMIN — OXYBUTYNIN CHLORIDE 5 MG: 5 TABLET ORAL at 21:37

## 2021-04-05 RX ADMIN — ACETAMINOPHEN 650 MG: 325 TABLET ORAL at 10:50

## 2021-04-05 RX ADMIN — DROXIDOPA 300 MG: 300 CAPSULE ORAL at 10:51

## 2021-04-05 RX ADMIN — CARBIDOPA AND LEVODOPA 1 TABLET: 25; 250 TABLET ORAL at 04:52

## 2021-04-05 RX ADMIN — AMANTADINE HYDROCHLORIDE 100 MG: 100 CAPSULE ORAL at 14:52

## 2021-04-05 RX ADMIN — LISINOPRIL 10 MG: 5 TABLET ORAL at 10:50

## 2021-04-05 RX ADMIN — CARBIDOPA AND LEVODOPA 1 TABLET: 25; 250 TABLET ORAL at 10:50

## 2021-04-05 RX ADMIN — CARBIDOPA AND LEVODOPA 1 TABLET: 25; 250 TABLET ORAL at 14:52

## 2021-04-05 RX ADMIN — FLUDROCORTISONE ACETATE 0.1 MG: 0.1 TABLET ORAL at 04:52

## 2021-04-05 NOTE — CONSULTS
Comprehensive Nutrition Assessment    Type and Reason for Visit: Initial, Consult  Consult for Malnutrition, general nutrition (Jassi WILKINS)    Nutrition Recommendations/Plan:    Continue with current diet   Start Ensure [employer] with all meals (provides 350 calories and 20 grams protein per bottle)     Malnutrition Assessment:  Malnutrition Status: Moderate malnutrition  Context: Social/environmental circumstances  Findings of clinical characteristics of malnutrition:   Energy Intake:  Mild decrease in energy intake (specify)  Weight Loss:  Unable to assess     Body Fat Loss:  1 - Mild body fat loss, Triceps, Fat overlying ribs   Muscle Mass Loss:  1 - Mild muscle mass loss, Hand (interosseous), Clavicles (pectoralis &deltoids)  Fluid Accumulation:  No significant fluid accumulation,     Strength:  Not performed     Nutrition Assessment:   Nutrition History: Patient reports that last year while at a nursing home he ate poorly due to how food was prepared, he states he was there 9 months in 2020. Nutrition Background: medical history of parkinsons disease, frequent falls and orthostatic hypotension who presented to ED with falls. He had 2 falls on 3-29 and 1 on 3-31. CT head shows small bilateral acute on chronic SDH of frontal lobes without mass effect. Daily Update:  Spoke with patient who reports that he has not been doing great. He reports that he has lost almost 40 lbs in 2 months, however not verified by EMR weight history. He reports he is eating less than normal but unsure if his appetite is low or it is another reason. He reports he normally does not like food at hospitals and nursing homes but the food here is okay and hes eating some. He is willing to have ensure with all meals. Spoke with RN who reports patient is hit or miss with eating and with his mind. But he reports patient can feed himself just needs to be setup with items opened.     Nutrition Related Findings:   Muslce wasting and fat loss      Current Nutrition Therapies:  DIET CARDIAC Regular    Current Intake:   Average Meal Intake: 26-50% Average Supplement Intake: None ordered      Anthropometric Measures:  Height: 5' 10\" (177.8 cm)  Current Body Wt: 57.2 kg (126 lb 1.7 oz)(4/1), Weight source: Not specified  BMI: 18.1, Underweight (BMI less than 22) age over 72     Ideal Body Weight (lbs) (Calculated): 166 lbs (75 kg), 76 %  Usual Body Wt: 70.3 kg (155 lb)(per pt; however not confirmed by EMR), Percent weight change: -18.6           WT / BMI 4/1/2021 10/30/2020 3/10/2020 1/26/2020   WEIGHT 126 lb 1.7 oz 130 lb 138 lb 129 lb   BODY MASS INDEX 18.09 kg/m2 18.65 kg/m2 19.8 kg/m2 18.51 kg/m2     WT / BMI 11/22/2019 8/22/2019 8/16/2019 8/15/2019   WEIGHT 145 lb 149 lb 142 lb 144 lb 10 oz   BODY MASS INDEX 20.81 kg/m2 21.38 kg/m2 20.37 kg/m2 20.75 kg/m2     WT / BMI 6/11/2019 4/17/2019   WEIGHT 143 lb 166 lb   BODY MASS INDEX 20.52 kg/m2 23.82 kg/m2   Weight loss over 2 years which is not significant. Edema: No data recorded   Estimated Daily Nutrient Needs:  Energy (kcal/day): 9949-4534 (Kcal/kg(30-35), Weight Used: Current(57.2 kg))  Protein (g/day): 68-86 (1.2-1.5 g/kg) Weight Used: (Current)  Fluid (ml/day):   (1 ml/kcal)    Nutrition Diagnosis:   · Inadequate oral intake related to inadequate protein-energy intake as evidenced by intake 26-50%, weight loss, poor intake prior to admission    · Moderate malnutrition, In context of social or environmental circumstances related to inadequate protein-energy intake as evidenced by intake 26-50%, poor intake prior to admission, mild muscle loss, mild loss of subcutaneous fat    Nutrition Interventions:   Food and/or Nutrient Delivery: Continue current diet, Start oral nutrition supplement     Coordination of Nutrition Care: Continue to monitor while inpatient  Plan of Care discussed with Al, RN    Goals:       Active Goal: Intake >75% of daily nutritional needs within 7 days    Nutrition Monitoring and Evaluation:      Food/Nutrient Intake Outcomes: Food and nutrient intake, Supplement intake       Discharge Planning:     Too soon to determine    Electronically signed by Wily Montanez MS, RD, LD on 4/5/2021 at 4:03 PM.  Contact: [phone]

## 2021-04-05 NOTE — PROGRESS NOTES
ACUTE OT GOALS:  (Developed with and agreed upon by patient and/or caregiver.)  1. Patient will bathe and dress total body with supervision and adaptive device as needed. 2. Patient will toilet with modified independence and adaptive device as needed. 3. Patient will tolerate 30 minutes of OT treatment with up to 2 rest breaks to increase activity tolerance for ADLs. 4. Patient will complete functional mobility for ADLs with supervision. 5. Patient will demonstrate modified independence with HEP to increase strength/coordination/proprioception in BUEs for increased safety and independence with functional tasks. 6. Patient will demonstrate improved visual skills for ADLs with min cues.      Timeframe: 7 visits     OCCUPATIONAL THERAPY: Daily Note OT Treatment Day # 2    Demi Barbour is a 80 y.o. male   PRIMARY DIAGNOSIS: Subdural hematoma (HCC)  Subdural hematoma ([facility] [address] [S06.5X9A]       [place] / [place] / Product Type: Medicare /   ASSESSMENT:     REHAB RECOMMENDATIONS: CURRENT LEVEL OF FUNCTION:  (Most Recently Demonstrated)   Recommendation to date pending progress:  Setting:   Short-term Rehab  Equipment:    To Be Determined Bathing:   Not tested  Dressing:   Set Up  Feeding/Grooming:   Set Up  Toileting:   Not tested  Functional Mobility:   Minimal Assistance     ASSESSMENT:  Mr. Luciano Wen presents from shelter with falls, increased confusion, acute on chronic B SDHs. Hx PD, [place]. Pt typically independent with ADLs and ambulation. Pt AAO x4 today. Pt presents supine and agreeable to therapy. Pt CGA bed mobility supine>sit and pt denied dizziness/lightheadedness. BP taken 102/59. Pt completed grooming ADLs and UE dressing with set up EOB. Pt stood RW with min A for ~30 seconds before c/o dizziness and quickly returned to supine and demonstrated pursed lip breathing. BP taken 119/86. Pt repositioned in bed with lap restraint donned.  Pt does not seem to have R sided visual deficits anymore, however c/o double vision which pt reports is at baseline. Pt left in supine with PT at bedside. Pt is generally weak and would continue to benefit from skilled OT services to address deficits and goals. Rec STR at d/c.      SUBJECTIVE:   Mr. Weston Cruz states, \"I haven't stood up yet. \"    SOCIAL HISTORY/LIVING ENVIRONMENT: Hx PD, [place]. Pt typically independent with ADLs and ambulation. Home Environment: Assisted living  One/Two Story Residence: One story  Living Alone: Yes  Support Systems: Family member(s)    OBJECTIVE:     PAIN: VITAL SIGNS: LINES/DRAINS:   Pre Treatment: Pain Screen  Pain Scale 1: Numeric (0 - 10)  Pain Intensity 1: 0  Post Treatment: 0 Vital Signs  BP: (!) 102/59  MAP (Calculated): 73  BP 1 Location: Left upper arm  BP 1 Method: Automatic  BP Patient Position: Sitting(EOB)  Additional Blood Pressure/Pulse Data  BP 2: 119/86  MAP 2 (Calculated): 97  BP 2 Location: Left arm  BP Method 2: Automatic  Patient Position 2: Supine; Post activity none  O2 Device: Room air     ACTIVITIES OF DAILY LIVING: I Mod I S SBA CGA Min Mod Max Total NT Comments   BASIC ADLs:              Bathing/ Showering [] [] [] [] [] [] [] [] [] []    Toileting [] [] [] [] [] [] [] [] [] []    Dressing [] [] [x] [] [] [] [] [] [] [] Donning/doffing gown EOB   Feeding [] [] [] [] [] [] [] [] [] []    Grooming [] [] [x] [] [] [] [] [] [] [] Combing hair and washing face EOB   Personal Device Care [] [] [] [] [] [] [] [] [] []    Functional Mobility [] [] [] [] [] [x] [] [] [] [] RW   I=Independent, Mod I=Modified Independent, S=Supervision, SBA=Standby Assistance, CGA=Contact Guard Assistance,   Min=Minimal Assistance, Mod=Moderate Assistance, Max=Maximal Assistance, Total=Total Assistance, NT=Not Tested    MOBILITY: I Mod I S SBA CGA Min Mod Max Total  NT x2 Comments:   Supine to sit [] [] [] [] [x] [] [] [] [] [] []    Sit to supine [] [] [] [] [x] [] [] [] [] [] []    Sit to stand [] [] [] [] [] [x] [] [] [] [] [] RW   Bed to chair [] [] [] [] [] [] [] [] [] [] []    I=Independent, Mod I=Modified Independent, S=Supervision, SBA=Standby Assistance, CGA=Contact Guard Assistance,   Min=Minimal Assistance, Mod=Moderate Assistance, Max=Maximal Assistance, Total=Total Assistance, NT=Not Tested    BALANCE: Good Fair+ Fair Fair- Poor NT Comments   Sitting Static [] [x] [] [] [] [] Leans to right side with kyphotic posture   Sitting Dynamic [] [x] [] [] [] [] Leans to right side with kyphotic posture             Standing Static [] [x] [] [] [] [] CGA in standing   Standing Dynamic [] [] [] [] [] [x]      PLAN:   FREQUENCY/DURATION: OT Plan of Care: 3 times/week for duration of hospital stay or until stated goals are met, whichever comes first.    TREATMENT:   TREATMENT:   ($$ Self Care/Home Management: 8-22 mins$$ Therapeutic Activity: 8-22 mins   )  Therapeutic Activity (12 Minutes): Therapeutic activity included Rolling, Supine to Sit, Sit to Supine, Scooting, Transfer Training, Sitting balance , Standing balance and bridging hips to complete bed mobility to improve functional Mobility, Strength and Activity tolerance. Self Care (12 Minutes): Self care including Upper Body Dressing, Grooming, ADL Adaptive Equipment Training and functional transfers in prep for ADLs to increase independence and decrease level of assistance required.     TREATMENT GRID:  N/A    AFTER TREATMENT POSITION/PRECAUTIONS:  Alarm Activated, Bed, Needs within reach, Restraints , RN notified and PT at bedside    INTERDISCIPLINARY COLLABORATION:  RN/PCT and OT/DHALIWAL    TOTAL TREATMENT DURATION:  OT Patient Time In/Time Out  Time In: 1402  Time Out: [facility], OT

## 2021-04-05 NOTE — PROGRESS NOTES
04/05/21 1527   NIH Stroke Scale   Interval Other (comment)   LOC 0   LOC Questions 0   LOC Commands 0   Best Gaze 1   Visual 0   Facial Palsy 1   Motor Right Arm 0   Motor Left Arm 0   Motor Right Leg 0   Motor Left Leg 0   Limb Ataxia 1   Sensory 0   Best Language 0   Dysarthria 0   Extinction and Inattention 0   Total 3

## 2021-04-05 NOTE — PROGRESS NOTES
Patient offered bed at [facility] and [facility] [place]. CM in to speak with patient who is agreeable to either. Bed at [facility] [place] will be selected as patient's [facility] is next door. CM anticipates that patient can d/c tomorrow with negative Covid test.  PCR and Rapid ordered. Rapid can be accepted at this facility. Attending notified.

## 2021-04-05 NOTE — PROGRESS NOTES
Problem: Falls - Risk of  Goal: *Absence of Falls  Description: Document Marcus Farris Fall Risk and appropriate interventions in the flowsheet. Outcome: Progressing Towards Goal  Note: Fall Risk Interventions:  Mobility Interventions: Bed/chair exit alarm, Patient to call before getting OOB    Mentation Interventions: Bed/chair exit alarm, Door open when patient unattended    Medication Interventions: Bed/chair exit alarm, Patient to call before getting OOB    Elimination Interventions: Bed/chair exit alarm, Call light in reach, Patient to call for help with toileting needs, Toileting schedule/hourly rounds    History of Falls Interventions: Bed/chair exit alarm, Door open when patient unattended         Problem: Patient Education: Go to Patient Education Activity  Goal: Patient/Family Education  Outcome: Progressing Towards Goal     Problem: Pressure Injury - Risk of  Goal: *Prevention of pressure injury  Description: Document Reza Scale and appropriate interventions in the flowsheet.   Outcome: Progressing Towards Goal  Note: Pressure Injury Interventions:       Moisture Interventions: Absorbent underpads, Apply protective barrier, creams and emollients    Activity Interventions: Increase time out of bed, PT/OT evaluation    Mobility Interventions: HOB 30 degrees or less, PT/OT evaluation, Pressure redistribution bed/mattress (bed type)    Nutrition Interventions: Document food/fluid/supplement intake    Friction and Shear Interventions: Lift sheet, Minimize layers, Foam dressings/transparent film/skin sealants                Problem: Patient Education: Go to Patient Education Activity  Goal: Patient/Family Education  Outcome: Progressing Towards Goal     Problem: TIA/CVA Stroke: Day 2 Until Discharge  Goal: Off Pathway (Use only if patient is Off Pathway)  Outcome: Progressing Towards Goal  Goal: Activity/Safety  Outcome: Progressing Towards Goal  Goal: Diagnostic Test/Procedures  Outcome: Progressing Towards Goal  Goal: Nutrition/Diet  Outcome: Progressing Towards Goal  Goal: Discharge Planning  Outcome: Progressing Towards Goal  Goal: Medications  Outcome: Progressing Towards Goal  Goal: Respiratory  Outcome: Progressing Towards Goal  Goal: Treatments/Interventions/Procedures  Outcome: Progressing Towards Goal  Goal: Psychosocial  Outcome: Progressing Towards Goal  Goal: *Verbalizes anxiety and depression are reduced or absent  Outcome: Progressing Towards Goal  Goal: *Absence of aspiration  Outcome: Progressing Towards Goal  Goal: *Absence of deep venous thrombosis signs and symptoms(Stroke Metric)  Outcome: Progressing Towards Goal  Goal: *Optimal pain control at patient's stated goal  Outcome: Progressing Towards Goal  Goal: *Tolerating diet  Outcome: Progressing Towards Goal  Goal: *Ability to perform ADLs and demonstrates progressive mobility and function  Outcome: Progressing Towards Goal  Goal: *Stroke education continued(Stroke Metric)  Outcome: Progressing Towards Goal

## 2021-04-05 NOTE — PROGRESS NOTES
CM reviewed chart for continued discharge planning. Per previous CM, patient resides in [place] and may need SNF at d/c. No referrals have been sent. PPD is completed. Per therapy's last recommendations on 4/2/2021, patient could benefit from [facility]. Referrals sent to [address] [place] [address] and [facility]. Patient is a resident at [facility] which is next to patient's [place], [facility]. Facility liaison at [facility] contacted and Covid ordered for potential need at SNF or custodial. Patient does have PT and OT assigned today and therefore, updated therapy recommendations are pending. Attending updated and daughter, Meenu Farooq, updated over phone. Daughter has no concerns about either facility.

## 2021-04-05 NOTE — PROGRESS NOTES
Neurology Daily Progress Note     Assessment:     72-year-old man with Parkinson disease, PDD, and orthostatic hypotension who has acute subdural hematomas after multiple falls. Orthostatic hypotension is likely playing a role in his falls. 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 Plan:     Continue home Parkinson medications, including droxidopa for orthostatic hypotension     If blood pressure is greater than 160 then give labetalol 10 mg given his small subdural hematomas. More strict blood pressure parameters are ill-advised in the setting of Parkinson disease and orthostatic hypotension     I am unaware of the patient's living situation. He would not be safe to live alone. No antithrombotics     We will sign off. The patient can follow up with Dr. Silke Drummond in clinic     Subjective: Interval history:    Patient confused. Hallucinating. Able to follow commands but does not answer orientation questions. In restraints        History:    Diana Nye. is a 80 y.o. male who is being seen for subdural hematoma    Unable to obtain ROS due to AMS.        Objective:     Vitals:    04/04/21 2000 04/05/21 0000 04/05/21 0400 04/05/21 0800   BP: (!) 90/49 129/77 (!) 142/82 (!) 169/80   Pulse: (!) 51 64 64 64   Resp: 18 16 18 18   Temp: 98.3 °F (36.8 °C) 97.6 °F (36.4 °C) 98 °F (36.7 °C) 97.1 °F (36.2 °C)   SpO2: 97% 98% 98% 100%   Weight:       Height:              Current Facility-Administered Medications:     levothyroxine (SYNTHROID) tablet 125 mcg, 125 mcg, Oral, ACB, Kaleb Martinez MD, 125 mcg at 04/05/21 5483    lisinopriL (PRINIVIL, ZESTRIL) tablet 10 mg, 10 mg, Oral, DAILY, Kaleb Martinez MD, 10 mg at 04/05/21 1050    labetaloL (NORMODYNE;TRANDATE) injection 10 mg, 10 mg, IntraVENous, Q6H PRN, Luis Grewal DO, 10 mg at 04/04/21 9295   droxidopa cap 300 mg (Patient Supplied), 300 mg, Oral, TID, aGyathri More MD, 300 mg at 04/05/21 1051    sodium chloride (NS) flush 5-40 mL, 5-40 mL, IntraVENous, Q8H, Adwoa Segundo MD, 5 mL at 04/05/21 0453    sodium chloride (NS) flush 5-40 mL, 5-40 mL, IntraVENous, PRN, Travis Segundo MD    acetaminophen (TYLENOL) tablet 650 mg, 650 mg, Oral, Q6H PRN, Adwoa Segundo MD, 650 mg at 04/05/21 1050    naloxone (NARCAN) injection 0.4 mg, 0.4 mg, IntraVENous, PRN, Travis Segundo MD    polyethylene glycol (MIRALAX) packet 17 g, 17 g, Oral, DAILY PRN, Travis Segundo MD    amantadine HCL (SYMMETREL) capsule 100 mg, 100 mg, Oral, BID, Travis Segundo MD, 100 mg at 04/05/21 0452    carbidopa-levodopa (SINEMET)  mg per tablet 1 Tab, 1 Tab, Oral, QID, Zulay Cohn MD, 1 Tab at 04/05/21 1050    carbidopa-levodopa ER (SINEMET CR)  mg per tablet 2 Tab, 2 Tab, Oral, QHS, Zulay Cohn MD, 2 Tab at 04/04/21 2121    escitalopram oxalate (LEXAPRO) tablet 10 mg, 10 mg, Oral, DAILY, Adwoa Segundo MD, 10 mg at 04/05/21 1050    fludrocortisone (FLORINEF) tablet 0.1 mg, 0.1 mg, Oral, Q24H, Travis Segundo MD, 0.1 mg at 04/05/21 0452    gabapentin (NEURONTIN) capsule 100 mg, 100 mg, Oral, TID, Adwoa Segundo MD, 100 mg at 04/05/21 1050    latanoprost (XALATAN) 0.005 % ophthalmic solution 1 Drop, 1 Drop, Right Eye, QHS, Travis Segundo MD, 1 Drop at 04/04/21 2127    oxybutynin (DITROPAN) tablet 5 mg, 5 mg, Oral, QHS, Adwoa Segundo MD, 5 mg at 04/04/21 2122    polyethylene glycol (MIRALAX) packet 17 g, 17 g, Oral, DAILY, Adwoa Segundo MD, 17 g at 04/05/21 1050    rivastigmine tartrate (EXELON) capsule 6 mg, 6 mg, Oral, BID WITH MEALS, Adwoa Segundo MD, 6 mg at 04/05/21 1050    No results found for this or any previous visit (from the past 12 hour(s)).       Physical Exam:  Neurological examination -  Comprehension is impaired to complex commands. Attention is poor. 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 On cranial nerve examination pupils are equal round and reactive to light. Face is symmetric and sensation is intact to light touch. Hearing is intact to finger rustle bilaterally. Motor examination - There is normal muscle tone and bulk. Power is full throughout. Muscle stretch reflexes are normoactive and there are no pathological reflexes present. Sensation is intact to light touch, pinprick, vibration and proprioception in all extremities. Subtle bilateral pill-rolling tremor.       Signed By: Deonna Urias NP     April 5, 2021

## 2021-04-05 NOTE — PROGRESS NOTES
LTG: Patient will increase receptive/expressive language skills demonstrated by the ability to communicate basic wants/needs across environments   LTG: Patient will increase neuro-linguistic abilities to increase safety and awareness in functional living environment  LTG: Patient will utilize speech intelligibility compensatory strategies to improve communication across environments   STG: Patient will fill in carrier phrase/complete automatic speech tasks with 100% intelligibility given minimal cueing  STG: Patient will repeat phrases with 85% intelligibility given minimal cueing  STG: Patient will utilize compensatory strategies across tasks with 80% given moderate cueing  STG: Patient will answer moderate level yes/no questions with 80% accuracy given minimal cueing  STG: Patient will follow 2-step commands with 80% accuracy given minimal cueing  STG: Patient will name 10 items items to concrete category given moderate cueing  STG: Patient will name object given verbal description/function with 80% accuracy given minimal cueing  STG: Patient will complete responsive naming tasks with 80% accuracy given minimal cueing  STG: Patient will recall functional information (I.e. orientation, situation) with 80% accuracy given minimal cueing   STG: Patient will provide appropriate verbal solutions to problems of daily tasks with 80% accuracy given moderate cueing  STG: patient will complete functional problem solving tasks targeting safety awareness with 80% accuracy given moderate cueing    SPEECH LANGUAGE PATHOLOGY: SPEECH-LANGUAGE/COGNITION: Initial Assessment    NAME/AGE/GENDER: Taryn Alonso is a 80 y.o. male  DATE: 4/5/2021  PRIMARY [facility]AGNOSIS: [facility] [S06.5X9A]      ICD-10: Treatment Diagnosis: F80.2 Mixed Receptive-Expressive Language Disorder  R47.1 Dysarthria and Anarthria  R41.844 Frontal Lobe and Executive Function Deficit    RECOMMENDATIONS   TREATMENT RECOMMENDATIONS:    Higher level language, dysarthria, and cognitive linguistic tx      EDUCATION:  · Recommendations discussed with Patient     Continuation of Skilled Services/Medical Necessity:   Patient is expected to demonstrate progress in cognitive ability and higher level language abilities to decrease assistance required communication, increase independence with activities of daily living and increase communication with family/caregivers.  Patient continues to require skilled intervention due to impaired higher level communication, dysarthria, and cognitive linguistic deficits . RECOMMENDATIONS for CONTINUED SPEECH THERAPY: YES: Anticipate need for ongoing speech therapy during this hospitalization and at next level of care. ASSESSMENT   Patient presents with higher level expressive and receptive language deficits, dysarthria, and cognitive linguistic deficits. Significantly low volume and reduced rate resulting in impaired intelligibility at word and phrase level and severely impaired intelligibility in unknown context. Expressive language deficits characterized by impaired verbal fluency, responsive naming, and sentence repetition. Receptive language characterized by ability to follow basic 1 step commands and answer yes/no questions, however breakdown with moderate level yes/no questions and 2 step commands. increased processing time and decreased functional problem solving and safety awareness. Recommend ongoing skilled speech therapy to improve functional communication during this hospitalization AND next level of care. REHABILITATION POTENTIAL FOR STATED GOALS: Good    PLAN    FREQUENCY/DURATION: Continue to follow patient 2 times a week for duration of hospital stay to address above goals. - Recommendations for next treatment session: Next treatment session will address speech/language/cognitive treatment    SUBJECTIVE   In bed with lapbelt.  Visual deficits significant     History of Present Injury/Illness: Mr. 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 Randellkarol Zuleta He also  has a past surgical history that includes hx colonoscopy ( & ); hx breast biopsy (Bilateral); hx thyroidectomy; hx hernia repair (Left, ); hx cataract removal (Right, ); hx other surgical (); and hx other surgical.     Problem List:  (Impairments causing functional limitations):  1. Aphasia  2. Cognitive linguistic deficits  3.  Dysarthria       Orientation:   Person  hospital   month      Pain: Pain Scale 1: Numeric (0 - 10)  Pain Intensity 1: 0    OBJECTIVE   Initial speech/language/cognitive linguistic evaluation:  1 step commands:   2 step commands: 2/4  Yes/no questions: 8/10  Phrase repetition: 3/4  Convergent namin/3  Divergent naming(concrete): 2 items in 1 minute (30 seconds passed without naming any items despite prompting)  Confrontational naming(not addressed due to visual deficits)  Responsive namin/8 (improved to  in verbal field of 2)  Automatic speech: 3/3  Speech intelligibility:moderate-severely impaired due to reduced volume and slow rate Basic problem solving (verbal): 0/2   Functional recall(orientation information-year): 0/2       Ongoing therapeutic assessment of cognitive linguistic functioning indicated given reduced problem solving and attention       Tool Used: MODIFIED RAHAT SCALE (mRS)   Score   No Symptoms  [] 0   No significant disability despite symptoms; able to carry out all usual duties and activities  [] 1   Slight disability; unable to carry out all previous activities but able to look after own affairs without assistance. [] 2   Moderate disability; requiring some help but able to walk without assistance  [x] 3   Moderately severe disability; unable to walk without assistance and unable to attend to own bodily needs without assistance  [] 4   Severe disability; bedridden, incontinent, and requiring constant nursing care and attention  [] 5      Score:  Initial: 3    Interpretation of Tool: The Modified [place] Scale is a 7-point scaled used to quantify level of disability as it relates to a patient's functional abilities. Current Medications:   No current facility-administered medications on file prior to encounter. Current Outpatient Medications on File Prior to Encounter   Medication Sig Dispense Refill    carbidopa-levodopa (SINEMET)  mg per tablet TAKE 1 TABLET BY MOUTH AT  6AM, 10AM, 2PM, AND 6PM  STRICT TIMES DUE TO  ADVANCED  Tab 3    fludrocortisone (FLORINEF) 0.1 mg tablet TAKE 1 TABLET BY MOUTH AT  6AM 90 Tab 1    melatonin 3 mg tablet 3 mg tab Q 9pm for RBD 90 Tab 3    OTHER 1) mestinon 1/2 tab Q day for 5 days and stop    2)Florinef 0.1mg 1 tab Qday (stays on 1 tab BID until northera can be increased). 1 Act 0    droxidopa (Northera) 300 mg cap 2 caps at 6am, 1 cap at 12pm, 1 cap at 5pm for 3 days then increase to 2 caps 6am, 2 caps 12pm, 1 cap 5pm for 3 days then increase to 2 caps TID. Monitor BP& HR sitting and standing in the AM and PM for 10 days and send readings to Dr. Diomedes Harris. 270 Cap 3    gabapentin (NEURONTIN) 100 mg capsule Take 1 Cap by mouth three (3) times daily. 270 Cap 3    escitalopram oxalate (LEXAPRO) 10 mg tablet Take 1 Tab by mouth daily. 90 Tab 3    oxybutynin (DITROPAN) 5 mg tablet 1 tab at 9pm (not to start until 11/10/20) 90 Tab 3    polyethylene glycol (MIRALAX) 17 gram packet Take 1 Packet by mouth daily. 90 Packet 3    rivastigmine tartrate (EXELON) 6 mg capsule Take 1 Cap by mouth two (2) times daily (with meals). 180 Cap 3    amantadine HCL (SYMMETREL) 100 mg tablet TAKE 1 TABLET BY MOUTH TWO  TIMES DAILY (Patient taking differently: TKE 1 TABLET BY MOUTH AT 6AM AND 2PM) 180 Tab 3    carbidopa-levodopa ER (SINEMET CR)  mg per tablet 1 tab at 10 pm strict time (Patient taking differently: 1 tab at 9pm strict time) 90 Tab 3    latanoprost (XALATAN) 0.005 % ophthalmic solution Administer 1 Drop to right eye nightly.  levothyroxine (SYNTHROID) 112 mcg tablet Take  by mouth Daily (before breakfast).          INTERDISCIPLINARY COLLABORATION: n/a  PRECAUTIONS/ALLERGIES: Brompheniramine maleate, Carisoprodol, Meperidine, Penicillin, Phenylephrine hcl, Zaleplon, Dimetapp cold and flu, Donepezil, Haldol [haloperidol lactate], Seroquel [quetiapine], Penicillins, and Rofecoxib     SAFETY:  After treatment position/precautions:  · reclined in bed, lap belt in place   · Call light within reach    Total Treatment Duration:     Time In: 1038  Time Out: JENI Andrew [facility], [facility] YUMIKO PRITCHETT, CCC-SLP

## 2021-04-05 NOTE — PROGRESS NOTES
Hospitalist Progress Note     Admit Date:  2021 11:00 PM   Name:  Diana Nye. Age:  80 y.o.  :  1940   MRN:  [id number]   PCP:  Nelli Burton MD  Treatment Team: Attending Provider: Nighat Tamayo MD; Consulting Provider: Jace Burton DO; Consulting Provider: Karon Montague MD; Utilization Review: Radha Lucas; Consulting Provider: Keyla Sorto, Andrey Rosen MD; Hospitalist: Luis Pathak; Charge Nurse: Tresa Trujillo Occupational Therapist: Corazon Mendoza OT; Physical Therapy Assistant: Kenneth Davis PTA  Presenting Complaint: No chief complaint on file.     Initial Admission Diagnosis: Subdural hematoma ([facility] [S06.5X9A]     Assessment and Plan:     Hospital Problems as of 2021 Date Reviewed: 2020          Codes Class Noted - Resolved POA    HTN (hypertension) ICD-10-CM: I10  ICD-9-CM: 401.9  2021 - Present Yes        Moderate protein-calorie malnutrition ([facility] ICD-10-CM: E44.0  ICD-9-CM: 263.0  2021 - Present Unknown        * (Principal) Subdural hematoma (HCC) ICD-10-CM: J84.2C9K  ICD-9-CM: 432.1  2021 - Present Yes        Orthostatic hypotension (Chronic) ICD-10-CM: I95.1  ICD-9-CM: 458.0  2021 - Present Yes        Hyponatremia ICD-10-CM: E87.1  ICD-9-CM: 276.1  2021 - Present Yes        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) ICD-10-CM: Samuel Chowdhury  ICD-9-CM: 332.0  2019 - Present Yes        Frequent falls (Chronic) ICD-10-CM: R29.6  ICD-9-CM: V15.88  2018 - Present Yes        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) ICD-10-CM: Samuel Chowdhury, F02.80  ICD-9-CM: 332.0, 294.10  2018 - Present Yes        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 ([facility] (Chronic) ICD-10-CM: G24.9, G20  ICD-9-CM: 781.3, 332.0  10/5/2017 - Present Yes        Neurologic orthostatic hypotension (HCC) (Chronic) ICD-10-CM: G90.3  ICD-9-CM: 333.0  10/20/2016 - Present Yes Parkinson disease ([facility] (Chronic) ICD-10-CM: G20  ICD-9-CM: 332.0  7/20/2016 - Present Yes        Cognitive deficits (Chronic) ICD-10-CM: R41.89  ICD-9-CM: 294.9  7/8/2016 - Present Yes              Plan:  SDH  04/05/21 - No changes to plan, pt is doing well  -no antiplatelets or AC  -repeat CT ~4/9 per neurosurgery as outpatient    HTN  04/05/21- new issue. Lisinopril started 4/4. If still elevated tomorrow, would titrate dose. Hyponatremia-   04/05/21 - No changes to plan   -stable    Malnutrition, severe protein calorie  04/05/21-pt notes he used to weigh over 150 lbs but has had decrease in weight. RD consulted, encouraged patient to drink protein shakes at meals. Parkinsons  04/05/21 - No changes to plan   -cont home meds as shown  -no neuroleptics  - appreciate neuro input    Hypothyroid  04/05/21 - TSH elevated, Synthroid increased 4/4. Will need outpatient f/u    DC planning:    -would not be safe to live alone per neurology. Would benefit from rehab and likely needs to be placed in NH side of his [facility]. -CM following  -PPD done. - Rapid COVID pending. Other listed chronic conditions stable, continue current management. Diet:  DIET CARDIAC  DVT ppx:  SCDs  Dispo: ok to transfer to [facility] tomorrow if COVID negative. Care d/w Dr. Shaquille Shankar Course: Diana Nye. is a 68 y.o. male with medical history of parkinsons disease, frequent falls and orthostatic hypotension who presented to ED with falls. He had 2 falls on 3-29 and 1 on 3-31. CT head shows small bilateral acute on chronic SDH of frontal lobes without mass effect. Admitted to ICU per protocol. Stabilized and transferred to floor on 4.4. CM consulted to work on placement at SNF    24hr Events/Subjective:   Pt denies complaints. He notes he has chronic double vision and was told he would need to see a specialist in [place] for this. He notes that he has had increased falls at his [facility].  Discussed need for possibly moving to nursing home side of facility for increased assistance to avoid future falls. No other complaints. ROS/history limited by chronic dementia  Objective:     Patient Vitals for the past 24 hrs:   Temp Pulse Resp BP SpO2   04/05/21 0800 97.1 °F (36.2 °C) 64 18 (!) 169/80 100 %   04/05/21 0400 98 °F (36.7 °C) 64 18 (!) 142/82 98 %   04/05/21 0000 97.6 °F (36.4 °C) 64 16 129/77 98 %   04/04/21 2000 98.3 °F (36.8 °C) (!) 51 18 (!) 90/49 97 %   04/04/21 1815    112/75    04/04/21 1600 98.6 °F (37 °C) 86 18 (!) 186/88 99 %     Oxygen Therapy  O2 Sat (%): 100 % (04/05/21 0800)  Pulse via Oximetry: 63 beats per minute (04/03/21 0046)  O2 Device: Room air (04/02/21 2300)    Estimated body mass index is 18.09 kg/m² as calculated from the following:    Height as of this encounter: 5' 10\" (1.778 m). Weight as of this encounter: 57.2 kg (126 lb 1.7 oz). No intake or output data in the 24 hours ending 04/05/21 1217    *Note that automatically entered I/Os may not be accurate; dependent on patient compliance with collection and accurate  by techs. General:    Well nourished. No overt distress  CV:   RRR. No edema. No JVD  Lungs:   Even,  Unlabored  Abdomen:    nondistended. Extremities: Warm and dry. No cyanosis   Skin:     No rashes. Normal coloration  Neuro:  No gross focal deficits. Data Reviewed:  I have reviewed all labs, meds, and studies from the last 24 hours. See all results below. Last 24hr Labs:  No results found for this or any previous visit (from the past 24 hour(s)).     Current Meds:  Current Facility-Administered Medications   Medication Dose Route Frequency    levothyroxine (SYNTHROID) tablet 125 mcg  125 mcg Oral ACB    lisinopriL (PRINIVIL, ZESTRIL) tablet 10 mg  10 mg Oral DAILY    labetaloL (NORMODYNE;TRANDATE) injection 10 mg  10 mg IntraVENous Q6H PRN    droxidopa cap 300 mg (Patient Supplied)  300 mg Oral TID    sodium chloride (NS) flush 5-40 mL  5-40 mL IntraVENous Q8H    sodium chloride (NS) flush 5-40 mL  5-40 mL IntraVENous PRN    acetaminophen (TYLENOL) tablet 650 mg  650 mg Oral Q6H PRN    naloxone (NARCAN) injection 0.4 mg  0.4 mg IntraVENous PRN    polyethylene glycol (MIRALAX) packet 17 g  17 g Oral DAILY PRN    amantadine HCL (SYMMETREL) capsule 100 mg  100 mg Oral BID    carbidopa-levodopa (SINEMET)  mg per tablet 1 Tab  1 Tab Oral QID    carbidopa-levodopa ER (SINEMET CR)  mg per tablet 2 Tab  2 Tab Oral QHS    escitalopram oxalate (LEXAPRO) tablet 10 mg  10 mg Oral DAILY    fludrocortisone (FLORINEF) tablet 0.1 mg  0.1 mg Oral Q24H    gabapentin (NEURONTIN) capsule 100 mg  100 mg Oral TID    latanoprost (XALATAN) 0.005 % ophthalmic solution 1 Drop  1 Drop Right Eye QHS    oxybutynin (DITROPAN) tablet 5 mg  5 mg Oral QHS    polyethylene glycol (MIRALAX) packet 17 g  17 g Oral DAILY    rivastigmine tartrate (EXELON) capsule 6 mg  6 mg Oral BID WITH MEALS       Other Studies:  No results found for this visit on 04/01/21. No results found.     All Micro Results     None          SARS-CoV-2 Lab Results  \"Novel Coronavirus\" Test: No results found for: COV2NT   \"Emergent Disease\" Test: No results found for: EDPR  \"SARS-COV-2\" Test: No results found for: XGCOVT  Rapid Test: No results found for: COVR         Signed:  CARMINE Romero

## 2021-04-05 NOTE — PROGRESS NOTES
04/05/21 0726   NIH Stroke Scale   Interval Other (comment)   LOC 0   LOC Questions 0   LOC Commands 0   Best Gaze 1   Visual 0   Facial Palsy 1   Motor Right Arm 0   Motor Left Arm 0   Motor Right Leg 0   Motor Left Leg 0   Limb Ataxia 1   Sensory 0   Best Language 0   Dysarthria 0   Extinction and Inattention 0   Total 3   Dual w/ AL, RN

## 2021-04-05 NOTE — PROGRESS NOTES
ACUTE PHYSICAL THERAPY GOALS:  (Developed with and agreed upon by patient and/or caregiver. )  LTG:  (1.)Mr. Lemos will move from supine to sit and sit to supine , scoot up and down and roll side to side in bed with INDEPENDENT within 7 treatment day(s). (2.)Mr. Lemos will transfer from bed to chair and chair to bed with CONTACT GUARD ASSIST using the least restrictive device within 7 treatment day(s). (3.)Mr. Lemos will ambulate with CONTACT GUARD ASSIST for 250+ feet with the least restrictive device within 7 treatment day(s). (4.)Mr. Lemos will perform static and dynamic standing balance activities x 10 min with CGA within 7 treatment days. PHYSICAL THERAPY: Daily Note and PM Treatment Day # 2    Demi Barbour is a 80 y.o. male   PRIMARY DIAGNOSIS: Subdural hematoma (HCC)  [facility] ([facility] [S06.5X9A]         ASSESSMENT:     REHAB RECOMMENDATIONS: CURRENT LEVEL OF FUNCTION:  (Most Recently Demonstrated)   Recommendation to date pending progress:  [address] [place] [address] vs. Short-term Rehab  Equipment:    To Be Determined Bed Mobility:   Contact Guard Assistance  Sit to Stand:   Contact Guard Assistance  Transfers:   Contact Guard Assistance  Gait/Mobility:   Minimal Assistance     ASSESSMENT:  Mr. Luciano Wen is making good progress toward goals, but continues to be limited by orthostatic BPs. Transfers and bed mobility with CGA. Amb 3' w/ RW and min A for balance safety. BP dropped following mobility, increased with feet elevated and ankle pumps in chair. Hope to see good progress with gait distances as BP stabilizes. SUBJECTIVE:   Mr. Luciano Wen states, \"I don't mind. \"    SOCIAL HISTORY/ LIVING ENVIRONMENT:   Home Environment: Assisted living  One/Two Story Residence: One story  Living Alone: Yes  Support Systems: Family member(s)  OBJECTIVE:     PAIN: VITAL SIGNS: LINES/DRAINS:   Pre Treatment: Pain Screen  Pain Scale 1: Numeric (0 - 10)  Pain Intensity 1: 0  Post Treatment: 0  Post standin/47  Post seated ex:123/62    O2 Device: Room air     MOBILITY: I Mod I S SBA CGA Min Mod Max Total  NT x2 Comments:   Bed Mobility    Rolling [] [] [] [] [x] [] [] [] [] [] []    Supine to Sit [] [] [] [] [x] [] [] [] [] [] []    Scooting [] [] [] [] [x] [] [] [] [] [] []    Sit to Supine [] [] [] [] [] [] [] [] [] [x] []    Transfers    Sit to Stand [] [] [] [] [x] [] [] [] [] [] []    Bed to Chair [] [] [] [] [x] [x] [] [] [] [] []    Stand to Sit [] [] [] [] [x] [] [] [] [] [] []    I=Independent, Mod I=Modified Independent, S=Supervision, SBA=Standby Assistance, CGA=Contact Guard Assistance,   Min=Minimal Assistance, Mod=Moderate Assistance, Max=Maximal Assistance, Total=Total Assistance, NT=Not Tested    BALANCE: Good Fair+ Fair Fair- Poor NT Comments   Sitting Static [x] [] [] [] [] []    Sitting Dynamic [] [x] [] [] [] []              Standing Static [] [x] [] [] [] []    Standing Dynamic [] [x] [] [] [] []      GAIT: I Mod I S SBA CGA Min Mod Max Total  NT x2 Comments:   Level of Assistance [] [] [] [] [x] [x] [] [] [] [] [] Cueing to increase steps   Distance 3'    DME Rolling Walker    Gait Quality Decreased step length, parkinsonian shuffle    Weightbearing  Status N/A     I=Independent, Mod I=Modified Independent, S=Supervision, SBA=Standby Assistance, CGA=Contact Guard Assistance,   Min=Minimal Assistance, Mod=Moderate Assistance, Max=Maximal Assistance, Total=Total Assistance, NT=Not Tested    PLAN:   FREQUENCY/DURATION: PT Plan of Care: 3 times/week for duration of hospital stay or until stated goals are met, whichever comes first.  TREATMENT:     TREATMENT:   ($$ Therapeutic Activity: 23-37 mins    )  Therapeutic Activity (28 Minutes): Therapeutic activity included Supine to Sit, Scooting, Lateral Scooting, Transfer Training, Ambulation on level ground, Sitting balance  and Standing balance to improve functional Mobility, Strength and Activity tolerance.     TREATMENT GRID:  N/A    AFTER TREATMENT POSITION/PRECAUTIONS:  Alarm Activated, Chair, Needs within reach, RN notified and lap belt in place    INTERDISCIPLINARY COLLABORATION:  RN/PCT and PT/PTA    TOTAL TREATMENT DURATION:  PT Patient Time In/Time Out  Time In: 1427  Time Out: [address], PTA

## 2021-04-06 VITALS
SYSTOLIC BLOOD PRESSURE: 152 MMHG | HEIGHT: 70 IN | DIASTOLIC BLOOD PRESSURE: 77 MMHG | RESPIRATION RATE: 18 BRPM | OXYGEN SATURATION: 98 % | TEMPERATURE: 98.2 F | BODY MASS INDEX: 18.05 KG/M2 | HEART RATE: 65 BPM | WEIGHT: 126.1 LBS

## 2021-04-06 LAB
COVID-19 RAPID TEST, COVR: NOT DETECTED
SOURCE, COVRS: NORMAL

## 2021-04-06 PROCEDURE — 74011250637 HC RX REV CODE- 250/637: Performed by: INTERNAL MEDICINE

## 2021-04-06 PROCEDURE — 99232 SBSQ HOSP IP/OBS MODERATE 35: CPT | Performed by: PHYSICIAN ASSISTANT

## 2021-04-06 PROCEDURE — 77030040393 HC DRSG OPTIFOAM GENT MDII -B

## 2021-04-06 RX ORDER — LISINOPRIL 10 MG/1
10 TABLET ORAL DAILY
Qty: 30 TAB | Refills: 1 | Status: SHIPPED | OUTPATIENT
Start: 2021-04-07 | End: 2021-05-07

## 2021-04-06 RX ORDER — DROXIDOPA 300 MG/1
300 CAPSULE ORAL 3 TIMES DAILY
Qty: 270 CAP | Refills: 3 | Status: SHIPPED | OUTPATIENT
Start: 2021-04-06 | End: 2021-06-05

## 2021-04-06 RX ORDER — LEVOTHYROXINE SODIUM 125 UG/1
125 TABLET ORAL
Qty: 30 TAB | Refills: 1 | Status: SHIPPED | OUTPATIENT
Start: 2021-04-06

## 2021-04-06 RX ADMIN — DROXIDOPA 300 MG: 300 CAPSULE ORAL at 09:00

## 2021-04-06 RX ADMIN — LISINOPRIL 10 MG: 5 TABLET ORAL at 09:02

## 2021-04-06 RX ADMIN — CARBIDOPA AND LEVODOPA 1 TABLET: 25; 250 TABLET ORAL at 14:19

## 2021-04-06 RX ADMIN — RIVASTIGMINE TARTRATE 6 MG: 3 CAPSULE ORAL at 09:02

## 2021-04-06 RX ADMIN — FLUDROCORTISONE ACETATE 0.1 MG: 0.1 TABLET ORAL at 04:36

## 2021-04-06 RX ADMIN — LEVOTHYROXINE SODIUM 125 MCG: 0.12 TABLET ORAL at 04:36

## 2021-04-06 RX ADMIN — POLYETHYLENE GLYCOL 3350 17 G: 17 POWDER, FOR SOLUTION ORAL at 09:03

## 2021-04-06 RX ADMIN — AMANTADINE HYDROCHLORIDE 100 MG: 100 CAPSULE ORAL at 04:36

## 2021-04-06 RX ADMIN — AMANTADINE HYDROCHLORIDE 100 MG: 100 CAPSULE ORAL at 14:19

## 2021-04-06 RX ADMIN — CARBIDOPA AND LEVODOPA 1 TABLET: 25; 250 TABLET ORAL at 04:35

## 2021-04-06 RX ADMIN — CARBIDOPA AND LEVODOPA 1 TABLET: 25; 250 TABLET ORAL at 09:02

## 2021-04-06 RX ADMIN — ESCITALOPRAM OXALATE 10 MG: 10 TABLET ORAL at 09:02

## 2021-04-06 RX ADMIN — GABAPENTIN 100 MG: 100 CAPSULE ORAL at 09:02

## 2021-04-06 NOTE — DISCHARGE SUMMARY
[address] [facility] Hospitalist Discharge Summary     Name:  Hannah Reid. Age:81 y.o. Sex:male  :  1940       MRN:  [id number]       Admitting Physician: Maurilio Davis MD   Admit Date: 2021 11:00 PM   Attending Physician: Brianne Peralta MD  Primary Care Physician: Dex Corbin MD       Discharge Physician: Jed Guthrie MD    Discharge date: 21   Discharged Condition: Stable    Indication for Admission:   No chief complaint on file.        Reasons for hospitalization:  Hospital Problems as of 2021 Date Reviewed: 2020          Codes Class Noted - Resolved POA    HTN (hypertension) ICD-10-CM: I10  ICD-9-CM: 401.9  2021 - Present Yes        Moderate protein-calorie malnutrition ([facility] ICD-10-CM: E44.0  ICD-9-CM: 263.0  2021 - Present Unknown        * (Principal) Subdural hematoma (HCC) ICD-10-CM: D02.0H9V  ICD-9-CM: 432.1  2021 - Present Yes        Orthostatic hypotension (Chronic) ICD-10-CM: I95.1  ICD-9-CM: 458.0  2021 - Present Yes        Hyponatremia ICD-10-CM: E87.1  ICD-9-CM: 276.1  2021 - Present Yes        Psychosis due to Parkinson's disease ([facility] (Chronic) ICD-10-CM: Odalis Murray  ICD-9-CM: 332.0  2019 - Present Yes        Frequent falls (Chronic) ICD-10-CM: R29.6  ICD-9-CM: V15.88  2018 - Present Yes        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) ICD-10-CM: Odalis Murray, F02.80  ICD-9-CM: 332.0, 294.10  2018 - Present Yes        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 ([facility] (Chronic) ICD-10-CM: G24.9, G20  ICD-9-CM: 781.3, 332.0  10/5/2017 - Present Yes        Neurologic orthostatic hypotension (HCC) (Chronic) ICD-10-CM: G90.3  ICD-9-CM: 333.0  10/20/2016 - Present Yes        Parkinson disease ([facility] (Chronic) ICD-10-CM: G20  ICD-9-CM: 332.0  2016 - Present Yes        Cognitive deficits (Chronic) ICD-10-CM: R41.89  ICD-9-CM: 294.9  2016 - Present Yes                 Discharge Diagnosis: 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 o. male with medical history of parkinsons disease, frequent falls and orthostatic hypotension who presented to ED with falls. He had 2 falls on 3-29 and 1 on 3-31. CT head shows small bilateral acute on chronic SDH of frontal lobes without mass effect.       Admitted to ICU per protocol. Stabilized and transferred to floor on 4.4. CM consulted to work on placement [facility] and being discharged to rehab. Assessment/Plan:  SDH  04/05/21 - No changes to plan, pt is doing well  -no antiplatelets or AC  -repeat CT ~4/9 per neurosurgery as outpatient     HTN  04/05/21- new issue. Lisinopril started 4/4. Cont on Lisinopril 10mg daily with goal SBP<160.     Hyponatremia-   04/05/21 - No changes to plan   -stable     Malnutrition, severe protein calorie  04/05/21-pt notes he used to weigh over 150 lbs but has had decrease in weight. RD consulted, encouraged patient to drink protein shakes at meals.      Parkinsons  04/05/21 - No changes to plan   -cont home meds as shown  -no neuroleptics  - appreciate neuro input     Hypothyroid  04/05/21 - TSH elevated, Synthroid increased 4/4 to 125mcg and will continue this upon discharge. Will need outpatient f/u of TSH    Consults:   IP CONSULT TO NEUROLOGY  IP CONSULT TO PHYSIATRIST(REHAB MEDICINE)  IP CONSULT TO NEUROSURGERY     Disposition: Rehab Facility  Diet:   DIET CARDIAC  DIET NUTRITIONAL SUPPLEMENTS  Code Status: DNR      Follow up labs/imaging: Needs CT head on 4/9      Current Discharge Medication List      START taking these medications    Details   lisinopriL (PRINIVIL, ZESTRIL) 10 mg tablet Take 1 Tab by mouth daily for 30 days. Qty: 30 Tab, Refills: 1         CONTINUE these medications which have CHANGED    Details   droxidopa (Northera) 300 mg cap Take 300 mg by mouth three (3) times daily.  2 caps at 6am, 1 cap at 12pm, 1 cap at 5pm for 3 days then increase to 2 caps 6am, 2 caps 12pm, 1 cap 5pm for 3 days then increase to 2 caps TID. Monitor BP& HR sitting and standing in the AM and PM for 10 days and send readings to Dr. Seema Montez. Qty: 270 Cap, Refills: 3    Associated Diagnoses: Neurologic orthostatic hypotension (HCC)      levothyroxine (SYNTHROID) 125 mcg tablet Take 1 Tab by mouth Daily (before breakfast). Qty: 30 Tab, Refills: 1         CONTINUE these medications which have NOT CHANGED    Details   carbidopa-levodopa (SINEMET)  mg per tablet TAKE 1 TABLET BY MOUTH AT  6AM, 10AM, 2PM, AND 6PM  STRICT TIMES DUE TO  ADVANCED PD  Qty: 360 Tab, Refills: 3    Comments: Requesting 1 year supply      fludrocortisone (FLORINEF) 0.1 mg tablet TAKE 1 TABLET BY MOUTH AT  6AM  Qty: 90 Tab, Refills: 1    Associated Diagnoses: Parkinson's disease (HCC)      melatonin 3 mg tablet 3 mg tab Q 9pm for RBD  Qty: 90 Tab, Refills: 3      OTHER 1) mestinon 1/2 tab Q day for 5 days and stop    2)Florinef 0.1mg 1 tab Qday (stays on 1 tab BID until northera can be increased). Qty: 1 Act, Refills: 0      gabapentin (NEURONTIN) 100 mg capsule Take 1 Cap by mouth three (3) times daily. Qty: 270 Cap, Refills: 3      escitalopram oxalate (LEXAPRO) 10 mg tablet Take 1 Tab by mouth daily. Qty: 90 Tab, Refills: 3      oxybutynin (DITROPAN) 5 mg tablet 1 tab at 9pm (not to start until 11/10/20)  Qty: 90 Tab, Refills: 3      polyethylene glycol (MIRALAX) 17 gram packet Take 1 Packet by mouth daily. Qty: 90 Packet, Refills: 3      rivastigmine tartrate (EXELON) 6 mg capsule Take 1 Cap by mouth two (2) times daily (with meals).   Qty: 180 Cap, Refills: 3    Associated Diagnoses: Parkinson's disease ([address]      amantadine HCL (SYMMETREL) 100 mg tablet TAKE 1 TABLET BY MOUTH TWO  TIMES DAILY  Qty: 180 Tab, Refills: 3      carbidopa-levodopa ER (SINEMET CR)  mg per tablet 1 tab at 10 pm strict time  Qty: 90 Tab, Refills: 3      latanoprost (XALATAN) 0.005 % ophthalmic solution Administer 1 Drop to right eye nightly. Medications Discontinued During This Encounter   Medication Reason    droxidopa cap 600 mg (Patient Supplied)     levothyroxine (SYNTHROID) tablet 112 mcg     levothyroxine (SYNTHROID) 112 mcg tablet REORDER    droxidopa (Northera) 300 mg cap REORDER         Follow Up Orders: Follow-up Appointments   Procedures    FOLLOW UP VISIT Appointment in: One Week     Standing Status:   Standing     Number of Occurrences:   1     Order Specific Question:   Appointment in     Answer: One Week         Follow-up Information     Follow up With Specialties Details Why Contact Info    [facility] [address] Louanna Dubin) [address] [address] [address] [address]    Will Pink MD Internal Medicine In 2 weeks  [address] [place] [address] Dr  [address] [facility] [address] [phone]      Galina Bennett MD Neurology In 1 month  [address] [address]  [phone]              Discharge Exam:    Patient Vitals for the past 24 hrs:   Temp Pulse Resp BP SpO2   04/06/21 1112 98.2 °F (36.8 °C) 90 18 130/88 98 %   04/06/21 0723 97.6 °F (36.4 °C) 73 18 127/82 98 %   04/06/21 0400 97.7 °F (36.5 °C) (!) 57 16 (!) 156/74 100 %   04/06/21 0000 97.2 °F (36.2 °C) 71 18 116/67 98 %   04/05/21 2000 97.4 °F (36.3 °C) 92 18 114/74 99 %   04/05/21 1600 97.8 °F (36.6 °C) 62 18 125/75 100 %   04/05/21 1402    (!) 102/59    04/05/21 1200 97.3 °F (36.3 °C) 65 18 (!) 162/64 100 %     Oxygen Therapy  O2 Sat (%): 98 % (04/06/21 1112)  Pulse via Oximetry: 63 beats per minute (04/03/21 0046)  O2 Device: Room air (04/02/21 2300)    Estimated body mass index is 18.09 kg/m² as calculated from the following:    Height as of this encounter: 5' 10\" (1.778 m). Weight as of 4/2/21: 57.2 kg (126 lb 1.7 oz).       Intake/Output Summary (Last 24 hours) at 4/6/2021 1115  Last data filed at 4/5/2021 1200  Gross per 24 hour   Intake 240 ml   Output    Net 240 ml       *Note that automatically entered I/Os may not be accurate; dependent on patient compliance with collection and accurate  by assistants. Physical Exam:   General:  No acute distress, speaking in full sentences, no use of accessory muscles   HEENT:  Pupils equal and reactive to light and accommodation, oropharynx is clear   Neck:   Supple, no lymphadenopathy, no JVD   Lungs:  Clear to auscultation bilaterally   CV:  Regular rate and rhythm with normal S1 and S2   Abdomen: Soft, nontender, nondistended, normoactive bowel sounds   Extremities:  No cyanosis clubbing or edema   Neuro:  Nonfocal, A&O x3. Normal strength and sensation  Psych:  Normal affect       All Labs from Last 24 Hrs:  No results found for this or any previous visit (from the past 24 hour(s)). All Micro Results     Procedure Component Value Units Date/Time    COVID-19 RAPID TEST [[id number]]     Order Status: Sent           SARS-CoV-2 Lab Results  \"Novel Coronavirus\" Test: No results found for: COV2NT   \"Emergent Disease\" Test: No results found for: EDPR  \"SARS-COV-2\" Test: No results found for: XGCOVT  Rapid Test:   No results found for: COVR         Diagnostic Imaging/Tests:   Xr Hip Rt W Or Wo Pelv 2-3 Vws    Result Date: 4/1/2021  No evidence of right hip fracture     Mri Brain Wo Cont    Result Date: 4/2/2021  Bilateral subacute frontal subdural hematomas without significant mass effect in the brain. Chronic hemorrhage in left posterior inferior temporal lobe. Bilateral mastoiditis. Date Of Dictation: 4/2/2021 7:12 PM This report will be available to the patient on the [facility] patient portal 36 hours after dictation. Ct Head Wo Cont    Result Date: 4/1/2021  1. Small bilateral acute on chronic subdural hematomas about the frontal lobes without significant mass effect at this time.  This report was made using voice transcription. Despite my best efforts to avoid any, transcription errors may persist. If there is any question about the accuracy of the report or need for clarification, then please call [phone], or text me through perfectserv for clarification or correction. The critical finding of acute on chronic subdural hematomas was discussed with Dr. Veronica Go by myself personally at 4:35 PM on 4/1/2021. Xr Chest Port    Result Date: 4/1/2021  No acute findings in the chest     Ct Code Neuro Head Wo Contrast    Result Date: 4/2/2021  1. Small bilateral frontal lobes acute subdural hemorrhage. No significant mass effect. No midline shift. 2. No CT evidence of acute territorial infarction. 3. No significant change compared to CT from yesterday. Echocardiogram/EKG results:  No results found for this visit on 04/01/21. EKG Results     Procedure 720 Value Units Date/Time    EKG, 12 LEAD, INITIAL [[id number]] Collected: 04/02/21 0239    Order Status: Completed Updated: 04/02/21 0641     Ventricular Rate 92 BPM      Atrial Rate 92 BPM      P-R Interval 168 ms      QRS Duration 96 ms      Q-T Interval 390 ms      QTC Calculation (Bezet) 482 ms      Calculated P Axis 52 degrees      Calculated R Axis -73 degrees      Calculated T Axis 64 degrees      Diagnosis --     Sinus rhythm with Premature supraventricular complexes  Left anterior fascicular block  Prolonged QT  Abnormal ECG  When compared with ECG of 14-AUG-2019 19:38,  Premature supraventricular complexes are now Present  Vent.  rate has increased BY  34 BPM  Criteria for Septal infarct are no longer Present  QT has lengthened  Confirmed by [facility] [place]  MD (), Tricia Buckner ([id number]) on 4/2/2021 6:41:27 AM            Results for orders placed or performed during the hospital encounter of 04/01/21   EKG, 12 LEAD, INITIAL   Result Value Ref Range    Ventricular Rate 92 BPM    Atrial Rate 92 BPM    P-R Interval 168 ms    QRS Duration 96 ms    Q-T Interval 390 ms    QTC Calculation (Bezet) 482 ms    Calculated P Axis 52 degrees    Calculated R Axis -73 degrees    Calculated T Axis 64 degrees    Diagnosis       Sinus rhythm with Premature supraventricular complexes  Left anterior fascicular block  Prolonged QT  Abnormal ECG  When compared with ECG of 14-AUG-2019 19:38,  Premature supraventricular complexes are now Present  Vent. rate has increased BY  34 BPM  Criteria for Septal infarct are no longer Present  QT has lengthened  Confirmed by [facility]  MD (), Nurys Agarwal ([id number]) on 4/2/2021 6:41:27 AM         Procedures done this admission:  * No surgery found *        Time spent in patient discharge planning and coordination 35 minutes.       Signed By: Sunita Avelar MD   [facility] Hospitalist Service    April 6, 2021  5:07 PM      .

## 2021-04-06 NOTE — ROUTINE PROCESS
TRANSFER - OUT REPORT: 
 
Verbal report given to Jodi Fajardo on Kamillamirelayamilet Negroer.  being transferred to [facility] for routine progression of care Report consisted of patients Situation, Background, Assessment and  
Recommendations(SBAR). Information from the following report(s) SBAR was reviewed with the receiving nurse. Opportunity for questions and clarification was provided.

## 2021-04-06 NOTE — PROGRESS NOTES
Problem: Patient Education: Go to Patient Education Activity  Goal: Patient/Family Education  Outcome: Progressing Towards Goal     Problem: Patient Education: Go to Patient Education Activity  Goal: Patient/Family Education  Outcome: Progressing Towards Goal     Problem: Patient Education: Go to Patient Education Activity  Goal: Patient/Family Education  Outcome: Progressing Towards Goal     Problem: TIA/CVA Stroke: 0-24 hours  Goal: Off Pathway (Use only if patient is Off Pathway)  Outcome: Progressing Towards Goal  Goal: Activity/Safety  Outcome: Progressing Towards Goal  Goal: Consults, if ordered  Outcome: Progressing Towards Goal  Goal: Diagnostic Test/Procedures  Outcome: Progressing Towards Goal  Goal: Nutrition/Diet  Outcome: Progressing Towards Goal  Goal: Discharge Planning  Outcome: Progressing Towards Goal  Goal: Medications  Outcome: Progressing Towards Goal  Goal: Respiratory  Outcome: Progressing Towards Goal  Goal: Treatments/Interventions/Procedures  Outcome: Progressing Towards Goal  Goal: Minimize risk of bleeding post-thrombolytic infusion  Outcome: Progressing Towards Goal  Goal: Monitor for complications post-thrombolytic infusion  Outcome: Progressing Towards Goal  Goal: Psychosocial  Outcome: Progressing Towards Goal  Goal: *Hemodynamically stable  Outcome: Progressing Towards Goal  Goal: *Neurologically stable  Description: Absence of additional neurological deficits    Outcome: Progressing Towards Goal  Goal: *Verbalizes anxiety and depression are reduced or absent  Outcome: Progressing Towards Goal  Goal: *Absence of Signs of Aspiration on Current Diet  Outcome: Progressing Towards Goal  Goal: *Absence of deep venous thrombosis signs and symptoms(Stroke Metric)  Outcome: Progressing Towards Goal  Goal: *Ability to perform ADLs and demonstrates progressive mobility and function  Outcome: Progressing Towards Goal  Goal: *Stroke education started(Stroke Metric)  Outcome: Progressing Towards Goal  Goal: *Dysphagia screen performed(Stroke Metric)  Outcome: Progressing Towards Goal  Goal: *Rehab consulted(Stroke Metric)  Outcome: Progressing Towards Goal

## 2021-04-06 NOTE — PROGRESS NOTES
Physician Progress Note      PATIENT:               Jackie Mckenna  CSN #:                  [id number]  :                       1940  ADMIT DATE:       2021 11:00 PM  [address] [place] DATE:  RESPONDING  PROVIDER #:        Jacey England MD          QUERY TEXT:    Patient admitted with SDH . Noted documentation of severe protein malnutrition per progress note and moderate protein malnutrition as per RD consult note and progress note. If possible, please document in progress notes and discharge summary if you are evaluating and /or treating any of the following: The medical record reflects the following:  Risk Factors: hx Parkinson, poor intake  Clinical Indicators: BMI 18.09. Per RD consult, moderate malnutrition. Body Fat Loss, mild body fat loss, triceps, fat overlying ribs. Muscle Mass Loss, Mild, muscle mass loss, hand, clavicles. Treatment: RD consult. Thank You. Rosezena Meckel, RN [facility]  [phone]  Options provided:  -- Moderate protein malnutrition confirmed and severe protein malnutrition ruled out  -- Severe protein malnutrition confirmed and moderate protein malnutrition ruled out  -- Other - I will add my own diagnosis  -- Disagree - Not applicable / Not valid  -- Disagree - Clinically unable to determine / Unknown  -- Refer to Clinical Documentation Reviewer    PROVIDER RESPONSE TEXT:    After study, moderate protein malnutrition confirmed and severe protein malnutrition ruled out.     Query created by: Nicki Longo on 2021 8:36 AM      Electronically signed by:  Jacey England MD 2021 10:36 AM

## 2021-04-06 NOTE — PROGRESS NOTES
Anderson Blake MD  Medical Director  [address] [place] [address], [address] [facility]  Tel: [phone]       Physical Medicine & Rehab Consult Progress Note      Patient: Diana Nye. Admit Date: 4/1/2021  Admit Diagnosis: Subdural hematoma ([address] [S06.5X9A]    Recommendations:   Continue acute rehabilitation program  Coordination of rehab / medical care  Counseling of PM&R care issues management    Patient at high risk of falls with orthostatic hypotension. We recommend skilled nursing facility after hospital discharge. History / Subjective / Complaint:     Patient seen and examined, interim EMR reviewed. Patient states he does not feel he is capable of therapy at level of intensity as provided at [facility]. Has made slow progress here due to [facility]; low premorbid level of activity and assistance with ADLs. Allergies   Allergen Reactions    Brompheniramine Maleate Unknown (comments)     Pt does not recall med.  Carisoprodol Unknown (comments)     Other reaction(s): Rash-A, Unknown-A  Pt does not recall med.  Meperidine Unknown (comments)     Pt does not recall med.  Penicillin Rash    Phenylephrine Hcl Unknown (comments)     Pt does not recall med.  Zaleplon Unknown (comments)     Other reaction(s): Rash-A, Unknown-A  Pt does not recall med.      Dimetapp Cold And Flu Rash    Donepezil Other (comments)     Altered mental state      Haldol [Haloperidol Lactate] Other (comments)     Avoid in PD    Seroquel [Quetiapine] Other (comments)     Avoid in PD    Penicillins Rash    Rofecoxib Rash     Current Facility-Administered Medications   Medication Dose Route Frequency    levothyroxine (SYNTHROID) tablet 125 mcg  125 mcg Oral ACB    lisinopriL (PRINIVIL, ZESTRIL) tablet 10 mg  10 mg Oral DAILY    labetaloL (NORMODYNE;TRANDATE) injection 10 mg  10 mg IntraVENous Q6H PRN    droxidopa cap 300 mg (Patient Supplied)  300 mg Oral TID    sodium chloride (NS) flush 5-40 mL  5-40 mL IntraVENous Q8H    sodium chloride (NS) flush 5-40 mL  5-40 mL IntraVENous PRN    acetaminophen (TYLENOL) tablet 650 mg  650 mg Oral Q6H PRN    naloxone (NARCAN) injection 0.4 mg  0.4 mg IntraVENous PRN    polyethylene glycol (MIRALAX) packet 17 g  17 g Oral DAILY PRN    amantadine HCL (SYMMETREL) capsule 100 mg  100 mg Oral BID    carbidopa-levodopa (SINEMET)  mg per tablet 1 Tab  1 Tab Oral QID    carbidopa-levodopa ER (SINEMET CR)  mg per tablet 2 Tab  2 Tab Oral QHS    escitalopram oxalate (LEXAPRO) tablet 10 mg  10 mg Oral DAILY    fludrocortisone (FLORINEF) tablet 0.1 mg  0.1 mg Oral Q24H    gabapentin (NEURONTIN) capsule 100 mg  100 mg Oral TID    latanoprost (XALATAN) 0.005 % ophthalmic solution 1 Drop  1 Drop Right Eye QHS    oxybutynin (DITROPAN) tablet 5 mg  5 mg Oral QHS    polyethylene glycol (MIRALAX) packet 17 g  17 g Oral DAILY    rivastigmine tartrate (EXELON) capsule 6 mg  6 mg Oral BID WITH MEALS       Objective:     Vitals:  Patient Vitals for the past 8 hrs:   BP Temp Pulse Resp SpO2   04/06/21 1200 130/88 98.2 °F (36.8 °C) 90 18 99 %   04/06/21 0800 127/82 97.6 °F (36.4 °C) 73 18 98 %      Intake and Output:  04/04 1901 - 04/06 0700  In: 240 [P.O.:240]  Out: -     Physical Exam:  No significant changes    Incision(s)/Wound(s): Wound Spine Mid;Posterior Abrasion from fall 04/01/21 (Active)   Dressing Status Clean;Dry; Intact 04/06/21 0859   Dressing/Treatment Foam 04/04/21 1926   Wound Assessment Pink/red 04/02/21 1216   Drainage Amount None 04/02/21 1216   Number of days: 5       Wound Elbow Right;Posterior Skin Tear from fall 04/01/21 (Active)   Dressing Status Intact 04/05/21 2000   Dressing/Treatment Open to air 04/06/21 0859   Wound Assessment Pink/red 04/02/21 1216   Drainage Amount None 04/02/21 1216   Number of days: 5        Pain 1  Pain Scale 1: Numeric (0 - 10) (04/05/21 2002)  Pain Intensity 1: 0 (04/05/21 2002)  Patient Stated Pain Goal: 0 (04/05/21 2002)     Labs/Studies:  Recent Results (from the past 72 hour(s))   PLEASE READ & DOCUMENT PPD TEST IN 48 HRS    Collection Time: 04/03/21 11:05 PM   Result Value Ref Range    PPD Negative Negative    mm Induration 0 0 - 5 mm   METABOLIC PANEL, BASIC    Collection Time: 04/04/21  5:56 AM   Result Value Ref Range    Sodium 135 (L) 136 - 145 mmol/L    Potassium 3.8 3.5 - 5.1 mmol/L    Chloride 101 98 - 107 mmol/L    CO2 28 21 - 32 mmol/L    Anion gap 6 (L) 7 - 16 mmol/L    Glucose 93 65 - 100 mg/dL    BUN 16 8 - 23 MG/DL    Creatinine 0.82 0.8 - 1.5 MG/DL    GFR est AA >60 >60 ml/min/1.73m2    GFR est non-AA >60 >60 ml/min/1.73m2    Calcium 9.5 8.3 - 10.4 MG/DL   TSH 3RD GENERATION    Collection Time: 04/04/21  5:56 AM   Result Value Ref Range    TSH 6.150 (H) 0.358 - 3.740 uIU/mL   COVID-19 RAPID TEST    Collection Time: 04/05/21 11:31 AM   Result Value Ref Range    Specimen source NASAL      COVID-19 rapid test Not detected NOTD          Assessment:     Principal Problem:    Subdural hematoma ([address] (4/1/2021)    Active Problems:    Cognitive deficits (7/8/2016)      Parkinson disease ([address] (7/20/2016)      Neurologic orthostatic hypotension (HCC) (10/20/2016)      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 ([address] (10/5/2017)      Frequent falls (8/20/2018)      Dementia due to Parkinson's disease without behavioral disturbance ([address] (8/20/2018)      Psychosis due to Parkinson's disease ([address] (8/22/2019)      Orthostatic hypotension (4/1/2021)      Hyponatremia (4/1/2021)      HTN (hypertension) (4/4/2021)      Moderate protein-calorie malnutrition ([address] (4/4/2021)      Plan / Recommendations / Medical Decision Making:     Recommendations:   Continue acute rehabilitation program  Coordination of rehab / medical care  Counseling of PM&R care issues management  Monitoring and management of medical conditions per plan of care / orders    Discussion with Family / Caregiver / Staff    Reviewed Therapies / Vickie Tirso / Medications / Records      Chayo Wyatt PA-C  Physician Assistant with [facility]  Milena Liu MD, Medical Director

## 2021-04-06 NOTE — PROGRESS NOTES
04/06/21 0707   NIH Stroke Scale   Interval Other (comment)   LOC 0   LOC Questions 0   LOC Commands 0   Best Gaze 1   Visual 0   Facial Palsy 1   Motor Right Arm 0   Motor Left Arm 0   Motor Right Leg 0   Motor Left Leg 0   Limb Ataxia 1   Sensory 0   Best Language 0   Dysarthria 0   Extinction and Inattention 0   Total 3   Dual w/ Edwardo Tapia RN

## 2021-04-06 NOTE — PROGRESS NOTES
Patient is medically ready for discharge. Rapid Covid pending. Patient can discharge to room 322A at 1530 and report will be called to [phone]. Call placed to Melisa lawton, with update. Care Management Interventions  PCP Verified by CM:  Yes  Mode of Transport at Discharge: [address]  [address] Time of Discharge: [address] (CM Consult): SNF  Discharge Durable Medical Equipment: (has access to walker, cane, w/c)  Current Support Network: Assisted Living  Confirm Follow Up Transport: Family  The Plan for Transition of Care is Related to the Following Treatment Goals : Patient will participate in SNF therapies in order to return to baseline independence in ADLs  The Patient and/or Patient Representative was Provided with a Choice of Provider and Agrees with the Discharge Plan?: Yes  Name of the Patient Representative Who was Provided with a Choice of Provider and Agrees with the Discharge Plan: daughter, Ammie Baumgarten of Choice List was Provided with Basic Dialogue that Supports the Patient's Individualized Plan of Care/Goals, Treatment Preferences and Shares the Quality Data Associated with the Providers?: Yes  [employer] Information Provided?: (MCR/supplemental)  Discharge Location  Discharge Placement: Skilled nursing facility

## 2021-04-07 LAB
SARS-COV-2, COV2: NOT DETECTED
SPECIMEN SOURCE, FCOV2M: NORMAL

## 2021-04-09 ENCOUNTER — HOSPITAL ENCOUNTER (OUTPATIENT)
Dept: CT IMAGING | Age: 81
Discharge: HOME | End: 2021-04-09
Attending: STUDENT IN AN ORGANIZED HEALTH CARE EDUCATION/TRAINING PROGRAM
Payer: MEDICARE

## 2021-04-09 ENCOUNTER — HOSPITAL ENCOUNTER (OUTPATIENT)
Dept: GENERAL RADIOLOGY | Age: 81
End: 2021-04-09
Payer: MEDICARE

## 2021-04-09 PROCEDURE — 70450 CT HEAD/BRAIN W/O DYE: CPT

## 2021-04-13 NOTE — ED TRIAGE NOTES
Patient arrives via EMS from home with EMS. Patient last seen this AM at 0800 and family got to patient's house around [id number] and patient was laying outside in a bush. Patient keeps journal of medications that he takes and per family patient did not take noon medications. Patient states last thing he remembers is eating a ham sandwich. Patient has had 11 falls in the past 3 months. Patient was hallucinating in the ambulance and trying to grab things in the air. Patient last /80. BGL 78. HR 63. Per EMS A&O x2. Patient has history of parkinsons. Patient given 250 ml of LR with EMS in route.
Abdominal Pain, N/V/D

## 2021-04-24 ENCOUNTER — APPOINTMENT (OUTPATIENT)
Dept: GENERAL RADIOLOGY | Age: 81
End: 2021-04-24
Attending: EMERGENCY MEDICINE
Payer: MEDICARE

## 2021-04-24 ENCOUNTER — HOSPITAL ENCOUNTER (EMERGENCY)
Age: 81
Discharge: HOME | End: 2021-04-24
Attending: EMERGENCY MEDICINE
Payer: MEDICARE

## 2021-04-24 ENCOUNTER — APPOINTMENT (OUTPATIENT)
Dept: CT IMAGING | Age: 81
End: 2021-04-24
Attending: EMERGENCY MEDICINE
Payer: MEDICARE

## 2021-04-24 VITALS
TEMPERATURE: 98.2 F | WEIGHT: 126 LBS | OXYGEN SATURATION: 99 % | SYSTOLIC BLOOD PRESSURE: 169 MMHG | HEIGHT: 70 IN | RESPIRATION RATE: 16 BRPM | BODY MASS INDEX: 18.04 KG/M2 | HEART RATE: 72 BPM | DIASTOLIC BLOOD PRESSURE: 88 MMHG

## 2021-04-24 DIAGNOSIS — T07.XXXA MULTIPLE CONTUSIONS: ICD-10-CM
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
## 2021-04-24 LAB
ALBUMIN SERPL-MCNC: 3.8 G/DL (ref 3.2–4.6)
ALBUMIN/GLOB SERPL: 1 {RATIO} (ref 1.2–3.5)
ALP SERPL-CCNC: 145 U/L (ref 50–136)
ALT SERPL-CCNC: 10 U/L (ref 12–65)
ANION GAP SERPL CALC-SCNC: 4 MMOL/L (ref 7–16)
AST SERPL-CCNC: 10 U/L (ref 15–37)
BILIRUB SERPL-MCNC: 0.3 MG/DL (ref 0.2–1.1)
BUN SERPL-MCNC: 20 MG/DL (ref 8–23)
CALCIUM SERPL-MCNC: 9.1 MG/DL (ref 8.3–10.4)
CHLORIDE SERPL-SCNC: 104 MMOL/L (ref 98–107)
CO2 SERPL-SCNC: 31 MMOL/L (ref 21–32)
CREAT SERPL-MCNC: 1.06 MG/DL (ref 0.8–1.5)
ERYTHROCYTE [DISTWIDTH] IN BLOOD BY AUTOMATED COUNT: 12.4 % (ref 11.9–14.6)
GLOBULIN SER CALC-MCNC: 3.7 G/DL (ref 2.3–3.5)
GLUCOSE SERPL-MCNC: 74 MG/DL (ref 65–100)
HCT VFR BLD AUTO: 40.9 % (ref 41.1–50.3)
HGB BLD-MCNC: 13.3 G/DL (ref 13.6–17.2)
MAGNESIUM SERPL-MCNC: 2.3 MG/DL (ref 1.8–2.4)
MCH RBC QN AUTO: 32.7 PG (ref 26.1–32.9)
MCHC RBC AUTO-ENTMCNC: 32.5 G/DL (ref 31.4–35)
MCV RBC AUTO: 100.5 FL (ref 79.6–97.8)
NRBC # BLD: 0 K/UL (ref 0–0.2)
PHOSPHATE SERPL-MCNC: 3.9 MG/DL (ref 2.3–3.7)
PLATELET # BLD AUTO: 235 K/UL (ref 150–450)
PMV BLD AUTO: 8.9 FL (ref 9.4–12.3)
POTASSIUM SERPL-SCNC: 3.9 MMOL/L (ref 3.5–5.1)
PROT SERPL-MCNC: 7.5 G/DL (ref 6.3–8.2)
RBC # BLD AUTO: 4.07 M/UL (ref 4.23–5.6)
SODIUM SERPL-SCNC: 139 MMOL/L (ref 136–145)
WBC # BLD AUTO: 6.6 K/UL (ref 4.3–11.1)

## 2021-04-24 PROCEDURE — 80053 COMPREHEN METABOLIC PANEL: CPT

## 2021-04-24 PROCEDURE — 70450 CT HEAD/BRAIN W/O DYE: CPT

## 2021-04-24 PROCEDURE — 84100 ASSAY OF PHOSPHORUS: CPT

## 2021-04-24 PROCEDURE — 83735 ASSAY OF MAGNESIUM: CPT

## 2021-04-24 PROCEDURE — 71046 X-RAY EXAM CHEST 2 VIEWS: CPT

## 2021-04-24 PROCEDURE — 93005 ELECTROCARDIOGRAM TRACING: CPT

## 2021-04-24 PROCEDURE — 99285 EMERGENCY DEPT VISIT HI MDM: CPT

## 2021-04-24 PROCEDURE — 85027 COMPLETE CBC AUTOMATED: CPT

## 2021-04-24 PROCEDURE — 74011250637 HC RX REV CODE- 250/637: Performed by: EMERGENCY MEDICINE

## 2021-04-24 RX ORDER — CARBIDOPA AND LEVODOPA 25; 250 MG/1; MG/1
1 TABLET ORAL 3 TIMES DAILY
Status: DISCONTINUED | OUTPATIENT
Start: 2021-04-24 | End: 2021-04-24 | Stop reason: HOSPADM

## 2021-04-24 RX ADMIN — CARBIDOPA AND LEVODOPA 1 TABLET: 25; 250 TABLET ORAL at 18:56

## 2021-04-24 NOTE — ED PROVIDER NOTES
81-year-old male presents from his care facility after an unwitnessed fall and some shaking activity. Patient states that he was walking from the bathroom when he turned his legs got weak and he fell. He does not have any real specific complaints from the fall. EMS reports that the nursing staff found him on the floor with some \"shaking. \"  No reported incontinence or postictal.    Patient denies any recent illnesses. He states she is not had any recent medication changes    Records indicate that the patient was hospitalized at the beginning of this month with subdural hematomas. The history is provided by the patient, the EMS personnel and the nursing home. Fall  The accident occurred 1 to 2 hours ago. The fall occurred while walking. He fell from a height of ground level. There was no blood loss. The point of impact was the head. The pain is present in the head. The pain is mild. He was not ambulatory at the scene. There was no entrapment after the fall. There was no drug use involved in the accident. There was no alcohol use involved in the accident. Associated symptoms include extremity weakness. Pertinent negatives include no fever, no numbness, no abdominal pain, no bowel incontinence, no nausea, no vomiting, no headaches and no laceration. The risk factors include being elderly and dementia (Parkinson's disease). The symptoms are aggravated by activity and standing. He has tried nothing for the symptoms.         Past Medical History:   Diagnosis Date    AAA (abdominal aortic aneurysm) ([facility] [address] 7/8/2016    pt is not aware of this-     Pak's esophagus with esophagitis 7/8/2016    no meds- not daily    Cerebrovascular accident (CVA) ([facility] [address] 7/8/2016    They thought I did but they didn't find anything on the tests \"I blacked out\" no admission to the hospital    Chronic fatigue syndrome 2/26/2016    Cognitive deficits 7/8/2016    Colon polyps 2007    Elevated PSA     Enthesopathy of ankle and tarsus 5/29/2014    GERD (gastroesophageal reflux disease) 7/8/2016    Hashimoto's thyroiditis 7/8/2016    History of nuclear stress test 1997, 5/2005    Hypotension 8/25/2015    Impaired cognition 7/8/2016    Inguinal hernia 7/8/2016    Insomnia 7/8/2016    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         Past Surgical History:   Procedure Laterality Date    HX BREAST BIOPSY Bilateral     sterotactic    HX CATARACT REMOVAL Right 2012    HX COLONOSCOPY  2000 & 2005    HX HERNIA REPAIR Left 2011    HX OTHER SURGICAL  2006    esophagogastroduodenoscopy    HX OTHER SURGICAL      deep leg/ankle tumor excision benign leg dumor    HX THYROIDECTOMY      total         Family History:   Problem Relation Age of Onset    Cancer Mother         Pancreas CA    Cancer Father         Brain CA    Heart Disease Father     Heart Failure Father     No Known Problems Sister     Heart Attack Brother         MI at 52 y.o.    Diabetes Maternal Uncle     Diabetes Paternal Uncle     Arthritis-osteo Sister     Thyroid Disease Neg Hx        Social History     Socioeconomic History    Marital status:      Spouse name: Not on file    Number of children: Not on file    Years of education: Not on file    Highest education level: Not on file   Occupational History    Not on file   Social Needs    Financial resource strain: Not on file    Food insecurity     Worry: Not on file     Inability: Not on file    Transportation needs     Medical: Not on file     Non-medical: Not on file   Tobacco Use    Smoking status: Never Smoker    Smokeless tobacco: Never Used   Substance and Sexual Activity    Alcohol use: No    Drug use: No    Sexual activity: Not Currently   Lifestyle    Physical activity     Days per week: Not on file     Minutes per session: Not on file    Stress: Not on file   Relationships    Social connections     Talks on phone: Not on file     Gets together: Not on file     Attends [employer] service: Not on file     Active member of club or organization: Not on file     Attends meetings of clubs or organizations: Not on file     Relationship status: Not on file    Intimate partner violence     Fear of current or ex partner: Not on file     Emotionally abused: Not on file     Physically abused: Not on file     Forced sexual activity: Not on file   Other Topics Concern    Not on file   Social History Narrative    Not on file         ALLERGIES: Brompheniramine maleate, Carisoprodol, Meperidine, Penicillin, Phenylephrine hcl, Zaleplon, Dimetapp cold and flu, Donepezil, Haldol [haloperidol lactate], Seroquel [quetiapine], Penicillins, and Rofecoxib    Review of Systems   Constitutional: Negative for activity change, chills, diaphoresis and fever. HENT: Negative for dental problem, hearing loss, nosebleeds, rhinorrhea and sore throat. Eyes: Negative for pain, discharge, redness and visual disturbance. Respiratory: Negative for cough, chest tightness and shortness of breath. Cardiovascular: Negative for chest pain, palpitations and leg swelling. Gastrointestinal: Negative for abdominal pain, bowel incontinence, constipation, diarrhea, nausea and vomiting. Endocrine: Negative for cold intolerance, heat intolerance, polydipsia and polyuria. Genitourinary: Negative for dysuria and flank pain. Musculoskeletal: Positive for arthralgias and extremity weakness. Negative for back pain, joint swelling, myalgias and neck pain.    Skin: Negative for pallor and rash. Allergic/Immunologic: Negative for environmental allergies and food allergies. Neurological: Positive for tremors. Negative for dizziness, light-headedness, numbness and headaches. Hematological: Negative for adenopathy. Does not bruise/bleed easily. Psychiatric/Behavioral: Negative for confusion and dysphoric mood. The patient is not nervous/anxious and is not hyperactive. All other systems reviewed and are negative. Vitals:    04/24/21 1717 04/24/21 1720 04/24/21 1721   BP: (!) 158/85 (!) 158/85    Pulse: 99     Resp: 16     Temp: 98.2 °F (36.8 °C)     SpO2: 100%  100%   Weight: 57.2 kg (126 lb)     Height: 5' 10\" (1.778 m)              Physical Exam  Vitals signs and nursing note reviewed. Constitutional:       General: He is in acute distress. Appearance: Normal appearance. He is well-developed and normal weight. HENT:      Head: Normocephalic and atraumatic. Right Ear: External ear normal.      Left Ear: External ear normal.      Mouth/Throat:      Mouth: Mucous membranes are moist.      Pharynx: Oropharynx is clear. No oropharyngeal exudate or posterior oropharyngeal erythema. Eyes:      General: No scleral icterus. Extraocular Movements: Extraocular movements intact. Conjunctiva/sclera: Conjunctivae normal.      Pupils: Pupils are equal, round, and reactive to light. Neck:      Musculoskeletal: Normal range of motion and neck supple. Normal range of motion. No erythema, neck rigidity, crepitus, injury, pain with movement, torticollis, spinous process tenderness or muscular tenderness. Thyroid: No thyromegaly. Vascular: No JVD. Cardiovascular:      Rate and Rhythm: Normal rate and regular rhythm. Pulses: Normal pulses. Heart sounds: Normal heart sounds. No murmur. No friction rub. No gallop. Pulmonary:      Effort: Pulmonary effort is normal. No respiratory distress. Breath sounds: Normal breath sounds.  No wheezing. Abdominal:      General: Bowel sounds are normal. There is no distension. Palpations: Abdomen is soft. Tenderness: There is no abdominal tenderness. Musculoskeletal: Normal range of motion. General: No tenderness or deformity. Skin:     General: Skin is warm and dry. Capillary Refill: Capillary refill takes less than 2 seconds. Findings: No laceration or rash. Neurological:      General: No focal deficit present. Mental Status: He is alert and oriented to person, place, and time. Cranial Nerves: No cranial nerve deficit. Sensory: No sensory deficit. Motor: No abnormal muscle tone. Coordination: Coordination normal.   Psychiatric:         Mood and Affect: Mood normal.         Behavior: Behavior normal.         Thought Content: Thought content normal.         Cognition and Memory: He exhibits impaired recent memory. Judgment: Judgment normal.          MDM  Number of Diagnoses or Management Options  Fall, initial encounter: new and requires workup  Multiple contusions: new and requires workup  Diagnosis management comments: EKG labs and CT are unremarkable  Chest x-ray is clear    Patient will be discharged back to his care facility       Amount and/or Complexity of Data Reviewed  Clinical lab tests: ordered and reviewed  Tests in the radiology section of CPT®: ordered and reviewed  Tests in the medicine section of CPT®: ordered and reviewed  Decide to obtain previous medical records or to obtain history from someone other than the patient: yes  Review and summarize past medical records: yes  Independent visualization of images, tracings, or specimens: yes    Risk of Complications, Morbidity, and/or Mortality  Presenting problems: moderate  Diagnostic procedures: moderate  Management options: moderate  General comments: Elements of this note have been dictated via voice recognition software.   Text and phrases may be limited by the accuracy of the software. The chart has been reviewed, but errors may still be present. Patient Progress  Patient progress: stable         EKG    Date/Time: 4/24/2021 5:50 PM  Performed by: Rafi Pride MD  Authorized by: Rafi Pride MD     ECG reviewed by ED Physician in the absence of a cardiologist: yes    Previous ECG:     Previous ECG:  Compared to current    Similarity:  No change  Interpretation:     Interpretation: abnormal    Rate:     ECG rate assessment: normal    Rhythm:     Rhythm: sinus rhythm    Ectopy:     Ectopy: none    QRS:     QRS axis:  Left    QRS intervals:   Wide  Conduction:     Conduction: abnormal      Abnormal conduction: LAFB    ST segments:     ST segments:  Normal  T waves:     T waves: normal    Comments:      No acute ischemic changes  Sinus rhythm with persistent sinus arrhythmia

## 2021-04-24 NOTE — ED NOTES
I have reviewed discharge instructions with the patient, daughter. The patient, daughter verbalized understanding. Patient left ED via Discharge Method: wheelchair to SNF with family. Opportunity for questions and clarification provided. Patient given 0 scripts. To continue your aftercare when you leave the hospital, you may receive an automated call from our care team to check in on how you are doing. This is a free service and part of our promise to provide the best care and service to meet your aftercare needs.  If you have questions, or wish to unsubscribe from this service please call [phone]. Thank you for Choosing our [facility] Emergency Department.

## 2021-04-24 NOTE — DISCHARGE INSTRUCTIONS
Continue all your current medications  Follow-up with your doctor next week  Return to ER for any worsening symptoms or new problems which may arise

## 2021-04-24 NOTE — ED TRIAGE NOTES
Pt arrives via EMS from [facility]. Pt had unwitnessed fall. Staff believes he hit his head. Pt denies hitting his head, denies pain, reports mechanical fall. Denies blood thinners, denies LOC, denies blurred vision. Staff reports \"seizure like episode after fall\" pt recalls the episode has hx parkinsons.

## 2021-04-25 LAB
ATRIAL RATE: 93 BPM
CALCULATED P AXIS, ECG09: 76 DEGREES
CALCULATED R AXIS, ECG10: -62 DEGREES
CALCULATED T AXIS, ECG11: 71 DEGREES
DIAGNOSIS, 93000: NORMAL
Q-T INTERVAL, ECG07: 372 MS
QRS DURATION, ECG06: 100 MS
QTC CALCULATION (BEZET), ECG08: 452 MS
VENTRICULAR RATE, ECG03: 89 BPM

## 2021-06-02 ENCOUNTER — APPOINTMENT (OUTPATIENT)
Dept: CT IMAGING | Age: 81
DRG: 083 | End: 2021-06-02
Attending: EMERGENCY MEDICINE
Payer: MEDICARE

## 2021-06-02 ENCOUNTER — APPOINTMENT (OUTPATIENT)
Dept: GENERAL RADIOLOGY | Age: 81
DRG: 083 | End: 2021-06-02
Attending: EMERGENCY MEDICINE
Payer: MEDICARE

## 2021-06-02 ENCOUNTER — HOSPITAL ENCOUNTER (INPATIENT)
Age: 81
LOS: 3 days | Discharge: HOME HEALTH | DRG: 083 | End: 2021-06-05
Attending: EMERGENCY MEDICINE | Admitting: HOSPITALIST
Payer: MEDICARE

## 2021-06-02 DIAGNOSIS — G20 DEMENTIA DUE TO PARKINSON'S DISEASE WITHOUT BEHAVIORAL DISTURBANCE (HCC): Chronic | ICD-10-CM

## 2021-06-02 DIAGNOSIS — F02.80 DEMENTIA DUE TO PARKINSON'S DISEASE WITHOUT BEHAVIORAL DISTURBANCE (HCC): Chronic | ICD-10-CM
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
## 2021-06-02 DIAGNOSIS — S06.5XAA SUBDURAL HEMATOMA: Primary | ICD-10-CM

## 2021-06-02 DIAGNOSIS — R44.3 HALLUCINATIONS: ICD-10-CM

## 2021-06-02 DIAGNOSIS — G90.3 NEUROLOGIC ORTHOSTATIC HYPOTENSION (HCC): Chronic | ICD-10-CM

## 2021-06-02 DIAGNOSIS — G20 PARKINSON'S DISEASE (HCC): ICD-10-CM

## 2021-06-02 DIAGNOSIS — R29.6 FREQUENT FALLS: Chronic | ICD-10-CM

## 2021-06-02 LAB
ALBUMIN SERPL-MCNC: 3.9 G/DL (ref 3.2–4.6)
ALBUMIN/GLOB SERPL: 1.3 {RATIO} (ref 1.2–3.5)
ALP SERPL-CCNC: 118 U/L (ref 50–136)
ALT SERPL-CCNC: <6 U/L (ref 12–65)
ANION GAP SERPL CALC-SCNC: 5 MMOL/L (ref 7–16)
APPEARANCE UR: CLEAR
AST SERPL-CCNC: 10 U/L (ref 15–37)
ATRIAL RATE: 61 BPM
BASOPHILS # BLD: 0.1 K/UL (ref 0–0.2)
BASOPHILS NFR BLD: 1 % (ref 0–2)
BILIRUB SERPL-MCNC: 0.6 MG/DL (ref 0.2–1.1)
BILIRUB UR QL: NEGATIVE
BUN SERPL-MCNC: 24 MG/DL (ref 8–23)
CALCIUM SERPL-MCNC: 9 MG/DL (ref 8.3–10.4)
CALCULATED P AXIS, ECG09: 71 DEGREES
CALCULATED R AXIS, ECG10: -49 DEGREES
CALCULATED T AXIS, ECG11: 62 DEGREES
CHLORIDE SERPL-SCNC: 105 MMOL/L (ref 98–107)
CO2 SERPL-SCNC: 30 MMOL/L (ref 21–32)
COLOR UR: YELLOW
CREAT SERPL-MCNC: 1.06 MG/DL (ref 0.8–1.5)
DIAGNOSIS, 93000: NORMAL
DIFFERENTIAL METHOD BLD: ABNORMAL
EOSINOPHIL # BLD: 0.1 K/UL (ref 0–0.8)
EOSINOPHIL NFR BLD: 2 % (ref 0.5–7.8)
ERYTHROCYTE [DISTWIDTH] IN BLOOD BY AUTOMATED COUNT: 12.3 % (ref 11.9–14.6)
GLOBULIN SER CALC-MCNC: 2.9 G/DL (ref 2.3–3.5)
GLUCOSE BLD STRIP.AUTO-MCNC: 80 MG/DL (ref 65–100)
GLUCOSE SERPL-MCNC: 77 MG/DL (ref 65–100)
GLUCOSE UR STRIP.AUTO-MCNC: NEGATIVE MG/DL
HCT VFR BLD AUTO: 35.9 % (ref 41.1–50.3)
HGB BLD-MCNC: 11.7 G/DL (ref 13.6–17.2)
HGB UR QL STRIP: NEGATIVE
IMM GRANULOCYTES # BLD AUTO: 0 K/UL (ref 0–0.5)
IMM GRANULOCYTES NFR BLD AUTO: 0 % (ref 0–5)
KETONES UR QL STRIP.AUTO: ABNORMAL MG/DL
LACTATE SERPL-SCNC: 0.9 MMOL/L (ref 0.4–2)
LEUKOCYTE ESTERASE UR QL STRIP.AUTO: NEGATIVE
LYMPHOCYTES # BLD: 2.1 K/UL (ref 0.5–4.6)
LYMPHOCYTES NFR BLD: 32 % (ref 13–44)
MCH RBC QN AUTO: 32.3 PG (ref 26.1–32.9)
MCHC RBC AUTO-ENTMCNC: 32.6 G/DL (ref 31.4–35)
MCV RBC AUTO: 99.2 FL (ref 79.6–97.8)
MONOCYTES # BLD: 0.6 K/UL (ref 0.1–1.3)
MONOCYTES NFR BLD: 9 % (ref 4–12)
NEUTS SEG # BLD: 3.6 K/UL (ref 1.7–8.2)
NEUTS SEG NFR BLD: 56 % (ref 43–78)
NITRITE UR QL STRIP.AUTO: NEGATIVE
NRBC # BLD: 0 K/UL (ref 0–0.2)
P-R INTERVAL, ECG05: 146 MS
PH UR STRIP: 5.5 [PH] (ref 5–9)
PLATELET # BLD AUTO: 239 K/UL (ref 150–450)
PMV BLD AUTO: 9 FL (ref 9.4–12.3)
POTASSIUM SERPL-SCNC: 4.3 MMOL/L (ref 3.5–5.1)
PROT SERPL-MCNC: 6.8 G/DL (ref 6.3–8.2)
PROT UR STRIP-MCNC: NEGATIVE MG/DL
Q-T INTERVAL, ECG07: 408 MS
QRS DURATION, ECG06: 100 MS
QTC CALCULATION (BEZET), ECG08: 410 MS
RBC # BLD AUTO: 3.62 M/UL (ref 4.23–5.6)
SERVICE CMNT-IMP: NORMAL
SODIUM SERPL-SCNC: 140 MMOL/L (ref 136–145)
SP GR UR REFRACTOMETRY: 1.02 (ref 1–1.02)
TSH SERPL DL<=0.005 MIU/L-ACNC: 0.44 UIU/ML (ref 0.36–3.74)
UROBILINOGEN UR QL STRIP.AUTO: 0.2 EU/DL (ref 0.2–1)
VENTRICULAR RATE, ECG03: 61 BPM
WBC # BLD AUTO: 6.4 K/UL (ref 4.3–11.1)

## 2021-06-02 PROCEDURE — 93005 ELECTROCARDIOGRAM TRACING: CPT | Performed by: EMERGENCY MEDICINE

## 2021-06-02 PROCEDURE — 74011250637 HC RX REV CODE- 250/637: Performed by: HOSPITALIST

## 2021-06-02 PROCEDURE — 81003 URINALYSIS AUTO W/O SCOPE: CPT

## 2021-06-02 PROCEDURE — 80053 COMPREHEN METABOLIC PANEL: CPT

## 2021-06-02 PROCEDURE — 84443 ASSAY THYROID STIM HORMONE: CPT

## 2021-06-02 PROCEDURE — 71045 X-RAY EXAM CHEST 1 VIEW: CPT

## 2021-06-02 PROCEDURE — 85025 COMPLETE CBC W/AUTO DIFF WBC: CPT

## 2021-06-02 PROCEDURE — 2709999900 HC NON-CHARGEABLE SUPPLY

## 2021-06-02 PROCEDURE — 77030004950 HC CATH ENTRL NG COVD -A

## 2021-06-02 PROCEDURE — 74011000250 HC RX REV CODE- 250: Performed by: HOSPITALIST

## 2021-06-02 PROCEDURE — 83605 ASSAY OF LACTIC ACID: CPT

## 2021-06-02 PROCEDURE — 65620000000 HC RM CCU GENERAL

## 2021-06-02 PROCEDURE — 74011250636 HC RX REV CODE- 250/636: Performed by: HOSPITALIST

## 2021-06-02 PROCEDURE — 70450 CT HEAD/BRAIN W/O DYE: CPT

## 2021-06-02 PROCEDURE — 82962 GLUCOSE BLOOD TEST: CPT

## 2021-06-02 PROCEDURE — 99285 EMERGENCY DEPT VISIT HI MDM: CPT

## 2021-06-02 RX ORDER — GABAPENTIN 100 MG/1
100 CAPSULE ORAL 3 TIMES DAILY
Status: DISCONTINUED | OUTPATIENT
Start: 2021-06-02 | End: 2021-06-05 | Stop reason: HOSPADM

## 2021-06-02 RX ORDER — SODIUM CHLORIDE 0.9 % (FLUSH) 0.9 %
5-40 SYRINGE (ML) INJECTION AS NEEDED
Status: DISCONTINUED | OUTPATIENT
Start: 2021-06-02 | End: 2021-06-05 | Stop reason: HOSPADM

## 2021-06-02 RX ORDER — ONDANSETRON 4 MG/1
4 TABLET, ORALLY DISINTEGRATING ORAL
Status: DISCONTINUED | OUTPATIENT
Start: 2021-06-02 | End: 2021-06-05 | Stop reason: HOSPADM

## 2021-06-02 RX ORDER — ONDANSETRON 2 MG/ML
4 INJECTION INTRAMUSCULAR; INTRAVENOUS
Status: DISCONTINUED | OUTPATIENT
Start: 2021-06-02 | End: 2021-06-05 | Stop reason: HOSPADM

## 2021-06-02 RX ORDER — SODIUM CHLORIDE 0.9 % (FLUSH) 0.9 %
5-40 SYRINGE (ML) INJECTION EVERY 8 HOURS
Status: DISCONTINUED | OUTPATIENT
Start: 2021-06-02 | End: 2021-06-05 | Stop reason: HOSPADM

## 2021-06-02 RX ORDER — LABETALOL HYDROCHLORIDE 5 MG/ML
20 INJECTION, SOLUTION INTRAVENOUS
Status: DISCONTINUED | OUTPATIENT
Start: 2021-06-02 | End: 2021-06-05 | Stop reason: HOSPADM

## 2021-06-02 RX ORDER — DROXIDOPA 300 MG/1
300 CAPSULE ORAL 3 TIMES DAILY
Status: DISCONTINUED | OUTPATIENT
Start: 2021-06-02 | End: 2021-06-03

## 2021-06-02 RX ORDER — CARBIDOPA AND LEVODOPA 25; 250 MG/1; MG/1
1 TABLET ORAL
Status: DISCONTINUED | OUTPATIENT
Start: 2021-06-02 | End: 2021-06-04

## 2021-06-02 RX ORDER — POLYETHYLENE GLYCOL 3350 17 G/17G
17 POWDER, FOR SOLUTION ORAL DAILY PRN
Status: DISCONTINUED | OUTPATIENT
Start: 2021-06-02 | End: 2021-06-05 | Stop reason: HOSPADM

## 2021-06-02 RX ORDER — HYDRALAZINE HYDROCHLORIDE 20 MG/ML
10 INJECTION INTRAMUSCULAR; INTRAVENOUS
Status: DISCONTINUED | OUTPATIENT
Start: 2021-06-02 | End: 2021-06-05 | Stop reason: HOSPADM

## 2021-06-02 RX ORDER — RIVASTIGMINE TARTRATE 3 MG/1
6 CAPSULE ORAL 2 TIMES DAILY WITH MEALS
Status: DISCONTINUED | OUTPATIENT
Start: 2021-06-02 | End: 2021-06-05 | Stop reason: HOSPADM

## 2021-06-02 RX ADMIN — SODIUM CHLORIDE 10 MG/HR: 900 INJECTION, SOLUTION INTRAVENOUS at 20:36

## 2021-06-02 RX ADMIN — GABAPENTIN 100 MG: 100 CAPSULE ORAL at 22:26

## 2021-06-02 RX ADMIN — CARBIDOPA AND LEVODOPA 1 TABLET: 25; 250 TABLET ORAL at 18:34

## 2021-06-02 RX ADMIN — Medication 5 ML: at 17:23

## 2021-06-02 RX ADMIN — SODIUM CHLORIDE 5 MG/HR: 900 INJECTION, SOLUTION INTRAVENOUS at 19:45

## 2021-06-02 RX ADMIN — SODIUM CHLORIDE 10 MG/HR: 900 INJECTION, SOLUTION INTRAVENOUS at 20:16

## 2021-06-02 RX ADMIN — HYDRALAZINE HYDROCHLORIDE 10 MG: 20 INJECTION, SOLUTION INTRAMUSCULAR; INTRAVENOUS at 17:12

## 2021-06-02 RX ADMIN — SODIUM CHLORIDE 5 MG/HR: 900 INJECTION, SOLUTION INTRAVENOUS at 22:26

## 2021-06-02 RX ADMIN — Medication 10 ML: at 22:04

## 2021-06-02 NOTE — H&P
[facility] History and Physical       Name:  Demi Patel. Age:81 y.o. Sex:male   :  1940    MRN:  [id number]   PCP:  Audrey English MD      Admit Date:  2021  2:51 PM   Chief Complaint: Presented with frequent falls and worsening of confusion. Reason for Admission:   Subdural hematoma ([address] [D41.1D4Z]    Assessment & Plan:   Frequent falls, right subdural hematoma: Patient will be admitted to ICU for continuous neurochecks, will request official neurosurgery consultation. Patient has history of Parkinson's disease and also orthostatic hypotension which led to frequent falls. Patient might need a higher level of care upon discharge as patient was currently living in an assisted living facility. Will order urinalysis to rule out any metabolic encephalopathy. Parkinson's disease: Continue on carbidopa, levodopa at home dose. Hallucinations: A part of Parkinson's disease, will monitor. Dementia with behavioral disturbance: Monitor. Disposition/Expected LOS: 4  Diet: DIET ADULT  VTE ppx: Mechanical DVT prophylaxis  GI ppx: None  Code status: Full Code  Surrogate decision-maker: Sister    History of Presenting Illness:     Demi Patel. is a 80 y.o. male with medical history of Parkinson's disease with dementia, history of hallucinations, history of orthostatic hypotension, frequent falls, gait instability who presented to ED with frequent falls, hallucinations from assisted living facility. Patient looks more confused, hallucinating, though not able to manage him there so patient was sent here for further evaluation. CT scan of head was obtained, CAT scan shows new right frontal small subdural hematoma. Patient has history of subdural hematomas in the past.  Emergency room physician reports to me that he spoke to neurosurgery, recommended hospitalist admission, no acute intervention. Patient still hallucinating in emergency room.   Patient not able to give much information to me as patient is confused, patient sister was at bedside who gives most of the information. Review of Systems:  A 14 point review of systems was taken and pertinent positive as per HPI. Past Medical History:   Diagnosis Date    AAA (abdominal aortic aneurysm) ([facility] 7/8/2016    pt is not aware of this-     Pak's esophagus with esophagitis 7/8/2016    no meds- not daily    Cerebrovascular accident (CVA) ([address] 7/8/2016    They thought I did but they didn't find anything on the tests \"I blacked out\" no admission to the hospital    Chronic fatigue syndrome 2/26/2016    Cognitive deficits 7/8/2016    Colon polyps 2007    Elevated PSA     Enthesopathy of ankle and tarsus 5/29/2014    GERD (gastroesophageal reflux disease) 7/8/2016    Hashimoto's thyroiditis 7/8/2016    History of nuclear stress test 1997, 5/2005    Hypotension 8/25/2015    Impaired cognition 7/8/2016    Inguinal hernia 7/8/2016    Insomnia 7/8/2016    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         Past Surgical History:   Procedure Laterality Date    HX BREAST BIOPSY Bilateral     sterotactic    HX CATARACT REMOVAL Right 2012    HX COLONOSCOPY  2000 & 2005    HX HERNIA REPAIR Left 2011    HX OTHER SURGICAL  2006    esophagogastroduodenoscopy    HX OTHER SURGICAL      deep leg/ankle tumor excision benign leg dumor    HX THYROIDECTOMY      total       Family History : reviewed  Family History   Problem Relation Age of Onset    Cancer Mother         Pancreas CA    Cancer Father         Brain CA    Heart Disease Father     Heart Failure Father     No Known Problems Sister     Heart Attack Brother         MI at 52 y.o.    Diabetes Maternal Uncle     Diabetes Paternal Uncle     Arthritis-osteo Sister     Thyroid Disease Neg Hx         Social History     Tobacco Use    Smoking status: Never Smoker    Smokeless tobacco: Never Used   Substance Use Topics    Alcohol use: No       Allergies   Allergen Reactions    Brompheniramine Maleate Unknown (comments)     Pt does not recall med.  Carisoprodol Unknown (comments)     Other reaction(s): Rash-A, Unknown-A  Pt does not recall med.  Meperidine Unknown (comments)     Pt does not recall med.  Penicillin Rash    Phenylephrine Hcl Unknown (comments)     Pt does not recall med.  Zaleplon Unknown (comments)     Other reaction(s): Rash-A, Unknown-A  Pt does not recall med.      Dimetapp Cold And Flu Rash    Donepezil Other (comments)     Altered mental state      Haldol [Haloperidol Lactate] Other (comments)     Avoid in PD    Seroquel [Quetiapine] Other (comments)     Avoid in PD    Penicillins Rash    Rofecoxib Rash       Immunization History   Administered Date(s) Administered    Influenza High Dose Vaccine PF 02/03/2014, 12/22/2014    Influenza Vaccine PF 01/02/2012, 01/23/2013    Pneumococcal Conjugate (PCV-13) 02/10/2015    Pneumococcal Polysaccharide (PPSV-23) 12/02/2009    TB Skin Test (PPD) Intradermal 08/15/2019, 04/01/2021    Td, Adsorbed PF 12/02/2009    Tdap 02/03/2014, 01/26/2020         PTA Medications:  Current Outpatient Medications   Medication Instructions    amantadine HCL (SYMMETREL) 100 mg tablet TAKE 1 TABLET BY MOUTH TWO  TIMES DAILY    carbidopa-levodopa (SINEMET)  mg per tablet TAKE 1 TABLET BY MOUTH AT  6AM, 10AM, 2PM, AND 6PM  STRICT TIMES DUE TO  ADVANCED PD    carbidopa-levodopa ER (SINEMET CR)  mg per tablet 1 tab at 10 pm strict time    droxidopa (NORTHERA) 300 mg, Oral, 3 TIMES DAILY, 2 caps at 6am, 1 cap at 12pm, 1 cap at 5pm for 3 days then increase to 2 caps 6am, 2 caps 12pm, 1 cap 5pm for 3 days then increase to 2 caps TID. <BR>Monitor BP& HR sitting and standing in the AM and PM for 10 days and send readings to Dr. Johnnie Jose.  escitalopram oxalate (LEXAPRO) 10 mg, Oral, DAILY    fludrocortisone (FLORINEF) 0.1 mg tablet TAKE 1 TABLET BY MOUTH AT  6AM    gabapentin (NEURONTIN) 100 mg, Oral, 3 TIMES DAILY    latanoprost (XALATAN) 0.005 % ophthalmic solution 1 Drop, Right Eye, EVERY BEDTIME    levothyroxine (SYNTHROID) 125 mcg, Oral, DAILY BEFORE BREAKFAST    lisinopriL (PRINIVIL, ZESTRIL) 10 mg, Oral, DAILY    melatonin 3 mg tablet 3 mg tab Q 9pm for RBD    mirabegron ER (MYRBETRIQ) 50 mg, Oral, DAILY    OTHER Amantadine discontinue 9pm dose and continue 8am dose.<BR>Lisinopril 5mg Q day (If BP rises to higher than 140/90 then give another 5mg for total of 10mg in 24 hrs)<BR>Compression socks during waking hours for help with orthostatic hypotension.  OTHER Melatonin 4mg Q hs for RBD and insomnia. Can increase to 5mg in 5 days if needed    polyethylene glycol (MIRALAX) 17 g, Oral, DAILY    rivastigmine tartrate (EXELON) 6 mg, Oral, 2 TIMES DAILY WITH MEALS       Objective:     Patient Vitals for the past 24 hrs:   Temp Pulse Resp BP SpO2   06/02/21 1452 98.5 °F (36.9 °C) 89 16 (!) 153/99 97 %       Oxygen Therapy  O2 Sat (%): 97 % (06/02/21 1452)  O2 Device: None (Room air) (06/02/21 1452)    Body mass index is 18.65 kg/m².     Physical Exam:    General:  No acute distress, speaking in full sentences  HEENT:  Left pupil is slightly dilated  Neck:   Supple, no lymphadenopathy, no JVD   Lungs:  Clear to auscultation bilaterally   CV:   Regular rate and rhythm with normal S1 and S2   Abdomen:  Soft, nontender, nondistended, normoactive bowel sounds   Extremities:  No cyanosis clubbing or edema Neuro:  Confused, able to follow commands, able to move all his extremities without any difficulty. Psych:  Normal mood and affect       Data Reviewed: I have reviewed all labs, meds, and studies. Recent Results (from the past 24 hour(s))   GLUCOSE, POC    Collection Time: 06/02/21  2:53 PM   Result Value Ref Range    Glucose (POC) 80 65 - 100 mg/dL    Performed by [facility]    CBC WITH AUTOMATED DIFF    Collection Time: 06/02/21  2:54 PM   Result Value Ref Range    WBC 6.4 4.3 - 11.1 K/uL    RBC 3.62 (L) 4.23 - 5.6 M/uL    HGB 11.7 (L) 13.6 - 17.2 g/dL    HCT 35.9 (L) 41.1 - 50.3 %    MCV 99.2 (H) 79.6 - 97.8 FL    MCH 32.3 26.1 - 32.9 PG    MCHC 32.6 31.4 - 35.0 g/dL    RDW 12.3 11.9 - 14.6 %    PLATELET 140 054 - 876 K/uL    MPV 9.0 (L) 9.4 - 12.3 FL    ABSOLUTE NRBC 0.00 0.0 - 0.2 K/uL    DF AUTOMATED      NEUTROPHILS 56 43 - 78 %    LYMPHOCYTES 32 13 - 44 %    MONOCYTES 9 4.0 - 12.0 %    EOSINOPHILS 2 0.5 - 7.8 %    BASOPHILS 1 0.0 - 2.0 %    IMMATURE GRANULOCYTES 0 0.0 - 5.0 %    ABS. NEUTROPHILS 3.6 1.7 - 8.2 K/UL    ABS. LYMPHOCYTES 2.1 0.5 - 4.6 K/UL    ABS. MONOCYTES 0.6 0.1 - 1.3 K/UL    ABS. EOSINOPHILS 0.1 0.0 - 0.8 K/UL    ABS. BASOPHILS 0.1 0.0 - 0.2 K/UL    ABS. IMM. GRANS. 0.0 0.0 - 0.5 K/UL   METABOLIC PANEL, COMPREHENSIVE    Collection Time: 06/02/21  2:54 PM   Result Value Ref Range    Sodium 140 136 - 145 mmol/L    Potassium 4.3 3.5 - 5.1 mmol/L    Chloride 105 98 - 107 mmol/L    CO2 30 21 - 32 mmol/L    Anion gap 5 (L) 7 - 16 mmol/L    Glucose 77 65 - 100 mg/dL    BUN 24 (H) 8 - 23 MG/DL    Creatinine 1.06 0.8 - 1.5 MG/DL    GFR est AA >60 >60 ml/min/1.73m2    GFR est non-AA >60 >60 ml/min/1.73m2    Calcium 9.0 8.3 - 10.4 MG/DL    Bilirubin, total 0.6 0.2 - 1.1 MG/DL    ALT (SGPT) <6 (L) 12 - 65 U/L    AST (SGOT) 10 (L) 15 - 37 U/L    Alk.  phosphatase 118 50 - 136 U/L    Protein, total 6.8 6.3 - 8.2 g/dL    Albumin 3.9 3.2 - 4.6 g/dL    Globulin 2.9 2.3 - 3.5 g/dL    A-G Ratio 1.3 1.2 - 3.5     TSH 3RD GENERATION    Collection Time: 06/02/21  2:54 PM   Result Value Ref Range    TSH 0.437 0.358 - 3.740 uIU/mL   LACTIC ACID    Collection Time: 06/02/21  2:54 PM   Result Value Ref Range    Lactic acid 0.9 0.4 - 2.0 MMOL/L   EKG, 12 LEAD, INITIAL    Collection Time: 06/02/21  3:08 PM   Result Value Ref Range    Ventricular Rate 61 BPM    Atrial Rate 61 BPM    P-R Interval 146 ms    QRS Duration 100 ms    Q-T Interval 408 ms    QTC Calculation (Bezet) 410 ms    Calculated P Axis 71 degrees    Calculated R Axis -49 degrees    Calculated T Axis 62 degrees    Diagnosis       !! AGE AND GENDER SPECIFIC ECG ANALYSIS !! Sinus rhythm with Premature atrial complexes  Left anterior fascicular block  Abnormal ECG  When compared with ECG of 24-APR-2021 17:47,  Premature atrial complexes are now Present  Sinus rhythm is no longer with junctional escape complexes  Confirmed by Tomás King MD (), AMERICO NGUYEN ([id number]) on 6/2/2021 4:51:12 PM         EKG Results     Procedure 720 Value Units Date/Time    EKG, 12 LEAD, INITIAL [[id number]] Collected: 06/02/21 1508    Order Status: Completed Updated: 06/02/21 1651     Ventricular Rate 61 BPM      Atrial Rate 61 BPM      P-R Interval 146 ms      QRS Duration 100 ms      Q-T Interval 408 ms      QTC Calculation (Bezet) 410 ms      Calculated P Axis 71 degrees      Calculated R Axis -49 degrees      Calculated T Axis 62 degrees      Diagnosis --     !! AGE AND GENDER SPECIFIC ECG ANALYSIS !!   Sinus rhythm with Premature atrial complexes  Left anterior fascicular block  Abnormal ECG  When compared with ECG of 24-APR-2021 17:47,  Premature atrial complexes are now Present  Sinus rhythm is no longer with junctional escape complexes  Confirmed by Tomás King MD ()AMERICO ([id number]) on 6/2/2021 4:51:12 PM            All Micro Results     None          Other Studies:  CT HEAD WO CONT    Result Date: 6/2/2021  EXAMINATION: HEAD CT WITHOUT CONTRAST 6/2/2021 3:48 PM ACCESSION NUMBER: [id number] INDICATION: Head trauma COMPARISON: CT of the head, 4/24/2021 TECHNIQUE: Multiple-row detector helical CT examination of the head without intravenous contrast. Radiation dose reduction techniques were used for this study:  Our CT scanners use one or all of the following: Automated exposure control, adjustment of the mA and/or kVp according to patient's size, iterative reconstruction. FINDINGS: No evidence of intracranial mass or large territorial acute infarct. There is a thin acute hyperdense subdural hematoma along the right lateral frontal convexity without significant mass effect, best seen on the coronal sequence and measuring only 3 mm in maximum thickness. Generalized parenchymal volume loss with commensurate enlargement of the ventricles and sulci. The ventricles remain midline and symmetric. Basal cisterns are patent. There is scattered deep and periventricular white matter lucency which is nonspecific but unchanged and most compatible with chronic microvascular ischemic changes  The basal cisterns are patent. No extra-axial fluid collection or mass effect. The orbital contents are within normal limits. The paranasal sinuses are clear. The mastoid air cells and middle ears are clear. No significant osseous or extracranial soft tissue lesions. 1. Thin acute right frontal convexity subdural hematoma measuring 3 mm in thickness without mass effect. Notification: The significant results of this study were discussed with Dr. Natalia Powell at [address] PM on 6/2/2021     XR CHEST PORT    Result Date: 6/2/2021  EXAM: XR CHEST PORT INDICATION: AMS/confusion COMPARISON: Chest radiograph, 4/24/2021 FINDINGS: The cardiomediastinal silhouette is within normal limits. No focal parenchymal process when accounting for overlying soft tissue attenuation. No pleural effusion. No pneumothorax. No acute osseous abnormality. No acute cardiopulmonary abnormality.          Medications:  Medications Administered Problem List:     Hospital Problems as of 6/2/2021 Date Reviewed: 5/25/2021        Codes Class Noted - Resolved POA    HTN (hypertension) ICD-10-CM: I10  ICD-9-CM: 401.9  4/4/2021 - Present Yes        * (Principal) Subdural hematoma ([facility] ICD-10-CM: S59.2S8P  ICD-9-CM: 432.1  4/1/2021 - Present Unknown        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) ICD-10-CM: G20, F02.80  ICD-9-CM: 332.0, 294.10  8/20/2018 - Present Yes        Parkinson disease ([facility] (Chronic) ICD-10-CM: G20  ICD-9-CM: 332.0  7/20/2016 - Present Yes                 Signed By: Kehinde Gee MD   [employer] [facility]    June 2, 2021  4:22 PM

## 2021-06-02 NOTE — PROGRESS NOTES
TRANSFER - IN REPORT:    Verbal report received from Belkis RN (name) on Jadon Phelan.  being received from ED (unit) for routine progression of care      Report consisted of patients Situation, Background, Assessment and   Recommendations(SBAR). Information from the following report(s) SBAR, Kardex, ED Summary, MAR, Recent Results and Cardiac Rhythm Sinus hebert was reviewed with the receiving nurse. Opportunity for questions and clarification was provided. Assessment completed upon patients arrival to unit and care assumed.

## 2021-06-02 NOTE — ED PROVIDER NOTES
Patient is a 81-year-old male presenting to the emergency department today after being found on the ground. The patient has a history of Parkinson's and has had frequent falls. This is his fourth visit recently for falls. The patient has also had some increased confusion and hallucinations according to his sister who is with him. The patient tells me he has had double vision and that has been ongoing for a while. He said that his left pupil has been bigger than the right however the sister says no and I cannot identify this in previous physical exams. The patient has no complaints except for a paper cut on the thumb. He has not had any fevers or chills. Past Medical History:   Diagnosis Date    AAA (abdominal aortic aneurysm) ([facility] 7/8/2016    pt is not aware of this-     Pak's esophagus with esophagitis 7/8/2016    no meds- not daily    Cerebrovascular accident (CVA) ([facility] 7/8/2016    They thought I did but they didn't find anything on the tests \"I blacked out\" no admission to the hospital    Chronic fatigue syndrome 2/26/2016    Cognitive deficits 7/8/2016    Colon polyps 2007    Elevated PSA     Enthesopathy of ankle and tarsus 5/29/2014    GERD (gastroesophageal reflux disease) 7/8/2016    Hashimoto's thyroiditis 7/8/2016    History of nuclear stress test 1997, 5/2005    Hypotension 8/25/2015    Impaired cognition 7/8/2016    Inguinal hernia 7/8/2016    Insomnia 7/8/2016    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  Tibial neuropathy 11/3/2015    Unsteady gait 8/25/2015    Overview:   With 2 falls in past month. Past Surgical History:   Procedure Laterality Date    HX BREAST BIOPSY Bilateral     sterotactic    HX CATARACT REMOVAL Right 2012    HX COLONOSCOPY  2000 & 2005    HX HERNIA REPAIR Left 2011    HX OTHER SURGICAL  2006    esophagogastroduodenoscopy    HX OTHER SURGICAL      deep leg/ankle tumor excision benign leg dumor    HX THYROIDECTOMY      total         Family History:   Problem Relation Age of Onset    Cancer Mother         Pancreas CA    Cancer Father         Brain CA    Heart Disease Father     Heart Failure Father     No Known Problems Sister     Heart Attack Brother         MI at 52 y.o.    Diabetes Maternal Uncle     Diabetes Paternal Uncle     Arthritis-osteo Sister     Thyroid Disease Neg Hx        Social History     Socioeconomic History    Marital status:      Spouse name: Not on file    Number of children: Not on file    Years of education: Not on file    Highest education level: Not on file   Occupational History    Not on file   Tobacco Use    Smoking status: Never Smoker    Smokeless tobacco: Never Used   Substance and Sexual Activity    Alcohol use: No    Drug use: No    Sexual activity: Not Currently   Other Topics Concern    Not on file   Social History Narrative    Not on file     Social Determinants of Health     Financial Resource Strain:     Difficulty of Paying Living Expenses:    Food Insecurity:     Worried About Running Out of Food in the Last Year:     [address] in the Last Year:    Transportation Needs:     Lack of Transportation (Medical):      Lack of Transportation (Non-Medical):    Physical Activity:     Days of Exercise per Week:     Minutes of Exercise per Session:    Stress:     Feeling of Stress :    Social Connections:     Frequency of Communication with Friends and Family:     Frequency of Social Gatherings with Friends and Family:     Attends [employer] Services:     Active Member of Clubs or Organizations:     Attends Club or Organization Meetings:     Marital Status:    Intimate Partner Violence:     Fear of Current or Ex-Partner:     Emotionally Abused:     Physically Abused:     Sexually Abused: ALLERGIES: Brompheniramine maleate, Carisoprodol, Meperidine, Penicillin, Phenylephrine hcl, Zaleplon, Dimetapp cold and flu, Donepezil, Haldol [haloperidol lactate], Seroquel [quetiapine], Penicillins, and Rofecoxib    Review of Systems   Eyes: Positive for visual disturbance. All other systems reviewed and are negative. Vitals:    06/02/21 1452   BP: (!) 153/99   Pulse: 89   Resp: 16   Temp: 98.5 °F (36.9 °C)   SpO2: 97%   Weight: 59 kg (130 lb)   Height: 5' 10\" (1.778 m)            Physical Exam     GENERAL:The patient has Body mass index is 18.65 kg/m². Well-hydrated. No acute distress. VITAL SIGNS: Heart rate, blood pressure, respiratory rate reviewed as recorded in  nurse's notes  EYES: Right pupil is 3 to 4 mm and left pupil is 6 to 8 mm. Pupils reactive. Extraocular motion intact. No conjunctival redness or drainage. Accommodation present. EARS: No external masses or lesions. NOSE: No nasal drainage or epistaxis. MOUTH/THROAT: Pharynx clear; airway patent. NECK: Supple, no meningeal signs. Trachea midline. No masses or thyromegaly. LUNGS: Breath sounds clear and equal bilaterally no accessory muscle use  CHEST: No deformity  CARDIOVASCULAR: Regular rate and rhythm  ABDOMEN: Soft without tenderness. No palpable masses or organomegaly. No  peritoneal signs. No rigidity. EXTREMITIES: No clubbing or cyanosis. No joint swelling. Normal muscle tone. No  restricted range of motion appreciated. NEUROLOGIC: Sensation is grossly intact. Cranial nerve exam reveals face is  symmetrical, tongue is midline speech is clear. SKIN: No rash or petechiae. Good skin turgor palpated. PSYCHIATRIC: Alert and oriented.      MDM  Number of Diagnoses or Management Options  Diagnosis management comments: TIA, CVA, intracranial hemorrhage, ischemic stroke, brain tumor, Bell's palsy,    tension headache, migraine headache,    peripheral neuropathy, metabolic disorder, Guillian [place] syndrome, multiple sclerosis,    electrolyte abnormality, Parkinson's disease, UTI,           Amount and/or Complexity of Data Reviewed  Clinical lab tests: ordered and reviewed  Tests in the radiology section of CPT®: ordered  Tests in the medicine section of CPT®: reviewed and ordered  Review and summarize past medical records: yes  Independent visualization of images, tracings, or specimens: yes      ED Course as of Jun 02 1645   Wed Jun 02, 2021   1624 IMPRESSION     1. Thin acute right frontal convexity subdural hematoma measuring 3 mm in  thickness without mass effect. CT HEAD WO CONT [KH]   1639 I spoke to Dr. Arabella Mckeon with neurosurgery and he confirms that patient just needs repeat imaging tomorrow and no surgical intervention at this time. [LS]   1223 I spoke to the patient and his sister about the findings here in the emergency department. I spoke to the hospitalist who will have the patient admitted and will follow up with possible inpatient psychiatric evaluation along with social work for possible higher level of care. [KH]      ED Course User Index  [KH] Astrid Mccullough DO       EKG    Date/Time: 6/2/2021 3:24 PM  Performed by: Astrid Mccullough DO  Authorized by: Astrid Mccullough DO     ECG reviewed by ED Physician in the absence of a cardiologist: yes    Comments:      EKG shows a normal sinus rhythm at a rate of 61 bpm with a narrow QRS complex and no ST elevation or T wave inversions.

## 2021-06-02 NOTE — PROGRESS NOTES
NS  CT HEAD SHOWS A TINY SDH IN THE RIGHT PARIETAL REGION.   NO MASS EFFECT  OR  SHIFT  NOT ON  BLOOD THINNERS  A/P NON SURGICAL  REPEAT CT IN  YOU Parekh MD

## 2021-06-02 NOTE — ED TRIAGE NOTES
Pt arrives via POV c/o freq falls. Pt arrives with sister who found him today in the floor. States hit the back of head. Pt states passing out and falling. Pt denies dizziness or headache at this time. Pt alert x3 ever since the last fall according to sister. Denies blood thinners. Hx of parkinsons.

## 2021-06-02 NOTE — ED NOTES
TRANSFER - OUT REPORT:    Verbal report given to CCU RN(name) on Felicia Benedict.  being transferred to [facility](unit) for routine progression of care       Report consisted of patients Situation, Background, Assessment and   Recommendations(SBAR). Information from the following report(s) SBAR was reviewed with the receiving nurse. Lines:   Peripheral IV 06/02/21 Right;Upper Arm (Active)   Site Assessment Clean, dry, & intact 06/02/21 1722   Phlebitis Assessment 0 06/02/21 1722   Infiltration Assessment 0 06/02/21 1722   Dressing Status Clean, dry, & intact 06/02/21 1722        Opportunity for questions and clarification was provided.       Patient transported with:   Registered Nurse

## 2021-06-03 ENCOUNTER — APPOINTMENT (OUTPATIENT)
Dept: CT IMAGING | Age: 81
DRG: 083 | End: 2021-06-03
Attending: FAMILY MEDICINE
Payer: MEDICARE

## 2021-06-03 LAB
ALBUMIN SERPL-MCNC: 3.6 G/DL (ref 3.2–4.6)
ALBUMIN/GLOB SERPL: 1.1 {RATIO} (ref 1.2–3.5)
ALP SERPL-CCNC: 116 U/L (ref 50–136)
ALT SERPL-CCNC: 6 U/L (ref 12–65)
ANION GAP SERPL CALC-SCNC: 7 MMOL/L (ref 7–16)
AST SERPL-CCNC: 12 U/L (ref 15–37)
BASOPHILS # BLD: 0.1 K/UL (ref 0–0.2)
BASOPHILS NFR BLD: 1 % (ref 0–2)
BILIRUB SERPL-MCNC: 0.9 MG/DL (ref 0.2–1.1)
BUN SERPL-MCNC: 21 MG/DL (ref 8–23)
CALCIUM SERPL-MCNC: 8.9 MG/DL (ref 8.3–10.4)
CHLORIDE SERPL-SCNC: 107 MMOL/L (ref 98–107)
CO2 SERPL-SCNC: 28 MMOL/L (ref 21–32)
CREAT SERPL-MCNC: 0.85 MG/DL (ref 0.8–1.5)
DIFFERENTIAL METHOD BLD: ABNORMAL
EOSINOPHIL # BLD: 0.1 K/UL (ref 0–0.8)
EOSINOPHIL NFR BLD: 1 % (ref 0.5–7.8)
ERYTHROCYTE [DISTWIDTH] IN BLOOD BY AUTOMATED COUNT: 11.9 % (ref 11.9–14.6)
GLOBULIN SER CALC-MCNC: 3.3 G/DL (ref 2.3–3.5)
GLUCOSE SERPL-MCNC: 79 MG/DL (ref 65–100)
HCT VFR BLD AUTO: 37.4 % (ref 41.1–50.3)
HGB BLD-MCNC: 12.5 G/DL (ref 13.6–17.2)
IMM GRANULOCYTES # BLD AUTO: 0 K/UL (ref 0–0.5)
IMM GRANULOCYTES NFR BLD AUTO: 0 % (ref 0–5)
LYMPHOCYTES # BLD: 1.6 K/UL (ref 0.5–4.6)
LYMPHOCYTES NFR BLD: 25 % (ref 13–44)
MAGNESIUM SERPL-MCNC: 2 MG/DL (ref 1.8–2.4)
MCH RBC QN AUTO: 32.5 PG (ref 26.1–32.9)
MCHC RBC AUTO-ENTMCNC: 33.4 G/DL (ref 31.4–35)
MCV RBC AUTO: 97.1 FL (ref 79.6–97.8)
MONOCYTES # BLD: 0.5 K/UL (ref 0.1–1.3)
MONOCYTES NFR BLD: 8 % (ref 4–12)
NEUTS SEG # BLD: 4.2 K/UL (ref 1.7–8.2)
NEUTS SEG NFR BLD: 65 % (ref 43–78)
NRBC # BLD: 0 K/UL (ref 0–0.2)
PLATELET # BLD AUTO: 233 K/UL (ref 150–450)
PMV BLD AUTO: 8.6 FL (ref 9.4–12.3)
POTASSIUM SERPL-SCNC: 4.3 MMOL/L (ref 3.5–5.1)
PROT SERPL-MCNC: 6.9 G/DL (ref 6.3–8.2)
RBC # BLD AUTO: 3.85 M/UL (ref 4.23–5.6)
SODIUM SERPL-SCNC: 142 MMOL/L (ref 136–145)
T4 FREE SERPL-MCNC: 1.4 NG/DL (ref 0.78–1.46)
WBC # BLD AUTO: 6.4 K/UL (ref 4.3–11.1)

## 2021-06-03 PROCEDURE — 84439 ASSAY OF FREE THYROXINE: CPT

## 2021-06-03 PROCEDURE — 74011250637 HC RX REV CODE- 250/637: Performed by: HOSPITALIST

## 2021-06-03 PROCEDURE — 74011000250 HC RX REV CODE- 250: Performed by: HOSPITALIST

## 2021-06-03 PROCEDURE — 74011250636 HC RX REV CODE- 250/636: Performed by: HOSPITALIST

## 2021-06-03 PROCEDURE — 85025 COMPLETE CBC W/AUTO DIFF WBC: CPT

## 2021-06-03 PROCEDURE — 65660000000 HC RM CCU STEPDOWN

## 2021-06-03 PROCEDURE — 74011000302 HC RX REV CODE- 302: Performed by: FAMILY MEDICINE

## 2021-06-03 PROCEDURE — 36415 COLL VENOUS BLD VENIPUNCTURE: CPT

## 2021-06-03 PROCEDURE — 92610 EVALUATE SWALLOWING FUNCTION: CPT

## 2021-06-03 PROCEDURE — 2709999900 HC NON-CHARGEABLE SUPPLY

## 2021-06-03 PROCEDURE — 86580 TB INTRADERMAL TEST: CPT | Performed by: FAMILY MEDICINE

## 2021-06-03 PROCEDURE — 77030040361 HC SLV COMPR DVT MDII -B

## 2021-06-03 PROCEDURE — 70450 CT HEAD/BRAIN W/O DYE: CPT

## 2021-06-03 PROCEDURE — 83735 ASSAY OF MAGNESIUM: CPT

## 2021-06-03 PROCEDURE — 80053 COMPREHEN METABOLIC PANEL: CPT

## 2021-06-03 RX ADMIN — CARBIDOPA AND LEVODOPA 1 TABLET: 25; 250 TABLET ORAL at 05:21

## 2021-06-03 RX ADMIN — RIVASTIGMINE TARTRATE 6 MG: 3 CAPSULE ORAL at 10:38

## 2021-06-03 RX ADMIN — Medication 10 ML: at 21:04

## 2021-06-03 RX ADMIN — Medication 10 ML: at 05:21

## 2021-06-03 RX ADMIN — CARBIDOPA AND LEVODOPA 1 TABLET: 25; 250 TABLET ORAL at 18:13

## 2021-06-03 RX ADMIN — GABAPENTIN 100 MG: 100 CAPSULE ORAL at 21:03

## 2021-06-03 RX ADMIN — SODIUM CHLORIDE 2.5 MG/HR: 900 INJECTION, SOLUTION INTRAVENOUS at 05:21

## 2021-06-03 RX ADMIN — GABAPENTIN 100 MG: 100 CAPSULE ORAL at 15:25

## 2021-06-03 RX ADMIN — RIVASTIGMINE TARTRATE 6 MG: 3 CAPSULE ORAL at 17:19

## 2021-06-03 RX ADMIN — TUBERCULIN PURIFIED PROTEIN DERIVATIVE 5 UNITS: 5 INJECTION, SOLUTION INTRADERMAL at 11:01

## 2021-06-03 RX ADMIN — CARBIDOPA AND LEVODOPA 1 TABLET: 25; 250 TABLET ORAL at 15:25

## 2021-06-03 RX ADMIN — CARBIDOPA AND LEVODOPA 1 TABLET: 25; 250 TABLET ORAL at 10:40

## 2021-06-03 RX ADMIN — Medication 20 ML: at 14:00

## 2021-06-03 RX ADMIN — Medication 10 ML: at 15:25

## 2021-06-03 RX ADMIN — GABAPENTIN 100 MG: 100 CAPSULE ORAL at 10:38

## 2021-06-03 NOTE — CONSULTS
LEAPFROG EVALUATION -- [facility]    The Patient is current in the ICU for: SDH and fall  He is being managed by:  Hospitalist.  No need for any additional acute ICU interventions. Patient is awake and alert. Currently hemodynamically stable. Please Call if Needed. No Charge. Ryanne Douglas MD    Electronically signed.

## 2021-06-03 NOTE — PROGRESS NOTES
SPEECH LANGUAGE PATHOLOGY: DYSPHAGIA- Initial Assessment    NAME/AGE/GENDER: Rocio Baptiste is a 80 y.o. male  DATE: 6/3/2021  PRIMARY DIAGNOSIS: Subdural hematoma ([facility] [S06.5X9A]      ICD-10: Treatment Diagnosis: R13.12 Dysphagia, Oropharyngeal Phase    RECOMMENDATIONS   DIET:    Regular Consistency   Thin Liquids    MEDICATIONS: With liquid     ASPIRATION PRECAUTIONS  · Slow rate of intake   · Upright positioning     COMPENSATORY STRATEGIES/MODIFICATIONS  · May require assistance with self-feeding. Visual perceptual deficits noted. RECOMMENDATIONS for CONTINUED SPEECH THERAPY:   YES: Anticipate need for ongoing speech therapy during this hospitalization and at next level of care. ASSESSMENT   Patient presents with oropharyngeal swallow function that is within normal limits. No s/sx of dysphagia identified. Recommend regular diet and thin liquids. Medications whole with liquid wash. He may require assistance with self-feeding due to visual perceptual deficits and frequently over/under shooting food and mouth. SDH noted on imaging. He typically lives in [facility], but confused and impulsive today. Plan for cognitive-linguistic evaluation at next session. EDUCATION:  · Recommendations discussed with Patient and RN  CONTINUATION OF SKILLED SERVICES/MEDICAL NECESSITY:   Patient continues to require skilled intervention due to cognitive-linguistic assessment. [facility] POTENTIAL FOR STATED GOALS: Good    PLAN    FREQUENCY/DURATION: Assessment of cognitive-linguistic abilities to be completed at next session.  Frequency and duration to be determined by findings/recommendations.     - Recommendations for next treatment session: Next treatment session will address assessment of cognitive-linguistic abilities     SUBJECTIVE   Alert and oriented x3. ? Hallucinations and attempts to get out of bed    Oxygen Device:room air  Pain: Pain Scale 1: Numeric (0 - 10)  Pain Intensity 1: 0    History of Present Injury/Illness: Mr. Patty Pa  has a past medical history of AAA (abdominal aortic aneurysm) ([facility] (7/8/2016), Pak's esophagus with esophagitis (7/8/2016), Cerebrovascular accident (CVA) ([facility] (7/8/2016), Chronic fatigue syndrome (2/26/2016), Cognitive deficits (7/8/2016), Colon polyps (2007), Elevated PSA, Enthesopathy of ankle and tarsus (5/29/2014), GERD (gastroesophageal reflux disease) (7/8/2016), Hashimoto's thyroiditis (7/8/2016), History of nuclear stress test (1997, 5/2005), Hypotension (8/25/2015), Impaired cognition (7/8/2016), Inguinal hernia (7/8/2016), Insomnia (7/8/2016), 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 Nuno Mike He also  has a past surgical history that includes hx colonoscopy (2000 & 2005); hx breast biopsy (Bilateral); hx thyroidectomy; hx hernia repair (Left, 2011); hx cataract removal (Right, 2012); hx other surgical (2006); and hx other surgical. PRECAUTIONS/ALLERGIES: Brompheniramine maleate, Carisoprodol, Meperidine, Penicillin, Phenylephrine hcl, Zaleplon, Dimetapp cold and flu, Donepezil, Haldol [haloperidol lactate], Seroquel [quetiapine], Penicillins, and Rofecoxib     Problem List:  (Impairments causing functional limitations):  1. Oropharyngeal dysphagia- No symptoms identified  2.      Previous Dysphagia: NONE REPORTED  Diet Prior to Evaluation: Regular diet/thin liquids     Orientation:  Person  Place  Time    Cognitive-Linguistic Screening:   Alertness  o Alert   Speech Production:   o Fully intelligible despite low volume   Expressive Language:  o Able to effectively communicate wants and needs   Receptive Language:  o Answers yes/no questions appropriately and Follows commands   Cognition:   o Moments of restlessness with attempts to get out of bed. Reaching for items that are not present  Prior Level of Function: Lives in [facility]. Unclear baseline responsibilities. Recommendations: Given results of screening, patient with impaired cognitive-linguistic abilities. He was seen during recent admission in early April 2021. Per documentation, patient's cognitive status similar to what was observed today, but unsure his funcational baseline since that admission   Assessment of cognitive-linguistic abilities to be completed during this admission. OBJECTIVE   Oral Motor:   · Labial: No impairment  · Dentition: Intact  · Oral Hygiene: Adequate  · Lingual: No impairment    Dysphagia evaluation:   Patient presented with thin liquid via cup and straw, puree, mixed, and solid consistencies. Appropriate oral prep with all textures. Timely swallow initiation, and single swallows upon palpation. Adequate oral clearing. No overt signs or symptoms of airway compromise observed with liquid or solid textures.        Tool Used: Dysphagia Outcome and Severity Scale (AICHA)    Score Comments   Normal Diet  [] 7 With no strategies or extra time needed   Functional Swallow  [] 6 May have mild oral or pharyngeal delay   Mild Dysphagia  [] 5 Which may require one diet consistency restricted    Mild-Moderate Dysphagia  [] 4 With 1-2 diet consistencies restricted   Moderate Dysphagia  [] 3 With 2 or more diet consistencies restricted   Moderate-Severe Dysphagia  [] 2 With partial PO strategies (trials with ST only)   Severe Dysphagia  [] 1 With inability to tolerate any PO safely      Score:  Initial: 7 Most Recent: x (Date 06/03/21 )   Interpretation of Tool: The Dysphagia Outcome and Severity Scale (AICHA) is a simple, easy-to-use, 7-point scale developed to systematically rate the functional severity of dysphagia based on objective assessment and make recommendations for diet level, independence level, and type of nutrition. Current Medications:   No current facility-administered medications on file prior to encounter. Current Outpatient Medications on File Prior to Encounter   Medication Sig Dispense Refill    mirabegron ER (Myrbetriq) 50 mg ER tablet Take 50 mg by mouth daily.  lisinopriL (PRINIVIL, ZESTRIL) 10 mg tablet Take 10 mg by mouth daily.  OTHER Amantadine discontinue 9pm dose and continue 8am dose. Lisinopril 5mg Q day (If BP rises to higher than 140/90 then give another 5mg for total of 10mg in 24 hrs)  Compression socks during waking hours for help with orthostatic hypotension. 1 Act 0    OTHER Melatonin 4mg Q hs for RBD and insomnia. Can increase to 5mg in 5 days if needed 1 Act 0    droxidopa (Northera) 300 mg cap Take 300 mg by mouth three (3) times daily. 2 caps at 6am, 1 cap at 12pm, 1 cap at 5pm for 3 days then increase to 2 caps 6am, 2 caps 12pm, 1 cap 5pm for 3 days then increase to 2 caps TID. Monitor BP& HR sitting and standing in the AM and PM for 10 days and send readings to Dr. Olya Patrick. (Patient not taking: Reported on 5/25/2021) 270 Cap 3    levothyroxine (SYNTHROID) 125 mcg tablet Take 1 Tab by mouth Daily (before breakfast). 30 Tab 1    carbidopa-levodopa (SINEMET)  mg per tablet TAKE 1 TABLET BY MOUTH AT  6AM, 10AM, 2PM, AND 6PM  STRICT TIMES DUE TO  ADVANCED  Tab 3    fludrocortisone (FLORINEF) 0.1 mg tablet TAKE 1 TABLET BY MOUTH AT  6AM 90 Tab 1    melatonin 3 mg tablet 3 mg tab Q 9pm for RBD 90 Tab 3    gabapentin (NEURONTIN) 100 mg capsule Take 1 Cap by mouth three (3) times daily. 270 Cap 3    escitalopram oxalate (LEXAPRO) 10 mg tablet Take 1 Tab by mouth daily. 90 Tab 3    polyethylene glycol (MIRALAX) 17 gram packet Take 1 Packet by mouth daily. 90 Packet 3    rivastigmine tartrate (EXELON) 6 mg capsule Take 1 Cap by mouth two (2) times daily (with meals).  180 Cap 3    amantadine HCL (SYMMETREL) 100 mg tablet TAKE 1 TABLET BY MOUTH TWO  TIMES DAILY (Patient taking differently: TKE 1 TABLET BY MOUTH AT 6AM AND 2PM) 180 Tab 3    carbidopa-levodopa ER (SINEMET CR)  mg per tablet 1 tab at 10 pm strict time (Patient taking differently: 1 tab at 9pm strict time) 90 Tab 3    latanoprost (XALATAN) 0.005 % ophthalmic solution Administer 1 Drop to right eye nightly.           INTERDISCIPLINARY COLLABORATION: RN    After treatment position/precautions:  · Upright in bed  · RN notified    Total Treatment Duration:   Time In: 3088  Time Out: 1135 Verito Cao, MSP, CCC-SLP  Speech Language Pathologist  Acute Rehabilitation Services  Contact: Ham

## 2021-06-03 NOTE — PROGRESS NOTES
Spoke to patient daughter Ms. Valdez Bachelor, discussed about CODE STATUS, she reported that her father always wanted to be DNR, she also reports she is medical power of , she wants her father to be DNR. DNR order was placed.

## 2021-06-03 NOTE — PROGRESS NOTES
Pt seen in CCU during am rounds s/p admission for SDH. Spoke with daughterChirag. Confirms demographics. Pt lives at [facility]. Discussed possible need for STR at SNF. Daughter would prefer [address] if needs rehab. Ok with Located [facility] if at baseline to return to [facility]. Aware CM to follow for d/c needs/POC. PPD for potential rehab needs. Awaiting PT/OT/ST eval.       Care Management Interventions  PCP Verified by CM: Yes  Mode of Transport at Discharge:  Other (see comment)  Transition of Care Consult (CM Consult): Discharge Planning, Assisted Living (Pt lives at the [facility]. )  Discharge Durable Medical Equipment:  (access to w/c, walker, cane if needed)  Physical Therapy Consult: Yes  Occupational Therapy Consult: Yes  Speech Therapy Consult: Yes  Current Support Network: Assisted Living  Confirm Follow Up Transport: Family  The Plan for Transition of Care is Related to the Following Treatment Goals : Located within [facility] vs STR  Name of the Patient Representative Who was Provided with a Choice of Provider and Agrees with the Discharge Plan: Chelsy lawton of Choice List was Provided with Basic Dialogue that Supports the Patient's Individualized Plan of Care/Goals, Treatment Preferences and Shares the Quality Data Associated with the Providers?: Yes  The [employer] Information Provided?:  ([facility]/AARP)  Discharge Location  Discharge Placement: Unable to determine at this time

## 2021-06-03 NOTE — PROGRESS NOTES
Initial visit made to patient and a prayer was provided. A  card was left.         Laura Murphy, [address] [facility], [facility]

## 2021-06-03 NOTE — ACP (ADVANCE CARE PLANNING)
Advance Care Planning     General Advance Care Planning (ACP) Conversation      Date of Conversation: 6/2/2021  Conducted with: Healthcare Decision Maker: Next of Kin by law (only applies in absence of a Healthcare Power of  or Legal Guardian) by MD.     Healthcare Decision Maker:     Primary Decision Maker: Julio Cesar Bullard - Daughter - [phone]    Secondary Decision Maker: Jomar gamble [facility] - Sister - [phone]  Click here to complete [address] including selection of the Healthcare Decision Maker Relationship (ie \"Primary\")  Today we documented Decision Maker(s) consistent with Legal Next of Kin hierarchy. Content/Action Overview:   No LW? HCPOA on file. Daughter, legal NOK. Pt DNR per MD orders.      Length of Voluntary ACP Conversation in minutes:  <16 minutes (Non-Billable)    Ana Romano RN

## 2021-06-03 NOTE — PROGRESS NOTES
Neurosurgery    Repeat CT brain scan this morning is stable. Very small thin 3 mm right-sided subdural fluid collection with no mass-effect or shift. Assessment/plan: This should resolve spontaneously without issue. Available if needed. Thank you.     Brianna Zuniga MD

## 2021-06-04 LAB
MM INDURATION POC: 2 MM (ref 0–5)
PPD POC: POSITIVE NEGATIVE
SARS-COV-2, COV2: NORMAL

## 2021-06-04 PROCEDURE — 97167 OT EVAL HIGH COMPLEX 60 MIN: CPT

## 2021-06-04 PROCEDURE — 74011250636 HC RX REV CODE- 250/636: Performed by: HOSPITALIST

## 2021-06-04 PROCEDURE — 97162 PT EVAL MOD COMPLEX 30 MIN: CPT

## 2021-06-04 PROCEDURE — 74011250637 HC RX REV CODE- 250/637: Performed by: HOSPITALIST

## 2021-06-04 PROCEDURE — U0005 INFEC AGEN DETEC AMPLI PROBE: HCPCS

## 2021-06-04 PROCEDURE — APPSS30 APP SPLIT SHARED TIME 16-30 MINUTES: Performed by: NURSE PRACTITIONER

## 2021-06-04 PROCEDURE — 99233 SBSQ HOSP IP/OBS HIGH 50: CPT | Performed by: PSYCHIATRY & NEUROLOGY

## 2021-06-04 PROCEDURE — 97535 SELF CARE MNGMENT TRAINING: CPT

## 2021-06-04 PROCEDURE — 2709999900 HC NON-CHARGEABLE SUPPLY

## 2021-06-04 PROCEDURE — 92522 EVALUATE SPEECH PRODUCTION: CPT

## 2021-06-04 PROCEDURE — 65660000000 HC RM CCU STEPDOWN

## 2021-06-04 PROCEDURE — 97530 THERAPEUTIC ACTIVITIES: CPT

## 2021-06-04 PROCEDURE — 74011250637 HC RX REV CODE- 250/637: Performed by: NURSE PRACTITIONER

## 2021-06-04 PROCEDURE — 74011250637 HC RX REV CODE- 250/637: Performed by: INTERNAL MEDICINE

## 2021-06-04 RX ORDER — AMANTADINE HYDROCHLORIDE 100 MG/1
100 CAPSULE, GELATIN COATED ORAL 2 TIMES DAILY
Status: DISCONTINUED | OUTPATIENT
Start: 2021-06-04 | End: 2021-06-05 | Stop reason: HOSPADM

## 2021-06-04 RX ORDER — CARBIDOPA AND LEVODOPA 25; 250 MG/1; MG/1
1 TABLET ORAL
Status: DISCONTINUED | OUTPATIENT
Start: 2021-06-04 | End: 2021-06-04

## 2021-06-04 RX ORDER — CARBIDOPA AND LEVODOPA 25; 250 MG/1; MG/1
1 TABLET ORAL 4 TIMES DAILY
Status: DISCONTINUED | OUTPATIENT
Start: 2021-06-04 | End: 2021-06-05 | Stop reason: HOSPADM

## 2021-06-04 RX ADMIN — GABAPENTIN 100 MG: 100 CAPSULE ORAL at 10:18

## 2021-06-04 RX ADMIN — Medication 10 ML: at 13:52

## 2021-06-04 RX ADMIN — RIVASTIGMINE TARTRATE 6 MG: 3 CAPSULE ORAL at 10:17

## 2021-06-04 RX ADMIN — CARBIDOPA AND LEVODOPA 1 TABLET: 25; 250 TABLET ORAL at 05:27

## 2021-06-04 RX ADMIN — HYDRALAZINE HYDROCHLORIDE 10 MG: 20 INJECTION, SOLUTION INTRAMUSCULAR; INTRAVENOUS at 15:55

## 2021-06-04 RX ADMIN — AMANTADINE HYDROCHLORIDE 100 MG: 100 CAPSULE, GELATIN COATED ORAL at 13:51

## 2021-06-04 RX ADMIN — CARBIDOPA AND LEVODOPA 1 TABLET: 25; 250 TABLET ORAL at 13:51

## 2021-06-04 RX ADMIN — RIVASTIGMINE TARTRATE 6 MG: 3 CAPSULE ORAL at 16:59

## 2021-06-04 RX ADMIN — CARBIDOPA AND LEVODOPA 1 TABLET: 25; 250 TABLET ORAL at 21:56

## 2021-06-04 RX ADMIN — GABAPENTIN 100 MG: 100 CAPSULE ORAL at 21:56

## 2021-06-04 RX ADMIN — Medication 5 ML: at 05:27

## 2021-06-04 RX ADMIN — GABAPENTIN 100 MG: 100 CAPSULE ORAL at 16:55

## 2021-06-04 RX ADMIN — CARBIDOPA AND LEVODOPA 1 TABLET: 25; 250 TABLET ORAL at 17:12

## 2021-06-04 RX ADMIN — CARBIDOPA AND LEVODOPA 1 TABLET: 25; 250 TABLET ORAL at 11:18

## 2021-06-04 RX ADMIN — Medication 5 ML: at 21:56

## 2021-06-04 RX ADMIN — Medication 10 ML: at 13:54

## 2021-06-04 RX ADMIN — AMANTADINE HYDROCHLORIDE 100 MG: 100 CAPSULE, GELATIN COATED ORAL at 17:11

## 2021-06-04 NOTE — PROGRESS NOTES
06/03/21 2320   Dual Skin Pressure Injury Assessment   Dual Skin Pressure Injury Assessment WDL   Second Care Provider (Based on [address] [place] [address]) BAM, RN   Skin Integumentary   Skin Integumentary (WDL) X    Pressure  Injury Documentation No Pressure Injury Noted-Pressure Ulcer Prevention Initiated   Skin Color Ecchymosis (comment)  (scattered)   Skin Condition/Temp Dry; Warm   Turgor Epidermis thin w/ loss of subcut tissue   Wound Prevention and Protection Methods   Orientation of Wound Prevention Posterior   Location of Wound Prevention Sacrum/Coccyx   Dressing Present  Yes   Dressing Status Intact   Wound Offloading (Prevention Methods) Bed, pressure reduction mattress;Pillows

## 2021-06-04 NOTE — PROGRESS NOTES
ACUTE OT GOALS:  (Developed with and agreed upon by patient and/or caregiver.)  1. Patient will complete lower body bathing and dressing with setup and adaptive equipment as needed. 2. Patient will complete toileting with supervision. 3. Patient will tolerate 20 minutes of OT treatment with as needed rest breaks to increase activity tolerance for ADLs. 4. Patient will complete functional transfers with supervision and adaptive equipment as needed. 5. Patient will complete grooming with modified independence from seated position. .  Timeframe: 7 visits       OCCUPATIONAL THERAPY ASSESSMENT: Initial Assessment and Daily Note OT Treatment Day # 1    Jadon Robertson is a 80 y.o. male   PRIMARY DIAGNOSIS: Subdural hematoma ([facility])  Subdural hematoma ([facility] [S06.5X9A]       Reason for Referral:  Frequent falls, SDH  ICD-10: Treatment Diagnosis: Other lack of cordination (R27.8)  Repeated Falls (R29.6)  [place]PATIENT[place] / [place] / Product Type: Medicare /   ASSESSMENT:     REHAB RECOMMENDATIONS:   Recommendation to date pending progress:  [address] [place] [address]  Equipment:    To Be Determined     PRIOR LEVEL OF FUNCTION:  (Prior to Hospitalization)  INITIAL/CURRENT LEVEL OF FUNCTION:  (Based on today's evaluation)   Bathing:   Independent  Dressing:   Independent  Feeding/Grooming:   Independent  Toileting:   Independent  Functional Mobility:   Modified Independent Bathing:   Minimal Assistance  Dressing:   Minimal Assistance  Feeding/Grooming:   Minimal Assistance  Toileting:   Minimal Assistance  Functional Mobility:   Minimal Assistance     ASSESSMENT:  Mr. Elier Roman is a 80year old male with Parkinson's Disease admitted after fall, found to have non-surgical SDH. He lives in [facility], but reports being fairly independent with ADLs. He propels his w/c with his feet as he has had many falls related to orthostatic hypotension.  Today's mobility assessment was limited by BP (dropped to 64/39 in standing. See below VS). He would benefit from continued OT to increase independence and safety. Will follow. SUBJECTIVE:   Mr. Traci Perales states, \"Sometimes I need help with buttons depending on how much startch was used. \"    SOCIAL HISTORY/LIVING ENVIRONMENT: Lives in [facility]. Independent with bathing, dressing (occaisonally needs assistance with buttons). Has fallen A LOT. Primarily uses w/c for safety. Was getting HH 3x/ week. Home Environment: Assisted living  # Steps to Enter: 0  One/Two Story Residence: One story  Living Alone: Yes  Support Systems: Assisted living, Family member(s), Child(marga)    OBJECTIVE:     PAIN: VITAL SIGNS: LINES/DRAINS:   Pre Treatment: Pain Screen  Pain Scale 1: Numeric (0 - 10)  Pain Intensity 1: 0  Post Treatment: 0 Vital Signs  BP: (!) 95/56  MAP (Calculated): 69  BP Patient Position: Sitting  More BP/Pulse rows needed?: Yes  Additional Blood Pressure/Pulse Data  BP 2: (!) 64/39  MAP 2 (Calculated): (!) 47  Patient Position 2: Standing  BP 3: 96/61  MAP 3 (Calculated): 73  Patient Position 3: Sitting;Post activity; At rest IV  O2 Device: None (Room air)     GROSS EVALUATION:  BUE Within Functional Limits Abnormal/ Functional Abnormal/ Non-Functional (see comments) Not Tested Comments:   AROM [x] [] [] []    PROM [x] [] [] []    Strength [x] [] [] []    Balance [] [x] [] []    Posture [] [] [] [x]    Sensation [x] [] [] []    Coordination [] [] [x] [] Baseline Parkinson's    Tone [] [] [] [x]    Edema [] [] [] [x]    Activity Tolerance [] [] [x] [] Limited by BP     [] [] [] []      COGNITION/  PERCEPTION: Intact Impaired   (see comments) Comments:   Orientation [x] []    Vision [x] []    Hearing [x] []    Judgment/ Insight [x] []    Attention [x] []    Memory [x] []    Command Following [x] []    Emotional Regulation [x] []     [] []      ACTIVITIES OF DAILY LIVING: I Mod I S SBA CGA Min Mod Max Total NT Comments   BASIC ADLs:              Bathing/ Showering [] [] [] [] [] [] [] [] [] [x]    Toileting [] [] [] [] [] [] [] [] [] [x]    Dressing [] [] [] [] [] [] [] [] [] [x]    Feeding [] [] [] [] [] [] [] [] [] [x]    Grooming [] [] [] [x] [] [] [] [] [] [] In sitting at sink, brushing teeth, washing face    Personal Device Care [] [] [] [] [] [] [] [] [] [x]    Functional Mobility [] [] [] [] [x] [] [] [] [] []    I=Independent, Mod I=Modified Independent, S=Supervision, SBA=Standby Assistance, CGA=Contact Guard Assistance,   Min=Minimal Assistance, Mod=Moderate Assistance, Max=Maximal Assistance, Total=Total Assistance, NT=Not Tested    MOBILITY: I Mod I S SBA CGA Min Mod Max Total  NT x2 Comments:   Supine to sit [] [] [] [] [] [] [] [] [] [x] []    Sit to supine [] [] [] [] [] [] [] [] [] [x] []    Sit to stand [] [] [] [] [x] [] [] [] [] [] []    Bed to chair [] [] [] [] [] [] [] [] [] [x] []    I=Independent, Mod I=Modified Independent, S=Supervision, SBA=Standby Assistance, CGA=Contact Guard Assistance,   Min=Minimal Assistance, Mod=Moderate Assistance, Max=Maximal Assistance, Total=Total Assistance, NT=Not Tested    [address] [facility] [address] [address] AM-PAC 6 Clicks   Daily Activity Inpatient Short Form        How much help from another person does the patient currently need. .. Total A Lot A Little None   1. Putting on and taking off regular lower body clothing? [] 1   [] 2   [x] 3   [] 4   2. Bathing (including washing, rinsing, drying)? [] 1   [] 2   [x] 3   [] 4   3. Toileting, which includes using toilet, bedpan or urinal?   [] 1   [] 2   [x] 3   [] 4   4. Putting on and taking off regular upper body clothing? [] 1   [] 2   [x] 3   [] 4   5. Taking care of personal grooming such as brushing teeth? [] 1   [] 2   [x] 3   [] 4   6. Eating meals? [] 1   [] 2   [x] 3   [] 4   © 2007, Trustees of [address] [place] [address] [address] [address], under license to [facility].  All rights reserved     Score:  Initial: 18 Most Recent: X (Date: -- )   Interpretation of Tool:  Represents activities that are increasingly more difficult (i.e. Bed mobility, Transfers, Gait). PLAN:   FREQUENCY/DURATION: OT Plan of Care: 3 times/week for duration of hospital stay or until stated goals are met, whichever comes first.    PROBLEM LIST:   (Skilled intervention is medically necessary to address:)  1. Decreased ADL/Functional Activities  2. Decreased Activity Tolerance  3. Decreased Balance  4. Decreased Coordination  5. Decreased Gait Ability  6. Decreased Transfer Abilities   INTERVENTIONS PLANNED:   (Benefits and precautions of occupational therapy have been discussed with the patient.)  1. Self Care Training  2. Therapeutic Activity  3. Therapeutic Exercise/HEP  4. Neuromuscular Re-education  5. Education     TREATMENT:     EVALUATION: High Complexity : (Untimed Charge)    TREATMENT:   ($$ Self Care/Home Management: 8-22 mins    )  Self Care (8 Minutes): Self care including Grooming to increase independence.     AFTER TREATMENT POSITION/PRECAUTIONS:  Alarm Activated, Chair, Needs within reach and RN notified    INTERDISCIPLINARY COLLABORATION:  RN/PCT, PT/PTA and OT/DHALIWAL    TOTAL TREATMENT DURATION:  OT Patient Time In/Time Out  Time In: 0906  Time Out: [address] OTR/CARON

## 2021-06-04 NOTE — PROGRESS NOTES
Neurology Daily Progress Note     Assessment:     80year old male seen for hx of  PD assoicated with dyskinesia and orthostatic hypotension. Admitted for traumatic SDH s/p fall. Hallucinations have appeared to have resolved. CT of head showed acute subdural hemorrhage along the right frontal convexity. Plan:     Continue Sinemet 25/250 mg 1 tablet at 6 AM, 10 AM, 2 PM, and 6 PM.     Continue Sinemet CR 50/200 1 tablet at 9 PM.     Amantadine 100 mg twice daily at 6 AM and 2 pm.     Resume Florinef 0.1 mg po daily at 6 AM.     Orthostatic blood pressures    Recommend splenic binder. Recommend liberalizing both fluid and salt intake. Recommend drinking 16 oz of cold water in the morning prior to getting out of bed. Given frequent falls, consider utilizing wheelchair    Recommend sleeping with HOB 30 degrees    Can consider Nuedexta as outpatient. Continue home PD medications and maintain home timing of medications. If medications are not on formulary, have family bring in medications. Do not stop medications. Abrupt discontinuation/reduction of dopaminergic medications can be life threatening. If unable to swallow, give medications via NG tube. Subjective: Interval history:    Alert and oriented x3. Denies hallucinations. Dyskinetic. Stated he has been having increasing falls over the past month. History:    Chadd Joshi. is a 80 y.o. male who is being seen for PD with worsening hallucinations. Review of systems negative with exception of pertinent positives and negatives noted above.        Objective:     Vitals:    06/04/21 0751 06/04/21 0858 06/04/21 0906 06/04/21 1139   BP: 129/63 (!) 95/56 (!) 95/56 (!) 157/82   Pulse: (!) 59   (!) 58   Resp: 16   16   Temp: 98.1 °F (36.7 °C)   98.1 °F (36.7 °C)   SpO2: 97%   98%   Weight:       Height:              Current Facility-Administered Medications:     carbidopa-levodopa (SINEMET)  mg per tablet 1 Tablet, 1 Tablet, Oral, QID, Dean, Yamila L, APRN    amantadine HCL (SYMMETREL) capsule 100 mg, 100 mg, Oral, BID, RicePerlaYamila L, APRN    sodium chloride (NS) flush 5-40 mL, 5-40 mL, IntraVENous, Q8H, Buster Uribe MD, 5 mL at 06/04/21 0527    sodium chloride (NS) flush 5-40 mL, 5-40 mL, IntraVENous, PRN, Buster Uribe MD, 10 mL at 06/03/21 1525    polyethylene glycol (MIRALAX) packet 17 g, 17 g, Oral, DAILY PRN, Buster Uribe MD    ondansetron (ZOFRAN ODT) tablet 4 mg, 4 mg, Oral, Q8H PRN **OR** ondansetron (ZOFRAN) injection 4 mg, 4 mg, IntraVENous, Q6H PRN, Buster Uribe MD    hydrALAZINE (APRESOLINE) 20 mg/mL injection 10 mg, 10 mg, IntraVENous, Q6H PRN, Buster Uribe MD, 10 mg at 06/02/21 1712    labetaloL (NORMODYNE;TRANDATE) injection 20 mg, 20 mg, IntraVENous, Q6H PRN, Buster Uribe MD    rivastigmine tartrate (EXELON) capsule 6 mg, 6 mg, Oral, BID WITH MEALS, Buster Uribe MD, 6 mg at 06/04/21 1017    gabapentin (NEURONTIN) capsule 100 mg, 100 mg, Oral, TID, Buster Uribe MD, 100 mg at 06/04/21 1018    No results found for this or any previous visit (from the past 12 hour(s)). CT Results (most recent):  Results from Hospital Encounter encounter on 06/02/21    CT HEAD WO CONT    Narrative  Noncontrast CT of the brain. COMPARISON: June 2, 2021    INDICATION: Follow-up subdural hemorrhage    TECHNIQUE: Contiguous axial images were obtained from the skull base through the  vertex without IV contrast. Radiation dose reduction techniques were used for  this study:  Our CT scanners use one or all of the following: Automated exposure  control, adjustment of the mA and/or kVp according to patient's size, iterative  reconstruction. FINDINGS:    There is thin acute subdural hemorrhage along the right frontal convexity,  better appreciated on the coronal views (series 601, image 56). This measures  approximately 2-3 mm in thickness. There is no mass effect.     There is no midline shift or hydrocephalus. Cerebellum on the brainstem are  grossly unremarkable. There is no CT evidence for acute territorial infarction. Periventricular hypodensities, nonspecific and likely due to chronic small  vessel changes. Included globes appear intact. The visualized paranasal sinuses and the mastoid  air cells are aerated. Surrounding bones are stable. Impression  1. Stable thin acute subdural hemorrhage along the right frontal convexity  (approximately 2-3 mm in thickness). No mass effect or midline shift. 2. No significant change when compared to prior exam.        Physical Exam:  General - Well developed, well nourished, in no apparent distress. Pleasant and conversent. HEENT - Normocephalic, atraumatic. Conjunctiva are clear. Neck - Supple without masses  Cardiovascular - Regular rate and rhythm. Normal S1, S2 without murmurs, rubs, or gallops. Lungs - Clear to auscultation. Abdomen - Soft, nontender with normal bowel sounds. Extremities - Peripheral pulses intact. No edema and no rashes. Neurological examination - Comprehension, attention, memory and reasoning are intact. Language and speech are normal.   On cranial nerve examination, (II, III, IV, VI) pupils are equal, round, and reactive to light. Visual acuity is adequate. Visual fields are full to finger confrontation. Extraocular motility is normal. (V, VII) Face is symmetric and sensation is intact to light touch. (VIII) Hearing is intact. (IX, X) Palate and uvula elevate symmetrically. Voice is hypophonic(XI) Shoulder shrug is strong and equal bilaterally. (XII)Tongue is midline. Motor examination - There is normal muscle tone and bulk. Bradykinetic. Power is full throughout. Dyskinetic in left foot. Muscle stretch reflexes are 2+ BUE, 2+ patellar, 1+ achilles. Plantar response is flexor bilaterally. Sensation is intact to light touch, pinprick, vibration and proprioception in all extremities.  Cerebellar examination is normal. Gait and stance deferred due to increased fall risk. .        Signed By: Evita Mrak, ARIAN     June 4, 2021

## 2021-06-04 NOTE — PROGRESS NOTES
TRANSFER - IN REPORT:    Verbal report received from Peewee Smith, [facility] on [employer].  being received from ICU for routine progression of care      Report consisted of patients Situation, Background, Assessment and   Recommendations(SBAR). Information from the following report(s) SBAR was reviewed with the receiving nurse. Opportunity for questions and clarification was provided. Assessment completed upon patients arrival to unit and care assumed.

## 2021-06-04 NOTE — PROGRESS NOTES
[facility] Hospitalist Progress Note     Name:  Chadd Joshi. Age:81 y.o. Sex:male   :  1940    MRN:  [id number]     Admit Date:  2021    Reason for Admission:  Subdural hematoma [facility]) [S06.5X9A]    Hospital Course/Interval history:     Chadd Caldera is a 80 y.o. male with medical history of Parkinson's disease with dementia, history of hallucinations, history of orthostatic hypotension, frequent falls, gait instability who presented to ED with frequent falls, hallucinations from assisted living facility. Patient looks more confused, hallucinating, though not able to manage him there so patient was sent here for further evaluation. CT scan of head was obtained, CAT scan shows new right frontal small subdural hematoma. Patient has history of subdural hematomas in the past.  Neurosurgery was contacted in ED and recommended hospitalist admission, no acute intervention. Subjective (21):    Pt doing well this AM. No complaints. Awaiting therapy evals. Review of Systems: 14 point review of systems is otherwise negative with the exception of the elements mentioned above. Assessment & Plan     Subdural hematoma  Pt has a hx of Parkinsons disease and orthostatic hypotension which led to frequent falls. - CT head on admission shows thin acute right frontal convexity subdural hematoma measuring 3 mm in thickness without mass effect.  - Repeat CT head 6/3 stable  - Neurosurgery consulted and recommended no surgical intervention, this should resolve spontaneously  - awaiting PT/OT/SLP  - Fall precautions    Parkinson's disease  Dementia with behavioral disturbances  Hallucinations  Frequent falls  Secondary to orthostatic hypotension due to Parkinson's disease. UA unremarkable. [facility] Neurology outpatient. Recently taken off amantadine  Fall precautions  Cont Sinemet and rivastigmine.  Appears he was taken off of Florinef and Northera per Neurology's note on 21  - neurology consulted    Diet:  DIET ADULT  DVT PPx: SCD's d/t subdural hematoma  Code status: DNR  Disposition/Expected LOS: medically stable for d/c, awaiting therapy evals, previously residing at [facility]      Objective:     Patient Vitals for the past 24 hrs:   Temp Pulse Resp BP SpO2   06/04/21 0906    (!) 95/56    06/04/21 0751 98.1 °F (36.7 °C) (!) 59 16 129/63 97 %   06/04/21 0400 98.1 °F (36.7 °C) (!) 103 16 (!) 156/76 98 %   06/04/21 0000 97.9 °F (36.6 °C) 72 16 (!) 145/79 98 %   06/03/21 2300  65 16 (!) 134/97 100 %   06/03/21 2200  63 24 123/63 99 %   06/03/21 2144  (!) 59 19 129/73 100 %   06/03/21 2129  65 24 117/62 100 %   06/03/21 2100  96 20 111/71 99 %   06/03/21 2014 98.1 °F (36.7 °C) 100 21 114/73 98 %   06/03/21 1900  63 20 118/68 100 %   06/03/21 1844  61 (!) 37 118/62 95 %   06/03/21 1829  (!) 109 (!) 37 (!) 162/82 93 %   06/03/21 1814  (!) 103 27 138/77 96 %   06/03/21 1800  97 (!) 34 133/79 99 %   06/03/21 1744  (!) 104 17 125/82 98 %   06/03/21 1729  (!) 103 20 (!) 141/86 97 %   06/03/21 1715  (!) 101 (!) 63 (!) 148/90 97 %   06/03/21 1700  61 16 127/70 97 %   06/03/21 1645  (!) 104 27 134/78 91 %   06/03/21 1629  (!) 104 23 116/70 95 %   06/03/21 1614  77 25 126/76 97 %   06/03/21 1600  65 (!) 31 100/62 97 %   06/03/21 1545  66 15 (!) 88/53 98 %   06/03/21 1529 97.8 °F (36.6 °C) (!) 103 24 132/74 94 %   06/03/21 1514  (!) 101 15 102/68 94 %   06/03/21 1500  95 (!) 44 109/65 97 %   06/03/21 1445  94 (!) 42 109/65 97 %   06/03/21 1429  99 (!) 45 135/64 99 %   06/03/21 1415  (!) 102 25 (!) 154/70 97 %   06/03/21 1401  (!) 104 (!) 55 115/68 97 %   06/03/21 1344  (!) 106 (!) 43 (!) 129/96 98 %   06/03/21 1329  (!) 110 (!) 72 136/67 97 %   06/03/21 1314  (!) 103 30 130/66 98 %   06/03/21 1300  (!) 106 (!) 34 (!) 144/80 96 %   06/03/21 1244  99 (!) 34 127/83 99 %   06/03/21 1229  (!) 112 (!) 44 127/73 96 %   06/03/21 1214  (!) 124 27 (!) 122/58 98 %   06/03/21 1200  (!) 107 26 132/67 99 %   06/03/21 1129  (!) 109 20 (!) 144/92    06/03/21 1115  (!) 105 21 (!) 159/98    06/03/21 1100 97.6 °F (36.4 °C) (!) 106 18 (!) 147/77 100 %     Oxygen Therapy  O2 Sat (%): 97 % (06/04/21 0751)  Pulse via Oximetry: 65 beats per minute (06/03/21 2300)  O2 Device: None (Room air) (06/03/21 2014)    Body mass index is 17.9 kg/m². Physical Exam:   General:  No acute distress, speaking in full sentences, no use of accessory muscles   Lungs:  Clear to auscultation bilaterally   CV:  Regular rate and rhythm with normal S1 and S2   Abdomen:  Soft, nontender, nondistended, normoactive bowel sounds   Extremities:  No cyanosis clubbing or edema   Neuro:   Nonfocal, A&O x3. Not following commands for CN3 test but otherwise CN2-12 intact. Strength 5/5 b/l. Sensation intact b/l. No pronator drift. No slurred speech or facial droop, head bobbing  Psych:  Normal affect     Data Review:  I have reviewed all labs, meds, and studies from the last 24 hours:    Labs:    No results found for this or any previous visit (from the past 24 hour(s)). All Micro Results     None          EKG Results     Procedure 720 Value Units Date/Time    EKG, 12 LEAD, INITIAL [[id number]] Collected: 06/02/21 1508    Order Status: Completed Updated: 06/02/21 1651     Ventricular Rate 61 BPM      Atrial Rate 61 BPM      P-R Interval 146 ms      QRS Duration 100 ms      Q-T Interval 408 ms      QTC Calculation (Bezet) 410 ms      Calculated P Axis 71 degrees      Calculated R Axis -49 degrees      Calculated T Axis 62 degrees      Diagnosis --     !! AGE AND GENDER SPECIFIC ECG ANALYSIS !!   Sinus rhythm with Premature atrial complexes  Left anterior fascicular block  Abnormal ECG  When compared with ECG of 24-APR-2021 17:47,  Premature atrial complexes are now Present  Sinus rhythm is no longer with junctional escape complexes  Confirmed by Coley Siemens MD (), AMERICO NGUYEN ([id number]) on 6/2/2021 4:51:12 PM            Other Studies:  No results found.    Current Meds:   Current Facility-Administered Medications   Medication Dose Route Frequency    tuberculin injection 5 Units  5 Units IntraDERMal ONCE    sodium chloride (NS) flush 5-40 mL  5-40 mL IntraVENous Q8H    sodium chloride (NS) flush 5-40 mL  5-40 mL IntraVENous PRN    polyethylene glycol (MIRALAX) packet 17 g  17 g Oral DAILY PRN    ondansetron (ZOFRAN ODT) tablet 4 mg  4 mg Oral Q8H PRN    Or    ondansetron (ZOFRAN) injection 4 mg  4 mg IntraVENous Q6H PRN    hydrALAZINE (APRESOLINE) 20 mg/mL injection 10 mg  10 mg IntraVENous Q6H PRN    labetaloL (NORMODYNE;TRANDATE) injection 20 mg  20 mg IntraVENous Q6H PRN    rivastigmine tartrate (EXELON) capsule 6 mg  6 mg Oral BID WITH MEALS    gabapentin (NEURONTIN) capsule 100 mg  100 mg Oral TID    carbidopa-levodopa (SINEMET)  mg per tablet 1 Tablet  1 Tablet Oral 4 times per day       Problem List:  Hospital Problems as of 6/4/2021 Date Reviewed: 5/25/2021        Codes Class Noted - Resolved POA    HTN (hypertension) ICD-10-CM: I10  ICD-9-CM: 401.9  4/4/2021 - Present Yes        * (Principal) Subdural hematoma ([facility] ICD-10-CM: Q62.2I7N  ICD-9-CM: 432.1  4/1/2021 - Present Yes        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) ICD-10-CM: G20  ICD-9-CM: 332.0  8/22/2019 - Present Yes        Frequent falls (Chronic) ICD-10-CM: R29.6  ICD-9-CM: V15.88  8/20/2018 - Present Yes        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) ICD-10-CM: G20, F02.80  ICD-9-CM: 332.0, 294.10  8/20/2018 - Present Yes        Neurologic orthostatic hypotension (HCC) (Chronic) ICD-10-CM: G90.3  ICD-9-CM: 333.0  10/20/2016 - Present Yes        Parkinson disease ([facility] (Chronic) ICD-10-CM: G20  ICD-9-CM: 332.0  7/20/2016 - Present Yes                 Part of this note was written by using a voice dictation software and the note has been proof read but may still contain some grammatical/other typographical errors.     Signed By: Isaura Mcghee MD   [facility] Hospitalist Service    June 4, 2021  5:15 PM

## 2021-06-04 NOTE — PROGRESS NOTES
CM in to see patient for discharge planning. Patient is currently working with therapy who believes that patient will be appropriate to go back to [facility] with HH. Patient resides at [facility] and [facility] HH notified and message left for [facility] for their readmission criteria. PPD has been initiated and Covid testing ordered. HH PT/OT resumption orders will need to be sent to [address] [place] [address] if [facility] will admit back at current level of care. CM will continue to follow for discharge planning.

## 2021-06-04 NOTE — PROGRESS NOTES
Speech therapy note  Attempted to see pt this am, however, pt with therapy.      Susana Mcdaniels MA/CCC/SLP

## 2021-06-04 NOTE — PROGRESS NOTES
SPEECH LANGUAGE PATHOLOGY: SPEECH-LANGUAGE/COGNITION: Initial Assessment and Discharge    NAME/AGE/GENDER: Arthur Hamm is a 80 y.o. male  DATE: 6/4/2021  PRIMARY DIAGNOSIS: Subdural hematoma ([facility] [S06.5X9A]      ICD-10: Treatment Diagnosis: R13.11 Dysphagia, Oral Phase    RECOMMENDATIONS         EDUCATION:  · Recommendations discussed with Patient          RECOMMENDATIONS for CONTINUED SPEECH THERAPY: No further speech therapy indicated at this time. ASSESSMENT   Patient presents with [facility] cognitive abilities. No further ST indicated. PLAN      - Recommendations for next treatment session: No additional speech therapy indicated at this time. SUBJECTIVE   Alert sitting up in recliner    History of Present Injury/Illness: Mr. Jordyn Browne  has a past medical history of AAA (abdominal aortic aneurysm) ([facility] (7/8/2016), Pak's esophagus with esophagitis (7/8/2016), Cerebrovascular accident (CVA) ([facility] (7/8/2016), Chronic fatigue syndrome (2/26/2016), Cognitive deficits (7/8/2016), Colon polyps (2007), Elevated PSA, Enthesopathy of ankle and tarsus (5/29/2014), GERD (gastroesophageal reflux disease) (7/8/2016), Hashimoto's thyroiditis (7/8/2016), History of nuclear stress test (1997, 5/2005), Hypotension (8/25/2015), Impaired cognition (7/8/2016), Inguinal hernia (7/8/2016), Insomnia (7/8/2016), 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 Roya Alberts  He also  has a past surgical history that includes hx colonoscopy (2000 & 2005); hx breast biopsy (Bilateral); hx thyroidectomy; hx hernia repair (Left, 2011); hx cataract removal (Right, 2012); hx other surgical (2006); and hx other surgical.       Previous Speech Therapy NONE REPORTED    Orientation:   Person  Place  Time  Situation    Pain: Pain Scale 1: Numeric (0 - 10)  Pain Intensity 1: 0    OBJECTIVE   Pt completed basic cognitive-linguistic evaluation with performance as follows: simple yes/no questions 5/5, complex yes/no questions 5/5, 1-2 step commands 10/10, 3 step commands 5/5, named 10 items per minute in food category, problem solving questions 5/5, reasoning questions 5/5 and recalled 1/3 items in short term memory task. Pt's cognition appear WFL to baseline. No further ST indicated. Tool Used: MODIFIED RAHAT SCALE (mRS)   Score   No Symptoms  [] 0   No significant disability despite symptoms; able to carry out all usual duties and activities  [x] 1   Slight disability; unable to carry out all previous activities but able to look after own affairs without assistance. [] 2   Moderate disability; requiring some help but able to walk without assistance  [] 3   Moderately severe disability; unable to walk without assistance and unable to attend to own bodily needs without assistance  [] 4   Severe disability; bedridden, incontinent, and requiring constant nursing care and attention  [] 5      Score:  Initial: 1    Interpretation of Tool: The Modified [place] Scale is a 7-point scaled used to quantify level of disability as it relates to a patient's functional abilities. Current Medications:   No current facility-administered medications on file prior to encounter. Current Outpatient Medications on File Prior to Encounter   Medication Sig Dispense Refill    mirabegron ER (Myrbetriq) 50 mg ER tablet Take 50 mg by mouth daily.  lisinopriL (PRINIVIL, ZESTRIL) 10 mg tablet Take 10 mg by mouth daily.  OTHER Amantadine discontinue 9pm dose and continue 8am dose.   Lisinopril 5mg Q day (If BP rises to higher than 140/90 then give another 5mg for total of 10mg in 24 hrs)  Compression socks during waking hours for help with orthostatic hypotension. 1 Act 0    OTHER Melatonin 4mg Q hs for RBD and insomnia. Can increase to 5mg in 5 days if needed 1 Act 0    droxidopa (Northera) 300 mg cap Take 300 mg by mouth three (3) times daily. 2 caps at 6am, 1 cap at 12pm, 1 cap at 5pm for 3 days then increase to 2 caps 6am, 2 caps 12pm, 1 cap 5pm for 3 days then increase to 2 caps TID. Monitor BP& HR sitting and standing in the AM and PM for 10 days and send readings to Dr. Ashley Marina. (Patient not taking: Reported on 5/25/2021) 270 Cap 3    levothyroxine (SYNTHROID) 125 mcg tablet Take 1 Tab by mouth Daily (before breakfast). 30 Tab 1    carbidopa-levodopa (SINEMET)  mg per tablet TAKE 1 TABLET BY MOUTH AT  6AM, 10AM, 2PM, AND 6PM  STRICT TIMES DUE TO  ADVANCED  Tab 3    fludrocortisone (FLORINEF) 0.1 mg tablet TAKE 1 TABLET BY MOUTH AT  6AM 90 Tab 1    melatonin 3 mg tablet 3 mg tab Q 9pm for RBD 90 Tab 3    gabapentin (NEURONTIN) 100 mg capsule Take 1 Cap by mouth three (3) times daily. 270 Cap 3    escitalopram oxalate (LEXAPRO) 10 mg tablet Take 1 Tab by mouth daily. 90 Tab 3    polyethylene glycol (MIRALAX) 17 gram packet Take 1 Packet by mouth daily. 90 Packet 3    rivastigmine tartrate (EXELON) 6 mg capsule Take 1 Cap by mouth two (2) times daily (with meals). 180 Cap 3    amantadine HCL (SYMMETREL) 100 mg tablet TAKE 1 TABLET BY MOUTH TWO  TIMES DAILY (Patient taking differently: TKE 1 TABLET BY MOUTH AT 6AM AND 2PM) 180 Tab 3    carbidopa-levodopa ER (SINEMET CR)  mg per tablet 1 tab at 10 pm strict time (Patient taking differently: 1 tab at 9pm strict time) 90 Tab 3    latanoprost (XALATAN) 0.005 % ophthalmic solution Administer 1 Drop to right eye nightly.          INTERDISCIPLINARY COLLABORATION: Registered Nurse and   PRECAUTIONS/ALLERGIES: Brompheniramine maleate, Carisoprodol, Meperidine, Penicillin, Phenylephrine hcl, Zaleplon, Dimetapp cold and flu, Donepezil, Haldol [haloperidol lactate], Seroquel [quetiapine], Penicillins, and Rofecoxib     SAFETY:  After treatment position/precautions:  · Up in chair    Total Treatment Duration:     Time In: 1000  Time Out: Murali Cazares MA/CCC/SLP

## 2021-06-04 NOTE — PROGRESS NOTES
Comprehensive Nutrition Assessment    Type and Reason for Visit: Initial, Positive nutrition screen  Best Practice Alert for Malnutrition Screening Tool: Recently Lost Weight Without Trying: Yes, If Yes, How Much Weight Loss: 24 - 33 lbs, Eating Poorly Due to Decreased Appetite: Yes    Nutrition Recommendations/Plan:   Meals and Snacks:  Continue current diet. PDA to see pt for menu selections explained to pt ordering to assist with maintaining food selections of his preference. Nutrition Supplement Therapy:   Medical food supplement therapy:  Initiate Ensure Clear once per day (this provides 240 kcal and 8 grams protein per bottle)  Magic cup BID. Malnutrition Assessment:  Malnutrition Status: Moderate malnutrition  Context: Chronic illness  Findings of clinical characteristics of malnutrition:   Energy Intake:  Unable to assess (waxing and waning oral intake)  Weight Loss:  Unable to assess (86% UBW per EMR wt from 2019, wt stable per past yr wt hx)     Body Fat Loss:  1 - Mild body fat loss, Triceps, Fat overlying ribs   Muscle Mass Loss:  1 - Mild muscle mass loss, Hand (interosseous), Calf (gastrocnemius), Thigh (quadraceps), Temples (temporalis)  Fluid Accumulation:  Unable to assess,     Strength:  Not performed     Nutrition Assessment:   Nutrition History: Pt reports limited intake at his senior living due to food preferences. He does not provide a quantifiable nutrition hx. Cultural/Druze/Ethnic Food Preference(s): None   Nutrition Background: PMH remarkable for Parkinson's with dementia, hx of hallucinations, orthostatic hypotension, frequent falls, gait instability. Admitted with subdural hematoma, Parkinson's disease - demnetia with behavioral disturbances, hallucinations, frequent falls. Daily Update:  Pt alert and oriented times 3 at RD visit. Recalls noon meal with accuracy. Indicates hx of dislike of ensure products but likes milkshakes.   He is agreeable to try various oral supplements stating his PCP has been concerned with his wt and poor intake. Nutrition Related Findings:   SLP cleared for regular diet      Current Nutrition Therapies:  ADULT DIET Regular    Current Intake:   Average Meal Intake: 51-75% Average Supplement Intake: None ordered      Anthropometric Measures:  Height: 5' 10\" (177.8 cm)  Current Body Wt: 56.6 kg (124 lb 12.5 oz) (6/3), Weight source: Not specified  BMI: 17.9, Underweight (BMI less than 22) age over 72  Admission Body Weight: 129 lb 13.6 oz (source not specified)  Ideal Body Weight (lbs) (Calculated): 166 lbs (75 kg), 75.2 %  Usual Body Wt:  (pt states 158# 6 mos ago, wt hx past yr similar to present ), Percent weight change:            Edema: No data recorded   Estimated Daily Nutrient Needs:  Energy (kcal/day): 2186-3968 (Kcal/kg (25-30), Weight Used: Current (56.6 kg))  Protein (g/day): 67-74 (1.2-1.3 g/kg) Weight Used: (Current)  Fluid (ml/day):   (1 ml/kcal)    Nutrition Diagnosis:   · Predicted inadequate energy intake related to cognitive or neurological impairment (hx fluctuating oral intake ) as evidenced by  (pt reports eating well becuase he likes the food)    · Moderate malnutrition, In context of chronic illness related to  (waxing and waning po) as evidenced by  (criteria in malnutrition assessment)    Nutrition Interventions:   Food and/or Nutrient Delivery: Continue current diet, Start oral nutrition supplement     Coordination of Nutrition Care: Continue to monitor while inpatient    Goals: Active Goal: Meet >75% estimated needs by nutrition follow-up    Nutrition Monitoring and Evaluation:      Food/Nutrient Intake Outcomes: Food and nutrient intake, Supplement intake  Physical Signs/Symptoms Outcomes: Biochemical data, Meal time behavior    Discharge Planning:     Too soon to determine    Kathya Begum RD, LDN on 6/4/2021 at 3:26 PM  Contact: [phone]

## 2021-06-04 NOTE — PROGRESS NOTES
Problem: Pressure Injury - Risk of  Goal: *Prevention of pressure injury  Description: Document Reza Scale and appropriate interventions in the flowsheet. Outcome: Progressing Towards Goal  Note: Pressure Injury Interventions:  Sensory Interventions: Assess changes in LOC, Assess need for specialty bed    Moisture Interventions: Absorbent underpads, Apply protective barrier, creams and emollients    Activity Interventions: Chair cushion, Increase time out of bed, PT/OT evaluation    Mobility Interventions: Chair cushion, PT/OT evaluation, Pressure redistribution bed/mattress (bed type)    Nutrition Interventions: Discuss nutritional consult with provider    Friction and Shear Interventions: HOB 30 degrees or less, Sit at 90-degree angle                Problem: Patient Education: Go to Patient Education Activity  Goal: Patient/Family Education  Outcome: Progressing Towards Goal     Problem: Falls - Risk of  Goal: *Absence of Falls  Description: Document Mando Lal Fall Risk and appropriate interventions in the flowsheet. Outcome: Progressing Towards Goal  Note: Fall Risk Interventions:  Mobility Interventions: Assess mobility with egress test, Bed/chair exit alarm, Communicate number of staff needed for ambulation/transfer    Mentation Interventions: Adequate sleep, hydration, pain control, Bed/chair exit alarm, Door open when patient unattended    Medication Interventions: Evaluate medications/consider consulting pharmacy, Teach patient to arise slowly    Elimination Interventions: Patient to call for help with toileting needs, Elevated toilet seat, Call light in reach    History of Falls Interventions:  Investigate reason for fall, Door open when patient unattended

## 2021-06-04 NOTE — PROGRESS NOTES
ACUTE PHYSICAL THERAPY GOALS:  (Developed with and agreed upon by patient and/or caregiver.)  STG:  (1.)Mr. Lemos will move from supine to sit and sit to supine , scoot up and down and roll side to side with SUPERVISION within 3 treatment day(s). (2.)Mr. Lemos will transfer from bed to chair and chair to bed with CONTACT GUARD ASSIST using the least restrictive device within 3 treatment day(s). (3.)Mr. Lemos will ambulate with CONTACT GUARD ASSIST for 10 feet with the least restrictive device within 3 treatment day(s). (4.)Mr. Lemos will perform standing static and dynamic balance activities x 5 minutes with CONTACT GUARD ASSIST to improve safety within 3 treatment day(s). (5.)Mr. Lemos will maintain stable vital signs throughout all functional mobility within 3 treatment days. LTG:  (1.)Mr. Lemos will move from supine to sit and sit to supine , scoot up and down and roll side to side in bed with MODIFIED INDEPENDENCE within 7 treatment day(s). (2.)Mr. Lemos will transfer from bed to chair and chair to bed with STAND BY ASSIST using the least restrictive device within 7 treatment day(s). (3.)Mr. Lemos will ambulate with STAND BY ASSIST for 25 feet with the least restrictive device within 7 treatment day(s). (4.)Mr. Lemos will perform standing static and dynamic balance activities x 15 minutes with STAND BY ASSIST to improve safety within 7 treatment day(s). (5.)Mr. Lemos will ambulate and/or perform functional activities for 15 consecutive minutes with stable vital signs and no rests required to improve activity tolerance within 7 treatment days.   ________________________________________________________________________________________________      PHYSICAL THERAPY ASSESSMENT: Initial Assessment, Daily Note and AM PT Treatment Day # 1      Gely Monae. is a 80 y.o. male   PRIMARY DIAGNOSIS: Subdural hematoma ([address]  Subdural hematoma ([address] [U60.0K4O]       Reason for Referral: Frequent falls, SDH  ICD-10: Treatment Diagnosis: Generalized Muscle Weakness (M62.81)  Repeated Falls (R29.6)  History of falling (Z91.81)  Dizziness and Giddiness (R42)  INPATIENT: [place] / Plan[place] PART [place] B / Product Type: Medicare /     ASSESSMENT:     REHAB RECOMMENDATIONS:   Recommendation to date pending progress:  Setting:   Short-term Rehab  Equipment:    None     PRIOR LEVEL OF FUNCTION:  (Prior to Hospitalization) INITIAL/CURRENT LEVEL OF FUNCTION:  (Most Recently Demonstrated)   Bed Mobility:   Modified Independent  Sit to Stand:   Modified Independent  Transfers:   Modified Independent  Gait/Mobility:  [address] with chair follow only with physical therapist Bed Mobility:   Not tested  Sit to Stand:  [employer] Assistance x 2  Transfers:   Contact Guard Assistance x 2  Gait/Mobility:   Unable to perform     ASSESSMENT:  Mr. Stefania Duval presents with decreased strength, decreased activity tolerance and impaired balance which are impacting his ability to perform ambulation, transfers and bed mobility at their baseline level of mobility. He currently requires CGA x2 to complete sit to stand transfers, stood several minutes however BP dropped to 64/39 and patient symptomatic so unable to attempt steps. He reports ambulation has been significantly limited and his MD recommended he only ambulate with PT otherwise uses a manual wheelchair for mobility. High risk of falling with 30 falls in the past 6 months and poor activity tolerance. Patient presents with fair rehab potential secondary to poor activity tolerance. Seen with OT this date due to patient poor activity tolerance. Bel Garrett is currently functioning below his baseline and would benefit from skilled PT during acute care stay to maximize safety and independence with functional mobility. SUBJECTIVE:   Mr. Stefania Duval states, \"I am dizzy. \"    SOCIAL HISTORY/LIVING ENVIRONMENT: Patient lives in [facility], typically uses wheelchair for mobility secondary to frequent falls and orthostatic hypotension. Able to perform ADLS without assist except fine motor buttons. Reports ~30 falls in the past 6 months, 3 of which he hit his head. Only ambulates with PT at his [facility] and always with a chair follow per patient as he does not have warning when he's going to, \"pass out. \"    Home Environment: Assisted living  # Steps to Enter: 0  One/Two Story Residence: One story  Living Alone: Yes  Support Systems: Assisted living, Family member(s), Child(marga)  OBJECTIVE:     PAIN: VITAL SIGNS: LINES/DRAINS:   Pre Treatment: Pain Screen  Pain Scale 1: Numeric (0 - 10)  Pain Intensity 1: 0  Post Treatment: 0 Vital Signs  BP: (!) 95/56  MAP (Calculated): 69  BP Patient Position: Sitting  More BP/Pulse rows needed?: Yes  Additional Blood Pressure/Pulse Data  BP 2: (!) 64/39  MAP 2 (Calculated): (!) 47  Patient Position 2: Standing  BP 3: 96/61  MAP 3 (Calculated): 73  Patient Position 3: Sitting;Post activity   O2 Device: None (Room air)     GROSS EVALUATION:  BLE Within Functional Limits Abnormal/ Functional Abnormal/ Non-Functional (see comments) Not Tested Comments:   AROM [x] [] [] []    PROM [x] [] [] []    Strength [] [x] [] []    Balance [] [x] [] []    Posture [] [x] [] []    Sensation [x] [] [] []    Coordination [] [] [] [] tremor   Tone [] [x] [] []    Edema [x] [] [] []    Activity Tolerance [] [] [x] [] Low BP in standing    [] [] [] []      COGNITION/  PERCEPTION: Intact Impaired   (see comments) Comments:   Orientation [] [x] Oriented to date except thought it was 2022   Vision [] [x]    Hearing [x] []    Command Following [x] []    Safety Awareness [] [x] Chair alarm    [] []      MOBILITY: I Mod I S SBA CGA Min Mod Max Total  NT x2 Comments:   Bed Mobility    Rolling [] [] [] [] [] [] [] [] [] [x] []    Supine to Sit [] [] [] [] [] [] [] [] [] [x] []    Scooting [] [] [] [] [] [] [] [] [] [x] []    Sit to Supine [] [] [] [] [] [] [] [] [] [x] []    Transfers    Sit to Stand [] [] [] [] [x] [] [] [] [] [] [x]    Bed to Chair [] [] [] [] [] [] [] [] [] [] [x]    Stand to Sit [] [] [] [] [x] [] [] [] [] [] [x]    I=Independent, Mod I=Modified Independent, S=Supervision, SBA=Standby Assistance, CGA=Contact Guard Assistance,   Min=Minimal Assistance, Mod=Moderate Assistance, Max=Maximal Assistance, Total=Total Assistance, NT=Not Tested  GAIT: I Mod I S SBA CGA Min Mod Max Total  NT x2 Comments:   Level of Assistance [] [] [] [] [] [] [] [] [] [x] [] Patient with symptomatic hypotension in standing. Recommend chair follow if attempting ambulation. Distance     DME N/A    Gait Quality     Weightbearing Status N/A     I=Independent, Mod I=Modified Independent, S=Supervision, SBA=Standby Assistance, CGA=Contact Guard Assistance,   Min=Minimal Assistance, Mod=Moderate Assistance, Max=Maximal Assistance, Total=Total Assistance, NT=Not [facility]       How much difficulty does the patient currently have. .. Unable A Lot A Little None   1. Turning over in bed (including adjusting bedclothes, sheets and blankets)? [] 1   [] 2   [x] 3   [] 4   2. Sitting down on and standing up from a chair with arms ( e.g., wheelchair, bedside commode, etc.)   [] 1   [] 2   [x] 3   [] 4   3. Moving from lying on back to sitting on the side of the bed? [] 1   [] 2   [x] 3   [] 4   How much help from another person does the patient currently need. .. Total A Lot A Little None   4. Moving to and from a bed to a chair (including a wheelchair)? [] 1   [x] 2   [] 3   [] 4   5. Need to walk in hospital room? [] 1   [x] 2   [] 3   [] 4   6. Climbing 3-5 steps with a railing? [x] 1   [] 2   [] 3   [] 4   © 2007, Trustees of [facility] [employer], under license to [employer].  All rights reserved     Score:  Initial: 14 Most Recent: X (Date: -- )    Interpretation of Tool:  Represents activities that are increasingly more difficult (i.e. Bed mobility, Transfers, Gait). PLAN:   FREQUENCY/DURATION: PT Plan of Care: 3 times/week for duration of hospital stay or until stated goals are met, whichever comes first.    PROBLEM LIST:   (Skilled intervention is medically necessary to address:)  1. Decreased Activity Tolerance  2. Decreased Balance  3. Decreased Cognition  4. Decreased Coordination  5. Decreased Gait Ability  6. Decreased Strength  7. Decreased Transfer Abilities   INTERVENTIONS PLANNED:   (Benefits and precautions of physical therapy have been discussed with the patient.)  1. Therapeutic Activity  2. Therapeutic Exercise/HEP  3. Neuromuscular Re-education  4. Gait Training  5. Manual Therapy  6. Education     TREATMENT:     EVALUATION: Moderate Complexity : (Untimed Charge)    TREATMENT:   ($$ Therapeutic Activity: 8-22 mins    )  Co-Treatment PT/OT necessary due to patient's decreased overall endurance/tolerance levels, as well as need for high level skilled assistance to complete functional transfers/mobility and functional tasks  Therapeutic Activity (8 Minutes): Therapeutic activity included Transfer Training, Sitting balance  and Standing balance to improve functional Activity tolerance.     TREATMENT GRID:  N/A    AFTER TREATMENT POSITION/PRECAUTIONS:  Alarm Activated, Chair, Needs within reach, RN notified and working with OT    INTERDISCIPLINARY COLLABORATION:  RN/PCT, PT/PTA and OT/DHALIWAL    TOTAL TREATMENT DURATION:  PT Patient Time In/Time Out  Time In: 0858  Time Out: 0920    Kehinde Avendano PT, DPT

## 2021-06-04 NOTE — PROGRESS NOTES
[facility] CRITICAL CARE OUTREACH NURSE PROGRESS REPORT      SUBJECTIVE: Called to assess patient secondary to ICU outreach protocol. MEWS Score: 1 (06/04/21 1139)  Vitals:    06/04/21 0858 06/04/21 0906 06/04/21 1139 06/04/21 1350   BP: (!) 95/56 (!) 95/56 (!) 157/82 135/74   Pulse:   (!) 58 93   Resp:   16    Temp:   98.1 °F (36.7 °C)    SpO2:   98%    Weight:       Height:          EKG: normal EKG, normal sinus rhythm, unchanged from previous tracings. LAB DATA:    Recent Labs     06/03/21  0315 06/02/21  1454    140   K 4.3 4.3    105   CO2 28 30   AGAP 7 5*   GLU 79 77   BUN 21 24*   CREA 0.85 1.06   GFRAA >60 >60   GFRNA >60 >60   CA 8.9 9.0   MG 2.0  --    ALB 3.6 3.9   TP 6.9 6.8   GLOB 3.3 2.9   AGRAT 1.1* 1.3   ALT 6* <6*        Recent Labs     06/03/21  0315 06/02/21  1454   WBC 6.4 6.4   HGB 12.5* 11.7*   HCT 37.4* 35.9*    239          OBJECTIVE: On arrival to room, I found patient to be resting in the recliner, neurology team at bedside doing rounds. Pain Assessment  Pain Intensity 1: 0 (06/04/21 1013)        Patient Stated Pain Goal: 0                                 ASSESSMENT:  Pt comfortable in bed, resting and back to neurological standpoint. VSS and in no distress. Neurologist and NP at bedside to evaluate and educated pt on splenic binders/home care. PLAN:  Will continue monitoring per ICU outreach protocol.

## 2021-06-05 VITALS
WEIGHT: 124.78 LBS | SYSTOLIC BLOOD PRESSURE: 130 MMHG | RESPIRATION RATE: 16 BRPM | DIASTOLIC BLOOD PRESSURE: 80 MMHG | TEMPERATURE: 98.2 F | OXYGEN SATURATION: 96 % | HEIGHT: 70 IN | BODY MASS INDEX: 17.86 KG/M2 | HEART RATE: 86 BPM

## 2021-06-05 LAB
COVID-19 RAPID TEST, COVR: NOT DETECTED
MM INDURATION POC: 0 MM (ref 0–5)
PPD POC: NEGATIVE NEGATIVE
SOURCE, COVRS: NORMAL

## 2021-06-05 PROCEDURE — 97535 SELF CARE MNGMENT TRAINING: CPT

## 2021-06-05 PROCEDURE — 97112 NEUROMUSCULAR REEDUCATION: CPT

## 2021-06-05 PROCEDURE — APPSS30 APP SPLIT SHARED TIME 16-30 MINUTES: Performed by: NURSE PRACTITIONER

## 2021-06-05 PROCEDURE — 2709999900 HC NON-CHARGEABLE SUPPLY

## 2021-06-05 PROCEDURE — 74011250637 HC RX REV CODE- 250/637: Performed by: NURSE PRACTITIONER

## 2021-06-05 PROCEDURE — 74011250637 HC RX REV CODE- 250/637: Performed by: HOSPITALIST

## 2021-06-05 PROCEDURE — 87635 SARS-COV-2 COVID-19 AMP PRB: CPT

## 2021-06-05 PROCEDURE — 99232 SBSQ HOSP IP/OBS MODERATE 35: CPT | Performed by: PSYCHIATRY & NEUROLOGY

## 2021-06-05 RX ORDER — FLUDROCORTISONE ACETATE 0.1 MG/1
0.1 TABLET ORAL DAILY
Qty: 30 TABLET | Refills: 1 | Status: SHIPPED | OUTPATIENT
Start: 2021-06-06 | End: 2021-06-05

## 2021-06-05 RX ORDER — AMANTADINE HYDROCHLORIDE 100 MG/1
100 CAPSULE, GELATIN COATED ORAL 2 TIMES DAILY
Qty: 60 CAPSULE | Refills: 1 | Status: SHIPPED | OUTPATIENT
Start: 2021-06-05 | End: 2021-06-05

## 2021-06-05 RX ORDER — FLUDROCORTISONE ACETATE 0.1 MG/1
0.1 TABLET ORAL DAILY
Status: DISCONTINUED | OUTPATIENT
Start: 2021-06-05 | End: 2021-06-05 | Stop reason: HOSPADM

## 2021-06-05 RX ADMIN — AMANTADINE HYDROCHLORIDE 100 MG: 100 CAPSULE, GELATIN COATED ORAL at 17:18

## 2021-06-05 RX ADMIN — GABAPENTIN 100 MG: 100 CAPSULE ORAL at 17:18

## 2021-06-05 RX ADMIN — RIVASTIGMINE TARTRATE 6 MG: 3 CAPSULE ORAL at 17:18

## 2021-06-05 RX ADMIN — AMANTADINE HYDROCHLORIDE 100 MG: 100 CAPSULE, GELATIN COATED ORAL at 08:37

## 2021-06-05 RX ADMIN — Medication 5 ML: at 05:20

## 2021-06-05 RX ADMIN — CARBIDOPA AND LEVODOPA 1 TABLET: 25; 250 TABLET ORAL at 12:46

## 2021-06-05 RX ADMIN — CARBIDOPA AND LEVODOPA 1 TABLET: 25; 250 TABLET ORAL at 08:38

## 2021-06-05 RX ADMIN — FLUDROCORTISONE ACETATE 0.1 MG: 0.1 TABLET ORAL at 08:38

## 2021-06-05 RX ADMIN — RIVASTIGMINE TARTRATE 6 MG: 3 CAPSULE ORAL at 08:38

## 2021-06-05 RX ADMIN — CARBIDOPA AND LEVODOPA 1 TABLET: 25; 250 TABLET ORAL at 17:18

## 2021-06-05 RX ADMIN — GABAPENTIN 100 MG: 100 CAPSULE ORAL at 08:38

## 2021-06-05 NOTE — PROGRESS NOTES
ACUTE PHYSICAL THERAPY GOALS:  (Developed with and agreed upon by patient and/or caregiver.)  STG:  (1.)Mr. Lemos will move from supine to sit and sit to supine , scoot up and down and roll side to side with SUPERVISION within 3 treatment day(s). (2.)Mr. Lemos will transfer from bed to chair and chair to bed with CONTACT GUARD ASSIST using the least restrictive device within 3 treatment day(s). (3.)Mr. Lemos will ambulate with CONTACT GUARD ASSIST for 10 feet with the least restrictive device within 3 treatment day(s). (4.)Mr. Lemos will perform standing static and dynamic balance activities x 5 minutes with CONTACT GUARD ASSIST to improve safety within 3 treatment day(s). (5.)Mr. Lemos will maintain stable vital signs throughout all functional mobility within 3 treatment days.     LTG:  (1.)Mr. Lemos will move from supine to sit and sit to supine , scoot up and down and roll side to side in bed with MODIFIED INDEPENDENCE within 7 treatment day(s). (2.)Mr. Lemos will transfer from bed to chair and chair to bed with STAND BY ASSIST using the least restrictive device within 7 treatment day(s). (3.)Mr. Lemos will ambulate with STAND BY ASSIST for 25 feet with the least restrictive device within 7 treatment day(s). (4.)Mr. Lemos will perform standing static and dynamic balance activities x 15 minutes with STAND BY ASSIST to improve safety within 7 treatment day(s). (5.)Mr. Lemos will ambulate and/or perform functional activities for 15 consecutive minutes with stable vital signs and no rests required to improve activity tolerance within 7 treatment days.     PHYSICAL THERAPY: Daily Note and PM Treatment Day # 2    Tomer Mast is a 80 y.o. male   PRIMARY DIAGNOSIS: Subdural hematoma ([facility])  Subdural hematoma ([facility] [address] [Z41.1G1C]         ASSESSMENT:     REHAB RECOMMENDATIONS: CURRENT LEVEL OF FUNCTION:  (Most Recently Demonstrated)   Recommendation to date pending progress:  [facility] [place] [address] Health Therapy  Equipment:    Continued use of WC for mobility and RW training with assistance for ambulation    Bed Mobility:   Modified Independent  Sit to Stand:   Contact Guard Assistance  Transfers:   Contact Guard Assistance  Gait/Mobility:   Contact Guard Assistance     ASSESSMENT:  Mr. Ana Irby is a 80year old male with history significant for Parkinson's Disease admitted from [facility] s/p fall with resultant SDH. Patient seen today for second consecutive therapy treatment. Today, demonstrated improved mobility and functional activity tolerance: requiring CGA x1 for transfer from bed to chair and ability to propel chair using B LEs. Addressed activity pacing as well with instruction of 4x20 sets of ankle pumps prior to sit to stand transfers; no c/o dizziness throughout treatment today. Continues to demonstrate impulsivity throughout mobility as well as impaired standing balance (consistant with balance impairments PTA). Orthostatic hypotension continues to be limit to functional safety; patient not symptomatic today. Recommend HHPT at [facility] as well as CGA with transfers. Will continue to follow during acute stay. SUBJECTIVE:   Mr. Ana Irby states, \"I feel good today. \"    SOCIAL HISTORY/ LIVING ENVIRONMENT:   Patient lives in [facility], typically uses wheelchair for mobility secondary to frequent falls and orthostatic hypotension. Able to perform ADLS without assist except fine motor buttons. Reports ~30 falls in the past 6 months, 3 of which he hit his head. Only ambulates with PT at his [facility] and always with a chair follow per patient as he does not have warning when he's going to, \"pass out. \"  Home Environment: Assisted living  # Steps to Enter: 0  One/Two Story Residence: One story  Living Alone: Yes  Support Systems: Assisted living, Family member(s), Child(marga)  OBJECTIVE:     PAIN: VITAL SIGNS: LINES/DRAINS:   Pre Treatment: Pain Screen  Pain Scale 1: Numeric (0 - 10)  Pain Intensity 1: 0  Post Treatment: 0/10 IV  O2 Device: None (Room air)     MOBILITY: I Mod I S SBA CGA Min Mod Max Total  NT x2 Comments:   Bed Mobility    Rolling [x] [] [] [] [] [] [] [] [] [] []    Supine to Sit [] [x] [] [] [] [] [] [] [] [] []    Scooting [x] [] [] [] [] [] [] [] [] [] []    Sit to Supine [] [] [] [] [] [] [] [] [] [x] []    Transfers    Sit to Stand [] [] [] [] [x] [] [] [] [] [] []    Bed to Chair [] [] [] [] [x] [] [] [] [] [] []    Stand to Sit [] [] [] [] [x] [] [] [] [] [] []    I=Independent, Mod I=Modified Independent, S=Supervision, SBA=Standby Assistance, CGA=Contact Guard Assistance,   Min=Minimal Assistance, Mod=Moderate Assistance, Max=Maximal Assistance, Total=Total Assistance, NT=Not Tested    BALANCE: Good Fair+ Fair Fair- Poor NT Comments   Sitting Static [x] [] [] [] [] []    Sitting Dynamic [x] [] [] [] [] []              Standing Static [] [] [x] [] [] []    Standing Dynamic [] [] [x] [] [] []      GAIT: I Mod I S SBA CGA Min Mod Max Total  NT x2 Comments:   Level of Assistance [] [] [] [] [x] [] [] [] [] [] [] Mild impulsivity requiring cues throughout mobility   Distance 4ft    DME Rolling Walker    Gait Quality Shuffled, decreased step clearance, flexed posture    Weightbearing  Status N/A     I=Independent, Mod I=Modified Independent, S=Supervision, SBA=Standby Assistance, CGA=Contact Guard Assistance,   Min=Minimal Assistance, Mod=Moderate Assistance, Max=Maximal Assistance, Total=Total Assistance, NT=Not Tested    PLAN:   FREQUENCY/DURATION: PT Plan of Care: 3 times/week for duration of hospital stay or until stated goals are met, whichever comes first.  TREATMENT:     TREATMENT:   ($$ Neuromuscular Re-education: 8-22 mins    )  Neuromuscular Re-education (20 Minutes): Neuromuscular Re-education included Balance Training, Postural training, Sitting balance training, Standing balance training and pre-gait activity (ankle pumps to improve functional activity tolerance in standing) activity pacing training, and ambulation to improve Balance, Coordination, Functional Mobility and Postural Control.     TREATMENT GRID:  N/A    AFTER TREATMENT POSITION/PRECAUTIONS:  Alarm Activated, Chair, Needs within reach, RN notified and Visitors at bedside    INTERDISCIPLINARY COLLABORATION:  MD/PA/NP, RN/PCT, PT/PTA and RN Case Manager/     TOTAL TREATMENT DURATION:  PT Patient Time In/Time Out  Time In: 1355  Time Out: 3 Rafael Ang DPT

## 2021-06-05 NOTE — PROGRESS NOTES
ACUTE OT GOALS:  (Developed with and agreed upon by patient and/or caregiver.)  1. Patient will complete lower body bathing and dressing with setup and adaptive equipment as needed. 2. Patient will complete toileting with supervision. 3. Patient will tolerate 20 minutes of OT treatment with as needed rest breaks to increase activity tolerance for ADLs. 4. Patient will complete functional transfers with supervision and adaptive equipment as needed. 5. Patient will complete grooming with modified independence from seated position. GOAL MET 21  Timeframe: 7 visits     OCCUPATIONAL THERAPY: Daily Note OT Treatment Day # 2    Felicia Delgadillo is a 80 y.o. male   PRIMARY DIAGNOSIS: Subdural hematoma (HCC)  Subdural hematoma ([facility] [address] [S06.5X9A]       [place] / [place] / Product Type: Medicare /   ASSESSMENT:     REHAB RECOMMENDATIONS: CURRENT LEVEL OF FUNCTION:  (Most Recently Demonstrated)   Recommendation to date pending progress:  [address] [place] [address]  Equipment:    None Bathing:   Not tested  Dressing:   Contact Guard Assistance  Feeding/Grooming:   Modified Independent  Toileting:   Not tested  Functional Mobility:   Contact Guard Assistance     ASSESSMENT:  Mr. Isamar Damian is a 80year old male with Parkinson's Disease admitted after fall, found to have non-surgical SDH. Pt supine upon arrival and agreeable to therapy. Pt supervision bed mobility and did not c/o dizziness/lightheadedness with change in positions. Pt set up donning socks EOB and donning elastic waist pants EOB, then CGA for balance when pulling up pants. Pt is quick with transitions. Pt CGA RW bed>chair then mod I grooming ADLs at sink seated in chair. Pt was able to use BLE to propel chair back to position in room. Pt doing much better today and progressing well toward goals, goals updated. Pt would continue to benefit from continued OT to increase independence and safety.  Rec [facility]OT at [facility] at d/c.      SUBJECTIVE:   Mr. Isaac Shell states, \"I'll put on my pants. \"    SOCIAL HISTORY/LIVING ENVIRONMENT: Lives in [facility]. Independent with bathing, dressing (occaisonally needs assistance with buttons). Has fallen A LOT. Primarily uses w/c for safety. Was getting HH 3x/ week.    Home Environment: Assisted living  # Steps to Enter: 0  One/Two Story Residence: One story  Living Alone: Yes  Support Systems: Assisted living, Family member(s), Child(marga)    OBJECTIVE:     PAIN: VITAL SIGNS: LINES/DRAINS:   Pre Treatment: Pain Screen  Pain Scale 1: Numeric (0 - 10)  Pain Intensity 1: 0  Post Treatment: 0   none  O2 Device: None (Room air)     ACTIVITIES OF DAILY LIVING: I Mod I S SBA CGA Min Mod Max Total NT Comments   BASIC ADLs:              Bathing/ Showering [] [] [] [] [] [] [] [] [] []    Toileting [] [] [] [] [] [] [] [] [] []    Dressing [] [] [x] [] [x] [] [] [] [] [] Set up donning socks and pant EOB, then CGA when pulling up pants standing EOB   Feeding [] [] [x] [] [] [] [] [] [] [] Set up reported by pt    Grooming [] [x] [] [] [] [] [] [] [] [] Brushing teeth seated in chair   Personal Device Care [] [] [] [] [] [] [] [] [] []    Functional Mobility [] [] [] [] [x] [] [] [] [] [] RW   I=Independent, Mod I=Modified Independent, S=Supervision, SBA=Standby Assistance, CGA=Contact Guard Assistance,   Min=Minimal Assistance, Mod=Moderate Assistance, Max=Maximal Assistance, Total=Total Assistance, NT=Not Tested    MOBILITY: I Mod I S SBA CGA Min Mod Max Total  NT x2 Comments:   Supine to sit [] [] [x] [] [] [] [] [] [] [] [] supervision   Sit to supine [] [] [] [] [] [] [] [] [] [] []    Sit to stand [] [] [] [] [x] [] [] [] [] [] [] RW   Bed to chair [] [] [] [] [x] [] [] [] [] [] [] RW   I=Independent, Mod I=Modified Independent, S=Supervision, SBA=Standby Assistance, CGA=Contact Guard Assistance,   Min=Minimal Assistance, Mod=Moderate Assistance, Max=Maximal Assistance, Total=Total Assistance, NT=Not Tested    BALANCE: Good Fair+ Fair Fair- Poor NT Comments   Sitting Static [] [x] [] [] [] [] Tremors noted secondary to parkinsons   Sitting Dynamic [] [x] [] [] [] [] Supervision when donning socks EOB             Standing Static [] [] [x] [] [] [] CGA RW   Standing Dynamic [] [] [x] [] [] [] CGA RW     PLAN:   FREQUENCY/DURATION: OT Plan of Care: 3 times/week for duration of hospital stay or until stated goals are met, whichever comes first.    TREATMENT:   TREATMENT:   ($$ Self Care/Home Management: 8-22 mins    )  Self Care (18 Minutes): Self care including Lower Body Dressing, Grooming and functional transfers in prep for ADLs to increase independence and decrease level of assistance required.     TREATMENT GRID:  N/A    AFTER TREATMENT POSITION/PRECAUTIONS:  Alarm Activated, Chair, Needs within reach, RN notified and Visitors at bedside    INTERDISCIPLINARY COLLABORATION:  RN/PCT, PT/PTA and OT/DHALIWAL    TOTAL TREATMENT DURATION:  OT Patient Time In/Time Out  Time In: 1355  Time Out: [address], OT

## 2021-06-05 NOTE — PROGRESS NOTES
CM spoke with [facility] to see if patient would be able to return. Due to patient needing some assistance with eating meals, he would not be able to return to the [facility] however, CM did speak with PT to see if they can evaluate patient again today to see if he has made progress. Patient has to be able to follow cues and feed himself before he can return to [facility], per Yumiko from [facility]. CM spoke with patient's daughter to update her. Per daughter, she stated that she was just up here and the patient was eating by himself and is able to follow cues. CM will pass information on to PT. OT will be seeing patient also. 4:30 - PT/OT martha states that patient has made progress from yesterday. Patient is cleared to go back to [facility]. CM spoke with Daly Smith from [facility] who confirmed again that it is okay for patient to return today; she did ask that we do a Covid Rapid test. RN notified to complete swab, in which swab came back negative. CM spoke with the patient's daughter Guadalupe Ngo again and let her know that patient would be discharging back to facility today. She will be picking patient up around 5:20 and will be taking patient straight to [facility]. CM called Daly Smith and let her know to be expecting patient around 6:00. Packet completed and placed at 's desk. CM confirmed with MD that patient did not have any new scripts going with him. CM placed order for [facility] and faxed referral to resume services to Bruno Martins at [facility]. Patient has a sister, Ashely Wilkinson in the room that requested to speak with CM at the  to get clarification about the patient discharging back to facility. She also stated that she would tell the patient that he was going back today. No other needs identified at this time. Care Management Interventions  PCP Verified by CM: Yes  Mode of Transport at Discharge:  Other (see comment)  Transition of Care Consult (CM Consult): Discharge Planning, Assisted Living (Pt lives at the [facility]. )  Discharge Durable Medical Equipment:  (access to w/c, walker, cane if needed)  Physical Therapy Consult: Yes  Occupational Therapy Consult: Yes  Speech Therapy Consult: Yes  Current Support Network: Assisted Living ([facility])  Confirm Follow Up Transport: Family (patient's daughter taking patient back to [facility])  The Plan for Transition of Care is Related to the Following Treatment Goals : [place] vs STR  Name of the Patient Representative Who was Provided with a Choice of Provider and Agrees with the Discharge Plan: David lawton of Choice List was Provided with Basic Dialogue that Supports the Patient's Individualized Plan of Care/Goals, Treatment Preferences and Shares the Quality Data Associated with the Providers?: Yes  The [employer] Information Provided?:  (MCR/AARP)  Discharge Location  Discharge Placement: Assisted Living (Back to [facility] with [facility])

## 2021-06-05 NOTE — PROGRESS NOTES
Neurology Daily Progress Note     Assessment:     80year old male seen for hx of  PD assoicated with dyskinesia and orthostatic hypotension. Admitted for traumatic SDH s/p fall. Hallucinations have appeared to have resolved. CT of head showed acute subdural hemorrhage along the right frontal convexity, stable. Orthostatic BP positive with SBP drop of 40 pts, continue florinef 0.1 mg po daily. No additional neurological workup is needed at this time. He can follow up in the outpatient clinic after discharge. Will sign off. Plan:     Continue Sinemet 25/250 mg 1 tablet at 6 AM, 10 AM, 2 PM, and 6 PM.     Continue Sinemet CR 50/200 1 tablet at 9 PM.     Amantadine 100 mg twice daily at 6 AM and 2 pm.     Continue Florinef 0.1 mg po daily at 6 AM.     Orthostatic blood pressures    Recommend splenic binder. Recommend liberalizing both fluid and salt intake. Recommend drinking 16 oz of cold water in the morning prior to getting out of bed. Recommend sleeping with HOB 30 degrees    Avoid hot showers and hot climate. Consider utilizing  wheelchair if all other measures are unsuccessful or continues to have recurrent falls     Can consider Nuedexta as outpatient. Continue home PD medications and maintain home timing of medications. If medications are not on formulary, have family bring in medications. Do not stop medications. Abrupt discontinuation/reduction of dopaminergic medications can be life threatening. If unable to swallow, give medications via NG tube. Neurology follow up in 3-4 weeks after discharge with Dr. Santy Carrillo in the outpatient clinic after discharge. Subjective: Interval history:    Somnolent, arouses to voice and oriented x3. No tremor or dyskinetics movements. Positive orthostatic BP, continue florinef. Continue Sinemet and amantadine.      History:    Homero Vazquez. is a 80 y.o. male who is being seen for PD with worsening hallucinations. Review of systems negative with exception of pertinent positives and negatives noted above. Objective:     Vitals:    06/04/21 2316 06/04/21 2352 06/05/21 0458 06/05/21 0755   BP:  107/65 105/65 139/81   Pulse: 94 89 74 91   Resp:  16 16 16   Temp:  97.9 °F (36.6 °C) 98.4 °F (36.9 °C) 98.4 °F (36.9 °C)   SpO2:  96% 98% 94%   Weight:       Height:              Current Facility-Administered Medications:     fludrocortisone (FLORINEF) tablet 0.1 mg, 0.1 mg, Oral, DAILY, Rice, Yamila L, APRN, 0.1 mg at 06/05/21 0838    carbidopa-levodopa (SINEMET)  mg per tablet 1 Tablet, 1 Tablet, Oral, QID, Rice, Yamila L, APRN, 1 Tablet at 06/05/21 0838    amantadine HCL (SYMMETREL) capsule 100 mg, 100 mg, Oral, BID, Rice, Yamila L, APRN, 100 mg at 06/05/21 0837    sodium chloride (NS) flush 5-40 mL, 5-40 mL, IntraVENous, Q8H, Eric Dupree MD, 5 mL at 06/05/21 0520    sodium chloride (NS) flush 5-40 mL, 5-40 mL, IntraVENous, PRN, Eric Dupree MD, 10 mL at 06/03/21 1525    polyethylene glycol (MIRALAX) packet 17 g, 17 g, Oral, DAILY PRN, Eric Dupree MD    ondansetron (ZOFRAN ODT) tablet 4 mg, 4 mg, Oral, Q8H PRN **OR** ondansetron (ZOFRAN) injection 4 mg, 4 mg, IntraVENous, Q6H PRN, Eric Dupree MD    hydrALAZINE (APRESOLINE) 20 mg/mL injection 10 mg, 10 mg, IntraVENous, Q6H PRN, Eric Dupree MD, 10 mg at 06/04/21 1555    labetaloL (NORMODYNE;TRANDATE) injection 20 mg, 20 mg, IntraVENous, Q6H PRN, Eric Dupree MD    rivastigmine tartrate (EXELON) capsule 6 mg, 6 mg, Oral, BID WITH MEALS, rEic Dupree MD, 6 mg at 06/05/21 6648    gabapentin (NEURONTIN) capsule 100 mg, 100 mg, Oral, TID, Eric Dupree MD, 100 mg at 06/05/21 0838    No results found for this or any previous visit (from the past 12 hour(s)). CT Results (most recent):  Results from Hospital Encounter encounter on 06/02/21    CT HEAD WO CONT    Narrative  Noncontrast CT of the brain.     COMPARISON: June 2, 2021    INDICATION: Follow-up subdural hemorrhage    TECHNIQUE: Contiguous axial images were obtained from the skull base through the  vertex without IV contrast. Radiation dose reduction techniques were used for  this study:  Our CT scanners use one or all of the following: Automated exposure  control, adjustment of the mA and/or kVp according to patient's size, iterative  reconstruction. FINDINGS:    There is thin acute subdural hemorrhage along the right frontal convexity,  better appreciated on the coronal views (series 601, image 56). This measures  approximately 2-3 mm in thickness. There is no mass effect. There is no midline shift or hydrocephalus. Cerebellum on the brainstem are  grossly unremarkable. There is no CT evidence for acute territorial infarction. Periventricular hypodensities, nonspecific and likely due to chronic small  vessel changes. Included globes appear intact. The visualized paranasal sinuses and the mastoid  air cells are aerated. Surrounding bones are stable. Impression  1. Stable thin acute subdural hemorrhage along the right frontal convexity  (approximately 2-3 mm in thickness). No mass effect or midline shift. 2. No significant change when compared to prior exam.        Physical Exam:  General - Well developed, well nourished, in no apparent distress. Pleasant and conversent. HEENT - Normocephalic, atraumatic. Conjunctiva are clear. Neck - Supple without masses  Cardiovascular - Regular rate and rhythm. Normal S1, S2 without murmurs, rubs, or gallops. Lungs - Clear to auscultation. Abdomen - Soft, nontender with normal bowel sounds. Extremities - Peripheral pulses intact. No edema and no rashes. Neurological examination - Comprehension, attention, memory and reasoning are intact. Language and speech are normal.   On cranial nerve examination, (II, III, IV, VI) pupils are equal, round, and reactive to light. Visual acuity is adequate.  Visual fields are full to finger confrontation. Extraocular motility is normal. (V, VII) Face is symmetric and sensation is intact to light touch. (VIII) Hearing is intact. (IX, X) Palate and uvula elevate symmetrically. Voice is hypophonic(XI) Shoulder shrug is strong and equal bilaterally. (XII)Tongue is midline. Motor examination - There is normal muscle tone and bulk. Bradykinetic. Power is full throughout. Dyskinetic in left foot. Muscle stretch reflexes are 2+ BUE, 2+ patellar, 1+ achilles. Plantar response is flexor bilaterally. Sensation is intact to light touch, pinprick, vibration and proprioception in all extremities. Cerebellar examination is normal. Gait and stance deferred due to increased fall risk. .        Signed By: ARIAN Martell     June 5, 2021

## 2021-06-05 NOTE — PROGRESS NOTES
[facility] Hospitalist Progress Note     Name:  Cyndee Phelps. Age:81 y.o. Sex:male   :  1940    MRN:  [id number]     Admit Date:  2021    Reason for Admission:  Subdural hematoma [facility]) [S06.5X9A]    Hospital Course/Interval history:     Cyndee Phelps. is a 80 y.o. male with medical history of Parkinson's disease with dementia, history of hallucinations, history of orthostatic hypotension, frequent falls, gait instability who presented to ED with frequent falls, hallucinations from assisted living facility. Patient looks more confused, hallucinating, though not able to manage him there so patient was sent here for further evaluation. CT scan of head was obtained, CAT scan shows new right frontal small subdural hematoma. Patient has history of subdural hematomas in the past.  Neurosurgery was contacted in ED and recommended hospitalist admission, no acute intervention. Subjective (21): No new issues. Awaiting therapy eval. Pt reports feeling well and ready to be discharged. Review of Systems: 14 point review of systems is otherwise negative with the exception of the elements mentioned above. Assessment & Plan     Subdural hematoma  Pt has a hx of Parkinsons disease and orthostatic hypotension which led to frequent falls.   - CT head on admission shows thin acute right frontal convexity subdural hematoma measuring 3 mm in thickness without mass effect.  - Repeat CT head 6/3 stable  - Neurosurgery consulted and recommended no surgical intervention, this should resolve spontaneously  - PT/OT   - f/u with NS with repeat head CT in 2 weeks    Parkinson's disease  Dementia with behavioral disturbances  Hallucinations  Frequent falls  Secondary to orthostatic hypotension due to Parkinson's disease.   - evaluated by neurology, recommendations below:   -Amantadine 100 mg twice daily at 6 AM and 2 pm.     -Continue Florinef 0.1 mg po daily at 6 AM.    -Recommend splenic binder.   -Recommend liberalizing both fluid and salt intake. Recommend drinking 16 oz of cold water in the morning  prior to getting out of bed.     -Recommend sleeping with HOB 30 degrees    -Avoid hot showers and hot climate.    -Consider utilizing  wheelchair if all other measures are  unsuccessful or continues to have recurrent falls    -Can consider Nuedexta as outpatient. Diet:  DIET ADULT  DIET ADULT ORAL NUTRITION SUPPLEMENT  DIET ADULT ORAL NUTRITION SUPPLEMENT  DVT PPx: SCD's d/t subdural hematoma  Code status: DNR  Disposition/Expected LOS: medically stable for d/c, awaiting therapy evals, previously residing at [facility]      Objective:     Patient Vitals for the past 24 hrs:   Temp Pulse Resp BP SpO2   06/05/21 1114 98.4 °F (36.9 °C) 61 16 137/86 98 %   06/05/21 0755 98.4 °F (36.9 °C) 91 16 139/81 94 %   06/05/21 0458 98.4 °F (36.9 °C) 74 16 105/65 98 %   06/04/21 2352 97.9 °F (36.6 °C) 89 16 107/65 96 %   06/04/21 2316  94      06/04/21 1928 98.3 °F (36.8 °C) 99 16 (!) 98/55 99 %   06/04/21 1653    (!) 143/83    06/04/21 1557 98.1 °F (36.7 °C) 86 16 (!) 158/80 97 %   06/04/21 1556 98.1 °F (36.7 °C) 98 16 (!) 77/47 (!) 80 %   06/04/21 1555 98.1 °F (36.7 °C) 62 16 (!) 168/92 98 %     Oxygen Therapy  O2 Sat (%): 98 % (06/05/21 1114)  Pulse via Oximetry: 65 beats per minute (06/03/21 2300)  O2 Device: None (Room air) (06/03/21 2014)    Body mass index is 17.9 kg/m². Physical Exam:   General:  No acute distress, speaking in full sentences, no use of accessory muscles   Lungs:  Clear to auscultation bilaterally   CV:  Regular rate and rhythm with normal S1 and S2   Abdomen:  Soft, nontender, nondistended, normoactive bowel sounds   Extremities:  No cyanosis clubbing or edema   Neuro:   Nonfocal, A&O x3. Not following commands for CN3 test but otherwise CN2-12 intact. Strength 5/5 b/l. Sensation intact b/l. No pronator drift.  No slurred speech or facial droop, head bobbing  Psych:  Normal affect Data Review:  I have reviewed all labs, meds, and studies from the last 24 hours:    Labs:    Recent Results (from the past 24 hour(s))   SARS-COV-2    Collection Time: 06/04/21  5:29 PM   Result Value Ref Range    SARS-CoV-2 Please find results under separate order         All Micro Results     Procedure Component Value Units Date/Time    SARS-COV-2, PCR [[id number]] Collected: 06/04/21 1729    Order Status: Completed Updated: 06/04/21 1749          EKG Results     Procedure 720 Value Units Date/Time    EKG, 12 LEAD, INITIAL [[id number]] Collected: 06/02/21 1508    Order Status: Completed Updated: 06/02/21 1651     Ventricular Rate 61 BPM      Atrial Rate 61 BPM      P-R Interval 146 ms      QRS Duration 100 ms      Q-T Interval 408 ms      QTC Calculation (Bezet) 410 ms      Calculated P Axis 71 degrees      Calculated R Axis -49 degrees      Calculated T Axis 62 degrees      Diagnosis --     !! AGE AND GENDER SPECIFIC ECG ANALYSIS !! Sinus rhythm with Premature atrial complexes  Left anterior fascicular block  Abnormal ECG  When compared with ECG of 24-APR-2021 17:47,  Premature atrial complexes are now Present  Sinus rhythm is no longer with junctional escape complexes  Confirmed by Karoline Silverio MD (), AMERICO NGUYEN ([id number]) on 6/2/2021 4:51:12 PM            Other Studies:  No results found.     Current Meds:   Current Facility-Administered Medications   Medication Dose Route Frequency    fludrocortisone (FLORINEF) tablet 0.1 mg  0.1 mg Oral DAILY    carbidopa-levodopa (SINEMET)  mg per tablet 1 Tablet  1 Tablet Oral QID    amantadine HCL (SYMMETREL) capsule 100 mg  100 mg Oral BID    sodium chloride (NS) flush 5-40 mL  5-40 mL IntraVENous Q8H    sodium chloride (NS) flush 5-40 mL  5-40 mL IntraVENous PRN    polyethylene glycol (MIRALAX) packet 17 g  17 g Oral DAILY PRN    ondansetron (ZOFRAN ODT) tablet 4 mg  4 mg Oral Q8H PRN    Or    ondansetron (ZOFRAN) injection 4 mg  4 mg IntraVENous Q6H PRN    hydrALAZINE (APRESOLINE) 20 mg/mL injection 10 mg  10 mg IntraVENous Q6H PRN    labetaloL (NORMODYNE;TRANDATE) injection 20 mg  20 mg IntraVENous Q6H PRN    rivastigmine tartrate (EXELON) capsule 6 mg  6 mg Oral BID WITH MEALS    gabapentin (NEURONTIN) capsule 100 mg  100 mg Oral TID       Problem List:  Hospital Problems as of 6/5/2021 Date Reviewed: 5/25/2021        Codes Class Noted - Resolved POA    HTN (hypertension) ICD-10-CM: I10  ICD-9-CM: 401.9  4/4/2021 - Present Yes        * (Principal) Subdural hematoma (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) ICD-10-CM: Claudis Stai  ICD-9-CM: 332.0  8/22/2019 - Present Yes        Frequent falls (Chronic) ICD-10-CM: R29.6  ICD-9-CM: V15.88  8/20/2018 - Present Yes        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) ICD-10-CM: G20, F02.80  ICD-9-CM: 332.0, 294.10  8/20/2018 - Present Yes        Neurologic orthostatic hypotension (HCC) (Chronic) ICD-10-CM: G90.3  ICD-9-CM: 333.0  10/20/2016 - Present Yes        Parkinson disease ([facility] [address] (Chronic) ICD-10-CM: G20  ICD-9-CM: 332.0  7/20/2016 - Present Yes                 Part of this note was written by using a voice dictation software and the note has been proof read but may still contain some grammatical/other typographical errors.     Signed By: Graydon Klinefelter, MD   [employer] [facility]    June 5, 2021  5:15 PM

## 2021-06-05 NOTE — ROUTINE PROCESS
The pt d/c to correction with HH. D/C instructions and scripts provided by primary RN. The pt assisted to lobby in w/chair

## 2021-06-05 NOTE — PROGRESS NOTES
Date of Outreach Update:  Miranda Gandhi was seen and assessed. MEWS Score: 1 (06/04/21 2352)  Vitals:    06/04/21 1653 06/04/21 1928 06/04/21 2316 06/04/21 2352   BP: (!) 143/83 (!) 98/55  107/65   Pulse:  99 94 89   Resp:  16  16   Temp:  98.3 °F (36.8 °C)  97.9 °F (36.6 °C)   SpO2:  99%  96%   Weight:       Height:             Pain Assessment  Pain Intensity 1: 0 (06/04/21 2007)        Patient Stated Pain Goal: 0      Previous Outreach assessment has been reviewed. There have been no significant clinical changes since the completion of the last dated Outreach assessment. Pt resting comfortably in bed. Easily arousable. VALENTINE. Denies any headaches or vision problems at this time. Pupils continue to be unequal.    Will continue to follow up per outreach protocol.     Signed By:   Kym Croft RN    June 5, 2021 3:46 AM

## 2021-06-05 NOTE — PROGRESS NOTES
Problem: Pressure Injury - Risk of  Goal: *Prevention of pressure injury  Description: Document Reza Scale and appropriate interventions in the flowsheet. Outcome: Progressing Towards Goal  Note: Pressure Injury Interventions:  Sensory Interventions: Assess need for specialty bed    Moisture Interventions: Absorbent underpads, Assess need for specialty bed    Activity Interventions: Increase time out of bed, Pressure redistribution bed/mattress(bed type), PT/OT evaluation    Mobility Interventions: HOB 30 degrees or less, PT/OT evaluation, Pressure redistribution bed/mattress (bed type)    Nutrition Interventions: Offer support with meals,snacks and hydration    Friction and Shear Interventions: Foam dressings/transparent film/skin sealants, HOB 30 degrees or less                Problem: Patient Education: Go to Patient Education Activity  Goal: Patient/Family Education  Outcome: Progressing Towards Goal     Problem: Falls - Risk of  Goal: *Absence of Falls  Description: Document Jadyn Fall Risk and appropriate interventions in the flowsheet.   Outcome: Progressing Towards Goal  Note: Fall Risk Interventions:  Mobility Interventions: Bed/chair exit alarm, Communicate number of staff needed for ambulation/transfer    Mentation Interventions: Bed/chair exit alarm    Medication Interventions: Bed/chair exit alarm, Teach patient to arise slowly    Elimination Interventions: Call light in reach, Toileting schedule/hourly rounds    History of Falls Interventions: Door open when patient unattended, Consult care management for discharge planning

## 2021-06-05 NOTE — DISCHARGE INSTRUCTIONS
Patient Education   Patient Education        Learning About a Hemorrhagic Stroke  What is a hemorrhagic stroke? When you have a hemorrhagic (say \"thq-sen-SK-shima\") stroke, it means that a blood vessel in the brain has burst open or has started to leak. When the blood spills into the space inside and around the brain, it damages nearby nerve cells. This is different from an ischemic (say \"ord-RGA-fatr\") stroke, which happens when a blood clot blocks a blood vessel in the brain. The brain damage from a stroke starts within minutes. Quick treatment can help limit damage to the brain and make recovery more likely. People who have had a stroke may have a hard time talking, understanding things, and making decisions. They may have to relearn daily activities, such as how to eat, bathe, and dress. How well someone recovers from a stroke depends on how quickly the person gets to the hospital, where in the brain the stroke happened, and how severe it was. Stroke rehabilitation, which includes training and therapy, also helps people recover. What are the symptoms? If you have any of these symptoms, call 911 or other emergency services right away. · You have symptoms of a stroke. These may include:  ? Sudden numbness, tingling, weakness, or loss of movement in your face, arm, or leg, especially on only one side of your body. ? Sudden vision changes. ? Sudden trouble speaking. ? Sudden confusion or trouble understanding simple statements. ? Sudden problems with walking or balance. ? A sudden, severe headache that is different from past headaches. See your doctor if you have symptoms that seem like a stroke, even if they go away quickly. You may have had a transient ischemic attack (TIA), sometimes called a mini-stroke. A TIA is a warning that a stroke may happen soon. Getting early treatment for a TIA can help prevent a stroke. What causes a hemorrhagic stroke?   A hemorrhagic stroke happens to blood vessels that have been weakened. The most common causes of weakened blood vessels in the brain are:  · A brain aneurysm. This is a bulging, weak part of a blood vessel. It can put pressure on nerves, or it can bleed or break open (rupture). · A brain AVM. This is an abnormal knot of weak blood vessels that some people are born with. · Head injury. · Chronic uncontrolled high blood pressure. Blood pressure that is too high over the long term increases your risk for stroke. · Very high blood pressure. Very high blood pressure that comes on suddenly is dangerous. It is a medical emergency. Anticoagulant and antiplatelet medicines can also cause bleeding in the brain. These medicines, also called blood thinners, increase the time it takes for a blood clot to form. How is hemorrhagic stroke treated? Emergency treatment is done to stop the bleeding and prevent damage to the brain. · You may need surgery to repair an aneurysm or to remove the blood that has built up inside the brain. · You may be given medicine to stop the bleeding. · You will be closely watched for signs of increased pressure on the brain. These signs include restlessness, confusion, trouble following commands, and headache. · You may take medicine to manage high blood pressure. Ask your doctor if a stroke rehab program is right for you. Rehab increases your chances of getting back some of the abilities you lost.  How can you prevent another stroke? · Work with your doctor to treat health problems, such as high blood pressure, that raise your chances of having another stroke. · Be safe with medicines. Take your medicine exactly as prescribed. Call your doctor if you think you are having a problem with your medicine. · Have a healthy lifestyle. ? Do not smoke or allow others to smoke around you. If you need help quitting, talk to your doctor about stop-smoking programs and medicines. These can increase your chances of quitting for good.  Smoking makes a stroke more likely. ? Limit alcohol to 2 drinks a day for men and 1 drink a day for women. ? Be active. Ask your doctor what type and level of activity is safe for you.  ? Eat heart-healthy foods, like fruits, vegetables, and high-fiber foods. ? Stay at a healthy weight. Lose weight if you need to. Follow-up care is a key part of your treatment and safety. Be sure to make and go to all appointments, and call your doctor if you are having problems. It's also a good idea to know your test results and keep a list of the medicines you take. Where can you learn more? Go to http://www.gray.com/  Enter R416 in the search box to learn more about \"Learning About a Hemorrhagic Stroke. \"  Current as of: March 4, 2020               Content Version: 12.8  © 2006-2021 [email or web address]. Care instructions adapted under license by [employer] (which disclaims liability or warranty for this information). If you have questions about a medical condition or this instruction, always ask your healthcare professional. [place] [address] any warranty or liability for your use of this information. Learning About Subarachnoid Hemorrhage  What is a subarachnoid hemorrhage? Hemorrhage means heavy bleeding. The space between the brain and the tissue that covers the brain is called the subarachnoid space. So this kind of hemorrhage is bleeding in that space just outside the brain. When blood spills into this small space, it builds up and presses on the brain. This often causes sudden and severe head pain. Other symptoms sometimes include nausea and vomiting, neck pain, or vision problems. Some people pass out or have a seizure. But the most common symptom is what many people describe as \"the worst headache of my life. \"  This hemorrhage is a type of stroke. Quick treatment is needed to prevent brain damage and death.   People who have brain damage from a subarachnoid hemorrhage may have a hard time talking, understanding things, and making decisions. They may have to relearn daily activities, such as how to eat, bathe, and dress. How well someone recovers depends on how quickly the person gets to the hospital and how severe the hemorrhage was. A stroke rehab program may help. What causes it? Most subarachnoid hemorrhages are caused when a brain aneurysm bursts. An aneurysm (say \"UGN-rya-nic-um\") is a bulging, weak area in the wall of an artery that supplies blood to the brain. It can be hard to know what exactly caused a brain aneurysm and why it burst. Many things can raise the risk of this, such as smoking, high blood pressure, and a family history of aneurysms. This type of hemorrhage can also be caused by a head injury. How is it treated? The goal of treatment is to prevent brain damage, more bleeding, and other serious problems. You will likely be in the hospital's intensive care unit, where your medical team can keep a close watch on you. They will work to control your blood pressure, manage pain, and watch for symptoms of brain damage. You may have more tests to find out for sure that an aneurysm caused the bleeding. If you have an aneurysm, you may have a surgery to fix it. This can help prevent another bleeding episode. Two types of surgery can be used:  · Clipping. The doctor makes cuts (incisions) in your scalp and through the bone of your skull. Then the doctor places a tiny metal clip over the weak part of the blood vessel. This stops the flow of blood. · Coiling. The doctor makes a cut in your groin or wrist. Then the doctor moves a small plastic tube through the cut and into a blood vessel. The tube is called a catheter. Using X-rays, the doctor gently guides the catheter through the blood vessel up to the brain aneurysm. Then the doctor uses a tool to fill up the aneurysm with tiny wire coils or block the opening.  This keeps blood from getting back into the aneurysm. You will need treatment even if your symptoms go away. This is because there is a good chance that the area will bleed again. Follow-up care is a key part of your treatment and safety. Be sure to make and go to all appointments, and call your doctor if you are having problems. It's also a good idea to know your test results and keep a list of the medicines you take. Where can you learn more? Go to http://www.gray.com/  Enter K443 in the search box to learn more about \"Learning About Subarachnoid Hemorrhage. \"  Current as of: March 4, 2020               Content Version: 12.8  © 3451-8429 [employer]. Care instructions adapted under license by [employer] (which disclaims liability or warranty for this information). If you have questions about a medical condition or this instruction, always ask your healthcare professional. [address] any warranty or liability for your use of this information.

## 2021-06-05 NOTE — DISCHARGE SUMMARY
[facility] Hospitalist Discharge Summary     Name:  Taryn Macedo. Age:81 y.o. Sex:male  :  1940       MRN:  [id number]       Admitting Physician: Merari Johnson MD Admit Date: 2021  2:51 PM   Attending Physician: Oriana Bernal MD  Primary Care Physician: Laila Alaniz MD       Discharge Physician: Cathie Burton MD  Discharge date: 21   Discharged Condition: Stable    Indication for Admission:   Chief Complaint   Patient presents with    Fall        Reasons for hospitalization:  Hospital Problems as of 2021 Date Reviewed: 2021        Codes Class Noted - Resolved POA    HTN (hypertension) ICD-10-CM: I10  ICD-9-CM: 401.9  2021 - Present Yes        * (Principal) [facility] ICD-10-CM: M25.0Q1C  ICD-9-CM: 432.1  2021 - Present Yes        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) ICD-10-CM: G20  ICD-9-CM: 332.0  2019 - Present Yes        Frequent falls (Chronic) ICD-10-CM: R29.6  ICD-9-CM: V15.88  2018 - Present Yes        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 is a 80 y. o. male with medical history of Parkinson's disease with dementia, history of hallucinations, history of orthostatic hypotension, frequent falls, gait instability who presented to ED with frequent falls, hallucinations from assisted living facility.  Patient looks more confused, hallucinating, though not able to manage him there so patient was sent here for further evaluation.  CT scan of head was obtained, CAT scan shows new right frontal small subdural hematoma.  Patient has history of subdural hematomas in the past.  Neurosurgery was contacted in ED and recommended hospitalist admission, no acute intervention. Assessment/Plan:  Subdural hematoma  Pt has a hx of Parkinsons disease and orthostatic hypotension which led to frequent falls. - CT head on admission shows thin acute right frontal convexity subdural hematoma measuring 3 mm in thickness without mass effect.  - Repeat CT head 6/3 stable  - Neurosurgery consulted and recommended no surgical intervention, this should resolve spontaneously  - PT/OT recommending to return to assisted living with [facility]  - f/u with NS with repeat head CT in 2 weeks     Parkinson's disease  Dementia with behavioral disturbances  Hallucinations  Frequent falls  Secondary to orthostatic hypotension due to Parkinson's disease.   - evaluated by neurology, recommendations below:              -Amantadine 100 mg twice daily at 6 AM and 2 pm.               -Continue Florinef 0.1 mg po daily at 6 AM.               -Recommend splenic binder.              -Recommend liberalizing both fluid and salt intake. Recommend drinking 16 oz of cold water in the morning   prior to getting out of bed.                -Recommend sleeping with HOB 30 degrees              -Avoid hot showers and hot climate.              -Consider utilizing  wheelchair if all other measures are           unsuccessful or continues to have recurrent falls               -Can consider Nuedexta as outpatient.      Consults:   IP CONSULT TO NEUROSURGERY  IP CONSULT TO NEUROLOGY     Disposition: Home Health Care Svc  Diet:   DIET ADULT  DIET ADULT ORAL NUTRITION SUPPLEMENT  DIET ADULT ORAL NUTRITION SUPPLEMENT  Code Status: DNR      Follow up labs/imaging: repeat head CT in 2 weeks  Follow up with NS in 2 weeks, neurology in 1-2 weeks  Pending labs/studies: none    Current Discharge Medication List      CONTINUE these medications which have NOT CHANGED    Details   mirabegron ER (Myrbetriq) 50 mg ER tablet Take 50 mg by mouth daily. !! OTHER Amantadine discontinue 9pm dose and continue 8am dose. Lisinopril 5mg Q day (If BP rises to higher than 140/90 then give another 5mg for total of 10mg in 24 hrs)  Compression socks during waking hours for help with orthostatic hypotension. Qty: 1 Act, Refills: 0      !! 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 Can increase to 5mg in 5 days if needed  Qty: 1 Act, Refills: 0      levothyroxine (SYNTHROID) 125 mcg tablet Take 1 Tab by mouth Daily (before breakfast). Qty: 30 Tab, Refills: 1      carbidopa-levodopa (SINEMET)  mg per tablet TAKE 1 TABLET BY MOUTH AT  6AM, 10AM, 2PM, AND 6PM  STRICT TIMES DUE TO  ADVANCED PD  Qty: 360 Tab, Refills: 3    Comments: Requesting 1 year supply      fludrocortisone (FLORINEF) 0.1 mg tablet TAKE 1 TABLET BY MOUTH AT  6AM  Qty: 90 Tab, Refills: 1    Associated Diagnoses: Parkinson's disease (HCC)      melatonin 3 mg tablet 3 mg tab Q 9pm for RBD  Qty: 90 Tab, Refills: 3      gabapentin (NEURONTIN) 100 mg capsule Take 1 Cap by mouth three (3) times daily. Qty: 270 Cap, Refills: 3      escitalopram oxalate (LEXAPRO) 10 mg tablet Take 1 Tab by mouth daily. Qty: 90 Tab, Refills: 3      polyethylene glycol (MIRALAX) 17 gram packet Take 1 Packet by mouth daily. Qty: 90 Packet, Refills: 3      rivastigmine tartrate (EXELON) 6 mg capsule Take 1 Cap by mouth two (2) times daily (with meals). Qty: 180 Cap, Refills: 3    Associated Diagnoses: Parkinson's disease ([address]      amantadine HCL (SYMMETREL) 100 mg tablet TAKE 1 TABLET BY MOUTH TWO  TIMES DAILY  Qty: 180 Tab, Refills: 3      carbidopa-levodopa ER (SINEMET CR)  mg per tablet 1 tab at 10 pm strict time  Qty: 90 Tab, Refills: 3      latanoprost (XALATAN) 0.005 % ophthalmic solution Administer 1 Drop to right eye nightly. !! - Potential duplicate medications found.  Please discuss with provider. STOP taking these medications       lisinopriL (PRINIVIL, ZESTRIL) 10 mg tablet Comments:   Reason for Stopping:         droxidopa (Northera) 300 mg cap Comments:   Reason for Stopping:               Medications Discontinued During This Encounter   Medication Reason    niCARdipine in Saline (CARDENE) 25 MG/250 mL infusion kit     niCARdipine (CARDENE) 25 mg in 0.9% sodium chloride (MBP/ADV) 250 mL infusion     droxidopa (Northera) cap 300 mg- pt to supply  (Patient Supplied)     niCARdipine (CARDENE) 25 mg in 0.9% sodium chloride (MBP/ADV) 250 mL infusion Patient Transfer    carbidopa-levodopa (SINEMET)  mg per tablet 1 Tablet Other    carbidopa-levodopa (SINEMET)  mg per tablet 1 Tablet     amantadine HCL (SYMMETREL) 100 mg capsule Discontinued at Discharge    fludrocortisone (FLORINEF) 0.1 mg tablet Discontinued at Discharge         Follow Up Orders: Follow-up Appointments   Procedures    FOLLOW UP VISIT Appointment in: Other ([facility]) Neurosurgery in 2 weeks with head CT Neurology in 2-3 weeks     Neurosurgery in 2 weeks with head CT  Neurology in 2-3 weeks     Standing Status:   Standing     Number of Occurrences:   1     Order Specific Question:   Appointment in     Answer:    Other (Specify)         Follow-up Information     Follow up With Specialties Details Why Binh Flood MD Internal Medicine   [address] [place] [id number] [phone]              Discharge Exam:    Patient Vitals for the past 24 hrs:   Temp Pulse Resp BP SpO2   06/05/21 1114 98.4 °F (36.9 °C) 61 16 137/86 98 %   06/05/21 0755 98.4 °F (36.9 °C) 91 16 139/81 94 %   06/05/21 0458 98.4 °F (36.9 °C) 74 16 105/65 98 %   06/04/21 2352 97.9 °F (36.6 °C) 89 16 107/65 96 %   06/04/21 2316  94      06/04/21 1928 98.3 °F (36.8 °C) 99 16 (!) 98/55 99 %   06/04/21 1653    (!) 143/83    06/04/21 1557 98.1 °F (36.7 °C) 86 16 (!) 158/80 97 %   06/04/21 1556 98.1 °F (36.7 °C) 98 16 (!) 77/47 (!) 80 %   06/04/21 1555 98.1 °F (36.7 °C) 62 16 (!) 168/92 98 %     Oxygen Therapy  O2 Sat (%): 98 % (06/05/21 1114)  Pulse via Oximetry: 65 beats per minute (06/03/21 2300)  O2 Device: None (Room air) (06/03/21 2014)    Estimated body mass index is 17.9 kg/m² as calculated from the following:    Height as of this encounter: 5' 10\" (1.778 m). Weight as of this encounter: 56.6 kg (124 lb 12.5 oz). No intake or output data in the 24 hours ending 06/05/21 1523    *Note that automatically entered I/Os may not be accurate; dependent on patient compliance with collection and accurate  by assistants. Physical Exam:   General:          No acute distress, speaking in full sentences, no use of accessory muscles   Lungs:             Clear to auscultation bilaterally   CV:                  Regular rate and rhythm with normal S1 and S2   Abdomen:        Soft, nontender, nondistended, normoactive bowel sounds   Extremities:     No cyanosis clubbing or edema   Neuro:                         Nonfocal, A&O x3. Not following commands for CN3 test but otherwise CN2-12 intact. Strength 5/5 b/l. Sensation intact b/l. No pronator drift.  No slurred speech or facial droop, head bobbing  Psych:             Normal affect       All Labs from Last 24 Hrs:  Recent Results (from the past 24 hour(s))   SARS-COV-2    Collection Time: 06/04/21  5:29 PM   Result Value Ref Range    SARS-CoV-2 Please find results under separate order         All Micro Results     Procedure Component Value Units Date/Time    SARS-COV-2, PCR [[id number]] Collected: 06/04/21 1729    Order Status: Completed Updated: 06/04/21 1749          SARS-CoV-2 Lab Results  \"Novel Coronavirus\" Test: No results found for: COV2NT   \"Emergent Disease\" Test: No results found for: EDPR  \"SARS-COV-2\" Test: No results found for: XGCOVT  Rapid Test:   Lab Results   Component Value Date/Time    COVR Not detected 04/05/2021 11:31 AM            Diagnostic Imaging/Tests:   CT HEAD WO CONT    Result Date: 6/3/2021  1. Stable thin acute subdural hemorrhage along the right frontal convexity (approximately 2-3 mm in thickness). No mass effect or midline shift. 2. No significant change when compared to prior exam.    CT HEAD WO CONT    Result Date: 6/2/2021  1. Thin acute right frontal convexity subdural hematoma measuring 3 mm in thickness without mass effect. Notification: The significant results of this study were discussed with Dr. Ralph Marquez at [address] [facility] PM on 6/2/2021     XR CHEST PORT    Result Date: 6/2/2021  No acute cardiopulmonary abnormality. Echocardiogram/EKG results:  No results found for this visit on 06/02/21. EKG Results     Procedure 720 Value Units Date/Time    EKG, 12 LEAD, INITIAL [[id number]] Collected: 06/02/21 1508    Order Status: Completed Updated: 06/02/21 1651     Ventricular Rate 61 BPM      Atrial Rate 61 BPM      P-R Interval 146 ms      QRS Duration 100 ms      Q-T Interval 408 ms      QTC Calculation (Bezet) 410 ms      Calculated P Axis 71 degrees      Calculated R Axis -49 degrees      Calculated T Axis 62 degrees      Diagnosis --     !! AGE AND GENDER SPECIFIC ECG ANALYSIS !!   Sinus rhythm with Premature atrial complexes  Left anterior fascicular block  Abnormal ECG  When compared with ECG of 24-APR-2021 17:47,  Premature atrial complexes are now Present  Sinus rhythm is no longer with junctional escape complexes  Confirmed by Jack Cee MD (), [place]GU[place]EN ([id number]) on 6/2/2021 4:51:12 PM            Results for orders placed or performed during the hospital encounter of 06/02/21   EKG, 12 LEAD, INITIAL   Result Value Ref Range    Ventricular Rate 61 BPM    Atrial Rate 61 BPM    P-R Interval 146 ms    QRS Duration 100 ms    Q-T Interval 408 ms    QTC Calculation (Bezet) 410 ms    Calculated P Axis 71 degrees    Calculated R Axis -49 degrees    Calculated T Axis 62 degrees    Diagnosis       !! AGE AND GENDER SPECIFIC ECG ANALYSIS !! Sinus rhythm with Premature atrial complexes  Left anterior fascicular block  Abnormal ECG  When compared with ECG of 24-APR-2021 17:47,  Premature atrial complexes are now Present  Sinus rhythm is no longer with junctional escape complexes  Confirmed by Tomás King MD (), [place] NGUYEN ([id number]) on 6/2/2021 4:51:12 PM         Procedures done this admission:  * No surgery found *        Time spent in patient discharge planning and coordination 34 minutes.       Signed By: Karen Patel MD   [employer] [facility]    June 5, 2021  3:23 PM

## 2021-06-06 LAB
SARS-COV-2, COV2: NOT DETECTED
SPECIMEN SOURCE, FCOV2M: NORMAL

## 2021-06-24 ENCOUNTER — HOSPITAL ENCOUNTER (OUTPATIENT)
Dept: CT IMAGING | Age: 81
Discharge: HOME | End: 2021-06-24
Attending: NEUROLOGICAL SURGERY
Payer: MEDICARE

## 2021-06-24 DIAGNOSIS — S06.5XAA SDH (SUBDURAL HEMATOMA): ICD-10-CM

## 2021-06-24 PROCEDURE — 70450 CT HEAD/BRAIN W/O DYE: CPT

## 2021-10-12 ENCOUNTER — TRANSCRIBE ORDER (OUTPATIENT)
Dept: SCHEDULING | Age: 81
End: 2021-10-12

## 2021-10-12 DIAGNOSIS — Z91.81 STATUS POST FALL: ICD-10-CM

## 2021-10-12 DIAGNOSIS — I61.9 BRAIN BLEED (HCC): Primary | ICD-10-CM

## 2021-10-12 DIAGNOSIS — R29.6 REPEATED FALLS: ICD-10-CM

## 2021-10-18 ENCOUNTER — HOSPITAL ENCOUNTER (OUTPATIENT)
Dept: CT IMAGING | Age: 81
Discharge: HOME | End: 2021-10-18
Attending: NURSE PRACTITIONER
Payer: MEDICARE

## 2021-10-18 DIAGNOSIS — I61.9 BRAIN BLEED (HCC): ICD-10-CM

## 2021-10-18 DIAGNOSIS — Z91.81 STATUS POST FALL: ICD-10-CM

## 2021-10-18 DIAGNOSIS — R29.6 REPEATED FALLS: ICD-10-CM

## 2021-10-18 PROCEDURE — 70450 CT HEAD/BRAIN W/O DYE: CPT

## 2021-10-30 ENCOUNTER — HOSPITAL ENCOUNTER (EMERGENCY)
Age: 81
Discharge: HOME | End: 2021-10-30
Attending: EMERGENCY MEDICINE
Payer: MEDICARE

## 2021-10-30 ENCOUNTER — APPOINTMENT (OUTPATIENT)
Dept: CT IMAGING | Age: 81
End: 2021-10-30
Attending: EMERGENCY MEDICINE
Payer: MEDICARE

## 2021-10-30 VITALS
SYSTOLIC BLOOD PRESSURE: 137 MMHG | HEIGHT: 61 IN | HEART RATE: 96 BPM | DIASTOLIC BLOOD PRESSURE: 86 MMHG | WEIGHT: 145 LBS | BODY MASS INDEX: 27.38 KG/M2 | TEMPERATURE: 97.9 F | RESPIRATION RATE: 16 BRPM | OXYGEN SATURATION: 99 %

## 2021-10-30 DIAGNOSIS — S09.90XA INJURY OF HEAD, INITIAL ENCOUNTER: Primary | ICD-10-CM

## 2021-10-30 PROCEDURE — 72125 CT NECK SPINE W/O DYE: CPT

## 2021-10-30 PROCEDURE — 70450 CT HEAD/BRAIN W/O DYE: CPT

## 2021-10-30 PROCEDURE — 99283 EMERGENCY DEPT VISIT LOW MDM: CPT

## 2021-10-30 NOTE — ED TRIAGE NOTES
Pt to ED via [facility] for a fall that involved hitting his head and has a posterior scalp laceration. Pt denies LOC, pt denies blood thinners. Pt has a hx of Parkinsons. Pt placed in a c collar and c/o neck pain and HA. Per EMS pt had dizziness when standing on scene.     Pt masked PTA

## 2021-10-30 NOTE — ED PROVIDER NOTES
77-year-old gentleman presents with concerns about falling. He says he got up from the dinner table started to walk and his feet got twisted under him. He then fell and hit his head. He denies any loss of consciousness but says that he does have a headache. He denies any nausea or vomiting. He says he was able to stand on his own after the fall. He has no other injury. Elements of this note were created using speech recognition software. As such, errors of speech recognition may be present. Past Medical History:   Diagnosis Date    AAA (abdominal aortic aneurysm) ([facility] [address] 7/8/2016    pt is not aware of this-     Pak's esophagus with esophagitis 7/8/2016    no meds- not daily    Cerebrovascular accident (CVA) ([facility] [address] 7/8/2016    They thought I did but they didn't find anything on the tests \"I blacked out\" no admission to the hospital    Chronic fatigue syndrome 2/26/2016    Cognitive deficits 7/8/2016    Colon polyps 2007    Elevated PSA     Enthesopathy of ankle and tarsus 5/29/2014    GERD (gastroesophageal reflux disease) 7/8/2016    Hashimoto's thyroiditis 7/8/2016    History of nuclear stress test 1997, 5/2005    Hypotension 8/25/2015    Impaired cognition 7/8/2016    Inguinal hernia 7/8/2016    Insomnia 7/8/2016    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         Past Surgical History:   Procedure Laterality Date    HX BREAST BIOPSY Bilateral     sterotactic    HX CATARACT REMOVAL Right 2012    HX COLONOSCOPY  2000 & 2005    HX HERNIA REPAIR Left 2011    HX OTHER SURGICAL  2006    esophagogastroduodenoscopy    HX OTHER SURGICAL      deep leg/ankle tumor excision benign leg dumor    HX THYROIDECTOMY      total         Family History:   Problem Relation Age of Onset    Cancer Mother         Pancreas CA    Cancer Father         Brain CA    Heart Disease Father     Heart Failure Father     No Known Problems Sister     Heart Attack Brother         MI at 52 y.o.    Diabetes Maternal Uncle     Diabetes Paternal Uncle     Arthritis-osteo Sister     Thyroid Disease Neg Hx        Social History     Socioeconomic History    Marital status:      Spouse name: Not on file    Number of children: Not on file    Years of education: Not on file    Highest education level: Not on file   Occupational History    Not on file   Tobacco Use    Smoking status: Never Smoker    Smokeless tobacco: Never Used   Vaping Use    Vaping Use: Never used   Substance and Sexual Activity    Alcohol use: No    Drug use: No    Sexual activity: Not Currently   Other Topics Concern    Not on file   Social History Narrative    Not on file     Social Determinants of Health     Financial Resource Strain:     Difficulty of Paying Living Expenses:    Food Insecurity:     Worried About Running Out of Food in the Last Year:     Ran Out of Food in the Last Year:    Transportation Needs:     Lack of Transportation (Medical):      Lack of Transportation (Non-Medical):    Physical Activity:     Days of Exercise per Week:     Minutes of Exercise per Session:    Stress:     Feeling of Stress :    Social Connections:     Frequency of Communication with Friends and Family:     Frequency of Social Gatherings with Friends and Family:     Attends [employer] Services:     Active Member of Clubs or Organizations:     Attends Club or Organization Meetings:     Marital Status:    Intimate Partner Violence:     Fear of Current or Ex-Partner:     Emotionally Abused:     Physically Abused:     Sexually Abused: ALLERGIES: Brompheniramine maleate, Carisoprodol, Meperidine, Penicillin, Phenylephrine hcl, Zaleplon, Dimetapp cold and flu, Donepezil, Haldol [haloperidol lactate], Seroquel [quetiapine], Penicillins, and Rofecoxib    Review of Systems   Constitutional: Negative for chills and fever. Respiratory: Negative for cough and shortness of breath. Gastrointestinal: Negative for nausea and vomiting. Musculoskeletal: Negative for arthralgias and myalgias. Skin: Positive for wound. Negative for color change. Neurological: Positive for headaches. Negative for dizziness and weakness. Vitals:    10/30/21 1829   BP: (!) 188/80   Resp: 20   Temp: 97.6 °F (36.4 °C)   Weight: 65.8 kg (145 lb)   Height: 5' 1\" (1.549 m)            Physical Exam  Vitals and nursing note reviewed. Constitutional:       Appearance: Normal appearance. HENT:      Head:      Comments: Contusion with abrasion on the back of his head  Eyes:      General:         Right eye: No discharge. Left eye: No discharge. Cardiovascular:      Rate and Rhythm: Normal rate and regular rhythm. Pulmonary:      Effort: Pulmonary effort is normal.      Breath sounds: Normal breath sounds. Musculoskeletal:         General: No tenderness, deformity or signs of injury. Neurological:      General: No focal deficit present. Mental Status: He is alert and oriented to person, place, and time. MDM  Number of Diagnoses or Management Options  Injury of head, initial encounter  Diagnosis management comments: I will get a CT scan of his head neck. Her several O2 sats in the 70s and 80s that showed up in the computer. None of those are accurate. His breathing has been fine and when the probe is actually on his finger his O2 sats are in the high 90s.     ED Course as of Oct 30 1959   Sat Oct 30, 2021   1956 Patient scans were unremarkable. After removing his cervical collar and cleaning the area of blood on his scalp there is an abrasion but no obvious lacerations.   I will discharge him home.    [AC]      ED Course User Index  [AC] Cherri Raphael MD       Procedures

## 2021-10-30 NOTE — DISCHARGE INSTRUCTIONS
Return with any fevers, vomiting, confusion, worsening symptoms, or additional concerns. You may shower and wash your hair like normal.    Follow-up with your doctor for further evaluation in 3 to 5 days.

## 2021-10-31 NOTE — ED NOTES
I have reviewed discharge instructions with the patient and son-in-law. The patient and son-in-law verbalized understanding. Patient left ED via Discharge Method: wheelchair to Home with son-in-law    Opportunity for questions and clarification provided. Patient given 0 scripts. To continue your aftercare when you leave the hospital, you may receive an automated call from our care team to check in on how you are doing. This is a free service and part of our promise to provide the best care and service to meet your aftercare needs.  If you have questions, or wish to unsubscribe from this service please call [phone]. Thank you for Choosing our [facility] Emergency Department.

## 2022-01-07 ENCOUNTER — APPOINTMENT (RX ONLY)
Dept: URBAN - METROPOLITAN AREA CLINIC 330 | Facility: CLINIC | Age: 82
Setting detail: DERMATOLOGY
End: 2022-01-07

## 2022-01-07 DIAGNOSIS — L21.8 OTHER SEBORRHEIC DERMATITIS: ICD-10-CM | Status: STABLE

## 2022-01-07 DIAGNOSIS — L82.1 OTHER SEBORRHEIC KERATOSIS: ICD-10-CM

## 2022-01-07 DIAGNOSIS — D22 MELANOCYTIC NEVI: ICD-10-CM

## 2022-01-07 DIAGNOSIS — R23.1 PALLOR: ICD-10-CM

## 2022-01-07 PROBLEM — D22.5 MELANOCYTIC NEVI OF TRUNK: Status: ACTIVE | Noted: 2022-01-07

## 2022-01-07 PROCEDURE — ? PRESCRIPTION MEDICATION MANAGEMENT

## 2022-01-07 PROCEDURE — ? PRESCRIPTION

## 2022-01-07 PROCEDURE — 99213 OFFICE O/P EST LOW 20 MIN: CPT

## 2022-01-07 PROCEDURE — ? FULL BODY SKIN EXAM

## 2022-01-07 PROCEDURE — ? ADDITIONAL NOTES

## 2022-01-07 PROCEDURE — ? COUNSELING

## 2022-01-07 RX ORDER — KETOCONAZOLE 20 MG/ML
SHAMPOO, SUSPENSION TOPICAL
Qty: 120 | Refills: 4 | Status: ERX | COMMUNITY
Start: 2022-01-07

## 2022-01-07 RX ADMIN — KETOCONAZOLE: 20 SHAMPOO, SUSPENSION TOPICAL at 00:00

## 2022-01-07 ASSESSMENT — LOCATION SIMPLE DESCRIPTION DERM
LOCATION SIMPLE: RIGHT FOREARM
LOCATION SIMPLE: LEFT POSTERIOR UPPER ARM
LOCATION SIMPLE: RIGHT POSTERIOR UPPER ARM
LOCATION SIMPLE: UPPER BACK
LOCATION SIMPLE: SUPERIOR FOREHEAD
LOCATION SIMPLE: LEFT UPPER BACK
LOCATION SIMPLE: RIGHT KNEE
LOCATION SIMPLE: LEFT KNEE

## 2022-01-07 ASSESSMENT — LOCATION DETAILED DESCRIPTION DERM
LOCATION DETAILED: LEFT INFERIOR UPPER BACK
LOCATION DETAILED: INFERIOR THORACIC SPINE
LOCATION DETAILED: RIGHT DISTAL POSTERIOR UPPER ARM
LOCATION DETAILED: SUPERIOR MID FOREHEAD
LOCATION DETAILED: RIGHT KNEE
LOCATION DETAILED: RIGHT PROXIMAL RADIAL DORSAL FOREARM
LOCATION DETAILED: LEFT KNEE
LOCATION DETAILED: LEFT DISTAL POSTERIOR UPPER ARM

## 2022-01-07 ASSESSMENT — LOCATION ZONE DERM
LOCATION ZONE: LEG
LOCATION ZONE: FACE
LOCATION ZONE: ARM
LOCATION ZONE: TRUNK

## 2022-03-18 ENCOUNTER — HOSPITAL ENCOUNTER (EMERGENCY)
Age: 82
Discharge: HOME | End: 2022-03-18
Attending: EMERGENCY MEDICINE
Payer: MEDICARE

## 2022-03-18 ENCOUNTER — APPOINTMENT (OUTPATIENT)
Dept: CT IMAGING | Age: 82
End: 2022-03-18
Attending: EMERGENCY MEDICINE
Payer: MEDICARE

## 2022-03-18 ENCOUNTER — APPOINTMENT (OUTPATIENT)
Dept: GENERAL RADIOLOGY | Age: 82
End: 2022-03-18
Attending: EMERGENCY MEDICINE
Payer: MEDICARE

## 2022-03-18 VITALS
OXYGEN SATURATION: 100 % | HEART RATE: 64 BPM | DIASTOLIC BLOOD PRESSURE: 108 MMHG | TEMPERATURE: 97.5 F | SYSTOLIC BLOOD PRESSURE: 177 MMHG | RESPIRATION RATE: 21 BRPM

## 2022-03-18 DIAGNOSIS — R41.0 INTERMITTENT CONFUSION: ICD-10-CM

## 2022-03-18 DIAGNOSIS — G20 PARKINSON DISEASE (HCC): Primary | ICD-10-CM

## 2022-03-18 PROBLEM — G89.29 HIP PAIN, CHRONIC, LEFT: Status: ACTIVE | Noted: 2018-02-15

## 2022-03-18 PROBLEM — R60.0 LEG EDEMA, LEFT: Status: ACTIVE | Noted: 2017-02-27

## 2022-03-18 PROBLEM — M25.552 HIP PAIN, CHRONIC, LEFT: Status: ACTIVE | Noted: 2018-02-15

## 2022-03-18 LAB
ANION GAP BLD CALC-SCNC: 15 MMOL/L (ref 10–20)
ATRIAL RATE: 54 BPM
BASOPHILS # BLD: 0 K/UL (ref 0–0.2)
BASOPHILS NFR BLD: 1 % (ref 0–2)
BILIRUB UR QL: NEGATIVE
BUN BLD-MCNC: 34 MG/DL (ref 9–20)
CALCULATED P AXIS, ECG09: 45 DEGREES
CALCULATED R AXIS, ECG10: -58 DEGREES
CALCULATED T AXIS, ECG11: 50 DEGREES
CHLORIDE BLD-SCNC: 102 MMOL/L (ref 98–107)
CO2 BLD-SCNC: 25.9 MMOL/L (ref 21–32)
CREAT BLD-MCNC: 1.56 MG/DL (ref 0.6–1.3)
DIAGNOSIS, 93000: NORMAL
DIFFERENTIAL METHOD BLD: ABNORMAL
EOSINOPHIL # BLD: 0 K/UL (ref 0–0.8)
EOSINOPHIL NFR BLD: 1 % (ref 0.5–7.8)
ERYTHROCYTE [DISTWIDTH] IN BLOOD BY AUTOMATED COUNT: 13 % (ref 11.9–14.6)
GLUCOSE BLD-MCNC: 72 MG/DL (ref 65–100)
GLUCOSE UR QL STRIP.AUTO: NEGATIVE MG/DL
HCT VFR BLD AUTO: 40.1 % (ref 41.1–50.3)
HGB BLD-MCNC: 13.2 G/DL (ref 13.6–17.2)
IMM GRANULOCYTES # BLD AUTO: 0 K/UL (ref 0–0.5)
IMM GRANULOCYTES NFR BLD AUTO: 0 % (ref 0–5)
KETONES UR-MCNC: NEGATIVE MG/DL
LACTATE BLD-SCNC: 1.77 MMOL/L (ref 0.4–2)
LEUKOCYTE ESTERASE UR QL STRIP: NEGATIVE
LYMPHOCYTES # BLD: 1.7 K/UL (ref 0.5–4.6)
LYMPHOCYTES NFR BLD: 23 % (ref 13–44)
MCH RBC QN AUTO: 31.7 PG (ref 26.1–32.9)
MCHC RBC AUTO-ENTMCNC: 32.9 G/DL (ref 31.4–35)
MCV RBC AUTO: 96.4 FL (ref 79.6–97.8)
MONOCYTES # BLD: 0.6 K/UL (ref 0.1–1.3)
MONOCYTES NFR BLD: 8 % (ref 4–12)
NEUTS SEG # BLD: 4.8 K/UL (ref 1.7–8.2)
NEUTS SEG NFR BLD: 67 % (ref 43–78)
NITRITE UR QL: NEGATIVE
NRBC # BLD: 0 K/UL (ref 0–0.2)
P-R INTERVAL, ECG05: 168 MS
PH UR: 5 [PH] (ref 5–9)
PLATELET # BLD AUTO: 230 K/UL (ref 150–450)
PMV BLD AUTO: 9.3 FL (ref 9.4–12.3)
POTASSIUM BLD-SCNC: 5.1 MMOL/L (ref 3.5–5.1)
PROT UR QL: NEGATIVE MG/DL
Q-T INTERVAL, ECG07: 452 MS
QRS DURATION, ECG06: 108 MS
QTC CALCULATION (BEZET), ECG08: 428 MS
RBC # BLD AUTO: 4.16 M/UL (ref 4.23–5.6)
RBC # UR STRIP: NEGATIVE /UL
SERVICE CMNT-IMP: ABNORMAL
SODIUM BLD-SCNC: 142 MMOL/L (ref 136–145)
SP GR UR: >1.03 (ref 1–1.02)
UROBILINOGEN UR QL: 0.2 EU/DL (ref 0.2–1)
VENTRICULAR RATE, ECG03: 54 BPM
WBC # BLD AUTO: 7.1 K/UL (ref 4.3–11.1)

## 2022-03-18 PROCEDURE — 81003 URINALYSIS AUTO W/O SCOPE: CPT

## 2022-03-18 PROCEDURE — 93005 ELECTROCARDIOGRAM TRACING: CPT | Performed by: EMERGENCY MEDICINE

## 2022-03-18 PROCEDURE — 83605 ASSAY OF LACTIC ACID: CPT

## 2022-03-18 PROCEDURE — 99285 EMERGENCY DEPT VISIT HI MDM: CPT

## 2022-03-18 PROCEDURE — 85025 COMPLETE CBC W/AUTO DIFF WBC: CPT

## 2022-03-18 PROCEDURE — 70450 CT HEAD/BRAIN W/O DYE: CPT

## 2022-03-18 PROCEDURE — 71045 X-RAY EXAM CHEST 1 VIEW: CPT

## 2022-03-18 RX ORDER — OMEPRAZOLE 40 MG/1
40 CAPSULE, DELAYED RELEASE ORAL EVERY MORNING
COMMUNITY
Start: 2022-02-06

## 2022-03-18 RX ORDER — MELATONIN 10 MG
10 CAPSULE ORAL
COMMUNITY

## 2022-03-18 RX ORDER — MIRABEGRON 50 MG/1
50 TABLET, FILM COATED, EXTENDED RELEASE ORAL
COMMUNITY
Start: 2022-02-21

## 2022-03-18 NOTE — ED NOTES
I have reviewed discharge instructions with the patient and spouse. The patient and spouse verbalized understanding. Patient left ED via Discharge Method: wheelchair to Home with wife. Opportunity for questions and clarification provided. Patient given 0 scripts. To continue your aftercare when you leave the hospital, you may receive an automated call from our care team to check in on how you are doing. This is a free service and part of our promise to provide the best care and service to meet your aftercare needs.  If you have questions, or wish to unsubscribe from this service please call [phone]. Thank you for Choosing our [facility] Emergency Department.

## 2022-03-18 NOTE — ED PROVIDER NOTES
53-year-old white male history of Parkinson's currently residing at an assisted living facility sent to the hospital due to increased confusion over the past couple days. His sister provides most of the history and reports that he has had some falls. Unsure if there was head trauma. Patient states that he did not injure himself and the most recent fall was yesterday and at that time he actually lowered himself to the ground before falling. He has no specific complaints other than feeling a bit tired. He denies chest pain, shortness of breath, vomiting, diarrhea, fever. Has been compliant with his medications    The history is provided by the patient and a relative. Altered mental status   Associated symptoms include weakness. Fall  Pertinent negatives include no fever, no abdominal pain, no nausea and no vomiting.         Past Medical History:   Diagnosis Date    AAA (abdominal aortic aneurysm) ([address] 7/8/2016    pt is not aware of this-     Pak's esophagus with esophagitis 7/8/2016    no meds- not daily    Cerebrovascular accident (CVA) ([address] 7/8/2016    They thought I did but they didn't find anything on the tests \"I blacked out\" no admission to the hospital    Chronic fatigue syndrome 2/26/2016    Cognitive deficits 7/8/2016    Colon polyps 2007    Elevated PSA     Enthesopathy of ankle and tarsus 5/29/2014    GERD (gastroesophageal reflux disease) 7/8/2016    Hashimoto's thyroiditis 7/8/2016    History of nuclear stress test 1997, 5/2005    Hypotension 8/25/2015    Impaired cognition 7/8/2016    Inguinal hernia 7/8/2016    Insomnia 7/8/2016    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 Past Surgical History:   Procedure Laterality Date    HX BREAST BIOPSY Bilateral     sterotactic    HX CATARACT REMOVAL Right 2012    HX COLONOSCOPY  2000 & 2005    HX HERNIA REPAIR Left 2011    HX OTHER SURGICAL  2006    esophagogastroduodenoscopy    HX OTHER SURGICAL      deep leg/ankle tumor excision benign leg dumor    HX THYROIDECTOMY      total         Family History:   Problem Relation Age of Onset    Cancer Mother         Pancreas CA    Cancer Father         Brain CA    Heart Disease Father     Heart Failure Father     No Known Problems Sister     Heart Attack Brother         MI at 52 y.o.    Diabetes Maternal Uncle     Diabetes Paternal Uncle     OSTEOARTHRITIS Sister     Thyroid Disease Neg Hx        Social History     Socioeconomic History    Marital status:      Spouse name: Not on file    Number of children: Not on file    Years of education: Not on file    Highest education level: Not on file   Occupational History    Not on file   Tobacco Use    Smoking status: Never Smoker    Smokeless tobacco: Never Used   Vaping Use    Vaping Use: Never used   Substance and Sexual Activity    Alcohol use: No    Drug use: No    Sexual activity: Not Currently   Other Topics Concern    Not on file   Social History Narrative    Not on file     Social Determinants of Health     Financial Resource Strain:     Difficulty of Paying Living Expenses: Not on file   Food Insecurity:     Worried About Running Out of Food in the Last Year: Not on file    Sandra of Food in the Last Year: Not on file   Transportation Needs:     Lack of Transportation (Medical): Not on file    Lack of Transportation (Non-Medical):  Not on file   Physical Activity:     Days of Exercise per Week: Not on file    Minutes of Exercise per Session: Not on file   Stress:     Feeling of Stress : Not on file   Social Connections:     Frequency of Communication with Friends and Family: Not on file    Frequency of Social Gatherings with Friends and Family: Not on file    Attends Synagogue Services: Not on file    Active Member of [address] [place] [address] or Organizations: Not on file    Attends Club or Organization Meetings: Not on file    Marital Status: Not on file   Intimate Partner Violence:     Fear of Current or Ex-Partner: Not on file    Emotionally Abused: Not on file    Physically Abused: Not on file    Sexually Abused: Not on file   Housing Stability:     Unable to Pay for Housing in the Last Year: Not on file    Number of [place] in the Last Year: Not on file    Unstable Housing in the Last Year: Not on file         ALLERGIES: Brompheniramine maleate, Carisoprodol, Meperidine, Penicillin, Phenylephrine hcl, Zaleplon, Dimetapp cold and flu, Donepezil, Haldol [haloperidol lactate], Seroquel [quetiapine], Penicillins, and Rofecoxib    Review of Systems   Constitutional: Positive for fatigue. Negative for fever. HENT: Negative for congestion. Respiratory: Negative for cough and shortness of breath. Cardiovascular: Negative for chest pain. Gastrointestinal: Negative for abdominal pain, nausea and vomiting. Genitourinary: Negative for dysuria. Musculoskeletal: Negative for back pain. Skin: Negative for rash. Neurological: Positive for weakness. All other systems reviewed and are negative. Vitals:    03/18/22 1051   BP: (!) 152/104   Pulse: 60   Temp: 97.5 °F (36.4 °C)   SpO2: 100%            Physical Exam  Vitals and nursing note reviewed. Constitutional:       General: He is not in acute distress. Appearance: Normal appearance. He is not toxic-appearing. HENT:      Head: Normocephalic and atraumatic.       Nose: Nose normal.      Mouth/Throat:      Mouth: Mucous membranes are moist.      Pharynx: Oropharynx is clear. Eyes:      Conjunctiva/sclera: Conjunctivae normal.      Pupils: Pupils are equal, round, and reactive to light. Cardiovascular:      Rate and Rhythm: Normal rate and regular rhythm. Heart sounds: No murmur heard. Pulmonary:      Effort: Pulmonary effort is normal.      Breath sounds: Normal breath sounds. Abdominal:      General: There is no distension. Palpations: Abdomen is soft. Tenderness: There is no abdominal tenderness. There is no guarding. Musculoskeletal:         General: No swelling or tenderness. Normal range of motion. Cervical back: Normal range of motion and neck supple. Skin:     General: Skin is warm and dry. Neurological:      Mental Status: He is alert. Comments: Patient is oriented to person and place. Unable to accurately state the year. Peripheral strength intact. No cranial nerve deficits   Psychiatric:         Mood and Affect: Mood normal.         Behavior: Behavior normal.          MDM  Number of Diagnoses or Management Options  Diagnosis management comments: EKG shows sinus bradycardia at 54 with left anterior fascicular block. No diagnostic ST changes or ectopy. Head CT shows chronic changes but no acute abnormality. Blood work and urine are unremarkable.   Patient appears very stable and safe for continued outpatient management       Amount and/or Complexity of Data Reviewed  Clinical lab tests: ordered and reviewed  Tests in the radiology section of CPT®: ordered and reviewed  Independent visualization of images, tracings, or specimens: yes    Risk of Complications, Morbidity, and/or Mortality  Presenting problems: moderate  Diagnostic procedures: moderate  Management options: moderate           EKG    Date/Time: 3/18/2022 11:47 AM  Performed by: Haris Vásquez MD  Authorized by: Haris Vásquez MD     ECG reviewed by ED Physician in the absence of a cardiologist: yes    Interpretation:     Interpretation: abnormal    Rate:     ECG rate:  54    ECG rate assessment: bradycardic    Rhythm:     Rhythm: sinus bradycardia    Ectopy:     Ectopy: none    QRS:     QRS axis:  Normal  Conduction:     Conduction: abnormal      Abnormal conduction: LAFB    ST segments:     ST segments:  Normal  T waves:     T waves: normal

## 2022-03-18 NOTE — ED TRIAGE NOTES
Patient arrives to ED pov from [place] [address]. Per facility patient fell multiple times over the past few days. Facility noticed he was more altered yesterday morning around 6 am. The falls were unwitnessed. Unsure if patient hit head. Per sister patient is more confused than his normal. Patient denies pain. Patient has history of Parkinson's disease. Denies blood thinners.

## 2022-03-19 PROBLEM — E87.1 HYPONATREMIA: Status: ACTIVE | Noted: 2021-04-01

## 2022-03-19 PROBLEM — R41.0 EPISODE OF CONFUSION: Status: ACTIVE | Noted: 2019-06-11

## 2022-03-19 PROBLEM — F02.80 DEMENTIA DUE TO PARKINSON'S DISEASE WITHOUT BEHAVIORAL DISTURBANCE (HCC): Status: ACTIVE | Noted: 2018-08-20

## 2022-03-19 PROBLEM — I95.1 ORTHOSTATIC HYPOTENSION: Status: ACTIVE | Noted: 2021-04-01

## 2022-03-19 PROBLEM — G20 DEMENTIA DUE TO PARKINSON'S DISEASE WITHOUT BEHAVIORAL DISTURBANCE (HCC): Status: ACTIVE | Noted: 2018-08-20

## 2022-03-19 PROBLEM — R35.1 NOCTURIA: Status: ACTIVE | Noted: 2020-06-12

## 2022-03-19 PROBLEM — S06.5XAA SUBDURAL HEMATOMA: Status: ACTIVE | Noted: 2021-04-01

## 2022-03-19 PROBLEM — R29.6 FREQUENT FALLS: Status: ACTIVE | Noted: 2018-08-20

## 2022-03-19 PROBLEM — E44.0 MODERATE PROTEIN-CALORIE MALNUTRITION (HCC): Status: ACTIVE | Noted: 2021-04-04

## 2022-03-20 PROBLEM — G24.9 DYSKINESIA DUE TO PARKINSON'S DISEASE (HCC): Status: ACTIVE | Noted: 2017-10-05

## 2022-03-20 PROBLEM — G20 DYSKINESIA DUE TO PARKINSON'S DISEASE (HCC): Status: ACTIVE | Noted: 2017-10-05

## 2022-03-20 PROBLEM — I10 HTN (HYPERTENSION): Status: ACTIVE | Noted: 2021-04-04

## 2022-04-07 NOTE — PROGRESS NOTES
Kirby Louis. : 1940 [facility]  [address], [address], [place] Vieyra.  [phone]   [phone]          OUTPATIENT PHYSICAL THERAPY:Daily Note and Treatment Day: 2018    ICD-10: Treatment Diagnosis: Y87-Eufpioimt's, R29.3-Abnormal posture, R27.8-Lack of coordination, R26.89-other gait abnormalities  Precautions/Allergies:   Brompheniramine maleate; Carisoprodol; Meperidine; Penicillin; Phenylephrine hcl; Zaleplon; Dimetapp cold and flu; Donepezil; Penicillins; and Rofecoxib falls  Fall Risk Score: 8 (? 5 = High Risk)  MD Orders: PT to eval and treat for Parkinson's MEDICAL/REFERRING DIAGNOSIS:  Parkinson's disease [G20]  · Abnormal posture [R29.3]   DATE OF ONSET: 18   REFERRING PHYSICIAN: W[place]up, Ralph Blizzard  RETURN PHYSICIAN APPOINTMENT: unknown     INITIAL ASSESSMENT:  Mr. Ankita Putnam is a 70year old male who presents to therapy today with the below functional deficits and could benefit from skilled outpatient PT to improve the deficits as well as improve overall quality of life and independence. PT will focus on Parkinson's education as well as Parkinson's specific therapy/PWR program for patient to be able to maintain gains achieved during therapy when therapy is completed. He has completed PWR and PT with me about 3 years ago and is maintaining his movement/function well. His main complaint is stiffness/pain in his low back especially in the am and noted to have decreased PROM in quads, trunk and hamstrings. Patient does not stretch at this point so will take a few sessions for education on PROM and then discharge. Will also focus on his gait technique as he is very rigid in his trunk with his gait and poor weight shift   PROBLEM LIST:  1. Decreased Balance  2. Decreased Ambulation/Gait Technique  3. Decreased Endurance  4. Rigidity  5. Bradykinesia INTERVENTIONS PLANNED:  1. Balance Exercise  2. Gait Training  3.  Home Exercise Program (HEP)  4. Manual Therapy  5. Neuromuscular Re-education/Strengthening  6. Range of Motion (ROM)  7. Therapeutic Activites  8. Therapeutic Exercise/Strengthening  9. Transfer Training   TREATMENT PLAN:  Effective Dates: 4/16/18 TO 7/15/2018 (90 days). Frequency/Duration: 2 times a week for 90 Days  GOALS: (Goals have been discussed and agreed upon with patient.)  Discharge Goals: Time Frame: 12 weeks  1. Patient will be I with HEP to maintain functional gains achieved during therapy  2. Patient will improve FGA to 25/30 for overall improved stability of gait  Rehabilitation Potential For Stated Goals: Excellent              The information in this section was collected on 4/16/18 (except where otherwise noted). HISTORY:   History of Present Injury/Illness (Reason for Referral): Nay Ferraro is a 70 y.o. male who presents with a history of parkinson's who was seen by Félix Sim NP to Dr. Mic Murphy on 2/15. C/o leg pain in left leg and now B. It has been happening about 3months. Scans were negative. He apparently started ST around the time of this visit due to difficulty swallowing. Double vision started 2 weeks before this appointment with the MD. His PD exam was stable, so continue all medication and no changes. UA was orderd as well as Xray of Left hip was order. Results were negative for both. Just had surgery on 3/19 for his R eye. Leg pain has resolved. Mostlly slight back pain in the morning. Goes to exercise class-no stretching. Works itself with movement and is inconsistent. No falls. 4/27/2018-Patient with no falls nor changes in medication    BP at MD office: 129/63  HR at MD office: 58  Dominant Side:         RIGHT  Past Medical History/Comorbidities:   Mr. Jolynn Lange  has a past medical history of AAA (abdominal aortic aneurysm) ([facility] (7/8/2016); Pak's esophagus with esophagitis (7/8/2016); Cerebrovascular accident (CVA) ([facility] [facility] (7/8/2016); Chronic fatigue syndrome (2/26/2016);  Cognitive deficits (7/8/2016); Colon polyps (2007); Elevated PSA; Enthesopathy of ankle and tarsus (5/29/2014); GERD (gastroesophageal reflux disease) (7/8/2016); Hashimoto's thyroiditis (7/8/2016); History of nuclear stress test (1997, 5/2005); Hypotension (8/25/2015); Impaired cognition (7/8/2016); Inguinal hernia (7/8/2016); Insomnia (7/8/2016); 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 Mr. Court Santos  has a past surgical history that includes hx colonoscopy (2000 & 2005); hx breast biopsy (Bilateral); hx thyroidectomy; hx hernia repair (Left, 2011); hx cataract removal (Right, 2012); hx other surgical (2006); and hx other surgical.  Current Medications:    Current Outpatient Prescriptions:     carbidopa-levodopa ER (SINEMET CR)  mg per tablet, TAKE 1 TABLET BY MOUTH  NIGHTLY, Disp: 90 Tab, Rfl: 3    carbidopa-levodopa (SINEMET)  mg per tablet, 2 tabs at 7:30am and 3:30pm and 2.5 tabs at 11:30am and 7:30pm, Disp: 360 Tab, Rfl: 3    gabapentin (NEURONTIN) 300 mg capsule, TAKE 1 CAPSULE BY MOUTH 4  TIMES DAILY (Patient taking differently: TAKE 1 CAPSULE BY MOUTH 4  TIMES DAILY- for neuropathy feet), Disp: 360 Cap, Rfl: 1    levothyroxine (SYNTHROID) 112 mcg tablet, Take  by mouth Daily (before breakfast). , Disp: , Rfl:     minocycline (MINOCIN, DYNACIN) 100 mg capsule, Take  by mouth two (2) times a day. \"breaking out behind my neck\", Disp: , Rfl:     traZODone 150 mg Tb24, Take 75 mg by mouth nightly., Disp: 45 Tab, Rfl: 1    Cholecalciferol, Vitamin D3, (VITAMIN D3) 2,000 unit cap capsule, Take 2,000 Units by mouth., Disp: , Rfl:     coenzyme q10 10 mg cap, Take 100 mg by mouth.  Last dose 3/16/18, Disp: , Rfl:     cyanocobalamin 1,000 mcg tablet, Take 1,000 mcg by mouth daily. , Disp: , Rfl:    Date Last Reviewed:  4/27/2018  Social History/Living Environment:     lives alone with one story home-1 steps to enter. No steps in home. Has walk in shower-standing to shower. No grab bars installed but is going to have them installed. Regular height toilet. Tall bed but can sit on it without stool. Still driving  Prior Level of Function:  Mod I  Durable Medical Equipment (DME)  · Current DME: [place]  Work/Activity History: retired from [employer]: Never started therapy  Current Workout: 4 times a week between boxing and SSI. Number of Personal Factors/Comorbidities that affect the Plan of Care: 3+: HIGH COMPLEXITY   EXAMINATION:   Sensation: intact  Skin Integrity:  intact  Neuromuscular/Motor:   · Muscle Tone:  2/4 on L and 1/4 on R    Coordination:  Foot tapping 1 on R/2 on L, finger taps/YRN/open/close 0 B  Observation/Orthostatic Postural Assessment:  Slight flexion at hips  Lower Extremity:  Grossly 5/5 but PROM:hamstring on R in supine with hip at 90-35, L 44, quad in prone: R-90, L-85. Trunk very rigid as well. He has ocmplaints of cervical spine stiffness as well. L LE more rigid than R LE. Hip extension B 4+/5  Upper Extremity: grossly 5/5 and WFL  Additional Special Tests:  · 10m:   · Self selected: 9.91 seconds  · Fast pace: 7.16 seconds  · 5x sit to stand: 10.69 seconds-posterior lean at times but able to adjust  · 6 minute walk test: 1675 feet   Distance:  Greater than 3000 including incline/decline   Assistive Device:  none   Orthosis/Brace:  none   Amount of Assist:  Mod I   Gait Characteristics:  Rigid trunk, decreased L arm swing, decreased step/stride mostly on L, decreased david   Stairs:    · Rails:  one       · Number of Stairs:  12      · Amount of Assist:   mod I         · Pattern:  Reciprocal pattern. Fair weight shift ascending.  Good weight shift descending  · SOT:  NT  · Comments:  patient is motivated and doing well. Without complaints   · Education:  Bruce Reaves on purpose of PROM/stretching  FUNCTIONAL ASSESSMENT:  · Wheelchair:  na  · Bed Mobility: mod I to I including prone   · Transfers: sit to stand wihtout UE support mod I. No freezing turns in 3 steps without LOB      Body Structures Involved:  1. Joints  2. Muscles Body Functions Affected:  1. Cardio  2. Respiratory  3. Neuromusculoskeletal  4. Movement Related Activities and Participation Affected:  1. Mobility   Number of elements that affect the Plan of Care: 4+: HIGH COMPLEXITY   CLINICAL PRESENTATION:   Presentation: Stable and uncomplicated: LOW COMPLEXITY   CLINICAL DECISION MAKING:   Outcome Measure: Tool Used: MINI BEST TEST   Score:  Initial: 23/28 Most Recent: X/28    Interpretation of Score: This is a 14 item test on a three level ordinal scale with a total score of 28. There are two sections scored, but only the lower score is recorded. This is a clinical balance assessment tool that aimes to target and identify six different balance control systems. Tool Used: FUNCTIONAL GAIT ASSESSMENT   Score:  Initial: 21/30 Most Recent: X/30   Interpretation of Score: This test is a modification of the Dynamic Gait Index. It is a 10 item test with each item score 0-3 that assesses postural stability during various walking tasks. Score 30 29-24 23-18 17-13 12-7 6-1 0   Modifier CH CI CJ CK CL CM CN ? Mobility - Walking and Moving Around:     - CURRENT STATUS: CJ - 20%-39% impaired, limited or restricted    - GOAL STATUS: CI - 1%-19% impaired, limited or restricted    - D/C STATUS:             ---------------To be determined---------------    Medical Necessity:   · Patient is expected to demonstrate progress in balance, coordination, functional technique and ambulation to increase independence with all functional tasks above.   Reason for Services/Other Comments:  · Patient can benefit from skilled outpatient PT to focus on Parkinson's specific treatment to improve all above functional deficits limiting patient daily as well as increasing fall risk to improve overall quality of life. Use of outcome tool(s) and clinical judgement create a POC that gives a: Clear prediction of patient's progress: LOW COMPLEXITY   TREATMENT:   (In addition to Assessment/Re-Assessment sessions the following treatments were rendered)  Pre-treatment Symptoms/Complaints:  4/27/2018: Patient reports worse-nothing. Does not improve. Does not take any meds. At worst back pain is 8-mostly in the am, at best 0/10. Current 0/10. Leg is 0/10. Reports some occasional soreness in Left leg but otherwise states his L LE has improved and he does not have difficulty with it. His main complaint is the stiffness/pain in his low back. He reports no stretching just exercise/strength/balance  Pain: Initial:   Pain Intensity 1: 0 0 Post Session:  0       TREATMENT:   (In addition to Assessment/Re-Assessment sessions the following treatments were rendered)    THERAPEUTIC EXERCISE: (59 minutes):  Exercises per grid below to improve mobility. Required minimal visual and verbal cues to promote proper body alignment. Progressed range and repetitions as indicated. Piriformis, hamstring, supine with arms above head-gave written HEP for patient with max written instructions for patient cogntition  quadraped PWR for PROM-PWR rock as separate stretches holding for 20 seconds, PWR twist and step repetition of 5  Followed through again with patient looking at pictures to see if comprehended and patient did well  Ed on purpose of PROM    Treatment/Session Assessment:    · Response to Treatment:  Patient tolerated well. No complaints of pain and tolerated exercises well  · Compliance with Program/Exercises: Will assess as treatment progresses. Recommendations/Intent for next treatment session:  \"Next visit will focus on advancements to more challenging activities\".   Cervical PROM, standing against wall, prone PWR twist and review quadraped PWR and piriformis/hamstring andsupine arms above head  Total Treatment Duration:  PT Patient Time In/Time Out  Time In: 0900  Time Out: 0959    Wei Garvin, PT No

## 2022-06-17 ENCOUNTER — OFFICE VISIT (OUTPATIENT)
Dept: NEUROLOGY | Age: 82
End: 2022-06-17
Payer: MEDICARE

## 2022-06-17 VITALS
SYSTOLIC BLOOD PRESSURE: 115 MMHG | BODY MASS INDEX: 28.46 KG/M2 | HEART RATE: 71 BPM | DIASTOLIC BLOOD PRESSURE: 70 MMHG | WEIGHT: 150.6 LBS

## 2022-06-17 DIAGNOSIS — G90.3 NEUROLOGIC ORTHOSTATIC HYPOTENSION (HCC): ICD-10-CM

## 2022-06-17 DIAGNOSIS — G24.9 DYSKINESIA DUE TO PARKINSON'S DISEASE (HCC): ICD-10-CM

## 2022-06-17 DIAGNOSIS — R25.1 TREMOR: ICD-10-CM

## 2022-06-17 DIAGNOSIS — G20 DYSKINESIA DUE TO PARKINSON'S DISEASE (HCC): ICD-10-CM

## 2022-06-17 DIAGNOSIS — G47.52 RBD (REM BEHAVIORAL DISORDER): ICD-10-CM

## 2022-06-17 DIAGNOSIS — G62.9 POLYNEUROPATHY: ICD-10-CM

## 2022-06-17 DIAGNOSIS — Z79.899 ENCOUNTER FOR LONG-TERM (CURRENT) USE OF HIGH-RISK MEDICATION: ICD-10-CM

## 2022-06-17 DIAGNOSIS — R41.0 EPISODE OF CONFUSION: ICD-10-CM

## 2022-06-17 DIAGNOSIS — F41.8 MIXED ANXIETY DEPRESSIVE DISORDER: ICD-10-CM

## 2022-06-17 DIAGNOSIS — G20 PSYCHOSIS DUE TO PARKINSON'S DISEASE (HCC): ICD-10-CM

## 2022-06-17 DIAGNOSIS — G20 PARKINSON DISEASE (HCC): Primary | ICD-10-CM

## 2022-06-17 PROCEDURE — 99215 OFFICE O/P EST HI 40 MIN: CPT | Performed by: PSYCHIATRY & NEUROLOGY

## 2022-06-17 PROCEDURE — G8427 DOCREV CUR MEDS BY ELIG CLIN: HCPCS | Performed by: PSYCHIATRY & NEUROLOGY

## 2022-06-17 PROCEDURE — 1036F TOBACCO NON-USER: CPT | Performed by: PSYCHIATRY & NEUROLOGY

## 2022-06-17 PROCEDURE — 1123F ACP DISCUSS/DSCN MKR DOCD: CPT | Performed by: PSYCHIATRY & NEUROLOGY

## 2022-06-17 PROCEDURE — G2212 PROLONG OUTPT/OFFICE VIS: HCPCS | Performed by: PSYCHIATRY & NEUROLOGY

## 2022-06-17 PROCEDURE — G8417 CALC BMI ABV UP PARAM F/U: HCPCS | Performed by: PSYCHIATRY & NEUROLOGY

## 2022-06-17 RX ORDER — MIRTAZAPINE 15 MG/1
15 TABLET, FILM COATED ORAL NIGHTLY
Qty: 90 TABLET | Refills: 3 | Status: SHIPPED | OUTPATIENT
Start: 2022-06-17 | End: 2022-06-17 | Stop reason: SDUPTHER

## 2022-06-17 RX ORDER — MIRTAZAPINE 15 MG/1
15 TABLET, FILM COATED ORAL NIGHTLY
Qty: 90 TABLET | Refills: 3 | Status: SHIPPED | OUTPATIENT
Start: 2022-06-17 | End: 2022-08-10 | Stop reason: DRUGHIGH

## 2022-06-17 ASSESSMENT — ENCOUNTER SYMPTOMS: EYES NEGATIVE: 1

## 2022-06-17 NOTE — PROGRESS NOTES
07/10/22  Danial Singleton  Chief Complaint:  Chief Complaint   Patient presents with    Follow-up     Parkinson's       Parkinson's Disease Diagnosed: He began with slower gait in 2013 and bilateral hand tremor in 2014. HPI:   Lupe Wilder., [de-identified] y.o.,male here in clinic follow up for continued management of Parkinson's Disease with PD related dementia. OH has been relatively stable. He has had multiple falls. He got his COVID booster that day before the April admission. He was also dehydrated and his thyroid level was low. He was sleep deprived and had not slept for >48 hrs on day of admission. He states that he has trouble falling asleep and that he always wakes up early AM and can't get back to sleep. Long standing issue. Swallowing is not an issue. Current Neuro related meds:  Sinemet 25/250mg 1 tab at 6am, 10am, 2pm, and 6pm  Sinemet CR 50/200mg 1 tab at 9pm  Amantadine 100mg 1 tab BID at 6am and 2pm  Exelon 6mg 1 tab BID  Myrbetriq 50mg 1 tab daily  Atropine drops for sialorrhea  Gabapentin 100mg TID    Melatonin 5 mg 1 tab at 9pm        Review of Systems:    Review of Systems   HENT: Negative. Eyes: Negative. Musculoskeletal: Negative. Neurological: Positive for dizziness. Psychiatric/Behavioral: Positive for sleep disturbance. Patient denies:  dizziness or light headedness,  drooling or swallowing issues  constipation,   hallucinations/ visual illusions or impulse control disorder   recent falls.    RBD     RLS    Past Medical History:   Diagnosis Date    AAA (abdominal aortic aneurysm) ([facility] 7/8/2016    pt is not aware of this-     Casas's esophagus with esophagitis 7/8/2016    no meds- not daily    Cerebrovascular accident (CVA) ([address] 7/8/2016    They thought I did but they didn't find anything on the tests \"I blacked out\" no admission to the hospital    Chronic fatigue syndrome 2/26/2016    Cognitive deficits 7/8/2016    Colon polyps 2007    Elevated PSA     Enthesopathy of ankle and tarsus 5/29/2014    GERD (gastroesophageal reflux disease) 7/8/2016    Hashimoto's thyroiditis 7/8/2016    History of nuclear stress test 1997, 5/2005    Hypotension 8/25/2015    Impaired cognition 7/8/2016    Inguinal hernia 7/8/2016    Insomnia 7/8/2016    Lesion of lateral popliteal nerve 10/10/2015    Mixed anxiety depressive disorder 7/20/2015    Neuropathy involving both lower extremities 11/3/2015    Parkinson's disease ([facility] [address] 07/08/2016    dx 2014    Peyronie's disease 7/8/2016    Polyneuropathy 2/26/2016    Postoperative hypothyroidism 6/9/2016    Last Assessment & Plan:  Postsurgical hypothyroidism for benign goiter, continue present dose of levothyroxine. 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 Past Surgical History:   Procedure Laterality Date    BREAST BIOPSY Bilateral     sterotactic    CATARACT REMOVAL Right 2012    COLONOSCOPY  2000 & 2005    HERNIA REPAIR Left 2011    OTHER SURGICAL HISTORY      deep leg/ankle tumor excision benign leg dumor    OTHER SURGICAL HISTORY  2006    esophagogastroduodenoscopy    THYROIDECTOMY      total       Family History   Problem Relation Age of Onset    Heart Failure Father     No Known Problems Sister     Heart Disease Father     Heart Attack Brother         MI at 52 y.o.    Cancer Mother         Pancreas CA    Osteoarthritis Sister     Cancer Father         Brain CA    Diabetes Paternal Uncle     Thyroid Disease Neg Hx     Diabetes Maternal Uncle        Social History     Socioeconomic History    Marital status:      Spouse name: Not on file    Number of children: Not on file    Years of education: Not on file    Highest education level: Not on file   Occupational History    Not on file   Tobacco Use    Smoking status: Never Smoker    Smokeless tobacco: Never Used   Substance and Sexual Activity    Alcohol use:  No  Drug use: No    Sexual activity: Not on file   Other Topics Concern    Not on file   Social History Narrative    Not on file     Social Determinants of Health     Financial Resource Strain:     Difficulty of Paying Living Expenses: Not on file   Food Insecurity:     Worried About Running Out of Food in the Last Year: Not on file    Cherie of Food in the Last Year: Not on file   Transportation Needs:     Lack of Transportation (Medical): Not on file    Lack of Transportation (Non-Medical): Not on file   Physical Activity:     Days of Exercise per Week: Not on file    Minutes of Exercise per Session: Not on file   Stress:     Feeling of Stress : Not on file   Social Connections:     Frequency of Communication with Friends and Family: Not on file    Frequency of Social Gatherings with Friends and Family: Not on file    Attends [employer]ism Services: Not on file    Active Member of [address] [place] [address] [place] or Organizations: Not on file    Attends Club or Organization Meetings: Not on file    Marital Status: Not on file   Intimate Partner Violence:     Fear of Current or Ex-Partner: Not on file    Emotionally Abused: Not on file    Physically Abused: Not on file    Sexually Abused: Not on file   Housing Stability:     Unable to Pay for Housing in the Last Year: Not on file    Number of [place] in the Last Year: Not on file    Unstable Housing in the Last Year: Not on file       Current Outpatient Medications on File Prior to Visit   Medication Sig Dispense Refill    atropine 1 % ophthalmic solution 2-3 drops in a tablespoon of water and swish and spit up to 4 times a day for drooling.       carbidopa-levodopa (SINEMET CR)  MG per extended release tablet 1 tab at 10 pm strict time      latanoprost (XALATAN) 0.005 % ophthalmic solution Apply 1 drop to eye      levothyroxine (SYNTHROID) 125 MCG tablet Take 125 mcg by mouth every morning (before breakfast)      lisinopril (PRINIVIL;ZESTRIL) 5 MG tablet Take 5 mg by mouth daily      melatonin 10 MG CAPS capsule Take 10 mg by mouth      mirabegron (MYRBETRIQ) 50 MG TB24 Take 50 mg by mouth      omeprazole (PRILOSEC) 40 MG delayed release capsule Take 40 mg by mouth every morning      polyethylene glycol (GLYCOLAX) 17 GM/SCOOP powder Take 17 g by mouth daily      rivastigmine (EXELON) 6 MG capsule Take 6 mg by mouth 2 times daily (with meals)       No current facility-administered medications on file prior to visit. Allergies   Allergen Reactions    Brompheniramine Other (See Comments)     Pt does not recall med.  Carisoprodol Other (See Comments)     Other reaction(s): Rash-A, Unknown-A  Pt does not recall med.  Meperidine Other (See Comments)     Pt does not recall med.  Penicillins Rash    Phenylephrine Hcl Other (See Comments)     Pt does not recall med.  Zaleplon Other (See Comments)     Other reaction(s): Rash-A, Unknown-A  Pt does not recall med.  Donepezil Other (See Comments)     Altered mental state    Haloperidol Lactate Other (See Comments)     Avoid in PD    Quetiapine Other (See Comments)     Avoid in PD    Rofecoxib Rash           Physical Examination    Vitals:    06/17/22 1153 06/17/22 1158   BP: (!) 142/89 115/70   Site: Right Upper Arm Right Upper Arm   Position: Sitting Standing   Pulse: 62 71   Weight: 150 lb 9.6 oz (68.3 kg)          General: No acute distress  Psychiatric: well oriented, normal mood and affect  Cardiovascular: no peripheral edema, no JVD, no carotid bruits, RRR  Pulmonary: CTAB  Skin: No rashes, lesions or ulcers  Ext: no C/C/E      Neurologic Exam  Patient oriented to Person, Place and Time. Language and fund of knowledge intact.    CN:  Extraocular movements are intact,facial sensation  Intact and strength is intact, , palate elevates normally, tongue protrudes midline, shoulder shrug is normal.    Motor:RUE 5/5, RLE 5/5, LUE 5/5, LLE 5/5 ,  normal bulk and tone    Reflexes: Patellar reflexes are +2 , Achilles reflexes are 1+ , toes are downgoing. Sensation: Normal  light touch, temperature and vibration throughout     Cerebellar: FTN, HTS intact    Motor Examination: Last dose of sinemet was 2.5 hrs ago. Voice tremor       Chin tremor         RIGHT LEFT   Tremor at rest 0 0   Postural Tremor of hands 0 0   Action Tremor of hands 0 0   Tremor of Lower extremity 0 0   Open/Close 0 0   Rapid Alternating Movements of Hands 2 2   Finger Taps 1 1   Rigidity - Upper extremity 1 2   Rigidity- Lower extremity  1 1   Foot tapping/LE agility 1 1            Hypophonia 3   Hypomimia 2   Arising from Chair In wheelchair but able to stand with arm rests   Posture WNL   Gait improved   Pull test deferred   Dyskinesia  Absent    Body Bradykinsesia 3          Assessment/Plan:   Diagnosis Orders   1. Parkinson disease ([address]     2. Neurologic orthostatic hypotension (HCC)     3. Tremor     4. RBD (REM behavioral disorder)     5. Encounter for long-term (current) use of high-risk medication     6. Psychosis due to Parkinson's disease ([facility]     7. Polyneuropathy     8. 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 ([address]     9. Mixed anxiety depressive disorder     10. Episode of confusion         Tremor predominant PD that needs no change in dosage. Does have wearing off dyskinesia. We will change times of dosing to help with his day. But he is having sleep issues and early awakening. He had taken Remeron in the past. He continues with melatonin and perhaps could consider extended release formulation. But I will add on remeron as he slept better when he was taking this. Dementia is increasing but he needs stimulation and getting out with others and also to stay well hydrated. I have spent greater than 50% of the patient's 90 minute visit  in counseling for importance of exercise,  medications and education of disease and disease progression.      Patient is to continue all other medications as directed by prescribing physicians unless addressed above in plan. Continuation of these medications from today's visit are made based on the patient's report of current medications.      Abel Batista MD  [facility] Neurology  Director Movement Disorders Program  [facility]. [address]  [place], [address]  Phone: [phone]  Fax: [phone]

## 2022-06-28 ENCOUNTER — APPOINTMENT (OUTPATIENT)
Dept: GENERAL RADIOLOGY | Age: 82
DRG: 056 | End: 2022-06-28
Payer: MEDICARE

## 2022-06-28 ENCOUNTER — APPOINTMENT (OUTPATIENT)
Dept: CT IMAGING | Age: 82
DRG: 056 | End: 2022-06-28
Payer: MEDICARE

## 2022-06-28 ENCOUNTER — HOSPITAL ENCOUNTER (INPATIENT)
Age: 82
LOS: 7 days | Discharge: NURSING, REHAB OR LONG-TERM CARE | DRG: 056 | End: 2022-07-05
Attending: EMERGENCY MEDICINE | Admitting: STUDENT IN AN ORGANIZED HEALTH CARE EDUCATION/TRAINING PROGRAM
Payer: MEDICARE

## 2022-06-28 DIAGNOSIS — R41.82 ALTERED MENTAL STATUS, UNSPECIFIED ALTERED MENTAL STATUS TYPE: Primary | ICD-10-CM

## 2022-06-28 DIAGNOSIS — G24.9 DYSKINESIA DUE TO PARKINSON'S DISEASE (HCC): ICD-10-CM

## 2022-06-28 DIAGNOSIS — G20 PARKINSON'S DISEASE (HCC): ICD-10-CM

## 2022-06-28 DIAGNOSIS — F41.8 MIXED ANXIETY DEPRESSIVE DISORDER: ICD-10-CM

## 2022-06-28 DIAGNOSIS — I10 HYPERTENSION, UNSPECIFIED TYPE: ICD-10-CM

## 2022-06-28 DIAGNOSIS — G20 DYSKINESIA DUE TO PARKINSON'S DISEASE (HCC): ICD-10-CM

## 2022-06-28 PROBLEM — E03.9 HYPOTHYROIDISM: Status: ACTIVE | Noted: 2022-06-28

## 2022-06-28 PROBLEM — R44.3 HALLUCINATIONS: Status: ACTIVE | Noted: 2022-06-28

## 2022-06-28 PROBLEM — R41.0 EPISODE OF CONFUSION: Status: ACTIVE | Noted: 2019-06-11

## 2022-06-28 PROBLEM — N17.9 AKI (ACUTE KIDNEY INJURY) (HCC): Status: ACTIVE | Noted: 2022-06-28

## 2022-06-28 LAB
ALBUMIN SERPL-MCNC: 3.8 G/DL (ref 3.2–4.6)
ALBUMIN/GLOB SERPL: 1.1 {RATIO} (ref 1.2–3.5)
ALP SERPL-CCNC: 149 U/L (ref 50–136)
ALT SERPL-CCNC: 9 U/L (ref 12–65)
ANION GAP SERPL CALC-SCNC: 9 MMOL/L (ref 7–16)
AST SERPL-CCNC: 19 U/L (ref 15–37)
BASOPHILS # BLD: 0.1 K/UL (ref 0–0.2)
BASOPHILS NFR BLD: 1 % (ref 0–2)
BILIRUB SERPL-MCNC: 0.9 MG/DL (ref 0.2–1.1)
BILIRUB UR QL: NEGATIVE
BUN SERPL-MCNC: 40 MG/DL (ref 8–23)
CALCIUM SERPL-MCNC: 9.2 MG/DL (ref 8.3–10.4)
CHLORIDE SERPL-SCNC: 105 MMOL/L (ref 98–107)
CO2 SERPL-SCNC: 25 MMOL/L (ref 21–32)
CREAT SERPL-MCNC: 2 MG/DL (ref 0.8–1.5)
DIFFERENTIAL METHOD BLD: ABNORMAL
EKG ATRIAL RATE: 98 BPM
EKG DIAGNOSIS: NORMAL
EKG P AXIS: 61 DEGREES
EKG P-R INTERVAL: 172 MS
EKG Q-T INTERVAL: 436 MS
EKG QRS DURATION: 109 MS
EKG QTC CALCULATION (BAZETT): 454 MS
EKG R AXIS: -60 DEGREES
EKG T AXIS: 43 DEGREES
EKG VENTRICULAR RATE: 65 BPM
EOSINOPHIL # BLD: 0.1 K/UL (ref 0–0.8)
EOSINOPHIL NFR BLD: 1 % (ref 0.5–7.8)
ERYTHROCYTE [DISTWIDTH] IN BLOOD BY AUTOMATED COUNT: 12.8 % (ref 11.9–14.6)
GLOBULIN SER CALC-MCNC: 3.6 G/DL (ref 2.3–3.5)
GLUCOSE SERPL-MCNC: 65 MG/DL (ref 65–100)
GLUCOSE UR QL STRIP.AUTO: NEGATIVE MG/DL
HCT VFR BLD AUTO: 37.1 % (ref 41.1–50.3)
HGB BLD-MCNC: 12 G/DL (ref 13.6–17.2)
IMM GRANULOCYTES # BLD AUTO: 0 K/UL (ref 0–0.5)
IMM GRANULOCYTES NFR BLD AUTO: 0 % (ref 0–5)
KETONES UR-MCNC: 15 MG/DL
LACTATE SERPL-SCNC: 1.1 MMOL/L (ref 0.4–2)
LEUKOCYTE ESTERASE UR QL STRIP: NEGATIVE
LYMPHOCYTES # BLD: 1.2 K/UL (ref 0.5–4.6)
LYMPHOCYTES NFR BLD: 18 % (ref 13–44)
MCH RBC QN AUTO: 31.4 PG (ref 26.1–32.9)
MCHC RBC AUTO-ENTMCNC: 32.3 G/DL (ref 31.4–35)
MCV RBC AUTO: 97.1 FL (ref 79.6–97.8)
MONOCYTES # BLD: 0.5 K/UL (ref 0.1–1.3)
MONOCYTES NFR BLD: 8 % (ref 4–12)
NEUTS SEG # BLD: 4.9 K/UL (ref 1.7–8.2)
NEUTS SEG NFR BLD: 72 % (ref 43–78)
NITRITE UR QL: NEGATIVE
NRBC # BLD: 0 K/UL (ref 0–0.2)
PH UR: 5 [PH] (ref 5–9)
PLATELET # BLD AUTO: 284 K/UL (ref 150–450)
PLATELET COMMENT: ABNORMAL
PMV BLD AUTO: 9.7 FL (ref 9.4–12.3)
POTASSIUM SERPL-SCNC: 4.9 MMOL/L (ref 3.5–5.1)
PROT SERPL-MCNC: 7.4 G/DL (ref 6.3–8.2)
PROT UR QL: NEGATIVE MG/DL
RBC # BLD AUTO: 3.82 M/UL (ref 4.23–5.6)
RBC # UR STRIP: ABNORMAL /UL
RBC MORPH BLD: ABNORMAL
SERVICE CMNT-IMP: ABNORMAL
SODIUM SERPL-SCNC: 139 MMOL/L (ref 138–145)
SP GR UR: 1.02 (ref 1–1.02)
TSH W FREE THYROID IF ABNORMAL: 1.48 UIU/ML (ref 0.36–3.74)
UROBILINOGEN UR QL: 0.2 EU/DL (ref 0.2–1)
WBC # BLD AUTO: 6.8 K/UL (ref 4.3–11.1)
WBC MORPH BLD: ABNORMAL

## 2022-06-28 PROCEDURE — 85025 COMPLETE CBC W/AUTO DIFF WBC: CPT

## 2022-06-28 PROCEDURE — 6360000002 HC RX W HCPCS: Performed by: EMERGENCY MEDICINE

## 2022-06-28 PROCEDURE — 6360000002 HC RX W HCPCS: Performed by: STUDENT IN AN ORGANIZED HEALTH CARE EDUCATION/TRAINING PROGRAM

## 2022-06-28 PROCEDURE — 99285 EMERGENCY DEPT VISIT HI MDM: CPT

## 2022-06-28 PROCEDURE — 6370000000 HC RX 637 (ALT 250 FOR IP): Performed by: STUDENT IN AN ORGANIZED HEALTH CARE EDUCATION/TRAINING PROGRAM

## 2022-06-28 PROCEDURE — 2580000003 HC RX 258: Performed by: EMERGENCY MEDICINE

## 2022-06-28 PROCEDURE — 96361 HYDRATE IV INFUSION ADD-ON: CPT

## 2022-06-28 PROCEDURE — 96374 THER/PROPH/DIAG INJ IV PUSH: CPT

## 2022-06-28 PROCEDURE — 70450 CT HEAD/BRAIN W/O DYE: CPT

## 2022-06-28 PROCEDURE — 83605 ASSAY OF LACTIC ACID: CPT

## 2022-06-28 PROCEDURE — 81003 URINALYSIS AUTO W/O SCOPE: CPT

## 2022-06-28 PROCEDURE — 93005 ELECTROCARDIOGRAM TRACING: CPT | Performed by: EMERGENCY MEDICINE

## 2022-06-28 PROCEDURE — 71045 X-RAY EXAM CHEST 1 VIEW: CPT

## 2022-06-28 PROCEDURE — 2580000003 HC RX 258: Performed by: STUDENT IN AN ORGANIZED HEALTH CARE EDUCATION/TRAINING PROGRAM

## 2022-06-28 PROCEDURE — 80053 COMPREHEN METABOLIC PANEL: CPT

## 2022-06-28 PROCEDURE — 1100000000 HC RM PRIVATE

## 2022-06-28 PROCEDURE — 84443 ASSAY THYROID STIM HORMONE: CPT

## 2022-06-28 RX ORDER — POTASSIUM CHLORIDE 20 MEQ/1
40 TABLET, EXTENDED RELEASE ORAL PRN
Status: DISCONTINUED | OUTPATIENT
Start: 2022-06-28 | End: 2022-07-05 | Stop reason: HOSPADM

## 2022-06-28 RX ORDER — ENOXAPARIN SODIUM 100 MG/ML
30 INJECTION SUBCUTANEOUS EVERY 24 HOURS
Status: DISCONTINUED | OUTPATIENT
Start: 2022-06-28 | End: 2022-07-01

## 2022-06-28 RX ORDER — SODIUM CHLORIDE 0.9 % (FLUSH) 0.9 %
3 SYRINGE (ML) INJECTION EVERY 8 HOURS
Status: DISCONTINUED | OUTPATIENT
Start: 2022-06-28 | End: 2022-06-30 | Stop reason: SDUPTHER

## 2022-06-28 RX ORDER — LISINOPRIL 5 MG/1
5 TABLET ORAL DAILY
Status: DISCONTINUED | OUTPATIENT
Start: 2022-06-28 | End: 2022-07-05 | Stop reason: HOSPADM

## 2022-06-28 RX ORDER — LATANOPROST 50 UG/ML
1 SOLUTION/ DROPS OPHTHALMIC NIGHTLY
Status: DISCONTINUED | OUTPATIENT
Start: 2022-06-28 | End: 2022-07-05 | Stop reason: HOSPADM

## 2022-06-28 RX ORDER — TROSPIUM CHLORIDE 20 MG/1
20 TABLET, FILM COATED ORAL DAILY
Status: DISCONTINUED | OUTPATIENT
Start: 2022-06-29 | End: 2022-07-05 | Stop reason: HOSPADM

## 2022-06-28 RX ORDER — 0.9 % SODIUM CHLORIDE 0.9 %
1000 INTRAVENOUS SOLUTION INTRAVENOUS
Status: COMPLETED | OUTPATIENT
Start: 2022-06-28 | End: 2022-06-28

## 2022-06-28 RX ORDER — ACETAMINOPHEN 650 MG/1
650 SUPPOSITORY RECTAL EVERY 6 HOURS PRN
Status: DISCONTINUED | OUTPATIENT
Start: 2022-06-28 | End: 2022-07-05 | Stop reason: HOSPADM

## 2022-06-28 RX ORDER — ACETAMINOPHEN 325 MG/1
650 TABLET ORAL EVERY 6 HOURS PRN
Status: DISCONTINUED | OUTPATIENT
Start: 2022-06-28 | End: 2022-07-05 | Stop reason: HOSPADM

## 2022-06-28 RX ORDER — POLYETHYLENE GLYCOL 3350 17 G/17G
17 POWDER, FOR SOLUTION ORAL DAILY PRN
Status: DISCONTINUED | OUTPATIENT
Start: 2022-06-28 | End: 2022-07-05 | Stop reason: HOSPADM

## 2022-06-28 RX ORDER — GABAPENTIN 100 MG/1
100 CAPSULE ORAL 3 TIMES DAILY
Status: DISCONTINUED | OUTPATIENT
Start: 2022-06-28 | End: 2022-07-05 | Stop reason: HOSPADM

## 2022-06-28 RX ORDER — SODIUM CHLORIDE 9 MG/ML
INJECTION, SOLUTION INTRAVENOUS PRN
Status: DISCONTINUED | OUTPATIENT
Start: 2022-06-28 | End: 2022-07-05 | Stop reason: HOSPADM

## 2022-06-28 RX ORDER — DIAZEPAM 5 MG/ML
5 INJECTION, SOLUTION INTRAMUSCULAR; INTRAVENOUS ONCE
Status: COMPLETED | OUTPATIENT
Start: 2022-06-28 | End: 2022-06-28

## 2022-06-28 RX ORDER — CARBIDOPA/LEVODOPA 25MG-250MG
1 TABLET ORAL 4 TIMES DAILY
Status: DISCONTINUED | OUTPATIENT
Start: 2022-06-28 | End: 2022-06-29

## 2022-06-28 RX ORDER — SODIUM CHLORIDE 0.9 % (FLUSH) 0.9 %
5-40 SYRINGE (ML) INJECTION PRN
Status: DISCONTINUED | OUTPATIENT
Start: 2022-06-28 | End: 2022-07-05 | Stop reason: HOSPADM

## 2022-06-28 RX ORDER — ENOXAPARIN SODIUM 100 MG/ML
40 INJECTION SUBCUTANEOUS DAILY
Status: DISCONTINUED | OUTPATIENT
Start: 2022-06-28 | End: 2022-06-28 | Stop reason: SDUPTHER

## 2022-06-28 RX ORDER — MAGNESIUM SULFATE IN WATER 40 MG/ML
2000 INJECTION, SOLUTION INTRAVENOUS PRN
Status: DISCONTINUED | OUTPATIENT
Start: 2022-06-28 | End: 2022-07-05 | Stop reason: HOSPADM

## 2022-06-28 RX ORDER — RIVASTIGMINE TARTRATE 3 MG/1
6 CAPSULE ORAL 2 TIMES DAILY WITH MEALS
Status: DISCONTINUED | OUTPATIENT
Start: 2022-06-28 | End: 2022-07-05 | Stop reason: HOSPADM

## 2022-06-28 RX ORDER — ESCITALOPRAM OXALATE 10 MG/1
10 TABLET ORAL DAILY
Status: DISCONTINUED | OUTPATIENT
Start: 2022-06-29 | End: 2022-07-05 | Stop reason: HOSPADM

## 2022-06-28 RX ORDER — SODIUM CHLORIDE 9 MG/ML
INJECTION, SOLUTION INTRAVENOUS CONTINUOUS
Status: DISCONTINUED | OUTPATIENT
Start: 2022-06-28 | End: 2022-06-29

## 2022-06-28 RX ORDER — SODIUM CHLORIDE 0.9 % (FLUSH) 0.9 %
5-40 SYRINGE (ML) INJECTION EVERY 12 HOURS SCHEDULED
Status: DISCONTINUED | OUTPATIENT
Start: 2022-06-28 | End: 2022-07-05 | Stop reason: HOSPADM

## 2022-06-28 RX ORDER — PANTOPRAZOLE SODIUM 40 MG/1
40 TABLET, DELAYED RELEASE ORAL
Status: DISCONTINUED | OUTPATIENT
Start: 2022-06-29 | End: 2022-07-05 | Stop reason: HOSPADM

## 2022-06-28 RX ORDER — ONDANSETRON 2 MG/ML
4 INJECTION INTRAMUSCULAR; INTRAVENOUS EVERY 6 HOURS PRN
Status: DISCONTINUED | OUTPATIENT
Start: 2022-06-28 | End: 2022-07-05 | Stop reason: HOSPADM

## 2022-06-28 RX ORDER — POTASSIUM CHLORIDE 7.45 MG/ML
10 INJECTION INTRAVENOUS PRN
Status: DISCONTINUED | OUTPATIENT
Start: 2022-06-28 | End: 2022-07-05 | Stop reason: HOSPADM

## 2022-06-28 RX ORDER — ONDANSETRON 4 MG/1
4 TABLET, ORALLY DISINTEGRATING ORAL EVERY 8 HOURS PRN
Status: DISCONTINUED | OUTPATIENT
Start: 2022-06-28 | End: 2022-07-05 | Stop reason: HOSPADM

## 2022-06-28 RX ORDER — CARBIDOPA AND LEVODOPA 25; 100 MG/1; MG/1
2 TABLET, EXTENDED RELEASE ORAL NIGHTLY
Status: DISCONTINUED | OUTPATIENT
Start: 2022-06-28 | End: 2022-07-05 | Stop reason: HOSPADM

## 2022-06-28 RX ORDER — LANOLIN ALCOHOL/MO/W.PET/CERES
9 CREAM (GRAM) TOPICAL NIGHTLY
Status: DISCONTINUED | OUTPATIENT
Start: 2022-06-28 | End: 2022-07-05 | Stop reason: HOSPADM

## 2022-06-28 RX ADMIN — ENOXAPARIN SODIUM 30 MG: 100 INJECTION SUBCUTANEOUS at 21:14

## 2022-06-28 RX ADMIN — SODIUM CHLORIDE, PRESERVATIVE FREE 5 ML: 5 INJECTION INTRAVENOUS at 21:14

## 2022-06-28 RX ADMIN — LISINOPRIL 5 MG: 5 TABLET ORAL at 19:39

## 2022-06-28 RX ADMIN — SODIUM CHLORIDE 1000 ML: 900 INJECTION, SOLUTION INTRAVENOUS at 14:37

## 2022-06-28 RX ADMIN — CARBIDOPA AND LEVODOPA 1 TABLET: 25; 250 TABLET ORAL at 19:39

## 2022-06-28 RX ADMIN — GABAPENTIN 100 MG: 100 CAPSULE ORAL at 21:14

## 2022-06-28 RX ADMIN — RIVASTIGMINE TARTRATE 6 MG: 3 CAPSULE ORAL at 19:39

## 2022-06-28 RX ADMIN — SODIUM CHLORIDE: 9 INJECTION, SOLUTION INTRAVENOUS at 19:41

## 2022-06-28 RX ADMIN — Medication 9 MG: at 21:14

## 2022-06-28 RX ADMIN — CARBIDOPA AND LEVODOPA 2 TABLET: 25; 100 TABLET, EXTENDED RELEASE ORAL at 21:14

## 2022-06-28 RX ADMIN — DIAZEPAM 5 MG: 10 INJECTION, SOLUTION INTRAMUSCULAR; INTRAVENOUS at 14:37

## 2022-06-28 RX ADMIN — LATANOPROST 1 DROP: 50 SOLUTION OPHTHALMIC at 21:14

## 2022-06-28 ASSESSMENT — PAIN SCALES - GENERAL: PAINLEVEL_OUTOF10: 0

## 2022-06-28 NOTE — ED NOTES
TRANSFER - OUT REPORT:    Verbal report given to [id number] [address] Amezcua  on [employer].  being transferred to 7th floor  for routine progression of patient care       Report consisted of patient's Situation, Background, Assessment and   Recommendations(SBAR). Information from the following report(s) Nurse Handoff Report, ED SBAR, [facility] and Recent Results was reviewed with the receiving nurse. Lines:   Peripheral IV 06/28/22 Left Hand (Active)   Site Assessment Clean, dry & intact 06/28/22 1322   Line Status Blood return noted 06/28/22 1322   Phlebitis Assessment No symptoms 06/28/22 1322   Infiltration Assessment 0 06/28/22 1322        Opportunity for questions and clarification was provided.       Patient transported with:  Robbert Leyden, RN  06/28/22 [id number]

## 2022-06-28 NOTE — ED TRIAGE NOTES
Pt arrived via EMS from [facility]. Pt has hx of Parkinsons and prostate cancer. Pt received Eligard for his prostate cancer wihch is contraindicated due to neuro condition. Pt has had hallucinations since last Thursday, June 23rd - when he received Eligard. Today pt was presenting with grimace and a stiff right leg. Bp 184/90,  Hr 60s, BGL 89, 02 96 room air, T 98.1.

## 2022-06-28 NOTE — ED PROVIDER NOTES
[employer] Emergency Department Provider Note                   PCP:                None Provider               Age: [de-identified] y.o. Sex: male     No diagnosis found. DISPOSITION         New Prescriptions    No medications on file       Orders Placed This Encounter   Procedures    XR CHEST PORTABLE    CT HEAD WO CONTRAST    CBC with Auto Differential    CMP    TSH with Reflex    Lactic Acid Now and in 2 Hours    POCT Urine Dipstick    Pulse Oximetry    Straight cath    POCT Urinalysis no Micro    EKG 12 Lead    Saline lock IV        Tab Bedoya MD 2:12 PM      MDM  Number of Diagnoses or Management Options  Diagnosis management comments: Patient was noted to become more hypertensive during observation. He is at risk for serotonin syndrome as he is on Remeron and Lexapro. This may be contributing to his symptoms. According to the facility staff he has not taken any of his medications for the past day or 2. Symptoms may also be part of his Parkinson's. He is currently being treated with Valium for the rigidity. Urinalysis is unremarkable. Blood work shows INO with creatinine of 2.0 BUN 40. Lactic acid is normal.  He is being treated with IV fluids. Chest x-ray no acute abnormality. EKG shows sinus rhythm with frequent PACs.        Amount and/or Complexity of Data Reviewed  Clinical lab tests: ordered and reviewed  Tests in the radiology section of CPT®: ordered and reviewed  Tests in the medicine section of CPT®: ordered and reviewed  Discuss the patient with other providers: yes  Independent visualization of images, tracings, or specimens: yes    Risk of Complications, Morbidity, and/or Mortality  Presenting problems: high  Diagnostic procedures: high  Management options: high Yamilex Atkinson is a [de-identified] y.o. male who presents to the Emergency Department with chief complaint of    Chief Complaint   Patient presents with    Altered Mental Status      29-year-old white male history of Parkinson's and prostate cancer currently residing at assisted living facility sent to the emergency department due to changes in mental status. History is obtained from the patient's brother-in-law. States that since yesterday he has been \"out of sorts\". Has been reportedly hallucinating and talking to the wall. Today they noticed that he was grimacing and appeared very rigid. Patient cannot contribute to history of present illness. The history is provided by a relative. Review of Systems   Unable to perform ROS: Mental status change       Past Medical History:   Diagnosis Date    AAA (abdominal aortic aneurysm) ([facility] [address] 7/8/2016    pt is not aware of this-     Casas's esophagus with esophagitis 7/8/2016    no meds- not daily    Cerebrovascular accident (CVA) ([address] 7/8/2016    They thought I did but they didn't find anything on the tests \"I blacked out\" no admission to the hospital    Chronic fatigue syndrome 2/26/2016    Cognitive deficits 7/8/2016    Colon polyps 2007    Elevated PSA     Enthesopathy of ankle and tarsus 5/29/2014    GERD (gastroesophageal reflux disease) 7/8/2016    Hashimoto's thyroiditis 7/8/2016    History of nuclear stress test 1997, 5/2005    Hypotension 8/25/2015    Impaired cognition 7/8/2016    Inguinal hernia 7/8/2016    Insomnia 7/8/2016    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          Past Surgical History:   Procedure Laterality Date    BREAST BIOPSY Bilateral     sterotactic appears somewhat restless and uncomfortable. HENT:      Head: Normocephalic and atraumatic. Nose: Nose normal.      Mouth/Throat:      Mouth: Mucous membranes are dry. Eyes:      Pupils: Pupils are equal, round, and reactive to light. Cardiovascular:      Rate and Rhythm: Normal rate and regular rhythm. Pulmonary:      Effort: Pulmonary effort is normal.      Breath sounds: Normal breath sounds. No wheezing. Abdominal:      General: There is no distension. Palpations: Abdomen is soft. Tenderness: There is no abdominal tenderness. There is no guarding. Musculoskeletal:      Cervical back: Rigidity present. Comments: Patient has some rigidity to all extremities but is able to bend them on command. Skin:     General: Skin is warm and dry. Neurological:      Comments: Unable to assess orientation as he does not respond verbally. He does move all extremities and follows commands although he is somewhat rigid throughout. He does have some cogwheel rigidity. No definite cranial nerve deficits however patient has some difficulty following commands for testing.           EKG 12 Lead    Date/Time: 6/28/2022 2:20 PM  Performed by: Hailee Caraballo MD  Authorized by: Hailee Caraballo MD     ECG reviewed by ED Physician in the absence of a cardiologist: yes    Interpretation:     Interpretation: abnormal    Rate:     ECG rate assessment: normal    Rhythm:     Rhythm: sinus rhythm    Ectopy:     Ectopy: PAC    QRS:     QRS axis:  Normal  Conduction:     Conduction: normal    ST segments:     ST segments:  Normal  T waves:     T waves: normal            Labs Reviewed   CBC WITH AUTO DIFFERENTIAL - Abnormal; Notable for the following components:       Result Value    RBC 3.82 (*)     Hemoglobin 12.0 (*)     Hematocrit 37.1 (*)     All other components within normal limits   COMPREHENSIVE METABOLIC PANEL - Abnormal; Notable for the following components:    BUN 40 (*)     CREATININE 2.00 (*) GFR  42 (*)     GFR Non- 34 (*)     ALT 9 (*)     Alk Phosphatase 149 (*)     Globulin 3.6 (*)     Albumin/Globulin Ratio 1.1 (*)     All other components within normal limits   POCT URINALYSIS DIPSTICK - Abnormal; Notable for the following components:    Specific Gravity, Urine, POC 1.025 (*)     KETONES, Urine, POC 15 (*)     Blood, UA POC Trace Intact (*)     All other components within normal limits   TSH WITH REFLEX   LACTIC ACID   LACTIC ACID        XR CHEST PORTABLE   Final Result   No acute findings in the chest.         CT HEAD WO CONTRAST    (Results Pending)                          Voice dictation software was used during the making of this note. This software is not perfect and grammatical and other typographical errors may be present. This note has not been completely proofread for errors.      Shania Nuno MD  06/28/22 [id number]

## 2022-06-29 ENCOUNTER — APPOINTMENT (OUTPATIENT)
Dept: GENERAL RADIOLOGY | Age: 82
DRG: 056 | End: 2022-06-29
Payer: MEDICARE

## 2022-06-29 PROBLEM — R29.6 FREQUENT FALLS: Status: ACTIVE | Noted: 2018-08-20

## 2022-06-29 LAB
25(OH)D3 SERPL-MCNC: 25.6 NG/ML (ref 30–100)
ALBUMIN SERPL-MCNC: 3 G/DL (ref 3.2–4.6)
ALBUMIN/GLOB SERPL: 0.9 {RATIO} (ref 1.2–3.5)
ALP SERPL-CCNC: 130 U/L (ref 50–136)
ALT SERPL-CCNC: <6 U/L (ref 12–65)
ANION GAP SERPL CALC-SCNC: 10 MMOL/L (ref 7–16)
APPEARANCE UR: ABNORMAL
AST SERPL-CCNC: 10 U/L (ref 15–37)
BACTERIA URNS QL MICRO: NEGATIVE /HPF
BASOPHILS # BLD: 0.1 K/UL (ref 0–0.2)
BASOPHILS NFR BLD: 1 % (ref 0–2)
BILIRUB SERPL-MCNC: 0.9 MG/DL (ref 0.2–1.1)
BILIRUB UR QL: NEGATIVE
BUN SERPL-MCNC: 42 MG/DL (ref 8–23)
CALCIUM SERPL-MCNC: 8.6 MG/DL (ref 8.3–10.4)
CASTS URNS QL MICRO: ABNORMAL /LPF
CHLORIDE SERPL-SCNC: 110 MMOL/L (ref 98–107)
CO2 SERPL-SCNC: 23 MMOL/L (ref 21–32)
COLOR UR: ABNORMAL
CREAT SERPL-MCNC: 1.4 MG/DL (ref 0.8–1.5)
CREAT UR-MCNC: 52 MG/DL
DIFFERENTIAL METHOD BLD: ABNORMAL
EOSINOPHIL # BLD: 0 K/UL (ref 0–0.8)
EOSINOPHIL NFR BLD: 1 % (ref 0.5–7.8)
EPI CELLS #/AREA URNS HPF: ABNORMAL /HPF
ERYTHROCYTE [DISTWIDTH] IN BLOOD BY AUTOMATED COUNT: 12.7 % (ref 11.9–14.6)
FOLATE SERPL-MCNC: 17.2 NG/ML (ref 3.1–17.5)
GLOBULIN SER CALC-MCNC: 3.4 G/DL (ref 2.3–3.5)
GLUCOSE BLD STRIP.AUTO-MCNC: 154 MG/DL (ref 65–100)
GLUCOSE BLD STRIP.AUTO-MCNC: 254 MG/DL (ref 65–100)
GLUCOSE BLD STRIP.AUTO-MCNC: 29 MG/DL (ref 65–100)
GLUCOSE BLD STRIP.AUTO-MCNC: 43 MG/DL (ref 65–100)
GLUCOSE BLD STRIP.AUTO-MCNC: 98 MG/DL (ref 65–100)
GLUCOSE SERPL-MCNC: 39 MG/DL (ref 65–100)
GLUCOSE UR STRIP.AUTO-MCNC: >1000 MG/DL
HCT VFR BLD AUTO: 35.8 % (ref 41.1–50.3)
HGB BLD-MCNC: 11.5 G/DL (ref 13.6–17.2)
HGB UR QL STRIP: NEGATIVE
IMM GRANULOCYTES # BLD AUTO: 0 K/UL (ref 0–0.5)
IMM GRANULOCYTES NFR BLD AUTO: 0 % (ref 0–5)
KETONES UR QL STRIP.AUTO: ABNORMAL MG/DL
LEUKOCYTE ESTERASE UR QL STRIP.AUTO: NEGATIVE
LYMPHOCYTES # BLD: 1.2 K/UL (ref 0.5–4.6)
LYMPHOCYTES NFR BLD: 20 % (ref 13–44)
MCH RBC QN AUTO: 31.4 PG (ref 26.1–32.9)
MCHC RBC AUTO-ENTMCNC: 32.1 G/DL (ref 31.4–35)
MCV RBC AUTO: 97.8 FL (ref 79.6–97.8)
MONOCYTES # BLD: 0.5 K/UL (ref 0.1–1.3)
MONOCYTES NFR BLD: 8 % (ref 4–12)
MUCOUS THREADS URNS QL MICRO: 0 /LPF
NEUTS SEG # BLD: 4.4 K/UL (ref 1.7–8.2)
NEUTS SEG NFR BLD: 70 % (ref 43–78)
NITRITE UR QL STRIP.AUTO: NEGATIVE
NRBC # BLD: 0 K/UL (ref 0–0.2)
PH UR STRIP: 5 [PH] (ref 5–9)
PLATELET # BLD AUTO: 242 K/UL (ref 150–450)
PMV BLD AUTO: 9 FL (ref 9.4–12.3)
POTASSIUM SERPL-SCNC: 4.8 MMOL/L (ref 3.5–5.1)
PROT SERPL-MCNC: 6.4 G/DL (ref 6.3–8.2)
PROT UR STRIP-MCNC: NEGATIVE MG/DL
PROT UR-MCNC: 11 MG/DL
PROT/CREAT UR-RTO: 0.2
RBC # BLD AUTO: 3.66 M/UL (ref 4.23–5.6)
RBC #/AREA URNS HPF: ABNORMAL /HPF
SERVICE CMNT-IMP: ABNORMAL
SERVICE CMNT-IMP: NORMAL
SODIUM SERPL-SCNC: 143 MMOL/L (ref 138–145)
SODIUM UR-SCNC: 70 MMOL/L
SP GR UR REFRACTOMETRY: 1.01 (ref 1–1.02)
URINE CULTURE IF INDICATED: ABNORMAL
UROBILINOGEN UR QL STRIP.AUTO: 1 EU/DL (ref 0.2–1)
VIT B12 SERPL-MCNC: 756 PG/ML (ref 193–986)
WBC # BLD AUTO: 6.2 K/UL (ref 4.3–11.1)
WBC URNS QL MICRO: ABNORMAL /HPF

## 2022-06-29 PROCEDURE — 36415 COLL VENOUS BLD VENIPUNCTURE: CPT

## 2022-06-29 PROCEDURE — 6370000000 HC RX 637 (ALT 250 FOR IP): Performed by: STUDENT IN AN ORGANIZED HEALTH CARE EDUCATION/TRAINING PROGRAM

## 2022-06-29 PROCEDURE — 6360000002 HC RX W HCPCS: Performed by: STUDENT IN AN ORGANIZED HEALTH CARE EDUCATION/TRAINING PROGRAM

## 2022-06-29 PROCEDURE — 82306 VITAMIN D 25 HYDROXY: CPT

## 2022-06-29 PROCEDURE — 82962 GLUCOSE BLOOD TEST: CPT

## 2022-06-29 PROCEDURE — 2580000003 HC RX 258: Performed by: FAMILY MEDICINE

## 2022-06-29 PROCEDURE — 84300 ASSAY OF URINE SODIUM: CPT

## 2022-06-29 PROCEDURE — 84156 ASSAY OF PROTEIN URINE: CPT

## 2022-06-29 PROCEDURE — 81001 URINALYSIS AUTO W/SCOPE: CPT

## 2022-06-29 PROCEDURE — 99223 1ST HOSP IP/OBS HIGH 75: CPT | Performed by: PSYCHIATRY & NEUROLOGY

## 2022-06-29 PROCEDURE — 85025 COMPLETE CBC W/AUTO DIFF WBC: CPT

## 2022-06-29 PROCEDURE — 82746 ASSAY OF FOLIC ACID SERUM: CPT

## 2022-06-29 PROCEDURE — 1100000000 HC RM PRIVATE

## 2022-06-29 PROCEDURE — 71045 X-RAY EXAM CHEST 1 VIEW: CPT

## 2022-06-29 PROCEDURE — 2580000003 HC RX 258: Performed by: EMERGENCY MEDICINE

## 2022-06-29 PROCEDURE — 6370000000 HC RX 637 (ALT 250 FOR IP): Performed by: PSYCHIATRY & NEUROLOGY

## 2022-06-29 PROCEDURE — 97166 OT EVAL MOD COMPLEX 45 MIN: CPT

## 2022-06-29 PROCEDURE — 97535 SELF CARE MNGMENT TRAINING: CPT

## 2022-06-29 PROCEDURE — 2580000003 HC RX 258: Performed by: STUDENT IN AN ORGANIZED HEALTH CARE EDUCATION/TRAINING PROGRAM

## 2022-06-29 PROCEDURE — 2500000003 HC RX 250 WO HCPCS: Performed by: STUDENT IN AN ORGANIZED HEALTH CARE EDUCATION/TRAINING PROGRAM

## 2022-06-29 PROCEDURE — 82607 VITAMIN B-12: CPT

## 2022-06-29 PROCEDURE — 92610 EVALUATE SWALLOWING FUNCTION: CPT

## 2022-06-29 PROCEDURE — 80053 COMPREHEN METABOLIC PANEL: CPT

## 2022-06-29 PROCEDURE — 97162 PT EVAL MOD COMPLEX 30 MIN: CPT

## 2022-06-29 PROCEDURE — 97530 THERAPEUTIC ACTIVITIES: CPT

## 2022-06-29 RX ORDER — CARBIDOPA AND LEVODOPA 25; 250 MG/1; MG/1
1 TABLET, ORALLY DISINTEGRATING ORAL 4 TIMES DAILY
Status: DISCONTINUED | OUTPATIENT
Start: 2022-06-29 | End: 2022-07-05 | Stop reason: HOSPADM

## 2022-06-29 RX ORDER — LEVALBUTEROL INHALATION SOLUTION 1.25 MG/3ML
1.25 SOLUTION RESPIRATORY (INHALATION) EVERY 8 HOURS PRN
Status: DISCONTINUED | OUTPATIENT
Start: 2022-06-29 | End: 2022-07-05 | Stop reason: HOSPADM

## 2022-06-29 RX ORDER — DEXTROSE AND SODIUM CHLORIDE 5; .45 G/100ML; G/100ML
INJECTION, SOLUTION INTRAVENOUS CONTINUOUS
Status: DISCONTINUED | OUTPATIENT
Start: 2022-06-29 | End: 2022-06-30

## 2022-06-29 RX ORDER — HYDRALAZINE HYDROCHLORIDE 20 MG/ML
5 INJECTION INTRAMUSCULAR; INTRAVENOUS EVERY 6 HOURS PRN
Status: DISCONTINUED | OUTPATIENT
Start: 2022-06-29 | End: 2022-07-05 | Stop reason: HOSPADM

## 2022-06-29 RX ORDER — GLUCAGON 1 MG/ML
1 KIT INJECTION PRN
Status: DISCONTINUED | OUTPATIENT
Start: 2022-06-29 | End: 2022-07-05 | Stop reason: HOSPADM

## 2022-06-29 RX ORDER — CLONAZEPAM 1 MG/1
1 TABLET ORAL DAILY
Status: DISCONTINUED | OUTPATIENT
Start: 2022-06-29 | End: 2022-07-05 | Stop reason: HOSPADM

## 2022-06-29 RX ORDER — DEXTROSE MONOHYDRATE 50 MG/ML
100 INJECTION, SOLUTION INTRAVENOUS PRN
Status: DISCONTINUED | OUTPATIENT
Start: 2022-06-29 | End: 2022-07-05 | Stop reason: HOSPADM

## 2022-06-29 RX ADMIN — SODIUM CHLORIDE, PRESERVATIVE FREE 3 ML: 5 INJECTION INTRAVENOUS at 12:37

## 2022-06-29 RX ADMIN — ESCITALOPRAM OXALATE 10 MG: 10 TABLET ORAL at 08:09

## 2022-06-29 RX ADMIN — SODIUM CHLORIDE, PRESERVATIVE FREE 10 ML: 5 INJECTION INTRAVENOUS at 21:23

## 2022-06-29 RX ADMIN — TROSPIUM CHLORIDE 20 MG: 20 TABLET, FILM COATED ORAL at 08:09

## 2022-06-29 RX ADMIN — LEVOTHYROXINE SODIUM 125 MCG: 0.07 TABLET ORAL at 05:49

## 2022-06-29 RX ADMIN — SODIUM CHLORIDE, PRESERVATIVE FREE 3 ML: 5 INJECTION INTRAVENOUS at 21:23

## 2022-06-29 RX ADMIN — CARBIDOPA AND LEVODOPA 1 TABLET: 25; 250 TABLET ORAL at 14:41

## 2022-06-29 RX ADMIN — DEXTROSE MONOHYDRATE 250 ML: 100 INJECTION, SOLUTION INTRAVENOUS at 10:38

## 2022-06-29 RX ADMIN — CARBIDOPA AND LEVODOPA 1 TABLET: 25; 250 TABLET ORAL at 08:09

## 2022-06-29 RX ADMIN — GABAPENTIN 100 MG: 100 CAPSULE ORAL at 21:09

## 2022-06-29 RX ADMIN — GABAPENTIN 100 MG: 100 CAPSULE ORAL at 08:09

## 2022-06-29 RX ADMIN — RIVASTIGMINE TARTRATE 6 MG: 3 CAPSULE ORAL at 17:11

## 2022-06-29 RX ADMIN — CARBIDOPA AND LEVODOPA 1 TABLET: 25; 250 TABLET, ORALLY DISINTEGRATING ORAL at 17:10

## 2022-06-29 RX ADMIN — DEXTROSE AND SODIUM CHLORIDE: 5; 450 INJECTION, SOLUTION INTRAVENOUS at 12:32

## 2022-06-29 RX ADMIN — RIVASTIGMINE TARTRATE 6 MG: 3 CAPSULE ORAL at 08:10

## 2022-06-29 RX ADMIN — Medication 9 MG: at 21:09

## 2022-06-29 RX ADMIN — CARBIDOPA AND LEVODOPA 1 TABLET: 25; 250 TABLET ORAL at 05:49

## 2022-06-29 RX ADMIN — PANTOPRAZOLE SODIUM 40 MG: 40 TABLET, DELAYED RELEASE ORAL at 05:50

## 2022-06-29 RX ADMIN — ENOXAPARIN SODIUM 30 MG: 100 INJECTION SUBCUTANEOUS at 21:10

## 2022-06-29 RX ADMIN — SODIUM CHLORIDE, PRESERVATIVE FREE 3 ML: 5 INJECTION INTRAVENOUS at 05:50

## 2022-06-29 RX ADMIN — LISINOPRIL 5 MG: 5 TABLET ORAL at 08:09

## 2022-06-29 RX ADMIN — CLONAZEPAM 1 MG: 1 TABLET ORAL at 21:09

## 2022-06-29 RX ADMIN — LATANOPROST 1 DROP: 50 SOLUTION OPHTHALMIC at 21:13

## 2022-06-29 RX ADMIN — TUBERCULIN PURIFIED PROTEIN DERIVATIVE 5 UNITS: 5 INJECTION, SOLUTION INTRADERMAL at 05:51

## 2022-06-29 RX ADMIN — CARBIDOPA AND LEVODOPA 2 TABLET: 25; 100 TABLET, EXTENDED RELEASE ORAL at 21:08

## 2022-06-29 RX ADMIN — SODIUM CHLORIDE, PRESERVATIVE FREE 10 ML: 5 INJECTION INTRAVENOUS at 08:10

## 2022-06-29 RX ADMIN — GABAPENTIN 100 MG: 100 CAPSULE ORAL at 14:41

## 2022-06-29 NOTE — PROGRESS NOTES
ADLs and activity tolerance compared to reported PLOF. Pt tolerated session well. Pt presents pleasant and cooperative with good rehab potential. Pt would benefit from continued skilled OT services for duration of hospital stay and after t/f to next level of care or until all stated goals met.       [address] [facility] [place] [address] [address] AM-PAC 6 Clicks Daily Activity Inpatient Short Form:    AM-PAC Daily Activity Inpatient   How much help for putting on and taking off regular lower body clothing?: A Little  How much help for Bathing?: A Little  How much help for Toileting?: A Little  How much help for putting on and taking off regular upper body clothing?: None  How much help for taking care of personal grooming?: A Little  How much help for eating meals?: None  AM-PAC Inpatient Daily Activity Raw Score: 20  AM-PAC Inpatient ADL T-Scale Score : 42.03  ADL Inpatient CMS 0-100% Score: 38.32  ADL Inpatient CMS G-Code Modifier : CJ           SUBJECTIVE:     Mr. Tano Farrell states, \"I feel fine\"     Social/Functional Lives With: Alone  Type of Home: Assisted living  Home Layout: One level  ADL Assistance: Independent  Ambulation Assistance: Needs assistance  Active : No    OBJECTIVE:     Joe Frames / Ilir Stapler / Girfloraa Glaze: IV and Telemetry     RESTRICTIONS/PRECAUTIONS:  Restrictions/Precautions: Fall Risk    PAIN: VITALS / O2:   Pre Treatment:   Pain Assessment: None - Denies Pain      Post Treatment: None       Vitals          Oxygen  O2 Flow Rate (L/min): 4 L/min  SpO2: (!) 88 % (unsure of accuracy d/t poor circulation no SOB)  Pulse Oximeter Device Location: Right;Left;Finger         GROSS EVALUATION: INTACT IMPAIRED   (See Comments)   UE AROM [] WFL   UE PROM [] []NT   Strength [] LUE Strength  Gross LUE Strength: WFL  LUE Strength Comment: 4/5  RUE Strength  Gross RUE Strength: WFL  RUE Strength Comment: 4/5     Posture / Balance [] Sitting - Static: Fair,+  Sitting - Dynamic: Fair,+  Standing - Static: Fair  Standing - Dynamic: Fair Sensation [x]     Coordination []  Minimal decrease in FM coordination noted, FM WFL for BADLs (toothpaste/brush management standing at sink CGA for stability)     Tone []  NT     Edema [] NT   Activity Tolerance []       Hand Dominance R [x] L []      COGNITION/  PERCEPTION: INTACT IMPAIRED   (See Comments)   Orientation [] Oriented to place,Oriented to person   Vision [x]     Hearing [x]     Cognition  [] Overall Cognitive Status: Exceptions  Following Commands:  Follows one step commands with increased time,Follows one step commands with repetition,Follows multistep commands with increased time,Follows multistep commands with repitition  Safety Judgement: Decreased awareness of need for safety  Insights: Decreased awareness of deficits   Perception [x]       MOBILITY: I Mod I S SBA CGA Min Mod Max Total  NT x2 Comments:   Bed Mobility    Rolling [] [] [] [] [x] [] [] [] [] [] []    Supine to Sit [] [] [] [] [x] [] [] [] [] [] []    Scooting [] [] [] [] [x] [] [] [] [] [] []    Sit to Supine [] [] [] [] [x] [] [] [] [] [] []    Transfers    Sit to Stand [] [] [] [] [x] [x] [] [] [] [] []    Bed to Chair [] [] [] [] [x] [] [] [] [] [] []    Stand to Sit [] [] [] [] [x] [x] [] [] [] [] []    Tub/Shower [] [] [] [] [] [] [] [] [] [] []     Toilet [] [] [] [] [] [] [] [] [] [] []      [] [] [] [] [] [] [] [] [] [] []    I=Independent, Mod I=Modified Independent, S=Supervision/Setup, SBA=Standby Assistance, CGA=Contact Guard Assistance, Min=Minimal Assistance, Mod=Moderate Assistance, Max=Maximal Assistance, Total=Total Assistance, NT=Not Tested    ACTIVITIES OF DAILY LIVING: I Mod I S SBA CGA Min Mod Max Total NT Comments   BASIC ADLs:              Upper Body Bathing  [] [] [] [] [x] [] [] [] [] [] UB washing seated EOB   Lower Body Bathing [] [] [] [] [] [x] [] [] [] [] LB washing seated EOB   Toileting [] [] [] [] [x] [] [] [] [] [] Brief management and hygiene standing at RW   Upper Body Dressing [] [] [] [x] [] [] [] [] [] [] Doff/don gown   Lower Body Dressing [] [] [] [] [] [x] [] [] [] [] Doff/don socks and brief seated and standing at [facility] [] [x] [] [] [] [] [] [] [] []    Grooming [] [] [] [] [x] [] [] [] [] [] Oral hygiene and face washing standing at sink with RW   Personal Device Care [] [] [] [] [] [] [] [] [] [x]    Functional Mobility [] [] [] [] [x] [x] [] [] [] [] Bed mobility and household mobility in room for ADLs with RW   I=Independent, Mod I=Modified Independent, S=Supervision/Setup, SBA=Standby Assistance, CGA=Contact Guard Assistance, Min=Minimal Assistance, Mod=Moderate Assistance, Max=Maximal Assistance, Total=Total Assistance, NT=Not Tested    PLAN:     [address] [place] [address] of Care: 3 times/week for duration of hospital stay or until stated goals are met, whichever comes first.    ACUTE OCCUPATIONAL THERAPY GOALS:   (Developed with and agreed upon by patient and/or caregiver.)  1. Patient will complete lower body bathing and dressing with Sup-V and adaptive equipment as   needed. 2. Patient will completed upper body bathing and dressing with Mod I and adaptive equipment as needed. 3. Patient will complete grooming standing at sink with Sup-V and adaptive equipment as needed. 4. Patient will complete toileting with Sup-V.   5. Patient will tolerate 30 minutes of OT treatment with no rest breaks to increase activity tolerance for ADLs. 6. Patient will complete functional transfers with Mod I and adaptive equipment as needed.      Timeframe: 7 visits         PROBLEM LIST:   (Skilled intervention is medically necessary to address:)  Decreased ADL/Functional Activities  Decreased Activity Tolerance  Decreased Balance  Decreased Cognition  Decreased Coordination  Decreased Gait Ability  Decreased Safety Awareness  Decreased Strength  Decreased Transfer Abilities   INTERVENTIONS PLANNED:  (Benefits and precautions of occupational therapy have been discussed with the patient.)  Self Care Training  Therapeutic Activity  Therapeutic Exercise/HEP  Neuromuscular Re-education  Manual Therapy  Education         TREATMENT:     EVALUATION: MODERATE COMPLEXITY: (Untimed Charge)    TREATMENT:   Co-Treatment PT/OT necessary due to patient's decreased overall endurance/tolerance levels, as well as need for high level skilled assistance to complete functional transfers/mobility and functional tasks  Self Care (30 minutes): Patient participated in upper body bathing, lower body bathing, toileting, upper body dressing, lower body dressing and grooming ADLs in unsupported sitting and standing with minimal verbal cueing to increase independence, decrease assistance required, increase activity tolerance and increase safety awareness. Patient also participated in functional mobility, functional transfer and energy conservation training to increase independence, decrease assistance required, increase activity tolerance and increase safety awareness. TREATMENT GRID:  N/A    AFTER TREATMENT PRECAUTIONS: Alarm Activated, Bed, Bed/Chair Locked, Call light within reach, Needs within reach, RN notified, Side rails x3 and Visitors at bedside    INTERDISCIPLINARY COLLABORATION:  RN/ PCT, PT/ PTA and OT/ ROYAL    EDUCATION:  Education Given To: Family; Patient  Education Provided: Role of Therapy;Plan of Care;Energy Conservation  Education Provided Comments: Breathing technique and exercise  Education Method: Verbal;Demonstration  Barriers to Learning: Cognition  Education Outcome: Verbalized understanding;Demonstrated understanding;Continued education needed    TOTAL TREATMENT DURATION AND TIME:  Time In: 1031  Time Out: Eula 25  Minutes: 4 Cindy Kirby OT

## 2022-06-29 NOTE — PROGRESS NOTES
Consult    Patient: Kathie Camacho MRN: [id number]  SSN: [id number]    YOB: 1940  Age: [de-identified] y.o. Sex: male      Subjective: Kathie Camacho is a [de-identified] y.o. male who is being seen for evaluation of confusion episodes of hallucinations he enters from assisted living further confusion and hallucinations superimposed on a longstanding history of this disorder associated with parkinsonism. Was previously treated with Nuplazid with a paradoxical negative response. Remeron was started 2 weeks ago and it appears there is likewise clinical worsening. Parkinson's disease since according to the patient 2004 but according to the medical record 2014  He follows with Dr Vanessa Bansal  He has been managed with Sinemet with marginal response but no major on-off phenomenology patient was admitted to the hospital with creatinine of 2.0 and acute kidney injury. Nonseptic.     Past Medical History:   Diagnosis Date    AAA (abdominal aortic aneurysm) ([address] 7/8/2016    pt is not aware of this-     Casas's esophagus with esophagitis 7/8/2016    no meds- not daily    Cerebrovascular accident (CVA) ([address] 7/8/2016    They thought I did but they didn't find anything on the tests \"I blacked out\" no admission to the hospital    Chronic fatigue syndrome 2/26/2016    Cognitive deficits 7/8/2016    Colon polyps 2007    Elevated PSA     Enthesopathy of ankle and tarsus 5/29/2014    GERD (gastroesophageal reflux disease) 7/8/2016    Hashimoto's thyroiditis 7/8/2016    History of nuclear stress test 1997, 5/2005    Hypotension 8/25/2015    Impaired cognition 7/8/2016    Inguinal hernia 7/8/2016    Insomnia 7/8/2016    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       Past Surgical History:   Procedure Laterality Date    BREAST BIOPSY Bilateral     sterotactic    CATARACT REMOVAL Right 2012    COLONOSCOPY  2000 & 2005    HERNIA REPAIR Left 2011    OTHER SURGICAL HISTORY      deep leg/ankle tumor excision benign leg dumor    OTHER SURGICAL HISTORY  2006    esophagogastroduodenoscopy    THYROIDECTOMY      total      Family History   Problem Relation Age of Onset    Heart Failure Father     No Known Problems Sister     Heart Disease Father     Heart Attack Brother         MI at 52 y.o.    Cancer Mother         Pancreas CA    Osteoarthritis Sister     Cancer Father         Brain CA    Diabetes Paternal Uncle     Thyroid Disease Neg Hx     Diabetes Maternal Uncle      Social History     Tobacco Use    Smoking status: Never Smoker    Smokeless tobacco: Never Used   Substance Use Topics    Alcohol use: No      Current Facility-Administered Medications   Medication Dose Route Frequency Provider Last Rate Last Admin    tuberculin injection 5 Units  5 Units IntraDERmal Once Carolyn Garcia MD   5 Units at 06/29/22 0551    glucose chewable tablet 16 g  4 tablet Oral PRN Thu Moreno, DO        dextrose bolus 10% 125 mL  125 mL IntraVENous PRN Thu Moreno, DO        Or    dextrose bolus 10% 250 mL  250 mL IntraVENous PRN Thu Moreno, DO        glucagon injection 1 mg  1 mg IntraMUSCular PRN Thu Moreno, DO        dextrose 5 % solution  100 mL/hr IntraVENous PRN Thu Moreno, DO        dextrose 5 % and 0.45 % sodium chloride infusion   IntraVENous Continuous Thu Moreno, DO 75 mL/hr at 06/29/22 1232 New Bag at 06/29/22 1232    hydrALAZINE (APRESOLINE) injection 5 mg  5 mg IntraVENous Q6H PRN Thu Moreno, DO        sodium chloride flush 0.9 % injection 3 mL  3 mL IntraVENous Q8H Pablo Mendes MD   3 mL at 06/29/22 1237    carbidopa-levodopa (SINEMET CR)  MG per extended release tablet 2 tablet  2 tablet Oral Nightly Alea Dubose MD   2 tablet at 06/28/22 2114    carbidopa-levodopa (SINEMET)  MG per tablet 1 tablet  1 tablet Oral 4x Daily Alea Dubose MD   1 tablet at 06/29/22 0809    escitalopram (LEXAPRO) tablet 10 mg  10 mg Oral Daily Alea Dubose MD   10 mg at 06/29/22 0809    gabapentin (NEURONTIN) capsule 100 mg  100 mg Oral TID Alea Dubose MD   100 mg at 06/29/22 0809    latanoprost (XALATAN) 0.005 % ophthalmic solution 1 drop  1 drop Ophthalmic Nightly Alea Dubose MD   1 drop at 06/28/22 2114    levothyroxine (SYNTHROID) tablet 125 mcg  125 mcg Oral QAM AC Alea Dubose MD   125 mcg at 06/29/22 0549    lisinopril (PRINIVIL;ZESTRIL) tablet 5 mg  5 mg Oral Daily Alea Dubose MD   5 mg at 06/29/22 0809    melatonin tablet 9 mg  9 mg Oral Nightly Alea Dubose MD   9 mg at 06/28/22 2114    trospium (SANCTURA) tablet 20 mg  20 mg Oral Daily Alea Dubose MD   20 mg at 06/29/22 0809    pantoprazole (PROTONIX) tablet 40 mg  40 mg Oral QAM AC Alea Dubose MD   40 mg at 06/29/22 0550    rivastigmine (EXELON) capsule 6 mg  6 mg Oral BID WC Alea Dubose MD   6 mg at 06/29/22 0810    sodium chloride flush 0.9 % injection 5-40 mL  5-40 mL IntraVENous 2 times per day Alea Dubose MD   10 mL at 06/29/22 0810    sodium chloride flush 0.9 % injection 5-40 mL  5-40 mL IntraVENous PRN Alea Dubose MD        0.9 % sodium chloride infusion   IntraVENous PRN Alea Dubose MD        ondansetron (ZOFRAN-ODT) disintegrating tablet 4 mg  4 mg Oral Q8H PRN Alea Dubose MD        Or    ondansetron [facility]) injection 4 mg  4 mg IntraVENous Q6H PRN Alea Dubose MD        polyethylene glycol [facility]) packet 17 g  17 g Oral Daily PRN Alea Dubose MD        acetaminophen (TYLENOL) tablet 650 mg  650 mg Oral Q6H PRN Allie Bagley MD        Or    acetaminophen (TYLENOL) suppository 650 mg  650 mg Rectal Q6H PRN Allie Bagley MD        magnesium sulfate 2000 mg in 50 mL IVPB premix  2,000 mg IntraVENous PRN Allie Bagley MD        potassium chloride (KLOR-CON M) extended release tablet 40 mEq  40 mEq Oral PRN Allie Bagley MD        Or    potassium bicarb-citric acid (EFFER-K) effervescent tablet 40 mEq  40 mEq Oral PRN Allie Bagley MD        Or    potassium chloride 10 mEq/100 mL IVPB (Peripheral Line)  10 mEq IntraVENous PRN Allie Bagley MD        enoxaparin Sodium (LOVENOX) injection 30 mg  30 mg SubCUTAneous Q24H Allie Bagley MD   30 mg at 06/28/22 2114        Allergies   Allergen Reactions    Brompheniramine Other (See Comments)     Pt does not recall med.  Carisoprodol Other (See Comments)     Other reaction(s): Rash-A, Unknown-A  Pt does not recall med.  Meperidine Other (See Comments)     Pt does not recall med.  Penicillins Rash    Phenylephrine Hcl Other (See Comments)     Pt does not recall med.  Zaleplon Other (See Comments)     Other reaction(s): Rash-A, Unknown-A  Pt does not recall med.  Donepezil Other (See Comments)     Altered mental state    Haloperidol Lactate Other (See Comments)     Avoid in PD    Quetiapine Other (See Comments)     Avoid in PD    Rofecoxib Rash   CT scan review  I have reviewed CT scans on the PACS system and additionally reviewed an MRI from 2021  All of these studies demonstrate the presence of frontotemporal and upper midbrain atrophy although the latter is not frankly enough for me to specifically state this is a hummingbird sign.   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 that it is to be primary parkinsonism  As noted does not have a hummingbird sign, morning glory sign, or hot cross bun sign on the MR images of the last year and it is felt that additional MR imaging is unlikely to provide any further elucidation of the problem  Review of Systems:    unreliable secondary to his confusion    Objective:     Vitals:    06/29/22 0734 06/29/22 1031 06/29/22 1140 06/29/22 1341   BP: (!) 155/89  132/82    Pulse: (!) 102  92    Resp: 18  18    Temp: 97.5 °F (36.4 °C)  97.7 °F (36.5 °C)    TempSrc: Oral  Oral    SpO2: 100% (!) 88% 100% (!) 88%   Weight:       Height:            Physical Exam:  Mildly cachectic elderly gentleman with a soft voice. 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 Management from a behavioral standpoint is clearly a challenge  He was on amantadine in the past I would tend to avoid this drug in the future secondary to his hallucinations and I suspect it does not do him a large amount of good from the standpoint of treatment of off time or dyskinesias. He has had a somewhat paradoxical response now to 2 stabilizers I would be inclined to try clonazepam  There are no specific difficulties with swallowing if there were difficulties with swallowing I had discussed with the patient's sister that we could conceivably switch over to orally dissolvable formulation of Sinemet    This would be a very challenging problem from the standpoint of treatment.     Following discharge she should return to Dr. Madisyn obrien and will require her usual meticulous medication titration, even in the presence of which however he is likely to have a somewhat sim course    Plan:     A/a    Signed By: Oletta Rinne, MD     June 29, 2022

## 2022-06-29 NOTE — PROGRESS NOTES
PHYSICAL THERAPY Initial Assessment and Daily Note  (Link to Caseload Tracking: PT Visit Days : 1  Acknowledge Orders  Time In/Out  PT Charge Capture  Rehab Caseload Tracker    Kieran Nair is a [de-identified] y.o. male   PRIMARY DIAGNOSIS: INO (acute kidney injury) ([facility] [address] [facility]  Hallucinations [R44.3]       Reason for Referral: Generalized Muscle Weakness (M62.81)  Other abnormalities of gait and mobility (R26.89)  Inpatient: Payor: MEDICARE / Plan: MEDICARE PART A AND B / Product Type: *No Product type* /     ASSESSMENT:     REHAB RECOMMENDATIONS:   Recommendation to date pending progress:  Setting:   Short-term Rehab    Equipment:     To Be Determined     ASSESSMENT:   Mr. Era Prader is a [de-identified]year old M who presents admitted to hospital with confusion and hallucinations from [facility]. Pt has PMH Parkinsons and multiple falls. Pt presents this morning supine in bed with his sister present and is oriented to person, place and month, but not year. Pt lives in an [facility] and ambulates in room independently/Mod I with RW and uses his feet to propel himself forward in the hallway to dining area. Pt states he is Indep with ADLs, however his sister states he is independent because he refuses help from the staff. This date pt performs mobility including supine>sit with SBA and demonstrated good seated dynamic balance performing ADLS with OT, CGA/Min A for STS and gait x10ft to sink with RW and very shuffled gait pattern. Fair standing balance at sink performing ADLs with OT and ambulated another 20ft in room with RW with CGA/Min A for RW safety/utilization in a small space. Pt returned seated in bed for brief/gown change and washing up then returned supine with SBA and left with needs in reach, alarm activated, RN and sister present in room. Pt on 3L initially at the beginning of session, however was boosted up to 4L secondary to a poor saturation read due to pallor fingers. RN notified and O2 back down to 3L. Pt does not wear O2 at BL  .  Pt presents as functioning below his baseline, with deficits in mobility including transfers, gait, balance, and activity tolerance. Pt will benefit from skilled therapy services to address stated deficits to promote return to highest level of function, independence, and safety. STR recommended at d/c. Will continue to follow.        [address] [address] AM-PAC 6 Clicks Basic Mobility Inpatient Short Form  AM-PAC Mobility Inpatient   How much difficulty turning over in bed?: None  How much difficulty sitting down on / standing up from a chair with arms?: A Little  How much difficulty moving from lying on back to sitting on side of bed?: None  How much help from another person moving to and from a bed to a chair?: A Little  How much help from another person needed to walk in hospital room?: A Little  How much help from another person for climbing 3-5 steps with a railing?: A Little  [facility] : 20  AM-PAC Inpatient T-Scale Score : 47.67  Mobility Inpatient CMS 0-100% Score: 35.83  Mobility Inpatient CMS G-Code Modifier : CJ    SUBJECTIVE:   Mr. [facility], \"I go to exercise classes for Parkinsons\"     Social/Functional Lives With: Alone  Type of Home: Assisted living  Home Layout: One level  ADL Assistance: Independent  Ambulation Assistance: Needs assistance  Active : No    OBJECTIVE:     PAIN: VITALS / O2: PRECAUTION / Tabitha Jump / DRAINS:   Pre Treatment: 0         Post Treatment: 0 Vitals        Oxygen  O2 Therapy: Oxygen  O2 Device: Nasal cannula  O2 Flow Rate (L/min): 3 L/min  SpO2: (!) 88 %   IV    RESTRICTIONS/PRECAUTIONS:  Restrictions/Precautions: Fall Risk                 GROSS EVALUATION: Intact Impaired (Comments):   AROM [x]     PROM []    Strength [] Generalized weakness   Balance [] Sitting - Static: Good  Sitting - Dynamic: Good  Standing - Static: Fair  Standing - Dynamic: Fair   Posture [] Forward Head  Rounded Shoulders   Sensation []     Coordination []      Tone [] caregiver. )  LTG:  (1.)Mr. Singleton will move from supine to sit and sit to supine  in bed with MODIFIED INDEPENDENCE within 7 treatment day(s). (2.)Mr. Singleton will transfer from bed to chair and chair to bed with MODIFIED INDEPENDENCE using the least restrictive device within 7 treatment day(s). (3.)Mr. Singleton will ambulate with MODIFIED INDEPENDENCE for 150   feet with the least restrictive device within 7 treatment day(s). (4.)Mr. Singleton will perform standing static and dynamic balance activities x 25 minutes with SUPERVISION to improve safety within 7 treatment day(s). (5.)Mr. Singleton will ambulate and/or perform functional activities for  25 consecutive minutes with stable vital signs and no rests required to improve activity tolerance within 7 treatment days. ________________________________________________________________________________________________        FREQUENCY AND DURATION: 3 times/week for duration of hospital stay or until stated goals are met, whichever comes first.    THERAPY PROGNOSIS: Good    PROBLEM LIST:   (Skilled intervention is medically necessary to address:)  Decreased ADL/Functional Activities  Decreased Activity Tolerance  Decreased Balance  Decreased Cognition  Decreased Coordination  Decreased Gait Ability  Decreased Safety Awareness  Decreased Strength  Decreased Transfer Abilities INTERVENTIONS PLANNED:   (Benefits and precautions of physical therapy have been discussed with the patient.)  Self Care Training  Therapeutic Activity  Therapeutic Exercise/HEP  Neuromuscular Re-education  Gait Training  Education       TREATMENT:   EVALUATION: MODERATE COMPLEXITY: (Untimed Charge)    TREATMENT:   Co-Treatment PT/OT necessary due to patient's decreased overall endurance/tolerance levels, as well as need for high level skilled assistance to complete functional transfers/mobility and functional tasks  Therapeutic Activity (25 Minutes):  Therapeutic activity included Rolling, Supine to Sit, Sit to Supine, Scooting, Transfer Training, Ambulation on level ground, Sitting balance  and Standing balance to improve functional Activity tolerance, Balance, Coordination, Mobility, Strength and ROM. TREATMENT GRID:  N/A    AFTER TREATMENT PRECAUTIONS: Alarm Activated, Bed, Bed/Chair Locked, Call light within reach, Needs within reach, RN at bedside and Visitors at bedside    INTERDISCIPLINARY COLLABORATION:  RN/ PCT, PT/ PTA and OT/ ROYAL    EDUCATION: Education Given To: Patient; Family  Education Provided: Plan of Care;Role of Therapy;Transfer Training  Education Method: Demonstration;Verbal  Barriers to Learning: Cognition  Education Outcome: Verbalized understanding;Demonstrated understanding    TIME IN/OUT:  Time In: 1031  Time Out: Eula 25  Minutes: Dario Cardenas PT

## 2022-06-29 NOTE — PROGRESS NOTES
SPEECH LANGUAGE PATHOLOGY NOTE      Patient seen for dysphagia evaluation. Recommend regular diet and thin liquids. If able, crush meds in puree. Full report to follow.      Simeon Hernandez, [facility], [facility] VESNA SOOD, CCC-SLP

## 2022-06-29 NOTE — H&P
Hospitalist History and Physical   Admit Date:  2022 11:43 AM   Name:  Placido Teague. Age:  [de-identified] y.o. Sex:  male  :  1940   MRN:  [id number]   Room:  [facility]    Presenting Complaint: Altered Mental Status     Reason(s) for Admission: Hallucinations [R44.3]     History of Present Illness:   Placido Teague. is a [de-identified] y.o. male with medical history of Parkinson's, hypertension, insomnia, hypothyroidism who presented with confusion and episodes of hallucinations. Patient lives in [facility] living. Since Thursday family has stated that he has been more confused and hallucinating. They observed patient talking to the wall at one point. Patient just saw his neurologist 2 weeks ago that started him on Remeron. According to family he had not started taking that medication. Only antidepressant patient is on currently is Lexapro. According to facility staff patient had not been taking his medications over the past 1 to 2 days. Family had said that today patient was less responsive and more rigid. In the emergency department patient was found to have INO with creatinine of 2.0. No source of infection found. Patient also will be found to be hypertensive with blood pressure of 179/89. CT head showed mild atrophy and small vessel ischemic disease without acute abnormalities. Chest x-ray also without any acute abnormalities. Patient not febrile and no severe rigidity observed. Patient was treated with Valium in the ED. Patient rigidity can be consistent with poorly controlled Parkinson's. Per family patient also received Eligard for his prostate cancer on Thursday. It was after that fact that patient started to act \"different\". Family had also stated that patient had fallen a few times on . Review of Systems:  10 systems reviewed and negative except as noted in HPI.   Assessment & Plan:   INO  -Creatinine of 2.0, BUN of 40  - Continue maintenance IV fluids  - UA negative for urinary tract infection  - Follow-up daily BMP  - Avoid nephrotoxic agents    Confusion/hallucinating  - Can be secondary to worsening Parkinson's dementia versus reaction from Eligard  - Reorient patient  - Limit sedating medications  -CT head with mild atrophy and small vessel ischemic disease without acute abnormalities  - Speech therapy consulted    Parkinson's disease/dyskinesia secondary to Parkinson's disease  - Patients symptoms possibly consistent with poorly controlled Parkinson's  - Patient had not taken his medication in 1 to 2 days  - Neurology consulted, appreciate recommendations  - Patient just saw his outpatient neurologist 2 weeks ago and had started him on Remeron however per family patient did not start taking that medication  -Continue home Parkinson medications  - PT/OT/ST    Hypertension  - Continue home antihypertensives    Insomnia  - Continue home melatonin    Hypothyroidism  - Continue home Synthroid    Depression  - Continue home Lexapro    Neuropathy  - Continue home Neurontin    GERD  - Continue PPI      Dispo/Discharge Planning:   Dispo pending clinical course    Diet: Diet NPO Exceptions are: Sips of Water with Meds  VTE ppx: Lovenox  Code status: Full Code    Hospital Problems:  Principal Problem:    Hallucinations  Resolved Problems:    * No resolved hospital problems.  *       Past History:     Past Medical History:   Diagnosis Date    AAA (abdominal aortic aneurysm) ([facility] [address] 7/8/2016    pt is not aware of this-     Casas's esophagus with esophagitis 7/8/2016    no meds- not daily    Cerebrovascular accident (CVA) ([facility] [address] 7/8/2016    They thought I did but they didn't find anything on the tests \"I blacked out\" no admission to the hospital    Chronic fatigue syndrome 2/26/2016    Cognitive deficits 7/8/2016    Colon polyps 2007    Elevated PSA     Enthesopathy of ankle and tarsus 5/29/2014    GERD (gastroesophageal reflux disease) 7/8/2016    Hashimoto's thyroiditis 7/8/2016    History of nuclear stress test 1997, 5/2005    Hypotension 8/25/2015    Impaired cognition 7/8/2016    Inguinal hernia 7/8/2016    Insomnia 7/8/2016    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 Past Surgical History:   Procedure Laterality Date    BREAST BIOPSY Bilateral     sterotactic    CATARACT REMOVAL Right 2012    COLONOSCOPY  2000 & 2005    HERNIA REPAIR Left 2011    OTHER SURGICAL HISTORY      deep leg/ankle tumor excision benign leg dumor    OTHER SURGICAL HISTORY  2006    esophagogastroduodenoscopy    THYROIDECTOMY      total      Social History     Tobacco Use    Smoking status: Never Smoker    Smokeless tobacco: Never Used   Substance Use Topics    Alcohol use: No      Social History     Substance and Sexual Activity   Drug Use No     Family History   Problem Relation Age of Onset    Heart Failure Father     No Known Problems Sister     Heart Disease Father     Heart Attack Brother         MI at 52 y.o.   Erika Gonzalez Mother         Pancreas CA    Osteoarthritis Sister     Cancer Father         Brain CA    Diabetes Paternal Uncle     Thyroid Disease Neg Hx     Diabetes Maternal Uncle       Family history reviewed and negative except as noted above.       Immunization History   Administered Date(s) Administered    Influenza, High Dose (Fluzone 65 yrs and older) 02/03/2014, 12/22/2014    Influenza, Trivalent Pf 01/02/2012, 01/23/2013    PPD Test 08/15/2019, 04/01/2021, 06/03/2021    Pneumococcal Conjugate 13-valent ([id number]) 02/10/2015    Pneumococcal Polysaccharide ([id number]) 12/02/2009    Td (Adult), 5 Lf Tetanus Toxoid, Pf (Tenivac, Decavac) 12/02/2009    Tdap (Boostrix, Adacel) 02/03/2014, 01/26/2020     Allergies   Allergen Reactions    Brompheniramine Other (See Comments)     Pt does not recall med.  Carisoprodol Other (See Comments)     Other reaction(s): Rash-A, Unknown-A  Pt does not recall med.  Meperidine Other (See Comments)     Pt does not recall med.  Penicillins Rash    Phenylephrine Hcl Other (See Comments)     Pt does not recall med.  Zaleplon Other (See Comments)     Other reaction(s): Rash-A, Unknown-A  Pt does not recall med.  Donepezil Other (See Comments)     Altered mental state    Haloperidol Lactate Other (See Comments)     Avoid in PD    Quetiapine Other (See Comments)     Avoid in PD    Rofecoxib Rash     Prior to Admit Medications:  Current Outpatient Medications   Medication Instructions    Amantadine (SYMMETREL) 100 MG TABS tablet 1 tab at 8am and 2pm    atropine 1 % ophthalmic solution 2-3 drops in a tablespoon of water and swish and spit up to 4 times a day for drooling.     carbidopa-levodopa (SINEMET CR)  MG per extended release tablet 1 tab at 10 pm strict time    carbidopa-levodopa (SINEMET)  MG per tablet TAKE 1 TABLET BY MOUTH AT 6AM, 10AM, 2PM, AND 6PM STRICT TIMES DUE TO ADVANCED PD    escitalopram (LEXAPRO) 10 mg, Oral, DAILY    gabapentin (NEURONTIN) 100 mg, Oral, 3 TIMES DAILY    latanoprost (XALATAN) 0.005 % ophthalmic solution 1 drop, Ophthalmic    levothyroxine (SYNTHROID) 125 mcg, Oral, DAILY BEFORE BREAKFAST    lisinopril (PRINIVIL;ZESTRIL) 5 mg, Oral, DAILY    melatonin 10 mg, Oral    mirabegron (MYRBETRIQ) 50 mg, Oral    mirtazapine (REMERON) 15 mg, Oral, NIGHTLY    omeprazole (PRILOSEC) 40 mg, Oral, EVERY MORNING    polyethylene glycol (GLYCOLAX) 17 g, Oral, DAILY    rivastigmine (EXELON) 6 mg, Oral, 2 TIMES DAILY WITH MEALS         Objective:     Patient Vitals for the past 24 hrs:   Temp Pulse Resp BP SpO2   06/28/22 2335 97.7 °F (36.5 °C) 92 14 (!) 154/91 100 %   06/28/22 2100 -- 76 -- (!) 167/72 --   06/28/22 1938 -- 68 -- -- --   06/28/22 1919 98.4 °F (36.9 °C) 80 18 (!) 198/90 100 %   06/28/22 1700 -- 64 14 (!) 189/100 --   06/28/22 1645 -- 65 12 (!) 200/85 --   06/28/22 1630 -- 72 12 (!) 190/89 --   06/28/22 1615 -- 68 15 (!) 182/81 --   06/28/22 1600 -- 64 14 (!) 183/81 --   06/28/22 1415 -- 72 13 (!) 196/81 --   06/28/22 1412 -- 81 14 (!) 201/122 92 %   06/28/22 1409 -- 87 13 (!) 200/131 100 %   06/28/22 1400 -- 71 13 (!) 189/114 97 %   06/28/22 1345 -- 60 12 (!) 177/112 --   06/28/22 1329 -- 56 16 (!) 184/84 100 %   06/28/22 1314 -- 60 12 (!) 152/81 99 %   06/28/22 1259 -- 59 14 (!) 155/83 99 %   06/28/22 1244 -- 63 14 (!) 166/84 99 %   06/28/22 1229 -- 62 13 (!) 190/95 100 %   06/28/22 1214 -- 64 16 (!) 189/88 100 %   06/28/22 1159 -- 65 14 (!) 184/97 99 %   06/28/22 1151 -- 70 14 (!) 169/100 --   06/28/22 1150 98.6 °F (37 °C) 71 16 -- 97 %       Oxygen Therapy  SpO2: 100 %    Estimated body mass index is 28.47 kg/m² as calculated from the following:    Height as of this encounter: 5' 0.98\" (1.549 m). Weight as of this encounter: 150 lb 9.2 oz (68.3 kg). Intake/Output Summary (Last 24 hours) at 6/28/2022 2347  Last data filed at 6/28/2022 2346  Gross per 24 hour   Intake 1000 ml   Output 320 ml   Net 680 ml         Physical Exam:  Blood pressure (!) 154/91, pulse 92, temperature 97.7 °F (36.5 °C), resp. rate 14, height 5' 0.98\" (1.549 m), weight 150 lb 9.2 oz (68.3 kg), SpO2 100 %. General:    Restless  Head:  Normocephalic, atraumatic  Eyes:  Sclerae appear normal.  Pupils equally round. ENT:  Nares appear normal, no drainage. Moist oral mucosa  Neck:  No restricted ROM. Trachea midline   CV:   RRR. No m/r/g. No jugular venous distension. Lungs:   CTAB.   No wheezing, rhonchi, or rales. Respirations even, unlabored  Abdomen: Bowel sounds present. Soft, nontender, nondistended. Extremities: Rigidity to upper and lower extremities  Skin:     No rashes and normal coloration. Warm and dry. Neuro:  Patient unable to respond verbally. Patient did follow commands to squeeze hands but nothing else. Cogwheel rigidity. Psych:  Normal mood and affect. I have reviewed ordered lab tests and independently visualized imaging below:    Last 24hr Labs:  Recent Results (from the past 24 hour(s))   EKG 12 Lead    Collection Time: 06/28/22 12:14 PM   Result Value Ref Range    Ventricular Rate 65 BPM    Atrial Rate 98 BPM    P-R Interval 172 ms    QRS Duration 109 ms    Q-T Interval 436 ms    QTc Calculation (Bazett) 454 ms    P Axis 61 degrees    R Axis -60 degrees    T Axis 43 degrees    Diagnosis Sinus rhythm    CBC with Auto Differential    Collection Time: 06/28/22 12:15 PM   Result Value Ref Range    WBC 6.8 4.3 - 11.1 K/uL    RBC 3.82 (L) 4.23 - 5.6 M/uL    Hemoglobin 12.0 (L) 13.6 - 17.2 g/dL    Hematocrit 37.1 (L) 41.1 - 50.3 %    MCV 97.1 79.6 - 97.8 FL    MCH 31.4 26.1 - 32.9 PG    MCHC 32.3 31.4 - 35.0 g/dL    RDW 12.8 11.9 - 14.6 %    Platelets 629 952 - 226 K/uL    MPV 9.7 9.4 - 12.3 FL    nRBC 0.00 0.0 - 0.2 K/uL    Seg Neutrophils 72 43 - 78 %    Lymphocytes 18 13 - 44 %    Monocytes 8 4.0 - 12.0 %    Eosinophils % 1 0.5 - 7.8 %    Basophils 1 0.0 - 2.0 %    Immature Granulocytes 0 0.0 - 5.0 %    Segs Absolute 4.9 1.7 - 8.2 K/UL    Absolute Lymph # 1.2 0.5 - 4.6 K/UL    Absolute Mono # 0.5 0.1 - 1.3 K/UL    Absolute Eos # 0.1 0.0 - 0.8 K/UL    Basophils Absolute 0.1 0.0 - 0.2 K/UL    Absolute Immature Granulocyte 0.0 0.0 - 0.5 K/UL    RBC Comment NORMOCYTIC/NORMOCHROMIC      WBC Comment Result Confirmed By Smear      Platelet Comment       FIBRIN STRANDS PRESENT ON SMEAR IS INDICATIVE OF A CLOTTED SPECIMEN. .. RECOLLECT IS SUGGESTED    Differential Type AUTOMATED CMP    Collection Time: 06/28/22 12:15 PM   Result Value Ref Range    Sodium 139 138 - 145 mmol/L    Potassium 4.9 3.5 - 5.1 mmol/L    Chloride 105 98 - 107 mmol/L    CO2 25 21 - 32 mmol/L    Anion Gap 9 7 - 16 mmol/L    Glucose 65 65 - 100 mg/dL    BUN 40 (H) 8 - 23 MG/DL    CREATININE 2.00 (H) 0.8 - 1.5 MG/DL    GFR  42 (L) >60 ml/min/1.73m2    GFR Non- 34 (L) >60 ml/min/1.73m2    Calcium 9.2 8.3 - 10.4 MG/DL    Total Bilirubin 0.9 0.2 - 1.1 MG/DL    ALT 9 (L) 12 - 65 U/L    AST 19 15 - 37 U/L    Alk Phosphatase 149 (H) 50 - 136 U/L    Total Protein 7.4 6.3 - 8.2 g/dL    Albumin 3.8 3.2 - 4.6 g/dL    Globulin 3.6 (H) 2.3 - 3.5 g/dL    Albumin/Globulin Ratio 1.1 (L) 1.2 - 3.5     TSH with Reflex    Collection Time: 06/28/22 12:15 PM   Result Value Ref Range    TSH w Free Thyroid if Abnormal 1.48 0.358 - 3.740 UIU/ML   Lactic Acid Now and in 2 Hours    Collection Time: 06/28/22 12:15 PM   Result Value Ref Range    Lactic Acid, Plasma 1.1 0.4 - 2.0 MMOL/L   POCT Urinalysis no Micro    Collection Time: 06/28/22  2:07 PM   Result Value Ref Range    Specific Gravity, Urine, POC 1.025 (H) 1.001 - 1.023      pH, Urine, POC 5.0 5.0 - 9.0      Protein, Urine, POC Negative NEG mg/dL    Glucose, UA POC Negative NEG mg/dL    KETONES, Urine, POC 15 (A) NEG mg/dL    Bilirubin, Urine, POC Negative NEG      Blood, UA POC Trace Intact (A) NEG      URINE UROBILINOGEN POC 0.2 0.2 - 1.0 EU/dL    Nitrate, Urine, POC Negative NEG      Leukocyte Est, UA POC Negative NEG      Performed by: Jesse Gooden        Other Studies:  CT HEAD WO CONTRAST    Result Date: 6/28/2022  NONCONTRAST HEAD CT CLINICAL HISTORY:  Hallucinations. TECHNIQUE:  Axial images were obtained with spiral technique. Radiation dose reduction was achieved using one or all of the following techniques: automated exposure control, weight-based dosing, iterative reconstruction. COMPARISON:  March 18, 2022.  REPORT:   Standard noncontrast head CT demonstrates no definite intracranial mass effect, hemorrhage, or evidence of acute geographic infarction. White matter hypodensities are most consistent with small vessel ischemic disease. There is mild atrophy. Orbits  and paranasal sinuses are clear where imaged. Bone windows demonstrate no definite fracture or destruction. MILD ATROPHY AND SMALL VESSEL ISCHEMIC DISEASE WITH NO ACUTE INTRACRANIAL ABNORMALITY IDENTIFIED AT NONCONTRAST CT.     XR CHEST PORTABLE    Result Date: 6/28/2022  Chest X-ray INDICATION: Hallucinations. COMPARISON: Chest x-ray 3/18/2022. A portable AP view of the chest was obtained. FINDINGS: The lungs are clear. The heart is normal in size. No pneumothorax. No pleural effusions. No acute findings in the chest.       Echocardiogram:  No results found for this or any previous visit. Meds previously ordered:  Orders Placed This Encounter   Medications    sodium chloride flush 0.9 % injection 3 mL    0.9 % sodium chloride bolus    diazePAM (VALIUM) injection 5 mg    carbidopa-levodopa (SINEMET CR)  MG per extended release tablet 2 tablet    carbidopa-levodopa (SINEMET)  MG per tablet 1 tablet    escitalopram (LEXAPRO) tablet 10 mg    gabapentin (NEURONTIN) capsule 100 mg    latanoprost (XALATAN) 0.005 % ophthalmic solution 1 drop    levothyroxine (SYNTHROID) tablet 125 mcg    lisinopril (PRINIVIL;ZESTRIL) tablet 5 mg    melatonin tablet 9 mg    trospium (SANCTURA) tablet 20 mg     Order Specific Question:   Please select a reason the therapeutic interchange was not accepted:      Answer:   Dimitri Brown for Pharmacy to Substitute    pantoprazole (PROTONIX) tablet 40 mg    rivastigmine (EXELON) capsule 6 mg    sodium chloride flush 0.9 % injection 5-40 mL    sodium chloride flush 0.9 % injection 5-40 mL    0.9 % sodium chloride infusion    DISCONTD: enoxaparin (LOVENOX) injection 40 mg     Order Specific Question:   Indication of Use     Answer:

## 2022-06-29 NOTE — PROGRESS NOTES
intake   Therapeutic Intervention:Patient/Family education;Diet tolerance monitoring   Patient continues to require skilled intervention: Yes  D/C Recommendations: Ongoing speech therapy is recommended at next level of care,Ongoing speech therapy is recommended during this hospitalization     ASSESSMENT    Dysphagia Diagnosis: Swallow function appears [facility]. No significant overt s/sx concerning for aspiration. Recommend regular diet and thin liquids. Crush meds if able. Will follow up for diet tolerance. GENERAL    History of Present Injury/Illness: Mr. Óscar Guajardo  has a past medical history of AAA (abdominal aortic aneurysm) ([facility] [address] Casas's esophagus with esophagitis, Cerebrovascular accident (CVA) ([facility] [address] Chronic fatigue syndrome, Cognitive deficits, Colon polyps, Elevated PSA, Enthesopathy of ankle and tarsus, GERD (gastroesophageal reflux disease), Hashimoto's thyroiditis, History of nuclear stress test, Hypotension, Impaired cognition, Inguinal hernia, Insomnia, Lesion of lateral popliteal nerve, Mixed anxiety depressive disorder, Neuropathy involving both lower extremities, Parkinson's disease ([facility] [address] Peyronie's disease, Polyneuropathy, Postoperative hypothyroidism, Tibial neuropathy, and Unsteady gait. Marisol Stands He also  has a past surgical history that includes other surgical history; other surgical history (2006); Cataract removal (Right, 2012); hernia repair (Left, 2011); Thyroidectomy; Breast biopsy (Bilateral); and Colonoscopy (2000 & 2005). Chart Reviewed: Yes  Behavior/Cognition: Alert; Cooperative  Follows Directions: Simple        O2 Device: Nasal cannula        Prior Dysphagia History: consumes regular diet/thin liquids at baseline.  history hiatal hernia/reflux     Current Diet : NPO  Current Liquid Diet : NPO  Pain:   Patient does not c/o pain                                        OBJECTIVE    Patient Positioning: Upright in bed   Oral Motor   Labial: Decreased rate  Oral Hygiene: Clean  Lingual: Decreased strength;Decreased rate  Dentition: Adequate        Baseline Vocal Quality: Dysphonic  Oropharyngeal Phase:     Assessment Method(s): Observation;Palpation  Vocal Quality: Low volume;Hoarse  Consistency Presented: Thin;Pureed;Mixed consistency; Regular  How Presented: Self-fed/presented;Spoon;Straw;Successive Swallows;Cup/gulp  Bolus Acceptance: No impairment  Bolus Formation/Control: No impairment  Propulsion: No impairment  Oral Residue: None  Initiation of Swallow: Delayed (# of seconds)  Laryngeal Elevation: Functional  Aspiration Signs/Symptoms: Delayed cough/throat clear  Pharyngeal Phase Characteristics: No impairment, issues, or problems      patient demonstrated delayed throat clear/ delayed mild cough x1 after consuming all trials presented:   4oz mixed fruit, 4oz puree, ~8oz thin liquids, and half a pack of crackers. No change in vocal quality. PLAN    Duration/Frequency: Continue to follow patient 2x/week for duration of hospitalization and/or until goals met    Dysphagia Outcome and Severity Scale (JOAN)  Dysphagia Outcome Severity Scale: Level 6: Within functional limits/Modified independence  Interpretation of Tool: The Dysphagia Outcome and Severity Scale (JOAN) is a simple, easy-to-use, 7-point scale developed to systematically rate the functional severity of dysphagia based on objective assessment and make recommendations for diet level, independence level, and type of nutrition. Normal(7), Functional(6), Mild(5), Mild-Moderate(4), Moderate(3), Moderate-Severe(2), Severe(1)    Speech Therapy Prognosis  Prognosis: Good    Education: [employer] Rizzo member  Patient Education: diet consistencies, s/sx dysphagia  Patient Education Response: Verbalizes understanding,Demonstrated understanding    Current Medications:   No current facility-administered medications on file prior to encounter.      Current Outpatient Medications on File Prior to Encounter   Medication Sig Dispense Refill

## 2022-06-29 NOTE — PROGRESS NOTES
Pt resting well in bed with bed alarm on and door to room open. Pt last FSBS 154. VSS. Pt voiding well. Appetite good. Call light in reach.

## 2022-06-29 NOTE — PROGRESS NOTES
Hospitalist Progress Note   Admit Date:  2022 11:43 AM   Name:  Kieran Harper. Age:  [de-identified] y.o. Sex:  male  :  1940   MRN:  [id number]   Room:  [facility]/    Presenting Complaint: Altered Mental Status     Reason(s) for Admission: Hallucinations [R44.3]     Hospital Course & Interval History:   [de-identified] y.o. male with medical history of Parkinson's, hypertension, insomnia, hypothyroidism who presented with confusion and episodes of hallucinations. [facility]. Since  family has stated that he has been more confused and hallucinating. They observed patient talking to the wall at one point. Patient just saw his neurologist 2 weeks prior that started him on Remeron. According to family he had not started taking that medication. Only antidepressant patient is on currently is Lexapro. According to facility staff patient had not been taking his medications for 1 to 2 days PA. Family had said that  patient was less responsive and more rigid. In the emergency department patient was found to have INO with creatinine of 2.0. No source of infection found. Patient also was found to be hypertensive with blood pressure of 179/89. CT head showed mild atrophy and small vessel ischemic disease without acute abnormalities. Chest x-ray also without any acute abnormalities. Patient not febrile and no severe rigidity observed on admission. Patient was treated with Valium in the ED. Patient rigidity can be consistent with poorly controlled Parkinson's. Per family patient also received Eligard for his prostate cancer on Thursday. It was after that fact that patient started to act \"different\". Family had also stated that patient had fallen a few times on . Patient is admitted for INO and hallucinations with acute metabolic encephalopathy in setting of Parkinson's disease.   INO most likely due to decreased p.o. intake and encephalopathy most likely due to worsening Parkinson's. Renal function improves with IVF. CT head on admission was unremarkable for acute findings; only mild atrophy with small vessel ischemic disease. Neurology was consulted. Subjective/24hr Events (22): Patient pleasant, alert and oriented to person, place,  but not current time. Follows commands well. Able to tell me that he has been falling a lot, is supposed to use a wheelchair but he only uses 80% of the time he said. He stated that he felt weak at his nursing facility and was brought to the hospital.    Denies fever, chills, SOB, chest pain, abdominal pain, nausea, vomiting, headache. Assessment & Plan:     INO, improving   Cr on admission 2.0 (baseline <1). POC UA negative. Most likely due to decreased p.o. intake. Much improved after IVF  Avoid nephrotoxic meds and hypotension  Continue MIVF  Accurate I&O's  Check urine studies    Episode of confusion  Hallucinations  Parkinson's disease  Most likely worsening Parkinson's disease. CT head on admission shows mild atrophy with small vessel ischemic disease with no acute abnormalities. POC UA negative for UTI.  TSH wnl on admission  Continue Sinemet and Exelon  Delirium precautions  Fall precautions  Avoid hypoglycemia  Check vitamin B12, folate  Neuro consulted, appreciate recs  PT/OT/SLP    Frequent falls  Multifactorial in setting of hypoglycemia and worsening Parkinson's  Fall precautions  PT/OT  Check orthostatic VS    Hypoglycemia  Hypoglycemia protocol  Changing IVF to D51/2NS    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  Continue Sinemet and Exelon    Mixed anxiety depressive disorder  Continue Lexapro     Hypothyroidism  Continue Synthroid    Polyneuropathy  Continue gabapentin    GERD  Continue PPI    Insomnia  Continue melatonin nightly    HTN   Cont home Lisinopril   Hydralazine as needed    Management of Delirium      Non-pharmacological intervention  · Reorient the patient throughout the day  · Window open and lights on during the day. Lights off, television off, noises down during the night. If able, decrease nursing checks at night  · Therapies as often as possible  · Avoid restraints to the best of your ability   · Avoid sensory deprivation by using glasses and hearing aids, if applicable        Pharmacological intervention  · Replete electrolyte abnormalities and correct metabolic abnormalities  · Limit benzodiazepines, antihistamines, narcotics, anticholinergics. Preference towards Precedex for sedation over fentanyl and benzodiazepines/GABAa agonists. · For dangerous behavior/aggression to self or others can consider Zyprexa or Seroquel if benefits outweigh risk  · For persistent insomnia can use melatonin four hours prior to bedtime or Seroquel 25 mg at bedtime         Discharge Plannin-2 days pending Neuro recs and improvement in INO. 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  *      Objective:     Patient Vitals for the past 24 hrs:   Temp Pulse Resp BP SpO2   22 1140 97.7 °F (36.5 °C) 92 18 132/82 100 %   22 0734 97.5 °F (36.4 °C) (!) 102 18 (!) 155/89 100 %   22 0328 97.7 °F (36.5 °C) 62 23 (!) 156/80 100 %   22 2335 97.7 °F (36.5 °C) 92 14 (!) 154/91 100 %   22 2100 -- 76 -- (!) 167/72 --   22 1938 -- 68 -- -- --   22 1919 98.4 °F (36.9 °C) 80 18 (!) 198/90 100 %   22 1700 -- 64 14 (!) 189/100 --   22 1645 -- 65 12 (!) 200/85 --   22 1630 -- 72 12 (!) the past 48 hour(s))   EKG 12 Lead    Collection Time: 06/28/22 12:14 PM   Result Value Ref Range    Ventricular Rate 65 BPM    Atrial Rate 98 BPM    P-R Interval 172 ms    QRS Duration 109 ms    Q-T Interval 436 ms    QTc Calculation (Bazett) 454 ms    P Axis 61 degrees    R Axis -60 degrees    T Axis 43 degrees    Diagnosis Sinus rhythm    CBC with Auto Differential    Collection Time: 06/28/22 12:15 PM   Result Value Ref Range    WBC 6.8 4.3 - 11.1 K/uL    RBC 3.82 (L) 4.23 - 5.6 M/uL    Hemoglobin 12.0 (L) 13.6 - 17.2 g/dL    Hematocrit 37.1 (L) 41.1 - 50.3 %    MCV 97.1 79.6 - 97.8 FL    MCH 31.4 26.1 - 32.9 PG    MCHC 32.3 31.4 - 35.0 g/dL    RDW 12.8 11.9 - 14.6 %    Platelets 285 028 - 651 K/uL    MPV 9.7 9.4 - 12.3 FL    nRBC 0.00 0.0 - 0.2 K/uL    Seg Neutrophils 72 43 - 78 %    Lymphocytes 18 13 - 44 %    Monocytes 8 4.0 - 12.0 %    Eosinophils % 1 0.5 - 7.8 %    Basophils 1 0.0 - 2.0 %    Immature Granulocytes 0 0.0 - 5.0 %    Segs Absolute 4.9 1.7 - 8.2 K/UL    Absolute Lymph # 1.2 0.5 - 4.6 K/UL    Absolute Mono # 0.5 0.1 - 1.3 K/UL    Absolute Eos # 0.1 0.0 - 0.8 K/UL    Basophils Absolute 0.1 0.0 - 0.2 K/UL    Absolute Immature Granulocyte 0.0 0.0 - 0.5 K/UL    RBC Comment NORMOCYTIC/NORMOCHROMIC      WBC Comment Result Confirmed By Smear      Platelet Comment       FIBRIN STRANDS PRESENT ON SMEAR IS INDICATIVE OF A CLOTTED SPECIMEN. .. RECOLLECT IS SUGGESTED    Differential Type AUTOMATED     CMP    Collection Time: 06/28/22 12:15 PM   Result Value Ref Range    Sodium 139 138 - 145 mmol/L    Potassium 4.9 3.5 - 5.1 mmol/L    Chloride 105 98 - 107 mmol/L    CO2 25 21 - 32 mmol/L    Anion Gap 9 7 - 16 mmol/L    Glucose 65 65 - 100 mg/dL    BUN 40 (H) 8 - 23 MG/DL    CREATININE 2.00 (H) 0.8 - 1.5 MG/DL    GFR  42 (L) >60 ml/min/1.73m2    GFR Non- 34 (L) >60 ml/min/1.73m2    Calcium 9.2 8.3 - 10.4 MG/DL    Total Bilirubin 0.9 0.2 - 1.1 MG/DL    ALT 9 (L) 12 - 65 U/L    AST 19 15 - 37 U/L    Alk Phosphatase 149 (H) 50 - 136 U/L    Total Protein 7.4 6.3 - 8.2 g/dL    Albumin 3.8 3.2 - 4.6 g/dL    Globulin 3.6 (H) 2.3 - 3.5 g/dL    Albumin/Globulin Ratio 1.1 (L) 1.2 - 3.5     TSH with Reflex    Collection Time: 06/28/22 12:15 PM   Result Value Ref Range    TSH w Free Thyroid if Abnormal 1.48 0.358 - 3.740 UIU/ML   Lactic Acid Now and in 2 Hours    Collection Time: 06/28/22 12:15 PM   Result Value Ref Range    Lactic Acid, Plasma 1.1 0.4 - 2.0 MMOL/L   POCT Urinalysis no Micro    Collection Time: 06/28/22  2:07 PM   Result Value Ref Range    Specific Gravity, Urine, POC 1.025 (H) 1.001 - 1.023      pH, Urine, POC 5.0 5.0 - 9.0      Protein, Urine, POC Negative NEG mg/dL    Glucose, UA POC Negative NEG mg/dL    KETONES, Urine, POC 15 (A) NEG mg/dL    Bilirubin, Urine, POC Negative NEG      Blood, UA POC Trace Intact (A) NEG      URINE UROBILINOGEN POC 0.2 0.2 - 1.0 EU/dL    Nitrate, Urine, POC Negative NEG      Leukocyte Est, UA POC Negative NEG      Performed by:  Blu Underwood    Comprehensive Metabolic Panel w/ Reflex to MG    Collection Time: 06/29/22  4:55 AM   Result Value Ref Range    Sodium 143 138 - 145 mmol/L    Potassium 4.8 3.5 - 5.1 mmol/L    Chloride 110 (H) 98 - 107 mmol/L    CO2 23 21 - 32 mmol/L    Anion Gap 10 7 - 16 mmol/L    Glucose 39 (LL) 65 - 100 mg/dL    BUN 42 (H) 8 - 23 MG/DL    CREATININE 1.40 0.8 - 1.5 MG/DL    GFR African American >60 >60 ml/min/1.73m2    GFR Non- 52 (L) >60 ml/min/1.73m2    Calcium 8.6 8.3 - 10.4 MG/DL    Total Bilirubin 0.9 0.2 - 1.1 MG/DL    ALT <6 (L) 12 - 65 U/L    AST 10 (L) 15 - 37 U/L    Alk Phosphatase 130 50 - 136 U/L    Total Protein 6.4 6.3 - 8.2 g/dL    Albumin 3.0 (L) 3.2 - 4.6 g/dL    Globulin 3.4 2.3 - 3.5 g/dL    Albumin/Globulin Ratio 0.9 (L) 1.2 - 3.5     CBC with Auto Differential    Collection Time: 06/29/22  4:55 AM   Result Value Ref Range    WBC 6.2 4.3 - 11.1 K/uL    RBC 3.66 (L) 4.23 - 5.6 M/uL Hemoglobin 11.5 (L) 13.6 - 17.2 g/dL    Hematocrit 35.8 (L) 41.1 - 50.3 %    MCV 97.8 79.6 - 97.8 FL    MCH 31.4 26.1 - 32.9 PG    MCHC 32.1 31.4 - 35.0 g/dL    RDW 12.7 11.9 - 14.6 %    Platelets 498 495 - 528 K/uL    MPV 9.0 (L) 9.4 - 12.3 FL    nRBC 0.00 0.0 - 0.2 K/uL    Differential Type AUTOMATED      Seg Neutrophils 70 43 - 78 %    Lymphocytes 20 13 - 44 %    Monocytes 8 4.0 - 12.0 %    Eosinophils % 1 0.5 - 7.8 %    Basophils 1 0.0 - 2.0 %    Immature Granulocytes 0 0.0 - 5.0 %    Segs Absolute 4.4 1.7 - 8.2 K/UL    Absolute Lymph # 1.2 0.5 - 4.6 K/UL    Absolute Mono # 0.5 0.1 - 1.3 K/UL    Absolute Eos # 0.0 0.0 - 0.8 K/UL    Basophils Absolute 0.1 0.0 - 0.2 K/UL    Absolute Immature Granulocyte 0.0 0.0 - 0.5 K/UL   POCT Glucose    Collection Time: 06/29/22  8:32 AM   Result Value Ref Range    POC Glucose 29 (LL) 65 - 100 mg/dL    Performed by: Griffin Altamirano    POCT Glucose    Collection Time: 06/29/22  9:01 AM   Result Value Ref Range    POC Glucose 43 (L) 65 - 100 mg/dL    Performed by: Griffin Altamirano    POCT Glucose    Collection Time: 06/29/22  9:33 AM   Result Value Ref Range    POC Glucose 98 65 - 100 mg/dL    Performed by: Trina)    POCT Glucose    Collection Time: 06/29/22 11:42 AM   Result Value Ref Range    POC Glucose 254 (H) 65 - 100 mg/dL    Performed by: Griffin Altamirano        Other Studies:  CT HEAD WO CONTRAST   Final Result   MILD ATROPHY AND SMALL VESSEL ISCHEMIC DISEASE WITH NO ACUTE   INTRACRANIAL ABNORMALITY IDENTIFIED AT NONCONTRAST CT.               XR CHEST PORTABLE   Final Result   No acute findings in the chest.             Current Meds:  Current Facility-Administered Medications   Medication Dose Route Frequency    tuberculin injection 5 Units  5 Units IntraDERmal Once    glucose chewable tablet 16 g  4 tablet Oral PRN    dextrose bolus 10% 125 mL  125 mL IntraVENous PRN    Or    dextrose bolus 10% 250 mL  250 mL IntraVENous PRN    glucagon injection 1 mg  1 mg IntraMUSCular PRN    dextrose 5 % solution  100 mL/hr IntraVENous PRN    dextrose 5 % and 0.45 % sodium chloride infusion   IntraVENous Continuous    sodium chloride flush 0.9 % injection 3 mL  3 mL IntraVENous Q8H    carbidopa-levodopa (SINEMET CR)  MG per extended release tablet 2 tablet  2 tablet Oral Nightly    carbidopa-levodopa (SINEMET)  MG per tablet 1 tablet  1 tablet Oral 4x Daily    escitalopram (LEXAPRO) tablet 10 mg  10 mg Oral Daily    gabapentin (NEURONTIN) capsule 100 mg  100 mg Oral TID    latanoprost (XALATAN) 0.005 % ophthalmic solution 1 drop  1 drop Ophthalmic Nightly    levothyroxine (SYNTHROID) tablet 125 mcg  125 mcg Oral QAM AC    lisinopril (PRINIVIL;ZESTRIL) tablet 5 mg  5 mg Oral Daily    melatonin tablet 9 mg  9 mg Oral Nightly    trospium (SANCTURA) tablet 20 mg  20 mg Oral Daily    pantoprazole (PROTONIX) tablet 40 mg  40 mg Oral QAM AC    rivastigmine (EXELON) capsule 6 mg  6 mg Oral BID WC    sodium chloride flush 0.9 % injection 5-40 mL  5-40 mL IntraVENous 2 times per day    sodium chloride flush 0.9 % injection 5-40 mL  5-40 mL IntraVENous PRN    0.9 % sodium chloride infusion   IntraVENous PRN    ondansetron (ZOFRAN-ODT) disintegrating tablet 4 mg  4 mg Oral Q8H PRN    Or    ondansetron (ZOFRAN) injection 4 mg  4 mg IntraVENous Q6H PRN    polyethylene glycol (GLYCOLAX) packet 17 g  17 g Oral Daily PRN    acetaminophen (TYLENOL) tablet 650 mg  650 mg Oral Q6H PRN    Or    acetaminophen (TYLENOL) suppository 650 mg  650 mg Rectal Q6H PRN    magnesium sulfate 2000 mg in 50 mL IVPB premix  2,000 mg IntraVENous PRN    potassium chloride (KLOR-CON M) extended release tablet 40 mEq  40 mEq Oral PRN    Or    potassium bicarb-citric acid (EFFER-K) effervescent tablet 40 mEq  40 mEq Oral PRN    Or    potassium chloride 10 mEq/100 mL IVPB (Peripheral Line)  10 mEq IntraVENous PRN    enoxaparin Sodium (LOVENOX) injection 30 mg  30 mg SubCUTAneous Q24H       Signed: Farhana Schulz DO    Part of this note may have been written by using a voice dictation software. The note has been proof read but may still contain some grammatical/other typographical errors.

## 2022-06-30 LAB
ANION GAP SERPL CALC-SCNC: 5 MMOL/L (ref 7–16)
BASOPHILS # BLD: 0.1 K/UL (ref 0–0.2)
BASOPHILS NFR BLD: 1 % (ref 0–2)
BUN SERPL-MCNC: 29 MG/DL (ref 8–23)
CALCIUM SERPL-MCNC: 8.6 MG/DL (ref 8.3–10.4)
CHLORIDE SERPL-SCNC: 104 MMOL/L (ref 98–107)
CO2 SERPL-SCNC: 29 MMOL/L (ref 21–32)
CREAT SERPL-MCNC: 1.3 MG/DL (ref 0.8–1.5)
DIFFERENTIAL METHOD BLD: ABNORMAL
EKG ATRIAL RATE: 75 BPM
EKG DIAGNOSIS: NORMAL
EKG P AXIS: 44 DEGREES
EKG P-R INTERVAL: 170 MS
EKG Q-T INTERVAL: 396 MS
EKG QRS DURATION: 104 MS
EKG QTC CALCULATION (BAZETT): 442 MS
EKG R AXIS: -60 DEGREES
EKG T AXIS: 40 DEGREES
EKG VENTRICULAR RATE: 75 BPM
EOSINOPHIL # BLD: 0.1 K/UL (ref 0–0.8)
EOSINOPHIL NFR BLD: 2 % (ref 0.5–7.8)
ERYTHROCYTE [DISTWIDTH] IN BLOOD BY AUTOMATED COUNT: 12.6 % (ref 11.9–14.6)
GLUCOSE SERPL-MCNC: 115 MG/DL (ref 65–100)
HCT VFR BLD AUTO: 37.5 % (ref 41.1–50.3)
HGB BLD-MCNC: 12.1 G/DL (ref 13.6–17.2)
IMM GRANULOCYTES # BLD AUTO: 0 K/UL (ref 0–0.5)
IMM GRANULOCYTES NFR BLD AUTO: 0 % (ref 0–5)
LYMPHOCYTES # BLD: 1.6 K/UL (ref 0.5–4.6)
LYMPHOCYTES NFR BLD: 27 % (ref 13–44)
MCH RBC QN AUTO: 30.9 PG (ref 26.1–32.9)
MCHC RBC AUTO-ENTMCNC: 32.3 G/DL (ref 31.4–35)
MCV RBC AUTO: 95.7 FL (ref 79.6–97.8)
MM INDURATION, POC: 0 MM (ref 0–5)
MONOCYTES # BLD: 0.6 K/UL (ref 0.1–1.3)
MONOCYTES NFR BLD: 9 % (ref 4–12)
NEUTS SEG # BLD: 3.7 K/UL (ref 1.7–8.2)
NEUTS SEG NFR BLD: 61 % (ref 43–78)
NRBC # BLD: 0 K/UL (ref 0–0.2)
PLATELET # BLD AUTO: 255 K/UL (ref 150–450)
PMV BLD AUTO: 9 FL (ref 9.4–12.3)
POTASSIUM SERPL-SCNC: 4.3 MMOL/L (ref 3.5–5.1)
PPD, POC: NEGATIVE
RBC # BLD AUTO: 3.92 M/UL (ref 4.23–5.6)
SARS-COV-2 RNA RESP QL NAA+PROBE: NOT DETECTED
SARS-COV-2: NORMAL
SODIUM SERPL-SCNC: 138 MMOL/L (ref 136–145)
SOURCE: NORMAL
WBC # BLD AUTO: 6.1 K/UL (ref 4.3–11.1)

## 2022-06-30 PROCEDURE — 2580000003 HC RX 258: Performed by: EMERGENCY MEDICINE

## 2022-06-30 PROCEDURE — 2580000003 HC RX 258: Performed by: FAMILY MEDICINE

## 2022-06-30 PROCEDURE — 1100000000 HC RM PRIVATE

## 2022-06-30 PROCEDURE — 2580000003 HC RX 258: Performed by: STUDENT IN AN ORGANIZED HEALTH CARE EDUCATION/TRAINING PROGRAM

## 2022-06-30 PROCEDURE — 97530 THERAPEUTIC ACTIVITIES: CPT

## 2022-06-30 PROCEDURE — 36415 COLL VENOUS BLD VENIPUNCTURE: CPT

## 2022-06-30 PROCEDURE — 2580000003 HC RX 258: Performed by: HOSPITALIST

## 2022-06-30 PROCEDURE — 6360000002 HC RX W HCPCS: Performed by: STUDENT IN AN ORGANIZED HEALTH CARE EDUCATION/TRAINING PROGRAM

## 2022-06-30 PROCEDURE — U0005 INFEC AGEN DETEC AMPLI PROBE: HCPCS

## 2022-06-30 PROCEDURE — 97535 SELF CARE MNGMENT TRAINING: CPT

## 2022-06-30 PROCEDURE — 6360000002 HC RX W HCPCS: Performed by: HOSPITALIST

## 2022-06-30 PROCEDURE — 97116 GAIT TRAINING THERAPY: CPT

## 2022-06-30 PROCEDURE — 6370000000 HC RX 637 (ALT 250 FOR IP): Performed by: PSYCHIATRY & NEUROLOGY

## 2022-06-30 PROCEDURE — 93005 ELECTROCARDIOGRAM TRACING: CPT | Performed by: FAMILY MEDICINE

## 2022-06-30 PROCEDURE — 6370000000 HC RX 637 (ALT 250 FOR IP): Performed by: STUDENT IN AN ORGANIZED HEALTH CARE EDUCATION/TRAINING PROGRAM

## 2022-06-30 PROCEDURE — 80048 BASIC METABOLIC PNL TOTAL CA: CPT

## 2022-06-30 PROCEDURE — 6370000000 HC RX 637 (ALT 250 FOR IP): Performed by: FAMILY MEDICINE

## 2022-06-30 PROCEDURE — 85025 COMPLETE CBC W/AUTO DIFF WBC: CPT

## 2022-06-30 RX ORDER — VITAMIN B COMPLEX
1000 TABLET ORAL DAILY
Status: DISCONTINUED | OUTPATIENT
Start: 2022-06-30 | End: 2022-07-05 | Stop reason: HOSPADM

## 2022-06-30 RX ORDER — AMLODIPINE BESYLATE 5 MG/1
5 TABLET ORAL DAILY
Status: DISCONTINUED | OUTPATIENT
Start: 2022-06-30 | End: 2022-07-05 | Stop reason: HOSPADM

## 2022-06-30 RX ORDER — LIDOCAINE 4 G/G
2 PATCH TOPICAL DAILY
Status: DISCONTINUED | OUTPATIENT
Start: 2022-06-30 | End: 2022-07-05 | Stop reason: HOSPADM

## 2022-06-30 RX ORDER — ESCITALOPRAM OXALATE 10 MG/1
TABLET ORAL
Qty: 90 TABLET | Refills: 3 | Status: SHIPPED | OUTPATIENT
Start: 2022-06-30 | End: 2022-08-10 | Stop reason: DRUGHIGH

## 2022-06-30 RX ADMIN — CLONAZEPAM 1 MG: 1 TABLET ORAL at 22:11

## 2022-06-30 RX ADMIN — VITAMIN D, TAB 1000IU (100/BT) 1000 UNITS: 25 TAB at 08:19

## 2022-06-30 RX ADMIN — PANTOPRAZOLE SODIUM 40 MG: 40 TABLET, DELAYED RELEASE ORAL at 05:21

## 2022-06-30 RX ADMIN — ENOXAPARIN SODIUM 30 MG: 100 INJECTION SUBCUTANEOUS at 22:11

## 2022-06-30 RX ADMIN — SODIUM CHLORIDE, PRESERVATIVE FREE 3 ML: 5 INJECTION INTRAVENOUS at 05:21

## 2022-06-30 RX ADMIN — LEVOTHYROXINE SODIUM 125 MCG: 0.07 TABLET ORAL at 05:21

## 2022-06-30 RX ADMIN — LISINOPRIL 5 MG: 5 TABLET ORAL at 08:12

## 2022-06-30 RX ADMIN — CARBIDOPA AND LEVODOPA 1 TABLET: 25; 250 TABLET, ORALLY DISINTEGRATING ORAL at 12:39

## 2022-06-30 RX ADMIN — CARBIDOPA AND LEVODOPA 2 TABLET: 25; 100 TABLET, EXTENDED RELEASE ORAL at 22:11

## 2022-06-30 RX ADMIN — TROSPIUM CHLORIDE 20 MG: 20 TABLET, FILM COATED ORAL at 08:12

## 2022-06-30 RX ADMIN — Medication 9 MG: at 22:11

## 2022-06-30 RX ADMIN — ESCITALOPRAM OXALATE 10 MG: 10 TABLET ORAL at 08:12

## 2022-06-30 RX ADMIN — RIVASTIGMINE TARTRATE 6 MG: 3 CAPSULE ORAL at 08:12

## 2022-06-30 RX ADMIN — LATANOPROST 1 DROP: 50 SOLUTION OPHTHALMIC at 22:12

## 2022-06-30 RX ADMIN — GABAPENTIN 100 MG: 100 CAPSULE ORAL at 22:11

## 2022-06-30 RX ADMIN — CARBIDOPA AND LEVODOPA 1 TABLET: 25; 250 TABLET, ORALLY DISINTEGRATING ORAL at 16:24

## 2022-06-30 RX ADMIN — SODIUM CHLORIDE, PRESERVATIVE FREE 10 ML: 5 INJECTION INTRAVENOUS at 22:12

## 2022-06-30 RX ADMIN — CARBIDOPA AND LEVODOPA 1 TABLET: 25; 250 TABLET, ORALLY DISINTEGRATING ORAL at 08:11

## 2022-06-30 RX ADMIN — GABAPENTIN 100 MG: 100 CAPSULE ORAL at 15:24

## 2022-06-30 RX ADMIN — RIVASTIGMINE TARTRATE 6 MG: 3 CAPSULE ORAL at 16:24

## 2022-06-30 RX ADMIN — GABAPENTIN 100 MG: 100 CAPSULE ORAL at 08:12

## 2022-06-30 RX ADMIN — DEXTROSE AND SODIUM CHLORIDE: 5; 450 INJECTION, SOLUTION INTRAVENOUS at 03:49

## 2022-06-30 RX ADMIN — AMLODIPINE BESYLATE 5 MG: 5 TABLET ORAL at 09:34

## 2022-06-30 RX ADMIN — ZIPRASIDONE MESYLATE 5 MG: 20 INJECTION, POWDER, LYOPHILIZED, FOR SOLUTION INTRAMUSCULAR at 23:05

## 2022-06-30 ASSESSMENT — PAIN SCALES - GENERAL: PAINLEVEL_OUTOF10: 0

## 2022-06-30 NOTE — PROGRESS NOTES
Hospitalist Progress Note   Admit Date:  2022 11:43 AM   Name:  Lynn Alcantara. Age:  [de-identified] y.o. Sex:  male  :  1940   MRN:  [id number]   Room:  [facility]/    Presenting Complaint: Altered Mental Status     Reason(s) for Admission: Hallucinations [R44.3]     Hospital Course & Interval History:   [de-identified] y.o. male with medical history of Parkinson's, hypertension, insomnia, hypothyroidism who presented with confusion and episodes of hallucinations. [facility]. Since  family has stated that he has been more confused and hallucinating. They observed patient talking to the wall at one point. Patient just saw his neurologist 2 weeks prior that started him on Remeron. According to family he had not started taking that medication. Only antidepressant patient is on currently is Lexapro. According to facility staff patient had not been taking his medications for 1 to 2 days PA. Family had said that  patient was less responsive and more rigid. In the emergency department patient was found to have INO with creatinine of 2.0. No source of infection found. Patient also was found to be hypertensive with blood pressure of 179/89. CT head showed mild atrophy and small vessel ischemic disease without acute abnormalities. Chest x-ray also without any acute abnormalities. Patient not febrile and no severe rigidity observed on admission. Patient was treated with Valium in the ED. Patient rigidity can be consistent with poorly controlled Parkinson's. Per family patient also received Eligard for his prostate cancer on Thursday. It was after that fact that patient started to act \"different\". Family had also stated that patient had fallen a few times on . Patient is admitted for INO and hallucinations with acute metabolic encephalopathy in setting of Parkinson's disease.   INO most likely due to decreased p.o. intake and encephalopathy most likely due to worsening Parkinson's. Renal function improves with IVF. CT head on admission was unremarkable for acute findings; only mild atrophy with small vessel ischemic disease. Neurology was consulted. Subjective/24hr Events (22): Patient pleasant, alert and oriented to person, place,  but not current time. Follows commands well. Unchanged. Finished eating lunch. Denies any needs. Larisa Members to go to rehab to get stronger. Stated he has been to rehab in the past. \"I'm [de-identified]years old, I need to stay active. \"    Denies fever, chills, SOB, chest pain, abdominal pain, nausea, vomiting, headache. Assessment & Plan:     INO, improving   Cr on admission 2.0 (baseline <1). POC UA negative. Most likely due to decreased p.o. intake. Much improved after IVF  Avoid nephrotoxic meds and hypotension  Cr close to baseline. D/c IVF and encourage po intake. Episode of confusion  Hallucinations  Parkinson's disease  Most likely worsening Parkinson's disease. CT head on admission shows mild atrophy with small vessel ischemic disease with no acute abnormalities. POC UA negative for UTI. TSH wnl on admission  Continue Sinemet and Exelon  Delirium precautions  Fall precautions  Avoid hypoglycemia  Vitamin B12, folate--wnl  Neuro consulted, appreciate recs. Challenging and management from a behavioral standpoint. Has had amantadine in the past but would avoid this drug in the future secondary to hallucinations.   Somewhat paradoxical response due to stabilizers and was started on clonazepam.  He will need to follow-up with neurology Dr. Dipti Delaney on discharge  PT/OT/SLP--recommended STR  Discussed with neurology, patient was okay to be discharged from their standpoint to follow-up with neurology Dr. 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 Lights off, television off, noises down during the night. If able, decrease nursing checks at night  · Therapies as often as possible  · Avoid restraints to the best of your ability   · Avoid sensory deprivation by using glasses and hearing aids, if applicable        Pharmacological intervention  · Replete electrolyte abnormalities and correct metabolic abnormalities  · Limit benzodiazepines, antihistamines, narcotics, anticholinergics. Preference towards Precedex for sedation over fentanyl and benzodiazepines/GABAa agonists. · For dangerous behavior/aggression to self or others can consider Zyprexa or Seroquel if benefits outweigh risk  · For persistent insomnia can use melatonin four hours prior to bedtime or Seroquel 25 mg at bedtime         Discharge Planning:      Anticipate d/c to SNF tomorrow    Diet:  ADULT DIET; Regular; set up assistance  ADULT ORAL NUTRITION SUPPLEMENT; Breakfast, Lunch, Dinner; Standard High Calorie/High Protein Oral Supplement  DVT PPx: Lovenox SQ  Code status: Full Code    Hospital Problems:  Principal Problem:    INO (acute kidney injury) ([facility] [address]  Active Problems:    Hallucinations    Hypothyroidism    Polyneuropathy    Parkinson's disease ([facility] [address]    Frequent falls    Episode of confusion    GERD (gastroesophageal reflux disease)    Insomnia    HTN (hypertension)    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 *      Objective:     Patient Vitals for the past 24 hrs:   Temp Pulse Resp BP SpO2   06/30/22 1105 97.6 °F (36.4 °C) 74 18 (!) 162/91 100 %   06/30/22 1000 -- -- -- -- 100 %   06/30/22 0934 -- 53 -- (!) 117/96 --   06/30/22 0809 97.5 °F (36.4 °C) (!) 107 -- (!) 186/107 95 %   06/30/22 0714 98.3 °F (36.8 °C) 69 19 (!) 166/73 93 %   06/30/22 0349 97.7 °F (36.5 °C) 56 20 (!) 171/83 100 %   06/29/22 2338 97.7 °F (36.5 °C) 54 20 (!) 157/77 100 %   06/29/22 2033 98.4 °F (36.9 °C) 63 20 (!) 149/65 98 %   06/29/22 1656 98.8 °F (37.1 °C) 58 19 (!) 153/92 100 %   06/29/22 1341 -- -- -- -- (!) 88 %       Oxygen Therapy  SpO2: 100 %  Pulse Oximetry Type: Intermittent  Pulse Oximeter Device Location: Right,Left,Finger  O2 Device: None (Room air)  O2 Flow Rate (L/min): 3 L/min    Estimated body mass index is 27.85 kg/m² as calculated from the following:    Height as of this encounter: 5' 0.98\" (1.549 m). Weight as of this encounter: 147 lb 4.8 oz (66.8 kg). Intake/Output Summary (Last 24 hours) at 6/30/2022 1210  Last data filed at 6/29/2022 2338  Gross per 24 hour   Intake --   Output 275 ml   Net -275 ml         Physical Exam:     Blood pressure (!) 162/91, pulse 74, temperature 97.6 °F (36.4 °C), temperature source Oral, resp. rate 18, height 5' 0.98\" (1.549 m), weight 147 lb 4.8 oz (66.8 kg), SpO2 100 %. General:    Chronically ill appearing. Moves all extremities spontaneously. Head:  Normocephalic, atraumatic  Eyes:  Sclerae appear normal.  Pupils equally round. ENT:  Nares appear normal, no drainage. Moist oral mucosa  Neck:  No restricted ROM. Trachea midline   CV:   RRR. No m/r/g. No jugular venous distension. Lungs:   CTAB. No wheezing, rhonchi, or rales. Respirations even, unlabored  Abdomen: Bowel sounds present. Soft, nontender, nondistended. Extremities: No cyanosis or clubbing. No edema  Skin:     No rashes and normal coloration. Warm and dry. Neuro:  CN II-XII grossly intact.   Sensation intact. A&Ox to person, place and . Follows commands well. Psych:  Pleasantly demented    I have reviewed ordered lab tests and independently visualized imaging below:    Recent Labs:  Recent Results (from the past 48 hour(s))   EKG 12 Lead    Collection Time: 22 12:14 PM   Result Value Ref Range    Ventricular Rate 65 BPM    Atrial Rate 98 BPM    P-R Interval 172 ms    QRS Duration 109 ms    Q-T Interval 436 ms    QTc Calculation (Bazett) 454 ms    P Axis 61 degrees    R Axis -60 degrees    T Axis 43 degrees    Diagnosis Sinus rhythm    CBC with Auto Differential    Collection Time: 22 12:15 PM   Result Value Ref Range    WBC 6.8 4.3 - 11.1 K/uL    RBC 3.82 (L) 4.23 - 5.6 M/uL    Hemoglobin 12.0 (L) 13.6 - 17.2 g/dL    Hematocrit 37.1 (L) 41.1 - 50.3 %    MCV 97.1 79.6 - 97.8 FL    MCH 31.4 26.1 - 32.9 PG    MCHC 32.3 31.4 - 35.0 g/dL    RDW 12.8 11.9 - 14.6 %    Platelets 155 528 - 208 K/uL    MPV 9.7 9.4 - 12.3 FL    nRBC 0.00 0.0 - 0.2 K/uL    Seg Neutrophils 72 43 - 78 %    Lymphocytes 18 13 - 44 %    Monocytes 8 4.0 - 12.0 %    Eosinophils % 1 0.5 - 7.8 %    Basophils 1 0.0 - 2.0 %    Immature Granulocytes 0 0.0 - 5.0 %    Segs Absolute 4.9 1.7 - 8.2 K/UL    Absolute Lymph # 1.2 0.5 - 4.6 K/UL    Absolute Mono # 0.5 0.1 - 1.3 K/UL    Absolute Eos # 0.1 0.0 - 0.8 K/UL    Basophils Absolute 0.1 0.0 - 0.2 K/UL    Absolute Immature Granulocyte 0.0 0.0 - 0.5 K/UL    RBC Comment NORMOCYTIC/NORMOCHROMIC      WBC Comment Result Confirmed By Smear      Platelet Comment       FIBRIN STRANDS PRESENT ON SMEAR IS INDICATIVE OF A CLOTTED SPECIMEN. .. RECOLLECT IS SUGGESTED    Differential Type AUTOMATED     CMP    Collection Time: 22 12:15 PM   Result Value Ref Range    Sodium 139 138 - 145 mmol/L    Potassium 4.9 3.5 - 5.1 mmol/L    Chloride 105 98 - 107 mmol/L    CO2 25 21 - 32 mmol/L    Anion Gap 9 7 - 16 mmol/L    Glucose 65 65 - 100 mg/dL    BUN 40 (H) 8 - 23 MG/DL    CREATININE 2.00 (H) 0.8 - 1.5 MG/DL    GFR African American 42 (L) >60 ml/min/1.73m2    GFR Non- 34 (L) >60 ml/min/1.73m2    Calcium 9.2 8.3 - 10.4 MG/DL    Total Bilirubin 0.9 0.2 - 1.1 MG/DL    ALT 9 (L) 12 - 65 U/L    AST 19 15 - 37 U/L    Alk Phosphatase 149 (H) 50 - 136 U/L    Total Protein 7.4 6.3 - 8.2 g/dL    Albumin 3.8 3.2 - 4.6 g/dL    Globulin 3.6 (H) 2.3 - 3.5 g/dL    Albumin/Globulin Ratio 1.1 (L) 1.2 - 3.5     TSH with Reflex    Collection Time: 06/28/22 12:15 PM   Result Value Ref Range    TSH w Free Thyroid if Abnormal 1.48 0.358 - 3.740 UIU/ML   Lactic Acid Now and in 2 Hours    Collection Time: 06/28/22 12:15 PM   Result Value Ref Range    Lactic Acid, Plasma 1.1 0.4 - 2.0 MMOL/L   POCT Urinalysis no Micro    Collection Time: 06/28/22  2:07 PM   Result Value Ref Range    Specific Gravity, Urine, POC 1.025 (H) 1.001 - 1.023      pH, Urine, POC 5.0 5.0 - 9.0      Protein, Urine, POC Negative NEG mg/dL    Glucose, UA POC Negative NEG mg/dL    KETONES, Urine, POC 15 (A) NEG mg/dL    Bilirubin, Urine, POC Negative NEG      Blood, UA POC Trace Intact (A) NEG      URINE UROBILINOGEN POC 0.2 0.2 - 1.0 EU/dL    Nitrate, Urine, POC Negative NEG      Leukocyte Est, UA POC Negative NEG      Performed by:  Luis Carlos Childress    Comprehensive Metabolic Panel w/ Reflex to MG    Collection Time: 06/29/22  4:55 AM   Result Value Ref Range    Sodium 143 138 - 145 mmol/L    Potassium 4.8 3.5 - 5.1 mmol/L    Chloride 110 (H) 98 - 107 mmol/L    CO2 23 21 - 32 mmol/L    Anion Gap 10 7 - 16 mmol/L    Glucose 39 (LL) 65 - 100 mg/dL    BUN 42 (H) 8 - 23 MG/DL    CREATININE 1.40 0.8 - 1.5 MG/DL    GFR African American >60 >60 ml/min/1.73m2    GFR Non- 52 (L) >60 ml/min/1.73m2    Calcium 8.6 8.3 - 10.4 MG/DL    Total Bilirubin 0.9 0.2 - 1.1 MG/DL    ALT <6 (L) 12 - 65 U/L    AST 10 (L) 15 - 37 U/L    Alk Phosphatase 130 50 - 136 U/L    Total Protein 6.4 6.3 - 8.2 g/dL    Albumin 3.0 (L) 3.2 - 4.6 g/dL    Globulin 3.4 2.3 - 3.5 g/dL    Albumin/Globulin Ratio 0.9 (L) 1.2 - 3.5     CBC with Auto Differential    Collection Time: 06/29/22  4:55 AM   Result Value Ref Range    WBC 6.2 4.3 - 11.1 K/uL    RBC 3.66 (L) 4.23 - 5.6 M/uL    Hemoglobin 11.5 (L) 13.6 - 17.2 g/dL    Hematocrit 35.8 (L) 41.1 - 50.3 %    MCV 97.8 79.6 - 97.8 FL    MCH 31.4 26.1 - 32.9 PG    MCHC 32.1 31.4 - 35.0 g/dL    RDW 12.7 11.9 - 14.6 %    Platelets 142 985 - 652 K/uL    MPV 9.0 (L) 9.4 - 12.3 FL    nRBC 0.00 0.0 - 0.2 K/uL    Differential Type AUTOMATED      Seg Neutrophils 70 43 - 78 %    Lymphocytes 20 13 - 44 %    Monocytes 8 4.0 - 12.0 %    Eosinophils % 1 0.5 - 7.8 %    Basophils 1 0.0 - 2.0 %    Immature Granulocytes 0 0.0 - 5.0 %    Segs Absolute 4.4 1.7 - 8.2 K/UL    Absolute Lymph # 1.2 0.5 - 4.6 K/UL    Absolute Mono # 0.5 0.1 - 1.3 K/UL    Absolute Eos # 0.0 0.0 - 0.8 K/UL    Basophils Absolute 0.1 0.0 - 0.2 K/UL    Absolute Immature Granulocyte 0.0 0.0 - 0.5 K/UL   POCT Glucose    Collection Time: 06/29/22  8:32 AM   Result Value Ref Range    POC Glucose 29 (LL) 65 - 100 mg/dL    Performed by:  Lat    POCT Glucose    Collection Time: 06/29/22  9:01 AM   Result Value Ref Range    POC Glucose 43 (L) 65 - 100 mg/dL    Performed by:  Lat    POCT Glucose    Collection Time: 06/29/22  9:33 AM   Result Value Ref Range    POC Glucose 98 65 - 100 mg/dL    Performed by: Trina)    POCT Glucose    Collection Time: 06/29/22 11:42 AM   Result Value Ref Range    POC Glucose 254 (H) 65 - 100 mg/dL    Performed by:  Lat    Urinalysis with Reflex to Culture    Collection Time: 06/29/22 12:44 PM    Specimen: Urine   Result Value Ref Range    Color, UA YELLOW/STRAW      Appearance CLOUDY      Specific [place], UA 1.015 1.001 - 1.023      pH, Urine 5.0 5.0 - 9.0      Protein, UA Negative NEG mg/dL    Glucose, UA >1000 mg/dL    Ketones, Urine TRACE (A) NEG mg/dL    Bilirubin Urine Negative NEG      Blood, Urine Negative NEG Urobilinogen, Urine 1.0 0.2 - 1.0 EU/dL    Nitrite, Urine Negative NEG      Leukocyte Esterase, Urine Negative NEG      Urine Culture if Indicated CULTURE NOT INDICATED BY UA RESULT      WBC, UA 0-4 (A) NORM /hpf    RBC, UA 0-5 (A) NORM /hpf    BACTERIA, URINE Negative (A) NORM /hpf    Epithelial Cells UA 0-5 (A) NORM /hpf    Casts 0-2 (A) NORM /lpf    Mucus, UA 0 0 /lpf   Protein / creatinine ratio, urine    Collection Time: 06/29/22 12:44 PM   Result Value Ref Range    Protein, Urine, Random 11 <11.9 mg/dL    Creatinine, Ur 52.00 mg/dL    PROTEIN/CREAT RATIO URINE RAN 0.2     Sodium, urine, random    Collection Time: 06/29/22 12:44 PM   Result Value Ref Range    SODIUM, RANDOM URINE 70 MMOL/L   Vitamin B12    Collection Time: 06/29/22  2:17 PM   Result Value Ref Range    Vitamin B-12 756 193 - 986 pg/mL   Folate    Collection Time: 06/29/22  2:17 PM   Result Value Ref Range    Folate 17.2 3.1 - 17.5 ng/mL   Vitamin D 25 Hydroxy    Collection Time: 06/29/22  2:17 PM   Result Value Ref Range    Vit D, 25-Hydroxy 25.6 (L) 30.0 - 100.0 ng/mL   POCT Glucose    Collection Time: 06/29/22  4:58 PM   Result Value Ref Range    POC Glucose 154 (H) 65 - 100 mg/dL    Performed by: Trina)    PLEASE READ & DOCUMENT PPD TEST IN 24 HRS    Collection Time: 06/30/22 12:00 AM   Result Value Ref Range    PPD, (POC) Negative Negative    mm Induration 0 0 - 5 mm   CBC with Auto Differential    Collection Time: 06/30/22  4:56 AM   Result Value Ref Range    WBC 6.1 4.3 - 11.1 K/uL    RBC 3.92 (L) 4.23 - 5.6 M/uL    Hemoglobin 12.1 (L) 13.6 - 17.2 g/dL    Hematocrit 37.5 (L) 41.1 - 50.3 %    MCV 95.7 79.6 - 97.8 FL    MCH 30.9 26.1 - 32.9 PG    MCHC 32.3 31.4 - 35.0 g/dL    RDW 12.6 11.9 - 14.6 %    Platelets 525 974 - 113 K/uL    MPV 9.0 (L) 9.4 - 12.3 FL    nRBC 0.00 0.0 - 0.2 K/uL    Differential Type AUTOMATED      Seg Neutrophils 61 43 - 78 %    Lymphocytes 27 13 - 44 %    Monocytes 9 4.0 - 12.0 %    Eosinophils % 2 0.5 - 7.8 %    Basophils 1 0.0 - 2.0 %    Immature Granulocytes 0 0.0 - 5.0 %    Segs Absolute 3.7 1.7 - 8.2 K/UL    Absolute Lymph # 1.6 0.5 - 4.6 K/UL    Absolute Mono # 0.6 0.1 - 1.3 K/UL    Absolute Eos # 0.1 0.0 - 0.8 K/UL    Basophils Absolute 0.1 0.0 - 0.2 K/UL    Absolute Immature Granulocyte 0.0 0.0 - 0.5 K/UL   Basic Metabolic Panel    Collection Time: 06/30/22  4:56 AM   Result Value Ref Range    Sodium 138 136 - 145 mmol/L    Potassium 4.3 3.5 - 5.1 mmol/L    Chloride 104 98 - 107 mmol/L    CO2 29 21 - 32 mmol/L    Anion Gap 5 (L) 7 - 16 mmol/L    Glucose 115 (H) 65 - 100 mg/dL    BUN 29 (H) 8 - 23 MG/DL    CREATININE 1.30 0.8 - 1.5 MG/DL    GFR African American >60 >60 ml/min/1.73m2    GFR Non- 56 (L) >60 ml/min/1.73m2    Calcium 8.6 8.3 - 10.4 MG/DL   COVID-19    Collection Time: 06/30/22  5:23 AM    Specimen: Nasopharyngeal Swab   Result Value Ref Range    SARS-CoV-2 Please find results under separate order     COVID-19    Collection Time: 06/30/22  5:23 AM    Specimen: Nasopharyngeal   Result Value Ref Range    Source Nasopharyngeal      SARS-CoV-2, PCR Not detected NOTD         Other Studies:  XR CHEST 1 VIEW   Final Result   No acute abnormality.       CT HEAD WO CONTRAST   Final Result   MILD ATROPHY AND SMALL VESSEL ISCHEMIC DISEASE WITH NO ACUTE   INTRACRANIAL ABNORMALITY IDENTIFIED AT NONCONTRAST CT.               XR CHEST PORTABLE   Final Result   No acute findings in the chest.             Current Meds:  Current Facility-Administered Medications   Medication Dose Route Frequency    Vitamin D (CHOLECALCIFEROL) tablet 1,000 Units  1,000 Units Oral Daily    amLODIPine (NORVASC) tablet 5 mg  5 mg Oral Daily    glucose chewable tablet 16 g  4 tablet Oral PRN    dextrose bolus 10% 125 mL  125 mL IntraVENous PRN    Or    dextrose bolus 10% 250 mL  250 mL IntraVENous PRN    glucagon injection 1 mg  1 mg IntraMUSCular PRN    dextrose 5 % solution  100 mL/hr IntraVENous PRN   

## 2022-06-30 NOTE — PROGRESS NOTES
PHYSICAL THERAPY Daily Note  (Link to Caseload Tracking: PT Visit Days : 2  Time In/Out PT Charge Capture  Rehab Caseload Tracker  Orders      Vielka Stevens is a [de-identified] y.o. male   PRIMARY DIAGNOSIS: INO (acute kidney injury) ([facility] [address]  Hallucinations [R44.3]       Inpatient: Payor: Jessica Cox / Plan: MEDICARE PART A AND B / Product Type: *No Product type* /     ASSESSMENT:     REHAB RECOMMENDATIONS:   Recommendation to date pending progress:  Setting:   Short-term Rehab    Equipment:     To Be Determined     ASSESSMENT:  Mr. Rukhsana Colindres is making good progress toward goals, with his primary limitation being balance. Worked on static/dynamic standing balance and ambulation with the RW. Increased gait distances this session with cueing for upright posture and gait mechanics. He did demonstrate several LOB.      SUBJECTIVE:   Mr. Rukhsana Colindres states, \"I'll try whatever you ask\"     Social/Functional Lives With: Alone  Type of Home: Assisted living  Home Layout: One level  ADL Assistance: Independent  Ambulation Assistance: Needs assistance  Active : No  OBJECTIVE:     PAIN: VITALS / O2: Moy Kc / Charles Gains / DRAINS:   Pre Treatment:   Pain Assessment: None - Denies Pain      Post Treatment: 0 Vitals        Oxygen    None    RESTRICTIONS/PRECAUTIONS:  Restrictions/Precautions  Restrictions/Precautions: Fall Risk  Restrictions/Precautions: Fall Risk     MOBILITY: I Mod I S SBA CGA Min Mod Max Total  NT x2 Comments:   Bed Mobility    Rolling [] [] [] [] [] [] [] [] [] [x] []    Supine to Sit [] [] [] [] [x] [x] [] [] [] [] []    Scooting [] [] [] [] [x] [] [] [] [] [] []    Sit to Supine [] [] [] [] [] [] [] [] [] [x] []    Transfers    Sit to Stand [] [] [] [] [] [x] [] [] [] [] []    Bed to Chair [] [] [] [] [] [x] [] [] [] [] []    Stand to Sit [] [] [] [] [x] [] [] [] [] [] []     [] [] [] [] [] [] [] [] [] [] []    I=Independent, Mod I=Modified Independent, S=Supervision, SBA=Standby Assistance, CGA=Contact Guard Assistance,   Min=Minimal Assistance, Mod=Moderate Assistance, Max=Maximal Assistance, Total=Total Assistance, NT=Not Tested    BALANCE: Good Fair+ Fair Fair- Poor NT Comments   Sitting Static [x] [] [] [] [] []    Sitting Dynamic [] [x] [x] [] [] [] parkinson's trenors             Standing Static [] [] [x] [x] [] []    Standing Dynamic [] [] [] [x] [] []      GAIT: I Mod I S SBA CGA Min Mod Max Total  NT x2 Comments:   Level of Assistance [] [] [] [] [] [x] [] [] [] [] [x]    Distance 2x100 feet    DME Rolling Walker    Gait Quality Path deviations , Shuffling  and Trunk sway increased    Weightbearing Status      Stairs      I=Independent, Mod I=Modified Independent, S=Supervision, SBA=Standby Assistance, CGA=Contact Guard Assistance,   Min=Minimal Assistance, Mod=Moderate Assistance, Max=Maximal Assistance, Total=Total Assistance, NT=Not Tested    PLAN:   ACUTE PHYSICAL THERAPY GOALS:   (Developed with and agreed upon by patient and/or caregiver. )  LTG:  (1.)Mr. Singleton will move from supine to sit and sit to supine  in bed with MODIFIED INDEPENDENCE within 7 treatment day(s). (2.)Mr. Singleton will transfer from bed to chair and chair to bed with MODIFIED INDEPENDENCE using the least restrictive device within 7 treatment day(s). (3.)Mr. Singleton will ambulate with MODIFIED INDEPENDENCE for 150   feet with the least restrictive device within 7 treatment day(s). (4.)Mr. Singleton will perform standing static and dynamic balance activities x 25 minutes with SUPERVISION to improve safety within 7 treatment day(s). (5.)Mr. Singleton will ambulate and/or perform functional activities for  25 consecutive minutes with stable vital signs and no rests required to improve activity tolerance within 7 treatment days.     FREQUENCY AND DURATION: 3 times/week for duration of hospital stay or until stated goals are met, whichever comes first.    TREATMENT:   TREATMENT:   Co-Treatment PT/OT necessary due to patient's decreased overall endurance/tolerance levels, as well as need for high level skilled assistance to complete functional transfers/mobility and functional tasks  Therapeutic Activity (17 Minutes): Therapeutic activity included Supine to Sit, Scooting, Transfer Training, Sitting balance  and Standing balance to improve functional Activity tolerance, Balance, Coordination, Mobility and Strength. Gait Training (10 Minutes): Gait training for 2x100 feet utilizing [facility]. Patient required Manual and Verbal cueing to improve Activity Pacing, Assistive Device Utilization, Dynamic Standing Balance and Gait Mechanics.      TREATMENT GRID:  N/A    AFTER TREATMENT PRECAUTIONS: Bed/Chair Locked, Call light within reach, Chair, Needs within reach, RN notified and Visitors at bedside    INTERDISCIPLINARY COLLABORATION:  RN/ PCT, PT/ PTA and OT/ ROYAL    EDUCATION:      TIME IN/OUT:  Time In: 1010  Time Out: [place]  Minutes: [id number] [facility], [facility]

## 2022-06-30 NOTE — PROGRESS NOTES
OCCUPATIONAL THERAPY Daily Note     OT Visit Days: 2   Time  OT Charge Capture  Rehab Caseload Tracker  OT Orders    Abram Thomas is a [de-identified] y.o. male   PRIMARY DIAGNOSIS: INO (acute [facility]) ([facility]  Hallucinations [R44.3]       Inpatient: Payor: Munira Grew / Plan: MEDICARE PART A AND B / Product Type: *No Product type* /     ASSESSMENT:     REHAB RECOMMENDATIONS: CURRENT LEVEL OF FUNCTION:  (Most Recently Demonstrated)   Recommendation to date pending progress:  Setting:   Short-term Rehab    Equipment:     To Be Determined Bathing:   Not Tested  Dressing:   Not Tested  Feeding/Grooming:   Minimal Assist  Toileting:   Not Tested  Functional Mobility:   Minimal Assist     ASSESSMENT:  Mr. Tano Farrell was supine in bed upon arrival. Pt completed functional mobility with min A X 2 using rolling walker. Pt completed grooming at sink with min A. Pt is unsteady on feet. Continue POC.          SUBJECTIVE:     Mr. Tano Farrell states, \"Hey\"     Social/Functional Lives With: Alone  Type of Home: Assisted living  Home Layout: One level  ADL Assistance: Independent  Ambulation Assistance: Needs assistance  Active : No    OBJECTIVE:     JoeERUCES / Ilir Stapler / AIRWAY: IV    RESTRICTIONS/PRECAUTIONS:  Restrictions/Precautions  Restrictions/Precautions: Fall Risk        PAIN: VITALS / O2:   Pre Treatment: 0             Post Treatment: 0 Vitals          Oxygen            MOBILITY: I Mod I S SBA CGA Min Mod Max Total  NT x2 Comments:   Bed Mobility    Rolling [] [] [] [] [] [] [] [] [] [] []    Supine to Sit [] [] [] [] [] [x] [] [] [] [] []    Scooting [] [] [] [] [] [] [] [] [] [] []    Sit to Supine [] [] [] [] [] [x] [] [] [] [] []    Transfers    Sit to Stand [] [] [] [] [] [x] [] [] [] [] []    Bed to Chair [] [] [] [] [] [] [] [] [] [] []    Stand to Sit [] [] [] [] [] [x] [] [] [] [] []    Tub/Shower [] [] [] [] [] [] [] [] [] [] []     Toilet [] [] [] [] [] [] [] [] [] [] []      [] [] [] [] [] [] [] [] [] [] [] I=Independent, Mod I=Modified Independent, S=Supervision/Setup, SBA=Standby Assistance, CGA=Contact Guard Assistance, Min=Minimal Assistance, Mod=Moderate Assistance, Max=Maximal Assistance, Total=Total Assistance, NT=Not Tested    ACTIVITIES OF DAILY LIVING: I Mod I S SBA CGA Min Mod Max Total NT Comments   BASIC ADLs:              Upper Body   Bathing [] [] [] [] [] [] [] [] [] []    Lower Body Bathing [] [] [] [] [] [] [] [] [] []    Toileting [] [] [] [] [] [] [] [] [] []    Upper Body Dressing [] [] [] [] [] [] [] [] [] []    Lower Body Dressing [] [] [] [] [] [] [] [] [] []    Feeding [] [] [] [] [] [] [] [] [] []    Grooming [] [] [] [] [] [x] [] [] [] []    Personal Device Care [] [] [] [] [] [] [] [] [] []    Functional Mobility [] [] [] [] [] [] [] [] [] []    I=Independent, Mod I=Modified Independent, S=Supervision/Setup, SBA=Standby Assistance, CGA=Contact Guard Assistance, Min=Minimal Assistance, Mod=Moderate Assistance, Max=Maximal Assistance, Total=Total Assistance, NT=Not Tested    BALANCE: Good Fair+ Fair Fair- Poor NT Comments   Sitting Static [] [] [] [] [] []    Sitting Dynamic [] [] [] [] [] []              Standing Static [] [] [] [] [] []    Standing Dynamic [] [] [] [] [] []        PLAN:     FREQUENCY/DURATION   OT Plan of Care: 3 times/week for duration of hospital stay or until stated goals are met, whichever comes first.    ACUTE OCCUPATIONAL THERAPY GOALS:   (Developed with and agreed upon by patient and/or caregiver.)  1. Patient will complete lower body bathing and dressing with Sup-V and adaptive equipment as   needed. 2. Patient will completed upper body bathing and dressing with Mod I and adaptive equipment as needed. 3. Patient will complete grooming standing at sink with Sup-V and adaptive equipment as needed. 4. Patient will complete toileting with Sup-V.   5. Patient will tolerate 30 minutes of OT treatment with no rest breaks to increase activity tolerance for ADLs.    6. Patient will complete functional transfers with Mod I and adaptive equipment as needed. TREATMENT:     TREATMENT:   Self Care (25 minutes): Patient participated in grooming ADLs in standing with minimal verbal and manual cueing to increase independence and decrease assistance required. Patient also participated in functional mobility and functional transfer training to increase independence and decrease assistance required.      TREATMENT GRID:  N/A    AFTER TREATMENT PRECAUTIONS: Call light within reach, Chair, Needs within reach and RN notified    INTERDISCIPLINARY COLLABORATION:  RN/ PCT, PT/ PTA and OT/ ROYAL    EDUCATION:       TOTAL TREATMENT DURATION AND TIME:  Time In: 1014  Time Out: [facility]  Minutes: Amelissa 37, TAZ

## 2022-06-30 NOTE — PROGRESS NOTES
Patient resting calmly and quietly in bed. Eyes are open and respirations are even and unlabored. Patient denies any pain or discomfort. Will continue to monitor.   Twylla Prude

## 2022-06-30 NOTE — PROGRESS NOTES
Called pt's daughter Danitza Krause to give update but unable to get in touch with her. Voicemail full unable to leave message. Will try back later if time allows.     Kim Hager, DO

## 2022-06-30 NOTE — PROGRESS NOTES
SPEECH PATHOLOGY NOTE     Speech therapy attempt this morning for dysphagia management. Nursing students currently at bedside assisting with vitals and preparing to bathe patient. Will follow up at later time. ADDENDUM: patient working with PT/OT at time of second SLP attempt. Follow up later.      KAREN Chaney, CCC-SLP  Speech Language Pathologist  Acute Rehabilitation Services  Contact: Jordan

## 2022-06-30 NOTE — PLAN OF CARE
Problem: Discharge Planning  Goal: Discharge to home or other facility with appropriate resources  Outcome: Progressing  Flowsheets (Taken 6/30/2022 0815)  Discharge to home or other facility with appropriate resources:   Identify barriers to discharge with patient and caregiver   Arrange for needed discharge resources and transportation as appropriate   Identify discharge learning needs (meds, wound care, etc)     Problem: Skin/Tissue Integrity  Goal: Absence of new skin breakdown  Description: 1. Monitor for areas of redness and/or skin breakdown  2. Assess vascular access sites hourly  3. Every 4-6 hours minimum:  Change oxygen saturation probe site  4. Every 4-6 hours:  If on nasal continuous positive airway pressure, respiratory therapy assess nares and determine need for appliance change or resting period.   Outcome: Progressing     Problem: Safety - Adult  Goal: Free from fall injury  Outcome: Progressing     Problem: ABCDS Injury Assessment  Goal: Absence of physical injury  Outcome: Progressing

## 2022-07-01 ENCOUNTER — APPOINTMENT (OUTPATIENT)
Dept: GENERAL RADIOLOGY | Age: 82
DRG: 056 | End: 2022-07-01
Payer: MEDICARE

## 2022-07-01 ENCOUNTER — APPOINTMENT (OUTPATIENT)
Dept: CT IMAGING | Age: 82
DRG: 056 | End: 2022-07-01
Payer: MEDICARE

## 2022-07-01 PROBLEM — J96.01 ACUTE RESPIRATORY FAILURE WITH HYPOXIA (HCC): Status: ACTIVE | Noted: 2022-07-01

## 2022-07-01 LAB
ANION GAP SERPL CALC-SCNC: 3 MMOL/L (ref 7–16)
ARTERIAL PATENCY WRIST A: POSITIVE
BASE EXCESS BLD CALC-SCNC: 3.4 MMOL/L
BASOPHILS # BLD: 0 K/UL (ref 0–0.2)
BASOPHILS NFR BLD: 1 % (ref 0–2)
BDY SITE: ABNORMAL
BUN SERPL-MCNC: 24 MG/DL (ref 8–23)
CA-I BLD-MCNC: 1.18 MMOL/L (ref 1.12–1.32)
CALCIUM SERPL-MCNC: 9.4 MG/DL (ref 8.3–10.4)
CHLORIDE SERPL-SCNC: 104 MMOL/L (ref 98–107)
CO2 BLD-SCNC: 29 MMOL/L (ref 13–23)
CO2 SERPL-SCNC: 33 MMOL/L (ref 21–32)
CREAT SERPL-MCNC: 1.2 MG/DL (ref 0.8–1.5)
DIFFERENTIAL METHOD BLD: ABNORMAL
EKG ATRIAL RATE: 88 BPM
EKG DIAGNOSIS: NORMAL
EKG Q-T INTERVAL: 412 MS
EKG QRS DURATION: 96 MS
EKG QTC CALCULATION (BAZETT): 498 MS
EKG R AXIS: -69 DEGREES
EKG T AXIS: 45 DEGREES
EKG VENTRICULAR RATE: 88 BPM
EOSINOPHIL # BLD: 0.1 K/UL (ref 0–0.8)
EOSINOPHIL NFR BLD: 1 % (ref 0.5–7.8)
ERYTHROCYTE [DISTWIDTH] IN BLOOD BY AUTOMATED COUNT: 12.6 % (ref 11.9–14.6)
GAS FLOW.O2 O2 DELIVERY SYS: ABNORMAL L/MIN
GLUCOSE BLD STRIP.AUTO-MCNC: 90 MG/DL (ref 65–100)
GLUCOSE BLD STRIP.AUTO-MCNC: 95 MG/DL (ref 65–100)
GLUCOSE SERPL-MCNC: 110 MG/DL (ref 65–100)
HCO3 BLD-SCNC: 28.7 MMOL/L (ref 22–26)
HCT VFR BLD AUTO: 39.4 % (ref 41.1–50.3)
HGB BLD-MCNC: 12.8 G/DL (ref 13.6–17.2)
IMM GRANULOCYTES # BLD AUTO: 0 K/UL (ref 0–0.5)
IMM GRANULOCYTES NFR BLD AUTO: 0 % (ref 0–5)
LYMPHOCYTES # BLD: 1 K/UL (ref 0.5–4.6)
LYMPHOCYTES NFR BLD: 18 % (ref 13–44)
MCH RBC QN AUTO: 31.1 PG (ref 26.1–32.9)
MCHC RBC AUTO-ENTMCNC: 32.5 G/DL (ref 31.4–35)
MCV RBC AUTO: 95.9 FL (ref 79.6–97.8)
MM INDURATION, POC: 0 MM (ref 0–5)
MONOCYTES # BLD: 0.5 K/UL (ref 0.1–1.3)
MONOCYTES NFR BLD: 9 % (ref 4–12)
NEUTS SEG # BLD: 4 K/UL (ref 1.7–8.2)
NEUTS SEG NFR BLD: 71 % (ref 43–78)
NRBC # BLD: 0 K/UL (ref 0–0.2)
PCO2 BLD: 45.6 MMHG (ref 35–45)
PH BLD: 7.41 [PH] (ref 7.35–7.45)
PLATELET # BLD AUTO: 272 K/UL (ref 150–450)
PMV BLD AUTO: 8.9 FL (ref 9.4–12.3)
PO2 BLD: 542 MMHG (ref 75–100)
POC FIO2: 15
POTASSIUM BLD-SCNC: 4.5 MMOL/L (ref 3.5–5.1)
POTASSIUM SERPL-SCNC: 4.3 MMOL/L (ref 3.5–5.1)
PPD, POC: NEGATIVE
RBC # BLD AUTO: 4.11 M/UL (ref 4.23–5.6)
SAO2 % BLD: 100 %
SERVICE CMNT-IMP: ABNORMAL
SERVICE CMNT-IMP: NORMAL
SODIUM BLD-SCNC: 142 MMOL/L (ref 136–145)
SODIUM SERPL-SCNC: 140 MMOL/L (ref 138–145)
SPECIMEN SITE: ABNORMAL
WBC # BLD AUTO: 5.6 K/UL (ref 4.3–11.1)

## 2022-07-01 PROCEDURE — 85025 COMPLETE CBC W/AUTO DIFF WBC: CPT

## 2022-07-01 PROCEDURE — 71045 X-RAY EXAM CHEST 1 VIEW: CPT

## 2022-07-01 PROCEDURE — 97530 THERAPEUTIC ACTIVITIES: CPT

## 2022-07-01 PROCEDURE — 2580000003 HC RX 258: Performed by: STUDENT IN AN ORGANIZED HEALTH CARE EDUCATION/TRAINING PROGRAM

## 2022-07-01 PROCEDURE — 36415 COLL VENOUS BLD VENIPUNCTURE: CPT

## 2022-07-01 PROCEDURE — 6360000002 HC RX W HCPCS: Performed by: FAMILY MEDICINE

## 2022-07-01 PROCEDURE — 1100000000 HC RM PRIVATE

## 2022-07-01 PROCEDURE — 97535 SELF CARE MNGMENT TRAINING: CPT

## 2022-07-01 PROCEDURE — 82962 GLUCOSE BLOOD TEST: CPT

## 2022-07-01 PROCEDURE — 80048 BASIC METABOLIC PNL TOTAL CA: CPT

## 2022-07-01 PROCEDURE — 70450 CT HEAD/BRAIN W/O DYE: CPT

## 2022-07-01 PROCEDURE — 84295 ASSAY OF SERUM SODIUM: CPT

## 2022-07-01 PROCEDURE — 82803 BLOOD GASES ANY COMBINATION: CPT

## 2022-07-01 PROCEDURE — 6370000000 HC RX 637 (ALT 250 FOR IP): Performed by: PSYCHIATRY & NEUROLOGY

## 2022-07-01 PROCEDURE — 93005 ELECTROCARDIOGRAM TRACING: CPT | Performed by: FAMILY MEDICINE

## 2022-07-01 PROCEDURE — 97112 NEUROMUSCULAR REEDUCATION: CPT

## 2022-07-01 PROCEDURE — 6370000000 HC RX 637 (ALT 250 FOR IP): Performed by: STUDENT IN AN ORGANIZED HEALTH CARE EDUCATION/TRAINING PROGRAM

## 2022-07-01 RX ORDER — LEVALBUTEROL INHALATION SOLUTION 1.25 MG/3ML
1.25 SOLUTION RESPIRATORY (INHALATION) EVERY 8 HOURS PRN
Qty: 180 ML | Refills: 1 | Status: SHIPPED | OUTPATIENT
Start: 2022-07-01 | End: 2022-07-05

## 2022-07-01 RX ORDER — VITAMIN B COMPLEX
1000 TABLET ORAL DAILY
Qty: 60 TABLET | Refills: 0
Start: 2022-07-02 | End: 2022-07-05

## 2022-07-01 RX ORDER — CLONAZEPAM 1 MG/1
1 TABLET ORAL DAILY
Qty: 5 TABLET | Refills: 0 | Status: SHIPPED | OUTPATIENT
Start: 2022-07-01 | End: 2022-07-05

## 2022-07-01 RX ORDER — ENOXAPARIN SODIUM 100 MG/ML
40 INJECTION SUBCUTANEOUS EVERY 24 HOURS
Status: DISCONTINUED | OUTPATIENT
Start: 2022-07-01 | End: 2022-07-05 | Stop reason: HOSPADM

## 2022-07-01 RX ORDER — AMLODIPINE BESYLATE 5 MG/1
5 TABLET ORAL DAILY
Qty: 30 TABLET | Refills: 1
Start: 2022-07-02 | End: 2022-07-05

## 2022-07-01 RX ADMIN — SODIUM CHLORIDE, PRESERVATIVE FREE 5 ML: 5 INJECTION INTRAVENOUS at 22:13

## 2022-07-01 RX ADMIN — CARBIDOPA AND LEVODOPA 1 TABLET: 25; 250 TABLET, ORALLY DISINTEGRATING ORAL at 22:09

## 2022-07-01 RX ADMIN — Medication 9 MG: at 22:09

## 2022-07-01 RX ADMIN — RIVASTIGMINE TARTRATE 6 MG: 3 CAPSULE ORAL at 16:33

## 2022-07-01 RX ADMIN — GABAPENTIN 100 MG: 100 CAPSULE ORAL at 22:09

## 2022-07-01 RX ADMIN — SODIUM CHLORIDE, PRESERVATIVE FREE 5 ML: 5 INJECTION INTRAVENOUS at 09:07

## 2022-07-01 RX ADMIN — CARBIDOPA AND LEVODOPA 1 TABLET: 25; 250 TABLET, ORALLY DISINTEGRATING ORAL at 12:57

## 2022-07-01 RX ADMIN — CLONAZEPAM 1 MG: 1 TABLET ORAL at 19:58

## 2022-07-01 RX ADMIN — CARBIDOPA AND LEVODOPA 1 TABLET: 25; 250 TABLET, ORALLY DISINTEGRATING ORAL at 16:33

## 2022-07-01 RX ADMIN — ENOXAPARIN SODIUM 40 MG: 100 INJECTION SUBCUTANEOUS at 22:10

## 2022-07-01 RX ADMIN — CARBIDOPA AND LEVODOPA 2 TABLET: 25; 100 TABLET, EXTENDED RELEASE ORAL at 22:09

## 2022-07-01 RX ADMIN — LATANOPROST 1 DROP: 50 SOLUTION OPHTHALMIC at 22:14

## 2022-07-01 RX ADMIN — GABAPENTIN 100 MG: 100 CAPSULE ORAL at 14:58

## 2022-07-01 NOTE — PROGRESS NOTES
SPEECH PATHOLOGY NOTE:    Attempted to see patient for dysphagia management. Patient was asleep. Nursing staff reports patient has been unable to rouse for breakfast or other therapy attempts this morning. Will re-attempt at later time/date.      Fredis Tinoco [address] CCC-SLP

## 2022-07-01 NOTE — PROGRESS NOTES
OCCUPATIONAL THERAPY Daily Note     OT Visit Days: 3   Time  OT Charge Capture  Rehab Caseload Tracker  OT Orders    Ethan Sullivan. is a [de-identified] y.o. male   PRIMARY DIAGNOSIS: INO (acute kidney injury) ([facility]  Hallucinations [R44.3]       Inpatient: Payor: Katharina Tam / Plan: MEDICARE PART A AND B / Product Type: *No Product type* /     ASSESSMENT:     REHAB RECOMMENDATIONS: CURRENT LEVEL OF FUNCTION:  (Most Recently Demonstrated)   Recommendation to date pending progress:  Setting:   Short-term Rehab    Equipment:     To Be Determined Bathing:   Not Tested  Dressing:   Not Tested  Feeding/Grooming:   Total Assist d/t sedated from meds  Toileting:   Not Tested  Functional Mobility:   Moderate Assist assist x 2 with bed mobility     ASSESSMENT:  Mr. Arina Cat was seen twice today. Once in the a.m but he really couldn't wake up to participate. Pt had been given Geodon the night before, attempted to arouse through UE ROM and washing his face, unsuccessfully. Returned in the afternoon d/t RN stating that he had perked up this afternoon. Pt was sleeping upon entrance but did awake to stimuli. Pt agreeable to sitting up and transferred to the edge of the bed with mod a x 2. He sat edge of bed approximately 10 minutes with fair to fair - balance. and had a sip of tea, but did not feel like doing anything else. He was still drowsy and transport came to get pt for a CT scan. Pt was assisted back to supine and then sheet transferred to transport stretcher. Limited today d/t sedation. Continue OT POC. SUBJECTIVE:     Mr. Arina Cat states, \"I've got a bat and his ass is grass. \"     Social/Functional Lives With: Alone  Type of Home: Assisted living  Home Layout: One level  ADL Assistance: Independent  Ambulation Assistance: Needs assistance  Active : No    OBJECTIVE:     LINES / [place] / AIRWAY: IV    RESTRICTIONS/PRECAUTIONS:  Restrictions/Precautions  Restrictions/Precautions: Fall Risk        PAIN: [place] / O2: Pre Treatment: 0             Post Treatment: 0 Vitals          Oxygen            MOBILITY: I Mod I S SBA CGA Min Mod Max Total  NT x2 Comments:   Bed Mobility    Rolling [] [] [] [] [] [] [x] [] [] [] [x]    Supine to Sit [] [] [] [] [] [] [x] [] [] [] [x]    Scooting [] [] [] [] [] [] [x] [] [] [] []    Sit to Supine [] [] [] [] [] [] [x] [x] [] [] [x]    Transfers    Sit to Stand [] [] [] [] [] [] [] [] [] [x] []    Bed to Chair [] [] [] [] [] [] [] [] [] [x] []    Stand to Sit [] [] [] [] [] [] [] [] [] [x] []    Tub/Shower [] [] [] [] [] [] [] [] [] [x] []     Toilet [] [] [] [] [] [] [] [] [] [x] []      [] [] [] [] [] [] [] [] [] [x] []    I=Independent, Mod I=Modified Independent, S=Supervision/Setup, SBA=Standby Assistance, CGA=Contact Guard Assistance, Min=Minimal Assistance, Mod=Moderate Assistance, Max=Maximal Assistance, Total=Total Assistance, NT=Not Tested    ACTIVITIES OF DAILY LIVING: I Mod I S SBA CGA Min Mod Max Total NT Comments   BASIC ADLs:              Upper Body   Bathing [] [] [] [] [] [] [] [] [] []    Lower Body Bathing [] [] [] [] [] [] [] [] [] []    Toileting [] [] [] [] [] [] [] [] [] []    Upper Body Dressing [] [] [] [] [] [] [] [] [] []    Lower Body Dressing [] [] [] [] [] [] [] [] [] []    Feeding [] [] [] [] [] [] [] [] [] []    Grooming [] [] [] [] [] [] [] [] [x] []    Personal Device Care [] [] [] [] [] [] [] [] [] []    Functional Mobility [] [] [] [] [] [] [] [] [] []    I=Independent, Mod I=Modified Independent, S=Supervision/Setup, SBA=Standby Assistance, CGA=Contact Guard Assistance, Min=Minimal Assistance, Mod=Moderate Assistance, Max=Maximal Assistance, Total=Total Assistance, NT=Not Tested    BALANCE: Good Fair+ Fair Fair- Poor NT Comments   Sitting Static [] [] [x] [x] [] []    Sitting Dynamic [] [] [x] [x] [] []              Standing Static [] [] [] [] [] [x]    Standing Dynamic [] [] [] [] [] [x]        PLAN:     FREQUENCY/DURATION   OT Plan of Care: 3 times/week for duration of hospital stay or until stated goals are met, whichever comes first.    ACUTE OCCUPATIONAL THERAPY GOALS:   (Developed with and agreed upon by patient and/or caregiver.)  1. Patient will complete lower body bathing and dressing with Sup-V and adaptive equipment as   needed. 2. Patient will completed upper body bathing and dressing with Mod I and adaptive equipment as needed. 3. Patient will complete grooming standing at sink with Sup-V and adaptive equipment as needed. 4. Patient will complete toileting with Sup-V.   5. Patient will tolerate 30 minutes of OT treatment with no rest breaks to increase activity tolerance for ADLs. 6. Patient will complete functional transfers with Mod I and adaptive equipment as needed. TREATMENT:     TREATMENT:   Co-Treatment PT/OT necessary due to patient's decreased overall endurance/tolerance levels, as well as need for high level skilled assistance to complete functional transfers/mobility and functional tasks  Neuromuscular Re-education (18 Minutes): Neuromuscular Re-education included Balance Training, Postural training and Sitting balance training to improve Balance, Functional Mobility and Postural Control. Self Care (12 minutes): Patient participated in grooming and ROM ADLs in unsupported sitting and supine with maximal manual cueing to increase independence and decrease assistance required. Patient also participated in mobility training to decrease assistance required and increase activity tolerance.           TREATMENT GRID:  N/A    AFTER TREATMENT PRECAUTIONS: on stretcher with transport    INTERDISCIPLINARY COLLABORATION:  RN/ PCT, PT/ PTA and OT/ ROYAL    EDUCATION:       TOTAL TREATMENT DURATION AND TIME:  Time In: 9646 (2025)  Time Out: 0940 (1413)  Minutes: 12 (18)    TAZ Fuchs

## 2022-07-01 NOTE — PROGRESS NOTES
Pt now alert, talking, follows commands well and recognizes his family members. Able to take meds. Hold off on NGT.     Bri Nieves, DO

## 2022-07-01 NOTE — PROGRESS NOTES
responded to rapid response. Sister was outside room and expressed stress over patient's medical issues. Sister stated that patient resides in a SNF and was expected to return home today, but now does not appear to be discharging. Patient was sedated and did not respond verbally.  provided pastoral presence, prayer and empathetic listening.   Signed by  Elvin Colon M.Div.

## 2022-07-01 NOTE — PROGRESS NOTES
PHYSICAL THERAPY Daily Note  (Link to Caseload Tracking: PT Visit Days : 3  Time In/Out PT Charge Capture  Rehab Caseload Tracker  Orders      Veverly Whitney is a [de-identified] y.o. male   PRIMARY DIAGNOSIS: [facility]  Hallucinations [R44.3]       Inpatient: Payor: Eleanor Spencer / Plan: MEDICARE PART A AND B / Product Type: *No Product type* /     ASSESSMENT:     REHAB RECOMMENDATIONS:   Recommendation to date pending progress:  Setting:   Short-term Rehab    Equipment:     To Be Determined     ASSESSMENT:  Mr. Soren Worthington is very lethargic today following medication administration last night, time spent in treatment both in the AM and in the PM.  In the AM the patient was unable to maintain open eyes or follow commands. Attemped various methods to increase alertness including turing on lights, washing face, sitting the bed up, sternal rub, and talking. Limited participation. Returned in the PM to re attempt. The patient remains very drowsy and lethargic, but improved some. He was able to follow some commands and came to sitting EOB with assist for several minutes. He did continue to fall back asleep in sitting and required constant stimulation to maintain alertness. On the stretcher with transport at the end of session.       SUBJECTIVE:   Mr. Soren Worthington states, \"I've got my baseball bat, and His ass is grass\"     Social/Functional Lives With: Alone  Type of Home: Assisted living  Home Layout: One level  ADL Assistance: Independent  Ambulation Assistance: Needs assistance  Active : No  OBJECTIVE:     PAIN: VITALS / O2: PRECAUTION / [facility] / DRAINS:   Pre Treatment: 0         Post Treatment: 0 Vitals        Oxygen    None    RESTRICTIONS/PRECAUTIONS:  Restrictions/Precautions  Restrictions/Precautions: Fall Risk  Restrictions/Precautions: Fall Risk     MOBILITY: I Mod I S SBA CGA Min Mod Max Total  NT x2 Comments:   Bed Mobility    Rolling [] [] [] [] [] [] [x] [] [] [] [x]    Supine to Sit [] [] [] [] [] [] [x] [] [] [] [x]    Scooting [] [] [] [] [] [] [x] [] [] [] [x]    Sit to Supine [] [] [] [] [] [] [x] [] [] [] [x]    Transfers    Sit to Stand [] [] [] [] [] [] [] [] [] [x] []    Bed to Chair [] [] [] [] [] [] [] [] [] [x] []    Stand to Sit [] [] [] [] [] [] [] [] [] [x] []     [] [] [] [] [] [] [] [] [] [] []    I=Independent, Mod I=Modified Independent, S=Supervision, SBA=Standby Assistance, CGA=Contact Guard Assistance,   Min=Minimal Assistance, Mod=Moderate Assistance, Max=Maximal Assistance, Total=Total Assistance, NT=Not Tested    BALANCE: Good Fair+ Fair Fair- Poor NT Comments   Sitting Static [] [] [] [x] [] []    Sitting Dynamic [] [] [] [x] [] [] parkinson's trenors             Standing Static [] [] [] [] [] [x]    Standing Dynamic [] [] [] [] [] [x]      GAIT: I Mod I S SBA CGA Min Mod Max Total  NT x2 Comments:   Level of Assistance [] [] [] [] [] [] [] [] [] [x] []    Distance   feet    DME Rolling Walker    Gait Quality Path deviations , Shuffling  and Trunk sway increased    Weightbearing Status      Stairs      I=Independent, Mod I=Modified Independent, S=Supervision, SBA=Standby Assistance, CGA=Contact Guard Assistance,   Min=Minimal Assistance, Mod=Moderate Assistance, Max=Maximal Assistance, Total=Total Assistance, NT=Not Tested    PLAN:   ACUTE PHYSICAL THERAPY GOALS:   (Developed with and agreed upon by patient and/or caregiver. )  LTG:  (1.)Mr. Singleton will move from supine to sit and sit to supine  in bed with MODIFIED INDEPENDENCE within 7 treatment day(s). (2.)Mr. Singleton will transfer from bed to chair and chair to bed with MODIFIED INDEPENDENCE using the least restrictive device within 7 treatment day(s). (3.)Mr. Singleton will ambulate with MODIFIED INDEPENDENCE for 150   feet with the least restrictive device within 7 treatment day(s). (4.)Mr. Singleton will perform standing static and dynamic balance activities x 25 minutes with SUPERVISION to improve safety within 7 treatment day(s). (5.)Mr. Singleton will ambulate and/or perform functional activities for  25 consecutive minutes with stable vital signs and no rests required to improve activity tolerance within 7 treatment days. FREQUENCY AND DURATION: 3 times/week for duration of hospital stay or until stated goals are met, whichever comes first.    TREATMENT:   TREATMENT:   Co-Treatment PT/OT necessary due to patient's decreased overall endurance/tolerance levels, as well as need for high level skilled assistance to complete functional transfers/mobility and functional tasks  Therapeutic Activity (30 Minutes): Therapeutic activity included Supine to Sit, Sit to Supine, Scooting and Sitting balance  to improve functional Activity tolerance, Balance, Coordination, Mobility and Strength.     TREATMENT GRID:  N/A    AFTER TREATMENT PRECAUTIONS: RN notified, Visitors at bedside and on transport    INTERDISCIPLINARY COLLABORATION:  RN/ PCT, PT/ PTA and OT/ ROYAL    EDUCATION:      TIME IN/OUT:  Time In: 1972 (1355)  Time Out: 2880 ([phone])  Minutes: 12 (18)    Roman Rodriguez PTA

## 2022-07-01 NOTE — DISCHARGE SUMMARY
Hospitalist Discharge Summary   Admit Date:  2022 11:43 AM   DC Note date: 2022  Name:  Courtney Lozano. Age:  [de-identified] y.o. Sex:  male  :  1940   MRN:  [id number]   Room:  [facility]  PCP:  None Provider    Presenting Complaint: Altered Mental Status     Initial Admission Diagnosis: Hallucinations [R44.3]     Problem List for this Hospitalization (present on admission):    Principal Problem:    INO (acute kidney injury) ([facility] [address]  Active Problems:    Hallucinations    Hypothyroidism    Polyneuropathy    Parkinson's disease ([facility] [address]    Frequent falls    Episode of confusion    GERD (gastroesophageal reflux disease)    Insomnia    HTN (hypertension)    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 *    Did Patient have Sepsis (YES OR NO): No    Hospital Course:  [de-identified] y. o. male with medical history of Parkinson's, hypertension, insomnia, hypothyroidism who presented with confusion and episodes of hallucinations.  Patient lives in [facility] assisted living.  Since  family has stated that he has been more confused and hallucinating. Juana Chen observed patient talking to the wall at one point.  Patient just saw his neurologist 2 weeks prior that started him on Remeron.  According to family he had not started taking that medication.  Only antidepressant patient is on currently is Lexapro.  According to facility staff patient had not been taking his medications for 1 to 2 days PA. [facility] Cover had said that  patient was less responsive and more rigid.  In the emergency department patient was found to have INO with creatinine of 2.0.  No source of infection found.  Patient also was found to be hypertensive with blood pressure of 179/89.  CT head showed mild atrophy and small vessel ischemic disease without acute abnormalities.  Chest x-ray also without any acute abnormalities.  Patient not febrile and no severe rigidity observed on admission. Cyndie Elizondo was treated with Valium in the ED.  Patient rigidity can be consistent with poorly controlled Parkinson's.  Per family patient also received Eligard for his prostate cancer on Thursday.  It was after that fact that patient started to act \"different\".  Family had also stated that patient had fallen a few times on Sunday 6/26.     Patient is admitted for INO and hallucinations with acute metabolic encephalopathy in setting of Parkinson's disease. INO most likely due to decreased p.o. intake and encephalopathy most likely due to worsening Parkinson's. INO resolved with IVF. CT head on admission was unremarkable for acute findings; only mild atrophy with small vessel ischemic disease. Neurology was consulted. Challenging and management from a behavioral standpoint. Has had amantadine in the past but would avoid this drug in the future secondary to hallucinations. Somewhat paradoxical response due to stabilizers and was started on Clonazepam 1 mg qd. He will need to follow-up with neurology Dr. Tiffany Alexander on discharge. Discussed with neurology, patient was okay to be discharged from their standpoint to follow-up with neurology Dr. Tiffany Alexander outpatient. Patient initially had episodes of hypoglycemia on admission but this was resolved with dextrose and improvement in p.o. intake. PT/OT recommended SNF for patient. He continues to progress well and was discharged in hemodynamically stable condition. He will need to follow-up with his PCP in 3-5 days and follow-up with neurology in 2-4 weeks. Return precautions given. Disposition: SNF  Diet: ADULT DIET; Regular; set up assistance  ADULT ORAL NUTRITION SUPPLEMENT; Breakfast, Lunch, Dinner; Standard High Calorie/High Protein Oral Supplement  Code Status: Full Code    Follow Ups:   Contact information for after-discharge care     Discharge Destination     [address] [facility] .     Service: Skilled Nursing  Contact information:  [address]  [phone]                           Follow up labs/diagnostics (ultimately defer to outpatient provider):  PCP in 1 week  Neurology Dr. Lucille Long in 2-4 weeks      Time spent in patient discharge and coordination 45 minutes. Plan was discussed with patient. All questions answered. Patient was stable at time of discharge. Instructions given to call a physician or return if any concerns. Current Discharge Medication List      START taking these medications    Details   levalbuterol (XOPENEX) 1.25 MG/3ML nebulizer solution Take 3 mLs by nebulization every 8 hours as needed for Wheezing  Qty: 180 mL, Refills: 1      clonazePAM (KLONOPIN) 1 MG tablet Take 1 tablet by mouth daily for 5 days. 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 25 mcg=1000 Units. Please double check dosages. CONTINUE these medications which have NOT CHANGED    Details   escitalopram (LEXAPRO) 10 MG tablet TAKE 1 TABLET BY MOUTH  DAILY  Qty: 90 tablet, Refills: 3    Comments: Requesting 1 year supply      mirtazapine (REMERON) 15 MG tablet Take 1 tablet by mouth nightly  Qty: 90 tablet, Refills: 3      atropine 1 % ophthalmic solution 2-3 drops in a tablespoon of water and swish and spit up to 4 times a day for drooling.       carbidopa-levodopa (SINEMET)  MG per tablet TAKE 1 TABLET BY MOUTH AT 6AM, 10AM, 2PM, AND 6PM STRICT TIMES DUE TO ADVANCED PD      carbidopa-levodopa (SINEMET CR)  MG per extended release tablet 1 tab at 10 pm strict time      gabapentin (NEURONTIN) 100 MG capsule Take 100 mg by mouth 3 times daily.      latanoprost (XALATAN) 0.005 % ophthalmic solution Apply 1 drop to eye      levothyroxine (SYNTHROID) 125 MCG tablet Take 125 mcg by mouth every morning (before breakfast)      lisinopril (PRINIVIL;ZESTRIL) 5 MG tablet Take 5 mg by mouth daily      melatonin 10 MG CAPS capsule Take 10 mg by mouth      mirabegron (MYRBETRIQ) 50 MG TB24 Take 50 mg by mouth      omeprazole (PRILOSEC) 40 MG delayed release capsule Take 40 mg by mouth every morning      polyethylene glycol (GLYCOLAX) 17 GM/SCOOP powder Take 17 g by mouth daily      rivastigmine (EXELON) 6 MG capsule Take 6 mg by mouth 2 times daily (with meals)         STOP taking these medications       Amantadine (SYMMETREL) 100 MG TABS tablet Comments:   Reason for Stopping:               Procedures done this admission:  * No surgery found *    Consults this admission:  IP CONSULT TO NEUROLOGY    Echocardiogram results:  No results found for this or any previous visit. Diagnostic Imaging/Tests:   CT HEAD WO CONTRAST    Result Date: 6/28/2022  NONCONTRAST HEAD CT CLINICAL HISTORY:  Hallucinations. TECHNIQUE:  Axial images were obtained with spiral technique. Radiation dose reduction was achieved using one or all of the following techniques: automated exposure control, weight-based dosing, iterative reconstruction. COMPARISON:  March 18, 2022. REPORT:   Standard noncontrast head CT demonstrates no definite intracranial mass effect, hemorrhage, or evidence of acute geographic infarction. White matter hypodensities are most consistent with small vessel ischemic disease. There is mild atrophy. Orbits  and paranasal sinuses are clear where imaged. Bone windows demonstrate no definite fracture or destruction. MILD ATROPHY AND SMALL VESSEL ISCHEMIC DISEASE WITH NO ACUTE INTRACRANIAL ABNORMALITY IDENTIFIED AT NONCONTRAST CT.     XR CHEST PORTABLE    Result Date: 6/28/2022  Chest X-ray INDICATION: Hallucinations. COMPARISON: Chest x-ray 3/18/2022.  A portable AP view of the chest was obtained. FINDINGS: The lungs are clear. The heart is normal in size. No pneumothorax. No pleural effusions. No acute findings in the chest.     XR CHEST 1 VIEW    Result Date: 6/29/2022  Portable chest x-ray CLINICAL INDICATION: Hypoxia FINDINGS: Single AP view the chest compared to a similar exam dated 6/28/2022 show the lungs to be expanded and clear. No pleural effusion or pneumothorax. The cardiac silhouette and mediastinum are unremarkable. No acute abnormality.         Labs: Results:       BMP, Mg, Phos Recent Labs     06/29/22  0455 06/30/22  0456 07/01/22  0543    138 140   K 4.8 4.3 4.3   * 104 104   CO2 23 29 33*   BUN 42* 29* 24*      CBC Recent Labs     06/29/22 0455 06/30/22  0456 07/01/22  0543   WBC 6.2 6.1 5.6   RBC 3.66* 3.92* 4.11*   HGB 11.5* 12.1* 12.8*   HCT 35.8* 37.5* 39.4*    255 272      LFT Recent Labs     06/28/22  1215 06/29/22  0455   ALT 9* <6*   GLOB 3.6* 3.4      Cardiac Testing No results found for: BNP, CPK, RCK1, CKMB   Coagulation Tests No results found for: INR, APTT   A1c No results found for: HBA1C   Lipid Panel No results found for: CHOL, CHOLPOCT, CHOLX, CHLST, CHOLV, 707563, HDL, HDLC, LDL, LDLC, 172181, VLDLC, VLDL, TGLX, TRIGL   Thyroid Panel Lab Results   Component Value Date/Time    TSH 0.437 06/02/2021 02:54 PM    TSH 6.150 04/04/2021 05:56 AM        Most Recent UA Lab Results   Component Value Date/Time    MUCUS 0 06/29/2022 12:44 PM          All Labs from Last 24 Hrs:  Recent Results (from the past 24 hour(s))   EKG 12 Lead    Collection Time: 06/30/22 12:18 PM   Result Value Ref Range    Ventricular Rate 75 BPM    Atrial Rate 75 BPM    P-R Interval 170 ms    QRS Duration 104 ms    Q-T Interval 396 ms    QTc Calculation (Bazett) 442 ms    P Axis 44 degrees    R Axis -60 degrees    T Axis 40 degrees    Diagnosis Sinus rhythm with Premature atrial complexes    PLEASE READ & DOCUMENT PPD TEST IN 48 HRS Collection Time: 07/01/22 12:00 AM   Result Value Ref Range    PPD, (POC) Negative Negative    mm Induration 0 0 - 5 mm   CBC with Auto Differential    Collection Time: 07/01/22  5:43 AM   Result Value Ref Range    WBC 5.6 4.3 - 11.1 K/uL    RBC 4.11 (L) 4.23 - 5.6 M/uL    Hemoglobin 12.8 (L) 13.6 - 17.2 g/dL    Hematocrit 39.4 (L) 41.1 - 50.3 %    MCV 95.9 79.6 - 97.8 FL    MCH 31.1 26.1 - 32.9 PG    MCHC 32.5 31.4 - 35.0 g/dL    RDW 12.6 11.9 - 14.6 %    Platelets 961 800 - 474 K/uL    MPV 8.9 (L) 9.4 - 12.3 FL    nRBC 0.00 0.0 - 0.2 K/uL    Differential Type AUTOMATED      Seg Neutrophils 71 43 - 78 %    Lymphocytes 18 13 - 44 %    Monocytes 9 4.0 - 12.0 %    Eosinophils % 1 0.5 - 7.8 %    Basophils 1 0.0 - 2.0 %    Immature Granulocytes 0 0.0 - 5.0 %    Segs Absolute 4.0 1.7 - 8.2 K/UL    Absolute Lymph # 1.0 0.5 - 4.6 K/UL    Absolute Mono # 0.5 0.1 - 1.3 K/UL    Absolute Eos # 0.1 0.0 - 0.8 K/UL    Basophils Absolute 0.0 0.0 - 0.2 K/UL    Absolute Immature Granulocyte 0.0 0.0 - 0.5 K/UL   Basic Metabolic Panel    Collection Time: 07/01/22  5:43 AM   Result Value Ref Range    Sodium 140 138 - 145 mmol/L    Potassium 4.3 3.5 - 5.1 mmol/L    Chloride 104 98 - 107 mmol/L    CO2 33 (H) 21 - 32 mmol/L    Anion Gap 3 (L) 7 - 16 mmol/L    Glucose 110 (H) 65 - 100 mg/dL    BUN 24 (H) 8 - 23 MG/DL    CREATININE 1.20 0.8 - 1.5 MG/DL    GFR African American >60 >60 ml/min/1.73m2    GFR Non- >60 >60 ml/min/1.73m2    Calcium 9.4 8.3 - 10.4 MG/DL       Allergies   Allergen Reactions    Brompheniramine Other (See Comments)     Pt does not recall med.  Carisoprodol Other (See Comments)     Other reaction(s): Rash-A, Unknown-A  Pt does not recall med.  Meperidine Other (See Comments)     Pt does not recall med.  Penicillins Rash    Phenylephrine Hcl Other (See Comments)     Pt does not recall med.  Zaleplon Other (See Comments)     Other reaction(s): Rash-A, Unknown-A  Pt does not recall med.  Donepezil Other (See Comments)     Altered mental state    Haloperidol Lactate Other (See Comments)     Avoid in PD    Quetiapine Other (See Comments)     Avoid in PD    Rofecoxib Rash     Immunization History   Administered Date(s) Administered    Influenza, High Dose (Fluzone 65 yrs and older) 02/03/2014, 12/22/2014    Influenza, Trivalent Pf 01/02/2012, 01/23/2013    PPD Test 08/15/2019, 04/01/2021, 06/03/2021, 06/29/2022    Pneumococcal Conjugate 13-valent ([id number]) 02/10/2015    Pneumococcal Polysaccharide ([id number]) 12/02/2009    Td (Adult), 5 Lf Tetanus Toxoid, Pf (Tenivac, Decavac) 12/02/2009    Tdap (Boostrix, Adacel) 02/03/2014, 01/26/2020       Recent Vital Data:  Patient Vitals for the past 24 hrs:   Temp Pulse Resp BP SpO2   07/01/22 0725 97 °F (36.1 °C) (!) 43  (!) 166/89 100 %   07/01/22 0237 98.1 °F (36.7 °C) 98 18 (!) 155/95 96 %   06/30/22 2320 97.7 °F (36.5 °C) 51 22 (!) 188/98 97 %   06/30/22 1951 97.7 °F (36.5 °C) 88 20 (!) 154/89 100 %   06/30/22 1556 97.7 °F (36.5 °C) 59 19 (!) 165/87 (!) 85 %   06/30/22 1105 97.6 °F (36.4 °C) 74 18 (!) 162/91 100 %       Oxygen Therapy  SpO2: 100 %  Pulse Oximetry Type: Intermittent  Pulse Oximeter Device Location: Right,Left,Finger  O2 Device: None (Room air)  O2 Flow Rate (L/min): 3 L/min    Estimated body mass index is 27.85 kg/m² as calculated from the following:    Height as of this encounter: 5' 0.98\" (1.549 m). Weight as of this encounter: 147 lb 4.8 oz (66.8 kg). Intake/Output Summary (Last 24 hours) at 7/1/2022 1046  Last data filed at 6/30/2022 2228  Gross per 24 hour   Intake    Output 575 ml   Net -575 ml         Physical Exam:  General:          Chronically ill appearing. Moves all extremities spontaneously. Head:               Normocephalic, atraumatic  Eyes:               Sclerae appear normal.  Pupils equally round. ENT:                Nares appear normal, no drainage.   Moist oral mucosa  Neck:               No

## 2022-07-01 NOTE — PROGRESS NOTES
Hospitalist Progress Note   Admit Date:  2022 11:43 AM   Name:  Paige Rojas. Age:  [de-identified] y.o. Sex:  male  :  1940   MRN:  [id number]   Room:  [facility]/    Presenting Complaint: Altered Mental Status     Reason(s) for Admission: Hallucinations [R44.3]     Hospital Course & Interval History:   [de-identified] y.o. male with medical history of Parkinson's, hypertension, insomnia, hypothyroidism who presented with confusion and episodes of hallucinations. [facility]. Since  family has stated that he has been more confused and hallucinating. They observed patient talking to the wall at one point. Patient just saw his neurologist 2 weeks prior that started him on Remeron. According to family he had not started taking that medication. Only antidepressant patient is on currently is Lexapro. According to facility staff patient had not been taking his medications for 1 to 2 days PA. Family had said that  patient was less responsive and more rigid. In the emergency department patient was found to have INO with creatinine of 2.0. No source of infection found. Patient also was found to be hypertensive with blood pressure of 179/89. CT head showed mild atrophy and small vessel ischemic disease without acute abnormalities. Chest x-ray also without any acute abnormalities. Patient not febrile and no severe rigidity observed on admission. Patient was treated with Valium in the ED. Patient rigidity can be consistent with poorly controlled Parkinson's. Per family patient also received Eligard for his prostate cancer on Thursday. It was after that fact that patient started to act \"different\". Family had also stated that patient had fallen a few times on . Patient is admitted for INO and hallucinations with acute metabolic encephalopathy in setting of Parkinson's disease.   INO most likely due to decreased p.o. intake and encephalopathy most likely due to worsening Parkinson's. INO resolved with IVF. CT head on admission was unremarkable for acute findings; only mild atrophy with small vessel ischemic disease. Neurology was consulted. Challenging and management from a behavioral standpoint. Has had amantadine in the past but would avoid this drug in the future secondary to hallucinations. Somewhat paradoxical response due to stabilizers and was started on Clonazepam 1 mg qd. He will need to follow-up with neurology Dr. Fran Guerrero on discharge. Discussed with neurology, patient was okay to be discharged from their standpoint to follow-up with neurology Dr. Fran Guerrero outpatient. Patient initially had episodes of hypoglycemia on admission but this was resolved with dextrose and improvement in p.o. intake. PT/OT recommended SNF for patient. He continues to progress well. He will need to follow-up with his PCP in 3-5 days and follow-up with neurology in 2-4 weeks after discharge. Subjective/24hr Events (07/01/22): Patient was planned for discharge today to SNF but per nursing patient was agitated last night, placed in hand mitts and was given Geodon at 11 PM.  Also had received routine Klonopin earlier in the night. This a.m., patient was difficult to arouse. Snoring loudly in bed. Rapid response was called with persistent difficulty to arouse as well as hypoxia in 80s and at times unable to obtain pulse ox. BG 95 at bedside. Patient was placed on nonrebreather. ABG stat was overall unremarkable 7.41/45/542/28. CXR stat unremarkable. Place patient on telemetry. Check EKG. Place NGT for meds. Pt's Sister, daughter and son-in-law updated at bedside    ROS: unable to obtain d/t pt's condition      Assessment & Plan:     INO, improving   Cr on admission 2.0 (baseline <1). POC UA negative. Most likely due to decreased p.o. intake. Much improved after IVF  Avoid nephrotoxic meds and hypotension  Cr close to baseline.  D/c IVF and encourage po intake. Episode of confusion  Hallucinations  Parkinson's disease  Most likely worsening Parkinson's disease. CT head on admission shows mild atrophy with small vessel ischemic disease with no acute abnormalities. POC UA negative for UTI. TSH wnl on admission  Continue Sinemet and Exelon  Delirium precautions  Fall precautions  Avoid hypoglycemia  Vitamin B12, folate--wnl  Neuro consulted, appreciate recs. Challenging and management from a behavioral standpoint. Has had amantadine in the past but would avoid this drug in the future secondary to hallucinations. Somewhat paradoxical response due to stabilizers and was started on clonazepam.  He will need to follow-up with neurology Dr. Shanti Denis on discharge  PT/OT/SLP--recommended STR  Discussed with neurology, patient was okay to be discharged from their standpoint to follow-up with neurology Dr. Shanti Denis outpatient  Pt was difficult to arouse d/t geodon last PM, also received routine Klonopin earlier. Limit sedating meds. Place NGT for meds s/t somnolence. Check CT head    Hypoxia 7/1  Most likely false ready w/ cold fingers. Ear probe with O2sat 100%. 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 Lights off, television off, noises down during the night.  If able, decrease nursing checks at night  · Therapies as often as possible  · Avoid restraints to the best of your ability   · Avoid sensory deprivation by using glasses and hearing aids, if applicable        Pharmacological intervention  · Replete electrolyte abnormalities and correct metabolic abnormalities  · Limit benzodiazepines, antihistamines, narcotics, anticholinergics. Preference towards Precedex for sedation over fentanyl and benzodiazepines/GABAa agonists. · For dangerous behavior/aggression to self or others can consider Zyprexa or Seroquel if benefits outweigh risk  · For persistent insomnia can use melatonin four hours prior to bedtime or Seroquel 25 mg at bedtime         Discharge Planning: STR most likely 7/5    Patient is critically ill. Without intervention, there is a high probability of acute organ impairment or life-threatening deterioration in the patient's condition from: AMS and hypoxia  Critical care interventions: ABG, CT head, CXR, EKG, telemetry  Total critical care time spent: 32 minutes. Time is indicative of direct patient attendance at bedside and on the patient's floor nearby. Includes time spent at bedside performing history and exam, performing chart review, discussing findings and treatment plan with patient and/or family, discussing patient with nursing staff, consultants and colleagues, and ordering/reviewing pertinent laboratory and radiographic evaluations. Time excludes procedures. CPT:  21538: First 30-74 minutes  31043: Each block of 30 min. 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 *      Objective:     Patient Vitals for the past 24 hrs:   Temp Pulse Resp BP SpO2   07/01/22 0725 97 °F (36.1 °C) (!) 43 -- (!) 166/89 100 %   07/01/22 0237 98.1 °F (36.7 °C) 98 18 (!) 155/95 96 %   06/30/22 2320 97.7 °F (36.5 °C) 51 22 (!) 188/98 97 %   06/30/22 1951 97.7 °F (36.5 °C) 88 20 (!) 154/89 100 %   06/30/22 1556 97.7 °F (36.5 °C) 59 19 (!) 165/87 (!) 85 %       Oxygen Therapy  SpO2: 100 %  Pulse Oximetry Type: Intermittent  Pulse Oximeter Device Location: Right,Left,Finger  O2 Device: None (Room air)  O2 Flow Rate (L/min): 3 L/min    Estimated body mass index is 27.85 kg/m² as calculated from the following:    Height as of this encounter: 5' 0.98\" (1.549 m). Weight as of this encounter: 147 lb 4.8 oz (66.8 kg). Intake/Output Summary (Last 24 hours) at 7/1/2022 1108  Last data filed at 6/30/2022 2228  Gross per 24 hour   Intake --   Output 575 ml   Net -575 ml         Physical Exam:     Blood pressure (!) 166/89, pulse (!) 43, temperature 97 °F (36.1 °C), temperature source Axillary, resp. rate 18, height 5' 0.98\" (1.549 m), weight 147 lb 4.8 oz (66.8 kg), SpO2 100 %. General:    Chronically ill appearing. Somnolent. Hand mitts in place  Head:  Normocephalic, atraumatic  Eyes:  Sclerae appear normal.  Pupils equally round. ENT:  Nares appear normal, no drainage. Moist oral mucosa  Neck:  No restricted ROM. Trachea midline   CV:   RRR. No m/r/g. No jugular venous distension. Lungs:   CTAB. No wheezing, rhonchi, or rales. Respirations even, unlabored  Abdomen: Bowel sounds present. Soft, nontender, nondistended. Extremities: No cyanosis or clubbing. No edema  Skin:     No rashes and normal coloration. Warm and dry. Neuro:  Unable to examine. Snoring loudly.  Moans with sternal Trina)    PLEASE READ & DOCUMENT PPD TEST IN 24 HRS    Collection Time: 06/30/22 12:00 AM   Result Value Ref Range    PPD, (POC) Negative Negative    mm Induration 0 0 - 5 mm   CBC with Auto Differential    Collection Time: 06/30/22  4:56 AM   Result Value Ref Range    WBC 6.1 4.3 - 11.1 K/uL    RBC 3.92 (L) 4.23 - 5.6 M/uL    Hemoglobin 12.1 (L) 13.6 - 17.2 g/dL    Hematocrit 37.5 (L) 41.1 - 50.3 %    MCV 95.7 79.6 - 97.8 FL    MCH 30.9 26.1 - 32.9 PG    MCHC 32.3 31.4 - 35.0 g/dL    RDW 12.6 11.9 - 14.6 %    Platelets 304 411 - 886 K/uL    MPV 9.0 (L) 9.4 - 12.3 FL    nRBC 0.00 0.0 - 0.2 K/uL    Differential Type AUTOMATED      Seg Neutrophils 61 43 - 78 %    Lymphocytes 27 13 - 44 %    Monocytes 9 4.0 - 12.0 %    Eosinophils % 2 0.5 - 7.8 %    Basophils 1 0.0 - 2.0 %    Immature Granulocytes 0 0.0 - 5.0 %    Segs Absolute 3.7 1.7 - 8.2 K/UL    Absolute Lymph # 1.6 0.5 - 4.6 K/UL    Absolute Mono # 0.6 0.1 - 1.3 K/UL    Absolute Eos # 0.1 0.0 - 0.8 K/UL    Basophils Absolute 0.1 0.0 - 0.2 K/UL    Absolute Immature Granulocyte 0.0 0.0 - 0.5 K/UL   Basic Metabolic Panel    Collection Time: 06/30/22  4:56 AM   Result Value Ref Range    Sodium 138 136 - 145 mmol/L    Potassium 4.3 3.5 - 5.1 mmol/L    Chloride 104 98 - 107 mmol/L    CO2 29 21 - 32 mmol/L    Anion Gap 5 (L) 7 - 16 mmol/L    Glucose 115 (H) 65 - 100 mg/dL    BUN 29 (H) 8 - 23 MG/DL    CREATININE 1.30 0.8 - 1.5 MG/DL    GFR African American >60 >60 ml/min/1.73m2    GFR Non- 56 (L) >60 ml/min/1.73m2    Calcium 8.6 8.3 - 10.4 MG/DL   COVID-19    Collection Time: 06/30/22  5:23 AM    Specimen: Nasopharyngeal Swab   Result Value Ref Range    SARS-CoV-2 Please find results under separate order     COVID-19    Collection Time: 06/30/22  5:23 AM    Specimen: Nasopharyngeal   Result Value Ref Range    Source Nasopharyngeal      SARS-CoV-2, PCR Not detected NOTD     EKG 12 Lead    Collection Time: 06/30/22 12:18 PM   Result Value Ref Range    Ventricular Rate 75 BPM    Atrial Rate 75 BPM    P-R Interval 170 ms    QRS Duration 104 ms    Q-T Interval 396 ms    QTc Calculation (Bazett) 442 ms    P Axis 44 degrees    R Axis -60 degrees    T Axis 40 degrees    Diagnosis Sinus rhythm with Premature atrial complexes    PLEASE READ & DOCUMENT PPD TEST IN 48 HRS    Collection Time: 07/01/22 12:00 AM   Result Value Ref Range    PPD, (POC) Negative Negative    mm Induration 0 0 - 5 mm   CBC with Auto Differential    Collection Time: 07/01/22  5:43 AM   Result Value Ref Range    WBC 5.6 4.3 - 11.1 K/uL    RBC 4.11 (L) 4.23 - 5.6 M/uL    Hemoglobin 12.8 (L) 13.6 - 17.2 g/dL    Hematocrit 39.4 (L) 41.1 - 50.3 %    MCV 95.9 79.6 - 97.8 FL    MCH 31.1 26.1 - 32.9 PG    MCHC 32.5 31.4 - 35.0 g/dL    RDW 12.6 11.9 - 14.6 %    Platelets 800 845 - 453 K/uL    MPV 8.9 (L) 9.4 - 12.3 FL    nRBC 0.00 0.0 - 0.2 K/uL    Differential Type AUTOMATED      Seg Neutrophils 71 43 - 78 %    Lymphocytes 18 13 - 44 %    Monocytes 9 4.0 - 12.0 %    Eosinophils % 1 0.5 - 7.8 %    Basophils 1 0.0 - 2.0 %    Immature Granulocytes 0 0.0 - 5.0 %    Segs Absolute 4.0 1.7 - 8.2 K/UL    Absolute Lymph # 1.0 0.5 - 4.6 K/UL    Absolute Mono # 0.5 0.1 - 1.3 K/UL    Absolute Eos # 0.1 0.0 - 0.8 K/UL    Basophils Absolute 0.0 0.0 - 0.2 K/UL    Absolute Immature Granulocyte 0.0 0.0 - 0.5 K/UL   Basic Metabolic Panel    Collection Time: 07/01/22  5:43 AM   Result Value Ref Range    Sodium 140 138 - 145 mmol/L    Potassium 4.3 3.5 - 5.1 mmol/L    Chloride 104 98 - 107 mmol/L    CO2 33 (H) 21 - 32 mmol/L    Anion Gap 3 (L) 7 - 16 mmol/L    Glucose 110 (H) 65 - 100 mg/dL    BUN 24 (H) 8 - 23 MG/DL    CREATININE 1.20 0.8 - 1.5 MG/DL    GFR African American >60 >60 ml/min/1.73m2    GFR Non- >60 >60 ml/min/1.73m2    Calcium 9.4 8.3 - 10.4 MG/DL       Other Studies:  XR CHEST 1 VIEW   Final Result   No acute abnormality.       CT HEAD WO CONTRAST   Final Result   MILD ATROPHY AND SMALL VESSEL ISCHEMIC DISEASE WITH NO ACUTE   INTRACRANIAL ABNORMALITY IDENTIFIED AT NONCONTRAST CT.               XR CHEST PORTABLE   Final Result   No acute findings in the chest.             Current Meds:  Current Facility-Administered Medications   Medication Dose Route Frequency    enoxaparin (LOVENOX) injection 40 mg  40 mg SubCUTAneous Q24H    Vitamin D (CHOLECALCIFEROL) tablet 1,000 Units  1,000 Units Oral Daily    amLODIPine (NORVASC) tablet 5 mg  5 mg Oral Daily    lidocaine 4 % external patch 2 patch  2 patch TransDERmal Daily    glucose chewable tablet 16 g  4 tablet Oral PRN    dextrose bolus 10% 125 mL  125 mL IntraVENous PRN    Or    dextrose bolus 10% 250 mL  250 mL IntraVENous PRN    glucagon injection 1 mg  1 mg IntraMUSCular PRN    dextrose 5 % solution  100 mL/hr IntraVENous PRN    hydrALAZINE (APRESOLINE) injection 5 mg  5 mg IntraVENous Q6H PRN    clonazePAM (KLONOPIN) tablet 1 mg  1 mg Oral Daily    carbidopa-levodopa (PARCOPA)  MG per disintegrating tablet 1 tablet  1 tablet Oral 4x Daily    levalbuterol (XOPENEX) nebulizer solution 1.25 mg  1.25 mg Nebulization Q8H PRN    carbidopa-levodopa (SINEMET CR)  MG per extended release tablet 2 tablet  2 tablet Oral Nightly    escitalopram (LEXAPRO) tablet 10 mg  10 mg Oral Daily    gabapentin (NEURONTIN) capsule 100 mg  100 mg Oral TID    latanoprost (XALATAN) 0.005 % ophthalmic solution 1 drop  1 drop Ophthalmic Nightly    levothyroxine (SYNTHROID) tablet 125 mcg  125 mcg Oral QAM AC    lisinopril (PRINIVIL;ZESTRIL) tablet 5 mg  5 mg Oral Daily    melatonin tablet 9 mg  9 mg Oral Nightly    trospium (SANCTURA) tablet 20 mg  20 mg Oral Daily    pantoprazole (PROTONIX) tablet 40 mg  40 mg Oral QAM AC    rivastigmine (EXELON) capsule 6 mg  6 mg Oral BID WC    sodium chloride flush 0.9 % injection 5-40 mL  5-40 mL IntraVENous 2 times per day    sodium chloride flush 0.9 % injection 5-40 mL  5-40 mL IntraVENous PRN

## 2022-07-01 NOTE — PROGRESS NOTES
Responded to rapid response for hypoxia. Patient lethargic. Had received geodon overnight. SaO2 on finger probe with poor waveform - intermittent readings in 70s. Patient was placed on NRB. ABG performed by respiratory therapist. SaO2 probe temporarily applied to ear lobe with reading of 100%. ABG showed pO2 of 542. Patient placed back on 4L NC. EKG showed SR w/ PACs in 80s. Will send forehead Sat Probe and follow per critical care outreach protocol.

## 2022-07-02 LAB
ANION GAP SERPL CALC-SCNC: 5 MMOL/L (ref 7–16)
BASOPHILS # BLD: 0 K/UL (ref 0–0.2)
BASOPHILS NFR BLD: 1 % (ref 0–2)
BUN SERPL-MCNC: 26 MG/DL (ref 8–23)
CALCIUM SERPL-MCNC: 8.8 MG/DL (ref 8.3–10.4)
CHLORIDE SERPL-SCNC: 106 MMOL/L (ref 98–107)
CO2 SERPL-SCNC: 29 MMOL/L (ref 21–32)
CREAT SERPL-MCNC: 1.2 MG/DL (ref 0.8–1.5)
DIFFERENTIAL METHOD BLD: ABNORMAL
EKG ATRIAL RATE: 92 BPM
EKG DIAGNOSIS: NORMAL
EKG P AXIS: 61 DEGREES
EKG P-R INTERVAL: 158 MS
EKG Q-T INTERVAL: 380 MS
EKG QRS DURATION: 98 MS
EKG QTC CALCULATION (BAZETT): 469 MS
EKG R AXIS: -70 DEGREES
EKG T AXIS: 50 DEGREES
EKG VENTRICULAR RATE: 92 BPM
EOSINOPHIL # BLD: 0.1 K/UL (ref 0–0.8)
EOSINOPHIL NFR BLD: 2 % (ref 0.5–7.8)
ERYTHROCYTE [DISTWIDTH] IN BLOOD BY AUTOMATED COUNT: 13.2 % (ref 11.9–14.6)
GLUCOSE SERPL-MCNC: 98 MG/DL (ref 65–100)
HCT VFR BLD AUTO: 40.7 % (ref 41.1–50.3)
HGB BLD-MCNC: 13.2 G/DL (ref 13.6–17.2)
IMM GRANULOCYTES # BLD AUTO: 0 K/UL (ref 0–0.5)
IMM GRANULOCYTES NFR BLD AUTO: 0 % (ref 0–5)
LYMPHOCYTES # BLD: 1.6 K/UL (ref 0.5–4.6)
LYMPHOCYTES NFR BLD: 24 % (ref 13–44)
MCH RBC QN AUTO: 31.3 PG (ref 26.1–32.9)
MCHC RBC AUTO-ENTMCNC: 32.4 G/DL (ref 31.4–35)
MCV RBC AUTO: 96.4 FL (ref 79.6–97.8)
MONOCYTES # BLD: 0.6 K/UL (ref 0.1–1.3)
MONOCYTES NFR BLD: 8 % (ref 4–12)
NEUTS SEG # BLD: 4.3 K/UL (ref 1.7–8.2)
NEUTS SEG NFR BLD: 65 % (ref 43–78)
NRBC # BLD: 0 K/UL (ref 0–0.2)
PLATELET # BLD AUTO: 282 K/UL (ref 150–450)
PMV BLD AUTO: 9.6 FL (ref 9.4–12.3)
POTASSIUM SERPL-SCNC: 4.5 MMOL/L (ref 3.5–5.1)
RBC # BLD AUTO: 4.22 M/UL (ref 4.23–5.6)
SODIUM SERPL-SCNC: 140 MMOL/L (ref 138–145)
WBC # BLD AUTO: 6.6 K/UL (ref 4.3–11.1)

## 2022-07-02 PROCEDURE — 80048 BASIC METABOLIC PNL TOTAL CA: CPT

## 2022-07-02 PROCEDURE — 6370000000 HC RX 637 (ALT 250 FOR IP): Performed by: STUDENT IN AN ORGANIZED HEALTH CARE EDUCATION/TRAINING PROGRAM

## 2022-07-02 PROCEDURE — 1100000000 HC RM PRIVATE

## 2022-07-02 PROCEDURE — 85025 COMPLETE CBC W/AUTO DIFF WBC: CPT

## 2022-07-02 PROCEDURE — 94760 N-INVAS EAR/PLS OXIMETRY 1: CPT

## 2022-07-02 PROCEDURE — 2700000000 HC OXYGEN THERAPY PER DAY

## 2022-07-02 PROCEDURE — 93005 ELECTROCARDIOGRAM TRACING: CPT | Performed by: FAMILY MEDICINE

## 2022-07-02 PROCEDURE — 2580000003 HC RX 258: Performed by: STUDENT IN AN ORGANIZED HEALTH CARE EDUCATION/TRAINING PROGRAM

## 2022-07-02 PROCEDURE — 36415 COLL VENOUS BLD VENIPUNCTURE: CPT

## 2022-07-02 PROCEDURE — 6370000000 HC RX 637 (ALT 250 FOR IP): Performed by: FAMILY MEDICINE

## 2022-07-02 PROCEDURE — 6370000000 HC RX 637 (ALT 250 FOR IP): Performed by: PSYCHIATRY & NEUROLOGY

## 2022-07-02 PROCEDURE — 6360000002 HC RX W HCPCS: Performed by: FAMILY MEDICINE

## 2022-07-02 RX ADMIN — Medication 9 MG: at 21:06

## 2022-07-02 RX ADMIN — LEVOTHYROXINE SODIUM 125 MCG: 0.07 TABLET ORAL at 06:03

## 2022-07-02 RX ADMIN — CARBIDOPA AND LEVODOPA 1 TABLET: 25; 250 TABLET, ORALLY DISINTEGRATING ORAL at 17:41

## 2022-07-02 RX ADMIN — ENOXAPARIN SODIUM 40 MG: 100 INJECTION SUBCUTANEOUS at 21:06

## 2022-07-02 RX ADMIN — LISINOPRIL 5 MG: 5 TABLET ORAL at 09:27

## 2022-07-02 RX ADMIN — PANTOPRAZOLE SODIUM 40 MG: 40 TABLET, DELAYED RELEASE ORAL at 06:04

## 2022-07-02 RX ADMIN — GABAPENTIN 100 MG: 100 CAPSULE ORAL at 15:09

## 2022-07-02 RX ADMIN — RIVASTIGMINE TARTRATE 6 MG: 3 CAPSULE ORAL at 17:41

## 2022-07-02 RX ADMIN — AMLODIPINE BESYLATE 5 MG: 5 TABLET ORAL at 09:28

## 2022-07-02 RX ADMIN — CARBIDOPA AND LEVODOPA 1 TABLET: 25; 250 TABLET, ORALLY DISINTEGRATING ORAL at 09:29

## 2022-07-02 RX ADMIN — RIVASTIGMINE TARTRATE 6 MG: 3 CAPSULE ORAL at 09:27

## 2022-07-02 RX ADMIN — CLONAZEPAM 1 MG: 1 TABLET ORAL at 19:57

## 2022-07-02 RX ADMIN — CARBIDOPA AND LEVODOPA 1 TABLET: 25; 250 TABLET, ORALLY DISINTEGRATING ORAL at 22:00

## 2022-07-02 RX ADMIN — CARBIDOPA AND LEVODOPA 1 TABLET: 25; 250 TABLET, ORALLY DISINTEGRATING ORAL at 15:09

## 2022-07-02 RX ADMIN — ACETAMINOPHEN 650 MG: 325 TABLET, FILM COATED ORAL at 19:58

## 2022-07-02 RX ADMIN — GABAPENTIN 100 MG: 100 CAPSULE ORAL at 09:27

## 2022-07-02 RX ADMIN — TROSPIUM CHLORIDE 20 MG: 20 TABLET, FILM COATED ORAL at 09:28

## 2022-07-02 RX ADMIN — SODIUM CHLORIDE, PRESERVATIVE FREE 10 ML: 5 INJECTION INTRAVENOUS at 21:06

## 2022-07-02 RX ADMIN — GABAPENTIN 100 MG: 100 CAPSULE ORAL at 21:06

## 2022-07-02 RX ADMIN — CARBIDOPA AND LEVODOPA 2 TABLET: 25; 100 TABLET, EXTENDED RELEASE ORAL at 21:05

## 2022-07-02 RX ADMIN — VITAMIN D, TAB 1000IU (100/BT) 1000 UNITS: 25 TAB at 09:26

## 2022-07-02 RX ADMIN — LATANOPROST 1 DROP: 50 SOLUTION OPHTHALMIC at 21:10

## 2022-07-02 RX ADMIN — ESCITALOPRAM OXALATE 10 MG: 10 TABLET ORAL at 09:28

## 2022-07-02 ASSESSMENT — PAIN SCALES - GENERAL
PAINLEVEL_OUTOF10: 4
PAINLEVEL_OUTOF10: 0
PAINLEVEL_OUTOF10: 0

## 2022-07-02 ASSESSMENT — PAIN DESCRIPTION - LOCATION: LOCATION: BACK

## 2022-07-02 ASSESSMENT — PAIN DESCRIPTION - DESCRIPTORS: DESCRIPTORS: ACHING

## 2022-07-02 ASSESSMENT — PAIN - FUNCTIONAL ASSESSMENT: PAIN_FUNCTIONAL_ASSESSMENT: ACTIVITIES ARE NOT PREVENTED

## 2022-07-02 NOTE — PROGRESS NOTES
EKG does not appear to show true afib. P waves present. Does have frequent PACs. Also compared with EKG from 7/1 which appears unchanged.

## 2022-07-02 NOTE — PROGRESS NOTES
Per tele patient has converted to A fib. No signs of acute stress. Patient resting quietly. Hospitalist notified new orders received.

## 2022-07-02 NOTE — PROGRESS NOTES
Hospitalist Progress Note   Admit Date:  2022 11:43 AM   Name:  Johana Kennedy. Age:  [de-identified] y.o. Sex:  male  :  1940   MRN:  [id number]   Room:  [facility]/    Presenting Complaint: Altered Mental Status     Reason(s) for Admission: Hallucinations [R44.3]     Hospital Course & Interval History:   [de-identified] y.o. male with medical history of Parkinson's, hypertension, insomnia, hypothyroidism who presented with confusion and episodes of hallucinations. Patient lives in [address]. Since  family has stated that he has been more confused and hallucinating. They observed patient talking to the wall at one point. Patient just saw his neurologist 2 weeks prior that started him on Remeron. According to family he had not started taking that medication. Only antidepressant patient is on currently is Lexapro. According to facility staff patient had not been taking his medications for 1 to 2 days PA. Family had said that  patient was less responsive and more rigid. In the emergency department patient was found to have INO with creatinine of 2.0. No source of infection found. Patient also was found to be hypertensive with blood pressure of 179/89. CT head showed mild atrophy and small vessel ischemic disease without acute abnormalities. Chest x-ray also without any acute abnormalities. Patient not febrile and no severe rigidity observed on admission. Patient was treated with Valium in the ED. Patient rigidity can be consistent with poorly controlled Parkinson's. Per family patient also received Eligard for his prostate cancer on Thursday. It was after that fact that patient started to act \"different\". Family had also stated that patient had fallen a few times on . Patient is admitted for INO and hallucinations with acute metabolic encephalopathy in setting of Parkinson's disease.   INO most likely due to decreased p.o. intake and encephalopathy most likely due to worsening Parkinson's. INO resolved with IVF. CT head on admission was unremarkable for acute findings; only mild atrophy with small vessel ischemic disease. Neurology was consulted. Challenging and management from a behavioral standpoint. Has had amantadine in the past but would avoid this drug in the future secondary to hallucinations. Somewhat paradoxical response due to stabilizers and was started on Clonazepam 1 mg qd. He will need to follow-up with neurology Dr. Fran Guerrero on discharge. Discussed with neurology, patient was okay to be discharged from their standpoint to follow-up with neurology Dr. Fran Guerrero outpatient. Patient initially had episodes of hypoglycemia on admission but this was resolved with dextrose and improvement in p.o. intake. He continues to progress well. He was planned for discharge on  but was noted to be minimally responsive in the morning after receiving Geodon night prior for agitation. Rapid response was called with persistent difficulty to arouse as well as hypoxia in 80s and at times unable to obtain pulse ox. BG 95 at bedside. Patient was placed on nonrebreather. ABG stat was overall unremarkable 7.41/45/542/28. He was taken off of nonrebreather and placed back on 2L O2 NC.  CXR stat unremarkable. CT head was unremarkable. EKG shows NSR with frequent PACs in bigeminy pattern. He became alert and oriented, talking and following commands shortly after rapid response. Acute episode most likely due to sedation from meds. PT/OT recommended SNF for patient. He will need to follow-up with his PCP in 3-5 days and follow-up with neurology in 2-4 weeks after discharge. Subjective/24hr Events (22): Patient alert, oriented to person place and  this morning and follows commands well. Seems to be back to baseline. Stated that he ate his breakfast and did well. On 4LO 2 NC without dyspnea or tachypnea. Denies any needs.   \"I am doing fine this morning doc. \"    Telemetry reported possible A. fib last p.m. EKG was checked and was evaluated by nocturnist.  EKG appeared to be unchanged from previous with sinus rhythm and frequent PACs. No fever, chills, SOB, chest pain, abdominal pain, nausea, vomiting. Assessment & Plan:     INO, improving   Cr on admission 2.0 (baseline <1). POC UA negative. Most likely due to decreased p.o. intake. Much improved after IVF  Avoid nephrotoxic meds and hypotension  Cr close to baseline. D/c IVF and encourage po intake. Episode of confusion  Hallucinations  Parkinson's disease  Most likely worsening Parkinson's disease. CT head on admission shows mild atrophy with small vessel ischemic disease with no acute abnormalities. POC UA negative for UTI. TSH wnl on admission  Continue Sinemet and Exelon  Delirium precautions  Fall precautions  Avoid hypoglycemia  Vitamin B12, folate--wnl  Neuro consulted, appreciate recs. Challenging and management from a behavioral standpoint. Has had amantadine in the past but would avoid this drug in the future secondary to hallucinations. Somewhat paradoxical response due to stabilizers and was started on clonazepam.  He will need to follow-up with neurology Dr. Figueroa Hair on discharge  PT/OT/SLP--recommended STR  Discussed with neurology, patient was okay to be discharged from their standpoint to follow-up with neurology Dr. Figueroa Hair outpatient  Pt was difficult to arouse d/t geodon last PM, also received routine Klonopin earlier. Limit sedating meds. Place NGT for meds s/t somnolence. Check CT head    Hypoxia 7/1  Most likely false ready w/ cold fingers. Ear probe with O2sat 100%.  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 Lights off, television off, noises down during the night. If able, decrease nursing checks at night  · Therapies as often as possible  · Avoid restraints to the best of your ability   · Avoid sensory deprivation by using glasses and hearing aids, if applicable        Pharmacological intervention  · Replete electrolyte abnormalities and correct metabolic abnormalities  · Limit benzodiazepines, antihistamines, narcotics, anticholinergics. Preference towards Precedex for sedation over fentanyl and benzodiazepines/GABAa agonists. · For dangerous behavior/aggression to self or others can consider Zyprexa or Seroquel if benefits outweigh risk  · For persistent insomnia can use melatonin four hours prior to bedtime or Seroquel 25 mg at bedtime         Discharge Planning: STR most likely 7/5      Diet:  ADULT DIET; Regular; set up assistance  ADULT ORAL NUTRITION SUPPLEMENT; Breakfast, Lunch, Dinner; Standard High Calorie/High Protein Oral Supplement  DVT PPx: Lovenox SQ  Code status: Full Code    Hospital Problems:  Principal Problem:    INO (acute kidney injury) ([address]  Active Problems:    Hallucinations    Hypothyroidism    Acute respiratory failure with hypoxia (HCC)    Polyneuropathy    Parkinson's disease ([address]    Frequent falls    Episode of confusion    GERD (gastroesophageal reflux disease)    Insomnia    HTN (hypertension)    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  *      Objective:     Patient Vitals for the past 24 hrs:   Temp Pulse Resp BP SpO2   07/02/22 0745 -- 62 -- -- --   07/02/22 0711 97.3 °F (36.3 °C) (!) 49 16 130/78 100 %   07/02/22 0531 -- -- -- -- 96 %   07/02/22 0324 97.3 °F (36.3 °C) 55 18 (!) 120/95 96 %   07/01/22 2323 98.2 °F (36.8 °C) 72 16 134/72 100 %   07/01/22 1923 97.9 °F (36.6 °C) (!) 44 16 114/66 99 %   07/01/22 1458 -- (!) 45 -- (!) 140/75 100 %   07/01/22 1144 97.3 °F (36.3 °C) (!) 45 -- (!) 162/83 --   07/01/22 1141 -- -- -- -- 100 %   07/01/22 1136 -- (!) 45 10 (!) 150/88 (!) 82 %       Oxygen Therapy  SpO2: 100 %  Pulse Oximetry Type: Intermittent  Pulse Oximeter Device Mode: Intermittent  Pulse Oximeter Device Location: Right,Left,Finger  O2 Device: None (Room air)  O2 Flow Rate (L/min): 4 L/min    Estimated body mass index is 26.81 kg/m² as calculated from the following:    Height as of this encounter: 5' 0.98\" (1.549 m). Weight as of this encounter: 141 lb 12.8 oz (64.3 kg). Intake/Output Summary (Last 24 hours) at 7/2/2022 0902  Last data filed at 7/1/2022 2239  Gross per 24 hour   Intake 90 ml   Output 250 ml   Net -160 ml         Physical Exam:     Blood pressure 130/78, pulse 62, temperature 97.3 °F (36.3 °C), temperature source Oral, resp. rate 16, height 5' 0.98\" (1.549 m), weight 141 lb 12.8 oz (64.3 kg), SpO2 100 %. General:    Chronically ill appearing. Alert and oriented back to baseline  Head:  Normocephalic, atraumatic  Eyes:  Sclerae appear normal.  Pupils equally round. ENT:  Nares appear normal, no drainage. Moist oral mucosa  Neck:  No restricted ROM. Trachea midline   CV:   Regular rate, irregular rhythm. No m/r/g. No jugular venous distension. Lungs:   CTAB. No wheezing, rhonchi, or rales. Respirations even, unlabored  Abdomen: Bowel sounds present. Soft, nontender, nondistended. Extremities: No cyanosis or clubbing. No edema  Skin:     No rashes and normal coloration. Warm and dry. Neuro:  CN II-XII grossly intact.   Sensation intact. A&Ox to person, place and . Follows commands well.    Psych:  Pleasantly demented    I have reviewed ordered lab tests and independently visualized imaging below:    Recent Labs:  Recent Results (from the past 48 hour(s))   EKG 12 Lead    Collection Time: 22 12:18 PM   Result Value Ref Range    Ventricular Rate 75 BPM    Atrial Rate 75 BPM    P-R Interval 170 ms    QRS Duration 104 ms    Q-T Interval 396 ms    QTc Calculation (Bazett) 442 ms    P Axis 44 degrees    R Axis -60 degrees    T Axis 40 degrees    Diagnosis Sinus rhythm with Premature atrial complexes    PLEASE READ & DOCUMENT PPD TEST IN 48 HRS    Collection Time: 22 12:00 AM   Result Value Ref Range    PPD, (POC) Negative Negative    mm Induration 0 0 - 5 mm   CBC with Auto Differential    Collection Time: 22  5:43 AM   Result Value Ref Range    WBC 5.6 4.3 - 11.1 K/uL    RBC 4.11 (L) 4.23 - 5.6 M/uL    Hemoglobin 12.8 (L) 13.6 - 17.2 g/dL    Hematocrit 39.4 (L) 41.1 - 50.3 %    MCV 95.9 79.6 - 97.8 FL    MCH 31.1 26.1 - 32.9 PG    MCHC 32.5 31.4 - 35.0 g/dL    RDW 12.6 11.9 - 14.6 %    Platelets 533 603 - 700 K/uL    MPV 8.9 (L) 9.4 - 12.3 FL    nRBC 0.00 0.0 - 0.2 K/uL    Differential Type AUTOMATED      Seg Neutrophils 71 43 - 78 %    Lymphocytes 18 13 - 44 %    Monocytes 9 4.0 - 12.0 %    Eosinophils % 1 0.5 - 7.8 %    Basophils 1 0.0 - 2.0 %    Immature Granulocytes 0 0.0 - 5.0 %    Segs Absolute 4.0 1.7 - 8.2 K/UL    Absolute Lymph # 1.0 0.5 - 4.6 K/UL    Absolute Mono # 0.5 0.1 - 1.3 K/UL    Absolute Eos # 0.1 0.0 - 0.8 K/UL    Basophils Absolute 0.0 0.0 - 0.2 K/UL    Absolute Immature Granulocyte 0.0 0.0 - 0.5 K/UL   Basic Metabolic Panel    Collection Time: 22  5:43 AM   Result Value Ref Range    Sodium 140 138 - 145 mmol/L    Potassium 4.3 3.5 - 5.1 mmol/L    Chloride 104 98 - 107 mmol/L    CO2 33 (H) 21 - 32 mmol/L    Anion Gap 3 (L) 7 - 16 mmol/L    Glucose 110 (H) 65 - 100 mg/dL    BUN 24 (H) 8 - 23 MG/DL CREATININE 1.20 0.8 - 1.5 MG/DL    GFR African American >60 >60 ml/min/1.73m2    GFR Non- >60 >60 ml/min/1.73m2    Calcium 9.4 8.3 - 10.4 MG/DL   POCT Glucose    Collection Time: 07/01/22 11:21 AM   Result Value Ref Range    POC Glucose 90 65 - 100 mg/dL    Performed by: [employer]    POCT Blood Gas & Electrolytes    Collection Time: 07/01/22 11:37 AM   Result Value Ref Range    pH, Arterial, POC 7.41 7.35 - 7.45      pCO2, Arterial, POC 45.6 (H) 35 - 45 MMHG    pO2, Arterial,  (H) 75 - 100 MMHG    POC Sodium 142 136 - 145 MMOL/L    POC Potassium 4.5 3.5 - 5.1 MMOL/L    POC Ionized Calcium 1.18 1.12 - 1.32 mmol/L    POC Glucose 95 65 - 100 MG/DL    Base Excess 3.4 mmol/L    HCO3, Mixed 28.7 (H) 22 - 26 MMOL/L    POC TCO2 29 (H) 13 - 23 MMOL/L    POC O2  %    Source ARTERIAL      Site LEFT RADIAL      POC Farrukh's Test Positive      DEVICE Non rebreather      FIO2 15      Performed by: [employer]     POC GFR  Cannot be calculated >60 ml/min/1.73m2    Glomerular Filtration Rate, POC Cannot be calculated >60 ml/min/1.73m2   EKG 12 Lead    Collection Time: 07/01/22 11:39 AM   Result Value Ref Range    Ventricular Rate 88 BPM    Atrial Rate 88 BPM    QRS Duration 96 ms    Q-T Interval 412 ms    QTc Calculation (Bazett) 498 ms    R Axis -69 degrees    T Axis 45 degrees    Diagnosis       Sinus rhythm  with frequent PACs in pattern of bigeminy.     CBC with Auto Differential    Collection Time: 07/02/22  4:38 AM   Result Value Ref Range    WBC 6.6 4.3 - 11.1 K/uL    RBC 4.22 (L) 4.23 - 5.6 M/uL    Hemoglobin 13.2 (L) 13.6 - 17.2 g/dL    Hematocrit 40.7 (L) 41.1 - 50.3 %    MCV 96.4 79.6 - 97.8 FL    MCH 31.3 26.1 - 32.9 PG    MCHC 32.4 31.4 - 35.0 g/dL    RDW 13.2 11.9 - 14.6 %    Platelets 095 214 - 182 K/uL    MPV 9.6 9.4 - 12.3 FL    nRBC 0.00 0.0 - 0.2 K/uL    Differential Type AUTOMATED      Seg Neutrophils 65 43 - 78 %    Lymphocytes 24 13 - 44 %    Monocytes IntraVENous Q6H PRN    clonazePAM (KLONOPIN) tablet 1 mg  1 mg Oral Daily    carbidopa-levodopa (PARCOPA)  MG per disintegrating tablet 1 tablet  1 tablet Oral 4x Daily    levalbuterol (XOPENEX) nebulizer solution 1.25 mg  1.25 mg Nebulization Q8H PRN    carbidopa-levodopa (SINEMET CR)  MG per extended release tablet 2 tablet  2 tablet Oral Nightly    escitalopram (LEXAPRO) tablet 10 mg  10 mg Oral Daily    gabapentin (NEURONTIN) capsule 100 mg  100 mg Oral TID    latanoprost (XALATAN) 0.005 % ophthalmic solution 1 drop  1 drop Ophthalmic Nightly    levothyroxine (SYNTHROID) tablet 125 mcg  125 mcg Oral QAM AC    lisinopril (PRINIVIL;ZESTRIL) tablet 5 mg  5 mg Oral Daily    melatonin tablet 9 mg  9 mg Oral Nightly    trospium (SANCTURA) tablet 20 mg  20 mg Oral Daily    pantoprazole (PROTONIX) tablet 40 mg  40 mg Oral QAM AC    rivastigmine (EXELON) capsule 6 mg  6 mg Oral BID WC    sodium chloride flush 0.9 % injection 5-40 mL  5-40 mL IntraVENous 2 times per day    sodium chloride flush 0.9 % injection 5-40 mL  5-40 mL IntraVENous PRN    0.9 % sodium chloride infusion   IntraVENous PRN    ondansetron (ZOFRAN-ODT) disintegrating tablet 4 mg  4 mg Oral Q8H PRN    Or    ondansetron (ZOFRAN) injection 4 mg  4 mg IntraVENous Q6H PRN    polyethylene glycol (GLYCOLAX) packet 17 g  17 g Oral Daily PRN    acetaminophen (TYLENOL) tablet 650 mg  650 mg Oral Q6H PRN    Or    acetaminophen (TYLENOL) suppository 650 mg  650 mg Rectal Q6H PRN    magnesium sulfate 2000 mg in 50 mL IVPB premix  2,000 mg IntraVENous PRN    potassium chloride (KLOR-CON M) extended release tablet 40 mEq  40 mEq Oral PRN    Or    potassium bicarb-citric acid (EFFER-K) effervescent tablet 40 mEq  40 mEq Oral PRN    Or    potassium chloride 10 mEq/100 mL IVPB (Peripheral Line)  10 mEq IntraVENous PRN       Signed: Lindi Goltz, DO    Part of this note may have been written by using a voice dictation software.   The note has been proof read but may still contain some grammatical/other typographical errors.

## 2022-07-02 NOTE — PROGRESS NOTES
Sister at bedside. I came to bedside to update family. All questions answered and concerns addressed.     French Heimlich, DO

## 2022-07-03 PROCEDURE — 6360000002 HC RX W HCPCS: Performed by: FAMILY MEDICINE

## 2022-07-03 PROCEDURE — 6370000000 HC RX 637 (ALT 250 FOR IP): Performed by: STUDENT IN AN ORGANIZED HEALTH CARE EDUCATION/TRAINING PROGRAM

## 2022-07-03 PROCEDURE — 6370000000 HC RX 637 (ALT 250 FOR IP): Performed by: FAMILY MEDICINE

## 2022-07-03 PROCEDURE — 2700000000 HC OXYGEN THERAPY PER DAY

## 2022-07-03 PROCEDURE — 1100000000 HC RM PRIVATE

## 2022-07-03 PROCEDURE — 6370000000 HC RX 637 (ALT 250 FOR IP): Performed by: PSYCHIATRY & NEUROLOGY

## 2022-07-03 PROCEDURE — 2580000003 HC RX 258: Performed by: STUDENT IN AN ORGANIZED HEALTH CARE EDUCATION/TRAINING PROGRAM

## 2022-07-03 RX ADMIN — CARBIDOPA AND LEVODOPA 1 TABLET: 25; 250 TABLET, ORALLY DISINTEGRATING ORAL at 10:28

## 2022-07-03 RX ADMIN — Medication 9 MG: at 21:45

## 2022-07-03 RX ADMIN — ESCITALOPRAM OXALATE 10 MG: 10 TABLET ORAL at 08:46

## 2022-07-03 RX ADMIN — CARBIDOPA AND LEVODOPA 1 TABLET: 25; 250 TABLET, ORALLY DISINTEGRATING ORAL at 21:48

## 2022-07-03 RX ADMIN — AMLODIPINE BESYLATE 5 MG: 5 TABLET ORAL at 08:47

## 2022-07-03 RX ADMIN — GABAPENTIN 100 MG: 100 CAPSULE ORAL at 08:46

## 2022-07-03 RX ADMIN — RIVASTIGMINE TARTRATE 6 MG: 3 CAPSULE ORAL at 08:49

## 2022-07-03 RX ADMIN — LISINOPRIL 5 MG: 5 TABLET ORAL at 08:46

## 2022-07-03 RX ADMIN — ENOXAPARIN SODIUM 40 MG: 100 INJECTION SUBCUTANEOUS at 21:48

## 2022-07-03 RX ADMIN — GABAPENTIN 100 MG: 100 CAPSULE ORAL at 21:46

## 2022-07-03 RX ADMIN — CARBIDOPA AND LEVODOPA 1 TABLET: 25; 250 TABLET, ORALLY DISINTEGRATING ORAL at 17:12

## 2022-07-03 RX ADMIN — CLONAZEPAM 1 MG: 1 TABLET ORAL at 18:21

## 2022-07-03 RX ADMIN — TROSPIUM CHLORIDE 20 MG: 20 TABLET, FILM COATED ORAL at 08:47

## 2022-07-03 RX ADMIN — CARBIDOPA AND LEVODOPA 2 TABLET: 25; 100 TABLET, EXTENDED RELEASE ORAL at 21:45

## 2022-07-03 RX ADMIN — PANTOPRAZOLE SODIUM 40 MG: 40 TABLET, DELAYED RELEASE ORAL at 05:01

## 2022-07-03 RX ADMIN — CARBIDOPA AND LEVODOPA 1 TABLET: 25; 250 TABLET, ORALLY DISINTEGRATING ORAL at 14:16

## 2022-07-03 RX ADMIN — LEVOTHYROXINE SODIUM 125 MCG: 0.07 TABLET ORAL at 05:00

## 2022-07-03 RX ADMIN — SODIUM CHLORIDE, PRESERVATIVE FREE 5 ML: 5 INJECTION INTRAVENOUS at 08:47

## 2022-07-03 RX ADMIN — LATANOPROST 1 DROP: 50 SOLUTION OPHTHALMIC at 23:56

## 2022-07-03 RX ADMIN — RIVASTIGMINE TARTRATE 6 MG: 3 CAPSULE ORAL at 17:12

## 2022-07-03 RX ADMIN — VITAMIN D, TAB 1000IU (100/BT) 1000 UNITS: 25 TAB at 08:46

## 2022-07-03 RX ADMIN — GABAPENTIN 100 MG: 100 CAPSULE ORAL at 14:16

## 2022-07-03 ASSESSMENT — PAIN SCALES - GENERAL
PAINLEVEL_OUTOF10: 4
PAINLEVEL_OUTOF10: 2
PAINLEVEL_OUTOF10: 0
PAINLEVEL_OUTOF10: 0

## 2022-07-03 ASSESSMENT — PAIN - FUNCTIONAL ASSESSMENT: PAIN_FUNCTIONAL_ASSESSMENT: ACTIVITIES ARE NOT PREVENTED

## 2022-07-03 ASSESSMENT — PAIN DESCRIPTION - ORIENTATION: ORIENTATION: POSTERIOR

## 2022-07-03 ASSESSMENT — PAIN DESCRIPTION - LOCATION: LOCATION: BACK

## 2022-07-03 ASSESSMENT — PAIN DESCRIPTION - DESCRIPTORS: DESCRIPTORS: ACHING

## 2022-07-03 NOTE — PROGRESS NOTES
Patient awake, alert, eating sandwich. More co-operative, talkative, pleasant. Resting quietly, will monitor.

## 2022-07-03 NOTE — PROGRESS NOTES
Patient has rested quietly until 0100. Awake, trying to get out of bed, saying that he is leaving and not paying another dime to stay here. Agreeable to stay in bed, alarm on. Does not want to talk to family. Will continue to monitor.

## 2022-07-03 NOTE — PROGRESS NOTES
Through the night, hourly rounding has been performed, bed alarm and safety measures taken, with call light in reach. Report will be given to oncoming RN, will continue to monitor.

## 2022-07-03 NOTE — PROGRESS NOTES
Medicated with tylenol 650 mg po per prn order for back pain. Repositioned, safety measures met. Will monitor.

## 2022-07-04 PROBLEM — I49.1 PAC (PREMATURE ATRIAL CONTRACTION): Status: ACTIVE | Noted: 2022-07-04

## 2022-07-04 LAB
ANION GAP SERPL CALC-SCNC: 5 MMOL/L (ref 7–16)
BASOPHILS # BLD: 0 K/UL (ref 0–0.2)
BASOPHILS NFR BLD: 1 % (ref 0–2)
BUN SERPL-MCNC: 31 MG/DL (ref 8–23)
CALCIUM SERPL-MCNC: 8.9 MG/DL (ref 8.3–10.4)
CHLORIDE SERPL-SCNC: 103 MMOL/L (ref 98–107)
CO2 SERPL-SCNC: 31 MMOL/L (ref 21–32)
CREAT SERPL-MCNC: 1.3 MG/DL (ref 0.8–1.5)
DIFFERENTIAL METHOD BLD: ABNORMAL
EOSINOPHIL # BLD: 0.1 K/UL (ref 0–0.8)
EOSINOPHIL NFR BLD: 2 % (ref 0.5–7.8)
ERYTHROCYTE [DISTWIDTH] IN BLOOD BY AUTOMATED COUNT: 12.8 % (ref 11.9–14.6)
GLUCOSE SERPL-MCNC: 97 MG/DL (ref 65–100)
HCT VFR BLD AUTO: 39.8 % (ref 41.1–50.3)
HGB BLD-MCNC: 12.8 G/DL (ref 13.6–17.2)
IMM GRANULOCYTES # BLD AUTO: 0 K/UL (ref 0–0.5)
IMM GRANULOCYTES NFR BLD AUTO: 0 % (ref 0–5)
LYMPHOCYTES # BLD: 1.7 K/UL (ref 0.5–4.6)
LYMPHOCYTES NFR BLD: 31 % (ref 13–44)
MCH RBC QN AUTO: 31 PG (ref 26.1–32.9)
MCHC RBC AUTO-ENTMCNC: 32.2 G/DL (ref 31.4–35)
MCV RBC AUTO: 96.4 FL (ref 79.6–97.8)
MONOCYTES # BLD: 0.4 K/UL (ref 0.1–1.3)
MONOCYTES NFR BLD: 8 % (ref 4–12)
NEUTS SEG # BLD: 3.1 K/UL (ref 1.7–8.2)
NEUTS SEG NFR BLD: 58 % (ref 43–78)
NRBC # BLD: 0 K/UL (ref 0–0.2)
PLATELET # BLD AUTO: 270 K/UL (ref 150–450)
PMV BLD AUTO: 9 FL (ref 9.4–12.3)
POTASSIUM SERPL-SCNC: 4.6 MMOL/L (ref 3.5–5.1)
RBC # BLD AUTO: 4.13 M/UL (ref 4.23–5.6)
SODIUM SERPL-SCNC: 139 MMOL/L (ref 136–145)
WBC # BLD AUTO: 5.4 K/UL (ref 4.3–11.1)

## 2022-07-04 PROCEDURE — 1100000000 HC RM PRIVATE

## 2022-07-04 PROCEDURE — 2580000003 HC RX 258: Performed by: STUDENT IN AN ORGANIZED HEALTH CARE EDUCATION/TRAINING PROGRAM

## 2022-07-04 PROCEDURE — 6370000000 HC RX 637 (ALT 250 FOR IP): Performed by: STUDENT IN AN ORGANIZED HEALTH CARE EDUCATION/TRAINING PROGRAM

## 2022-07-04 PROCEDURE — 99221 1ST HOSP IP/OBS SF/LOW 40: CPT | Performed by: PSYCHIATRY & NEUROLOGY

## 2022-07-04 PROCEDURE — 6360000002 HC RX W HCPCS: Performed by: FAMILY MEDICINE

## 2022-07-04 PROCEDURE — 85025 COMPLETE CBC W/AUTO DIFF WBC: CPT

## 2022-07-04 PROCEDURE — 36415 COLL VENOUS BLD VENIPUNCTURE: CPT

## 2022-07-04 PROCEDURE — 97530 THERAPEUTIC ACTIVITIES: CPT

## 2022-07-04 PROCEDURE — 97116 GAIT TRAINING THERAPY: CPT

## 2022-07-04 PROCEDURE — 6370000000 HC RX 637 (ALT 250 FOR IP): Performed by: PSYCHIATRY & NEUROLOGY

## 2022-07-04 PROCEDURE — 80048 BASIC METABOLIC PNL TOTAL CA: CPT

## 2022-07-04 PROCEDURE — 6370000000 HC RX 637 (ALT 250 FOR IP): Performed by: FAMILY MEDICINE

## 2022-07-04 RX ADMIN — ENOXAPARIN SODIUM 40 MG: 100 INJECTION SUBCUTANEOUS at 21:19

## 2022-07-04 RX ADMIN — RIVASTIGMINE TARTRATE 6 MG: 3 CAPSULE ORAL at 17:32

## 2022-07-04 RX ADMIN — AMLODIPINE BESYLATE 5 MG: 5 TABLET ORAL at 09:29

## 2022-07-04 RX ADMIN — TROSPIUM CHLORIDE 20 MG: 20 TABLET, FILM COATED ORAL at 09:29

## 2022-07-04 RX ADMIN — LISINOPRIL 5 MG: 5 TABLET ORAL at 09:29

## 2022-07-04 RX ADMIN — CARBIDOPA AND LEVODOPA 1 TABLET: 25; 250 TABLET, ORALLY DISINTEGRATING ORAL at 09:29

## 2022-07-04 RX ADMIN — VITAMIN D, TAB 1000IU (100/BT) 1000 UNITS: 25 TAB at 09:29

## 2022-07-04 RX ADMIN — RIVASTIGMINE TARTRATE 6 MG: 3 CAPSULE ORAL at 09:29

## 2022-07-04 RX ADMIN — CLONAZEPAM 1 MG: 1 TABLET ORAL at 21:13

## 2022-07-04 RX ADMIN — CARBIDOPA AND LEVODOPA 2 TABLET: 25; 100 TABLET, EXTENDED RELEASE ORAL at 21:13

## 2022-07-04 RX ADMIN — SODIUM CHLORIDE, PRESERVATIVE FREE 5 ML: 5 INJECTION INTRAVENOUS at 21:19

## 2022-07-04 RX ADMIN — CARBIDOPA AND LEVODOPA 1 TABLET: 25; 250 TABLET, ORALLY DISINTEGRATING ORAL at 12:42

## 2022-07-04 RX ADMIN — GABAPENTIN 100 MG: 100 CAPSULE ORAL at 09:29

## 2022-07-04 RX ADMIN — GABAPENTIN 100 MG: 100 CAPSULE ORAL at 15:46

## 2022-07-04 RX ADMIN — LEVOTHYROXINE SODIUM 125 MCG: 0.07 TABLET ORAL at 06:21

## 2022-07-04 RX ADMIN — Medication 9 MG: at 21:13

## 2022-07-04 RX ADMIN — CARBIDOPA AND LEVODOPA 1 TABLET: 25; 250 TABLET, ORALLY DISINTEGRATING ORAL at 17:29

## 2022-07-04 RX ADMIN — PANTOPRAZOLE SODIUM 40 MG: 40 TABLET, DELAYED RELEASE ORAL at 06:21

## 2022-07-04 RX ADMIN — ESCITALOPRAM OXALATE 10 MG: 10 TABLET ORAL at 09:29

## 2022-07-04 RX ADMIN — CARBIDOPA AND LEVODOPA 1 TABLET: 25; 250 TABLET, ORALLY DISINTEGRATING ORAL at 21:16

## 2022-07-04 RX ADMIN — SODIUM CHLORIDE, PRESERVATIVE FREE 5 ML: 5 INJECTION INTRAVENOUS at 09:30

## 2022-07-04 RX ADMIN — GABAPENTIN 100 MG: 100 CAPSULE ORAL at 21:13

## 2022-07-04 ASSESSMENT — PAIN SCALES - GENERAL
PAINLEVEL_OUTOF10: 0
PAINLEVEL_OUTOF10: 2

## 2022-07-04 NOTE — PROGRESS NOTES
PHYSICAL THERAPY Daily Note  (Link to Caseload Tracking: PT Visit Days : 4  Time In/Out PT Charge Capture  Rehab Caseload Tracker  Orders      Jenny Grimaldo is a [de-identified] y.o. male   PRIMARY DIAGNOSIS: INO (acute kidney injury) ([facility]  Hallucinations [R44.3]       Inpatient: Payor: MEDICARE / Plan: MEDICARE PART A AND B / Product Type: *No Product type* /     ASSESSMENT:     REHAB RECOMMENDATIONS:   Recommendation to date pending progress:  Setting:   Short-term Rehab    Equipment:     To Be Determined     ASSESSMENT:  Mr. Miguel Angel Golden is very lethargic today but agreeable to therapy. Oriented to self and location, disoriented to time. Today he required modA of 2 to sit at EOB with extended time. STS with modA of 2. Attempt gait for 5 ft- pt shuffling feet in place with extreme difficult advancing them. Pt also noted to have posterior lean in standing. Throughout session pt demonstrated increased rigidity and tone and decreased initiation of movement. Pt required repetition of cues as well as multimodal cueing. Return to supine with maxA and vitals assessed, WNL. RN updated on pt performance. Little progress, will continue to follow.       SUBJECTIVE:   Mr. Miguel Angel Golden states, \"I have a daughter\"     Social/Functional Lives With: Alone  Type of Home: Assisted living  Home Layout: One level  ADL Assistance: Independent  Ambulation Assistance: Needs assistance  Active : No  OBJECTIVE:     PAIN: Lupie Sober / O2: [address] Acre / Solomon Money / Mariel Sauger:   Pre Treatment: 0  Pain Assessment: None - Denies Pain      Post Treatment: 0 Vitals   Vitals  Heart Rate: 92  BP: 128/69    Oxygen  Heart Rate: 92 None    RESTRICTIONS/PRECAUTIONS:  Restrictions/Precautions  Restrictions/Precautions: Fall Risk  Restrictions/Precautions: Fall Risk     MOBILITY: I Mod I S SBA CGA Min Mod Max Total  NT x2 Comments:   Bed Mobility    Rolling [] [] [] [] [] [] [x] [] [] [] [x]    Supine to Sit [] [] [] [] [] [] [x] [] [] [] [x]    Scooting [] [] [] [] [] [] [x] [] [] [] [x]    Sit to Supine [] [] [] [] [] [] [x] [] [] [] [x]    Transfers    Sit to Stand [] [] [] [] [] [] [x] [] [] [] [x]    Bed to Chair [] [] [] [] [] [] [] [] [] [x] []    Stand to Sit [] [] [] [] [] [] [x] [] [] [] [x]     [] [] [] [] [] [] [] [] [] [] []    I=Independent, Mod I=Modified Independent, S=Supervision, SBA=Standby Assistance, CGA=Contact Guard Assistance,   Min=Minimal Assistance, Mod=Moderate Assistance, Max=Maximal Assistance, Total=Total Assistance, NT=Not Tested    BALANCE: Good Fair+ Fair Fair- Poor NT Comments   Sitting Static [] [x] [] [] [] []    Sitting Dynamic [] [] [x] [] [] []              Standing Static [] [] [] [x] [] []    Standing Dynamic [] [] [] [] [x] []      GAIT: I Mod I S SBA CGA Min Mod Max Total  NT x2 Comments:   Level of Assistance [] [] [] [] [] [] [x] [x] [] [] [x]    Distance 5 feet    DME Gait Belt and Rolling Walker    Gait Quality Path deviations , Shuffling  and Trunk sway increased    Weightbearing Status      Stairs      I=Independent, Mod I=Modified Independent, S=Supervision, SBA=Standby Assistance, CGA=Contact Guard Assistance,   Min=Minimal Assistance, Mod=Moderate Assistance, Max=Maximal Assistance, Total=Total Assistance, NT=Not Tested    PLAN:   ACUTE PHYSICAL THERAPY GOALS:   (Developed with and agreed upon by patient and/or caregiver. )  LTG:  (1.)Mr. Singleton will move from supine to sit and sit to supine  in bed with MODIFIED INDEPENDENCE within 7 treatment day(s). (2.)Mr. Singleton will transfer from bed to chair and chair to bed with MODIFIED INDEPENDENCE using the least restrictive device within 7 treatment day(s). (3.)Mr. Singleton will ambulate with MODIFIED INDEPENDENCE for 150   feet with the least restrictive device within 7 treatment day(s). (4.)Mr. Singleton will perform standing static and dynamic balance activities x 25 minutes with SUPERVISION to improve safety within 7 treatment day(s). (5.)Mr. Singleton will ambulate and/or perform functional activities for  25 consecutive minutes with stable vital signs and no rests required to improve activity tolerance within 7 treatment days. FREQUENCY AND DURATION: 3 times/week for duration of hospital stay or until stated goals are met, whichever comes first.    TREATMENT:   TREATMENT:   Co-Treatment PT/OT necessary due to patient's decreased overall endurance/tolerance levels, as well as need for high level skilled assistance to complete functional transfers/mobility and functional tasks  Gait Training (30 Minutes): Gait training for 5 feet utilizing Gait Belt and Reino Axon. Patient required Manual, Tactile, Verbal and Visual cueing to improve Activity Pacing, Assistive Device Utilization, Dynamic Standing Balance and Gait Mechanics. TREATMENT GRID:  N/A    AFTER TREATMENT PRECAUTIONS: Alarm Activated, Bed, Bed/Chair Locked, Call light within reach, Needs within reach and RN notified    INTERDISCIPLINARY COLLABORATION:  RN/ PCT and OT/ ROYAL    EDUCATION: Education Given To: Patient  Education Provided: Precautions;Transfer Training;Energy Conservation; Fall Prevention Strategies; Equipment  Education Method: Verbal  Barriers to Learning: None  Education Outcome: Verbalized understanding    TIME IN/OUT:  Time In: 1315  Time Out: 1345  Minutes: Zechariah CLEMENTE, PT

## 2022-07-04 NOTE — PROGRESS NOTES
Called pt's daughter Saran Collins to give update, all questions answered.     Ana Rosa Wakefield, DO

## 2022-07-04 NOTE — PROGRESS NOTES
Hospitalist Progress Note   Admit Date:  2022 11:43 AM   Name:  Brandon Mccabe Age:  [de-identified] y.o. Sex:  male  :  1940   MRN:  [id number]   Room:  [facility]/    Presenting Complaint: Altered Mental Status     Reason(s) for Admission: Hallucinations [R44.3]     Hospital Course & Interval History:   [de-identified] y.o. male with medical history of Parkinson's, hypertension, insomnia, hypothyroidism who presented with confusion and episodes of hallucinations. [facility]. Since  family has stated that he has been more confused and hallucinating. They observed patient talking to the wall at one point. Patient just saw his neurologist 2 weeks prior that started him on Remeron. According to family he had not started taking that medication. Only antidepressant patient is on currently is Lexapro. According to facility staff patient had not been taking his medications for 1 to 2 days PA. Family had said that  patient was less responsive and more rigid. In the emergency department patient was found to have INO with creatinine of 2.0. No source of infection found. Patient also was found to be hypertensive with blood pressure of 179/89. CT head showed mild atrophy and small vessel ischemic disease without acute abnormalities. Chest x-ray also without any acute abnormalities. Patient not febrile and no severe rigidity observed on admission. Patient was treated with Valium in the ED. Patient rigidity can be consistent with poorly controlled Parkinson's. Per family patient also received Eligard for his prostate cancer on Thursday. It was after that fact that patient started to act \"different\". Family had also stated that patient had fallen a few times on . Patient is admitted for INO and hallucinations with acute metabolic encephalopathy in setting of Parkinson's disease.   INO most likely due to decreased p.o. intake and encephalopathy most likely due to worsening Parkinson's. INO resolved with IVF. CT head on admission was unremarkable for acute findings; only mild atrophy with small vessel ischemic disease. Neurology was consulted. Challenging and management from a behavioral standpoint. Has had amantadine in the past but would avoid this drug in the future secondary to hallucinations. Somewhat paradoxical response due to stabilizers and was started on Clonazepam 1 mg qd. He will need to follow-up with neurology Dr. Blair Lopes on discharge. Discussed with neurology, patient was okay to be discharged from their standpoint to follow-up with neurology Dr. Blair Lopes outpatient. Patient initially had episodes of hypoglycemia on admission but this was resolved with dextrose and improvement in p.o. intake. He continues to progress well. He was planned for discharge on  but was noted to be minimally responsive in the morning after receiving Geodon night prior for agitation. Rapid response was called with persistent difficulty to arouse as well as hypoxia in 80s and at times unable to obtain pulse ox. BG 95 at bedside. Patient was placed on nonrebreather. ABG stat was overall unremarkable 7.41/45/542/28. He was taken off of nonrebreather and placed back on 2L O2 NC.  CXR stat unremarkable. CT head was unremarkable. EKG shows NSR with frequent PACs in bigeminy pattern. He became alert and oriented, talking and following commands shortly after rapid response. Acute episode most likely due to sedation from meds. Telemetry reported possible A. fib p.m . EKG was checked and was evaluated by nocturnist.  EKG appeared to be unchanged from previous with sinus rhythm and frequent PACs. PT/OT recommended SNF for patient. He will need to follow-up with his PCP in 3-5 days and follow-up with neurology in 2-4 weeks after discharge. Subjective/24hr Events (22):      Patient alert, oriented to person place and  this morning and follows commands well. Unchanged from yesterday. Back to baseline. Stated that he ate his breakfast and did well. On 2LO 2 NC without dyspnea or tachypnea and will be weaned down to RA. Could have false O2 readings d/t cold fingers. Sister stated Raynaud's runs in her family. No fever, chills, SOB, chest pain, abdominal pain, nausea, vomiting. Assessment & Plan:     INO, improving   Cr on admission 2.0 (baseline <1). POC UA negative. Most likely due to decreased p.o. intake. Much improved after IVF  Avoid nephrotoxic meds and hypotension  Cr close to baseline. Cont to be off IVF and encourage po intake. Episode of confusion  Hallucinations  Parkinson's disease  Most likely worsening Parkinson's disease. CT head on admission shows mild atrophy with small vessel ischemic disease with no acute abnormalities. POC UA negative for UTI. TSH wnl on admission  Continue Sinemet and Exelon  Delirium precautions  Fall precautions  Avoid hypoglycemia  Vitamin B12, folate--wnl  Neuro consulted, appreciate recs. Challenging and management from a behavioral standpoint. Has had amantadine in the past but would avoid this drug in the future secondary to hallucinations. Somewhat paradoxical response due to stabilizers and was started on clonazepam.  He will need to follow-up with neurology Dr. Sujey Jean on discharge  PT/OT/SLP--recommended STR  Discussed with neurology, patient was okay to be discharged from their standpoint to follow-up with neurology Dr. Sujey Jean outpatient  CT head unremarkable after somnolence episode, most likely d/t meds  Back to baseline now    Hypoxia 7/1, resolved   Most likely false ready w/ cold fingers. Sister said Raynauds runs in the family. Ear probe with O2sat 100%. ABG unremarkable  On RA now    Frequent falls  Multifactorial in setting of hypoglycemia and worsening Parkinson's  Fall precautions  PT/OT  Orthostatic VS unremarkable    Frequent PAC's  Confirmed with multiple EKG's,not afib. 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 Lights off, television off, noises down during the night. If able, decrease nursing checks at night  · Therapies as often as possible  · Avoid restraints to the best of your ability   · Avoid sensory deprivation by using glasses and hearing aids, if applicable        Pharmacological intervention  · Replete electrolyte abnormalities and correct metabolic abnormalities  · Limit benzodiazepines, antihistamines, narcotics, anticholinergics. Preference towards Precedex for sedation over fentanyl and benzodiazepines/GABAa agonists. · For dangerous behavior/aggression to self or others can consider Zyprexa or Seroquel if benefits outweigh risk  · For persistent insomnia can use melatonin four hours prior to bedtime or Seroquel 25 mg at bedtime         Discharge Planning: STR most likely 7/5      Diet:  ADULT DIET;  Regular; set up assistance  ADULT ORAL NUTRITION SUPPLEMENT; Breakfast, Lunch, Dinner; Standard High Calorie/High Protein Oral Supplement  DVT PPx: Lovenox SQ  Code status: Full Code    Hospital Problems:  Principal Problem:    INO (acute kidney injury) ([facility]  Active Problems:    Hallucinations    Hypothyroidism    Acute respiratory failure with hypoxia (HCC)    Polyneuropathy    Parkinson's disease (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 *      Objective:     Patient Vitals for the past 24 hrs:   Temp Pulse Resp BP SpO2   07/04/22 0731 97.7 °F (36.5 °C) 77 16 136/84 100 %   07/04/22 0348 97.5 °F (36.4 °C) 56 16 (!) 156/78 99 %   07/03/22 2330 97.3 °F (36.3 °C) 72 18 124/73 97 %   07/03/22 1929 97.5 °F (36.4 °C) 90 20 (!) 129/59 100 %   07/03/22 1533 97.9 °F (36.6 °C) 89 22 (!) 143/70 100 %   07/03/22 1121 97.7 °F (36.5 °C) 88 20 (!) 155/79 98 %       Oxygen Therapy  SpO2: 100 %  Pulse Oximetry Type: Intermittent  Pulse Oximeter Device Mode: Intermittent  Pulse Oximeter Device Location: Right,Left,Finger  O2 Device: None (Room air)  O2 Flow Rate (L/min):  (weaned from 2 L)    Estimated body mass index is 27.01 kg/m² as calculated from the following:    Height as of this encounter: 5' 0.98\" (1.549 m). Weight as of this encounter: 142 lb 14.4 oz (64.8 kg). Intake/Output Summary (Last 24 hours) at 7/4/2022 1030  Last data filed at 7/4/2022 0814  Gross per 24 hour   Intake 120 ml   Output 250 ml   Net -130 ml         Physical Exam:     Blood pressure 136/84, pulse 77, temperature 97.7 °F (36.5 °C), temperature source Oral, resp. rate 16, height 5' 0.98\" (1.549 m), weight 142 lb 14.4 oz (64.8 kg), SpO2 100 %. General:    Chronically ill appearing. Alert and oriented back to baseline  Head:  Normocephalic, atraumatic  Eyes:  Sclerae appear normal.  Pupils equally round. ENT:  Nares appear normal, no drainage. Moist oral mucosa  Neck:  No restricted ROM. Trachea midline   CV:   Regular rate, irregular rhythm. No m/r/g. No jugular venous distension. Lungs:   CTAB. No wheezing, rhonchi, or rales. Respirations even, unlabored  Abdomen: Bowel sounds present. Soft, nontender, nondistended. Extremities: No cyanosis or clubbing. No edema  Skin:     No rashes and normal coloration. Warm and dry. Neuro:  CN II-XII grossly intact. Sensation intact. A&Ox to person, place and . Follows commands well.    Psych:  Pleasantly demented    I have reviewed ordered lab tests and independently visualized imaging below:    Recent Labs:  Recent Results (from the past 48 hour(s))   EKG 12 Lead    Collection Time: 22  3:34 PM   Result Value Ref Range    Ventricular Rate 92 BPM    Atrial Rate 92 BPM    P-R Interval 158 ms    QRS Duration 98 ms    Q-T Interval 380 ms    QTc Calculation (Bazett) 469 ms    P Axis 61 degrees    R Axis -70 degrees    T Axis 50 degrees    Diagnosis       Sinus rhythm with  Premature atrial complexes in a pattern of bigeminy   Basic Metabolic Panel    Collection Time: 22  5:09 AM   Result Value Ref Range    Sodium 139 136 - 145 mmol/L    Potassium 4.6 3.5 - 5.1 mmol/L    Chloride 103 98 - 107 mmol/L    CO2 31 21 - 32 mmol/L    Anion Gap 5 (L) 7 - 16 mmol/L    Glucose 97 65 - 100 mg/dL    BUN 31 (H) 8 - 23 MG/DL    CREATININE 1.30 0.8 - 1.5 MG/DL    GFR African American >60 >60 ml/min/1.73m2    GFR Non- 56 (L) >60 ml/min/1.73m2    Calcium 8.9 8.3 - 10.4 MG/DL   CBC with Auto Differential    Collection Time: 22  5:09 AM   Result Value Ref Range    WBC 5.4 4.3 - 11.1 K/uL    RBC 4.13 (L) 4.23 - 5.6 M/uL    Hemoglobin 12.8 (L) 13.6 - 17.2 g/dL    Hematocrit 39.8 (L) 41.1 - 50.3 %    MCV 96.4 79.6 - 97.8 FL    MCH 31.0 26.1 - 32.9 PG    MCHC 32.2 31.4 - 35.0 g/dL    RDW 12.8 11.9 - 14.6 %    Platelets 337 939 - 130 K/uL    MPV 9.0 (L) 9.4 - 12.3 FL    nRBC 0.00 0.0 - 0.2 K/uL    Differential Type AUTOMATED      Seg Neutrophils 58 43 - 78 %    Lymphocytes 31 13 - 44 %    Monocytes 8 4.0 - 12.0 %    Eosinophils % 2 0.5 - 7.8 %    Basophils 1 0.0 - 2.0 %    Immature Granulocytes 0 0.0 - 5.0 %    Segs Absolute 3.1 1.7 - 8.2 K/UL    Absolute Lymph # 1.7 0.5 - 4.6 K/UL    Absolute Mono # 0.4 0.1 - 1.3 K/UL    Absolute Eos # 0.1 0.0 - 0.8 K/UL    Basophils Absolute 0.0 0.0 - 0.2 K/UL    Absolute Immature Granulocyte 0.0 0.0 - 0.5 K/UL       Other Studies:  CT HEAD WO CONTRAST   Final Result   No acute intracranial abnormality. XR CHEST 1 VIEW   Final Result      XR CHEST 1 VIEW   Final Result   No acute abnormality.       CT HEAD WO CONTRAST   Final Result   MILD ATROPHY AND SMALL VESSEL ISCHEMIC DISEASE WITH NO ACUTE   INTRACRANIAL ABNORMALITY IDENTIFIED AT NONCONTRAST CT.               XR CHEST PORTABLE   Final Result   No acute findings in the chest.             Current Meds:  Current Facility-Administered Medications   Medication Dose Route Frequency    enoxaparin (LOVENOX) injection 40 mg  40 mg SubCUTAneous Q24H    Vitamin D (CHOLECALCIFEROL) tablet 1,000 Units  1,000 Units Oral Daily    amLODIPine (NORVASC) tablet 5 mg  5 mg Oral Daily    lidocaine 4 % external patch 2 patch  2 patch TransDERmal Daily    glucose chewable tablet 16 g  4 tablet Oral PRN    dextrose bolus 10% 125 mL  125 mL IntraVENous PRN    Or    dextrose bolus 10% 250 mL  250 mL IntraVENous PRN    glucagon injection 1 mg  1 mg IntraMUSCular PRN    dextrose 5 % solution  100 mL/hr IntraVENous PRN    hydrALAZINE (APRESOLINE) injection 5 mg  5 mg IntraVENous Q6H PRN    clonazePAM (KLONOPIN) tablet 1 mg  1 mg Oral Daily    carbidopa-levodopa (PARCOPA)  MG per disintegrating tablet 1 tablet  1 tablet Oral 4x Daily    levalbuterol (XOPENEX) nebulizer solution 1.25 mg  1.25 mg Nebulization Q8H PRN    carbidopa-levodopa (SINEMET CR)  MG per extended release tablet 2 tablet  2 tablet Oral Nightly    escitalopram (LEXAPRO) tablet 10 mg  10 mg Oral Daily    gabapentin (NEURONTIN) capsule 100 mg  100 mg Oral TID    latanoprost (XALATAN) 0.005 % ophthalmic solution 1 drop  1 drop Ophthalmic Nightly    levothyroxine (SYNTHROID) tablet 125 mcg  125 mcg Oral QAM AC    lisinopril (PRINIVIL;ZESTRIL) tablet 5 mg  5 mg Oral Daily    melatonin tablet 9 mg  9 mg Oral Nightly    trospium (SANCTURA) tablet 20 mg  20 mg Oral Daily    pantoprazole (PROTONIX) tablet 40 mg  40 mg Oral QAM AC    rivastigmine (EXELON) capsule 6 mg  6 mg Oral BID WC    sodium chloride flush 0.9 % injection 5-40 mL  5-40 mL IntraVENous 2 times per day    sodium chloride flush 0.9 % injection 5-40 mL  5-40 mL IntraVENous PRN    0.9 % sodium chloride infusion   IntraVENous PRN    ondansetron (ZOFRAN-ODT) disintegrating tablet 4 mg  4 mg Oral Q8H PRN    Or    ondansetron (ZOFRAN) injection 4 mg  4 mg IntraVENous Q6H PRN    polyethylene glycol (GLYCOLAX) packet 17 g  17 g Oral Daily PRN    acetaminophen (TYLENOL) tablet 650 mg  650 mg Oral Q6H PRN    Or    acetaminophen (TYLENOL) suppository 650 mg  650 mg Rectal Q6H PRN    magnesium sulfate 2000 mg in 50 mL IVPB premix  2,000 mg IntraVENous PRN    potassium chloride (KLOR-CON M) extended release tablet 40 mEq  40 mEq Oral PRN    Or    potassium bicarb-citric acid (EFFER-K) effervescent tablet 40 mEq  40 mEq Oral PRN    Or    potassium chloride 10 mEq/100 mL IVPB (Peripheral Line)  10 mEq IntraVENous PRN       Signed: Khadra Weaver DO    Part of this note may have been written by using a voice dictation software. The note has been proof read but may still contain some grammatical/other typographical errors.

## 2022-07-04 NOTE — PROGRESS NOTES
OCCUPATIONAL THERAPY Daily Note     OT Visit Days: 4   Time  OT Charge Capture  Rehab Caseload Tracker  OT Orders    Tirso Henson is a [de-identified] y.o. male   PRIMARY DIAGNOSIS: INO (acute kidney injury) ([facility]  Hallucinations [R44.3]       Inpatient: Payor: Halina Colindres / Plan: MEDICARE PART A AND B / Product Type: *No Product type* /     ASSESSMENT:     REHAB RECOMMENDATIONS: CURRENT LEVEL OF FUNCTION:  (Most Recently Demonstrated)   Recommendation to date pending progress:  Setting:   Short-term Rehab    Equipment:     To Be Determined Bathing:   Not Tested  Dressing:   Not Tested  Feeding/Grooming:   Not Tested  Toileting:   Not Tested  Functional Mobility:   Maximal Assist-Mod A with RW     ASSESSMENT:  Mr. Heidi Johnston presents side-lying, with significant fatigue. NSG reports pt sat up for increased period of time and may be fatigue. Pt Mod - Max A side-lying -> EOB. Pt arousal improved once seated EOB, pt lit up and began smiling and laughing while conversing about daughter. Significantly increased tone noted in BUE and decreased FM coordination with face washing seated EOB compared to eval. NSG notified and aware. Pt and NSG reports receiving PD medications on schedule. Pt required continuous verbal and tactile cues for directions and gait. Significantly increased time and rest breaks required throughout session. Pt tolerated session fair. Pt making limited progress at present time. Will continue with POC as appropriate.        SUBJECTIVE:     Mr. Heidi Johnston states, Minimal verbal response throughout     Social/Functional Lives With: Alone  Type of Home: Assisted living  Home Layout: One level  ADL Assistance: Independent  Ambulation Assistance: Needs assistance  Active : No    OBJECTIVE:     Chano Cornea / Clarence Dunk / AIRWAY: Telemetry     RESTRICTIONS/PRECAUTIONS:  Restrictions/Precautions  Restrictions/Precautions: Fall Risk        PAIN: VITALS / O2:   Pre Treatment:   Pain Assessment: None - Denies Pain          Post Treatment: None Vitals          Oxygen           MOBILITY: I Mod I S SBA CGA Min Mod Max Total  NT x2 Comments:   Bed Mobility    Rolling [] [] [] [] [] [] [] [] [] [x] []    Supine to Sit [] [] [] [] [] [] [] [x] [] [] [x]    Scooting [] [] [] [] [] [] [] [x] [] [] [x]    Sit to Supine [] [] [] [] [] [] [] [x] [] [] [x]    Transfers    Sit to Stand [] [] [] [] [] [] [x] [x] [] [] [x] With RW   Bed to Chair [] [] [] [] [] [] [x] [x] [] [] [x] With RW   Stand to Sit [] [] [] [] [] [] [x] [x] [] [] [x] With RW   Tub/Shower [] [] [] [] [] [] [] [] [] [] []     Toilet [] [] [] [] [] [] [] [] [] [] []      [] [] [] [] [] [] [] [] [] [] []    I=Independent, Mod I=Modified Independent, S=Supervision/Setup, SBA=Standby Assistance, CGA=Contact Guard Assistance, Min=Minimal Assistance, Mod=Moderate Assistance, Max=Maximal Assistance, Total=Total Assistance, NT=Not Tested    ACTIVITIES OF DAILY LIVING: I Mod I S SBA CGA Min Mod Max Total NT Comments   BASIC ADLs:              Upper Body   Bathing [] [] [] [] [] [] [] [] [] [x]    Lower Body Bathing [] [] [] [] [] [] [] [] [] [x]    Toileting [] [] [] [] [] [] [] [] [] [x]    Upper Body Dressing [] [] [] [] [] [] [] [] [] [x]    Lower Body Dressing [] [] [] [] [] [] [] [] [] [x]    Feeding [] [] [] [] [] [] [] [] [] [x]    Grooming [] [] [] [] [] [x] [] [] [] [] Face washing EOB   Personal Device Care [] [] [] [] [] [] [] [] [] [x]    Functional Mobility [] [] [] [] [] [] [] [] [] []    I=Independent, Mod I=Modified Independent, S=Supervision/Setup, SBA=Standby Assistance, CGA=Contact Guard Assistance, Min=Minimal Assistance, Mod=Moderate Assistance, Max=Maximal Assistance, Total=Total Assistance, NT=Not Tested    BALANCE: Good Fair+ Fair Fair- Poor NT Comments   Sitting Static [] [x] [] [] [] []    Sitting Dynamic [] [] [x] [] [] []              Standing Static [] [] [] [x] [] []    Standing Dynamic [] [] [] [] [x] []        PLAN:     FREQUENCY/DURATION   OT Plan of Care: 3 times/week for duration of hospital stay or until stated goals are met, whichever comes first.    ACUTE OCCUPATIONAL THERAPY GOALS:   (Developed with and agreed upon by patient and/or caregiver.)  (1.)Mr. Singleton will move from supine to sit and sit to supine  in bed with MODIFIED INDEPENDENCE within 7 treatment day(s).    (2.)Mr. Singleton will transfer from bed to chair and chair to bed with MODIFIED INDEPENDENCE using the least restrictive device within 7 treatment day(s).    (3.)Mr. Singleton will ambulate with MODIFIED INDEPENDENCE for 150   feet with the least restrictive device within 7 treatment day(s). (4.)Mr. Singleton will perform standing static and dynamic balance activities x 25 minutes with SUPERVISION to improve safety within 7 treatment day(s). (5.)Mr. Singleton will ambulate and/or perform functional activities for  25 consecutive minutes with stable vital signs and no rests required to improve activity tolerance within 7 treatment days. TREATMENT:     TREATMENT:   Co-Treatment PT/OT necessary due to patient's decreased overall endurance/tolerance levels, as well as need for high level skilled assistance to complete functional transfers/mobility and functional tasks  Therapeutic Activity (30 Minutes): Therapeutic activity included Rolling, Supine to Sit, Sit to Supine, Scooting, Lateral Scooting, Transfer Training, Ambulation on level ground, Sitting balance  and Standing balance to improve functional Activity tolerance, Balance, Coordination, Mobility, Strength and ROM.     TREATMENT GRID:  N/A    AFTER TREATMENT PRECAUTIONS: Alarm Activated, Bed, Bed/Chair Locked, Call light within reach, Needs within reach, RN notified and barrier between knees    INTERDISCIPLINARY COLLABORATION:  RN/ PCT, PT/ PTA and OT/ ROYAL    EDUCATION:       TOTAL TREATMENT DURATION AND TIME:  Time In: 1315  Time Out: 1400 [facility]  Minutes: [place], OT

## 2022-07-04 NOTE — PLAN OF CARE
Problem: Discharge Planning  Goal: Discharge to home or other facility with appropriate resources  Outcome: Progressing     Problem: Skin/Tissue Integrity  Goal: Absence of new skin breakdown  Description: 1. Monitor for areas of redness and/or skin breakdown  2. Assess vascular access sites hourly  3. Every 4-6 hours minimum:  Change oxygen saturation probe site  4. Every 4-6 hours:  If on nasal continuous positive airway pressure, respiratory therapy assess nares and determine need for appliance change or resting period.   Outcome: Progressing     Problem: Safety - Adult  Goal: Free from fall injury  Outcome: Progressing     Problem: ABCDS Injury Assessment  Goal: Absence of physical injury  Outcome: Progressing     Problem: Chronic Conditions and Co-morbidities  Goal: Patient's chronic conditions and co-morbidity symptoms are monitored and maintained or improved  Outcome: Progressing  Flowsheets (Taken 7/3/2022 1929)  Care Plan - Patient's Chronic Conditions and Co-Morbidity Symptoms are Monitored and Maintained or Improved:   Monitor and assess patient's chronic conditions and comorbid symptoms for stability, deterioration, or improvement   Collaborate with multidisciplinary team to address chronic and comorbid conditions and prevent exacerbation or deterioration     Problem: Pain  Goal: Verbalizes/displays adequate comfort level or baseline comfort level  Outcome: Progressing

## 2022-07-04 NOTE — PROGRESS NOTES
Hospitalist Progress Note   Admit Date:  2022 11:43 AM   Name:  Courtney Lozano. Age:  [de-identified] y.o. Sex:  male  :  1940   MRN:  [id number]   Room:  [facility]/    Presenting Complaint: Altered Mental Status     Reason(s) for Admission: Hallucinations [R44.3]     Hospital Course & Interval History:   [de-identified] y.o. male with medical history of Parkinson's, hypertension, insomnia, hypothyroidism who presented with confusion and episodes of hallucinations. [facility]. Since  family has stated that he has been more confused and hallucinating. They observed patient talking to the wall at one point. Patient just saw his neurologist 2 weeks prior that started him on Remeron. According to family he had not started taking that medication. Only antidepressant patient is on currently is Lexapro. According to facility staff patient had not been taking his medications for 1 to 2 days PA. Family had said that  patient was less responsive and more rigid. In the emergency department patient was found to have INO with creatinine of 2.0. No source of infection found. Patient also was found to be hypertensive with blood pressure of 179/89. CT head showed mild atrophy and small vessel ischemic disease without acute abnormalities. Chest x-ray also without any acute abnormalities. Patient not febrile and no severe rigidity observed on admission. Patient was treated with Valium in the ED. Patient rigidity can be consistent with poorly controlled Parkinson's. Per family patient also received Eligard for his prostate cancer on Thursday. It was after that fact that patient started to act \"different\". Family had also stated that patient had fallen a few times on . Patient is admitted for INO and hallucinations with acute metabolic encephalopathy in setting of Parkinson's disease.   INO most likely due to decreased p.o. intake and encephalopathy most likely Awake w/ name calling. Oriented to person, place and , follows commands. Answer yes or no appropriately. Denies pain. Denies SOB. No fever, chills, SOB, chest pain, abdominal pain, nausea, vomiting. Assessment & Plan:     INO, improving   Cr on admission 2.0 (baseline <1). POC UA negative. Most likely due to decreased p.o. intake. Much improved after IVF  Avoid nephrotoxic meds and hypotension  Cr close to baseline. Cont to be off IVF and encourage po intake. Episode of confusion  Hallucinations  Parkinson's disease  Most likely worsening Parkinson's disease. CT head on admission shows mild atrophy with small vessel ischemic disease with no acute abnormalities. POC UA negative for UTI. TSH wnl on admission  Continue Sinemet and Exelon  Delirium precautions  Fall precautions  Avoid hypoglycemia  Vitamin B12, folate--wnl  Neuro consulted, appreciate recs. Challenging and management from a behavioral standpoint. Has had amantadine in the past but would avoid this drug in the future secondary to hallucinations. Somewhat paradoxical response due to stabilizers and was started on clonazepam.  He will need to follow-up with neurology Dr. Mina Byrd on discharge  PT/OT/SLP--recommended STR  Discussed with neurology, patient was okay to be discharged from their standpoint to follow-up with neurology Dr. Mina Byrd outpatient  CT head unremarkable after somnolence episode, most likely d/t meds  Mentation back to baseline now--oriented to person, place and     Hypoxia , resolved   Most likely false ready w/ cold fingers. Sister said Raynauds runs in the family. Ear probe with O2sat 100%. ABG unremarkable  On RA now    Frequent falls  Multifactorial in setting of hypoglycemia and worsening Parkinson's  Fall precautions  PT/OT  Orthostatic VS unremarkable    Frequent PAC's  Confirmed with multiple EKG's,not afib.  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 Lights off, television off, noises down during the night. If able, decrease nursing checks at night  · Therapies as often as possible  · Avoid restraints to the best of your ability   · Avoid sensory deprivation by using glasses and hearing aids, if applicable        Pharmacological intervention  · Replete electrolyte abnormalities and correct metabolic abnormalities  · Limit benzodiazepines, antihistamines, narcotics, anticholinergics. Preference towards Precedex for sedation over fentanyl and benzodiazepines/GABAa agonists. · For dangerous behavior/aggression to self or others can consider Zyprexa or Seroquel if benefits outweigh risk  · For persistent insomnia can use melatonin four hours prior to bedtime or Seroquel 25 mg at bedtime         Discharge Planning: STR most likely 7/5      Diet:  ADULT DIET;  Regular; set up assistance  ADULT ORAL NUTRITION SUPPLEMENT; Breakfast, Lunch, Dinner; Standard High Calorie/High Protein Oral Supplement  DVT PPx: Lovenox SQ  Code status: Full Code    Hospital Problems:  Principal Problem:    INO (acute kidney injury) ([facility] [address]  Active Problems:    Hallucinations    Hypothyroidism    Acute respiratory failure with hypoxia (HCC)    PAC (premature atrial contraction)    Polyneuropathy    Parkinson's disease ([address]    Frequent falls    Episode of confusion    GERD (gastroesophageal reflux disease)    Insomnia    HTN (hypertension)    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 *      Objective:     Patient Vitals for the past 24 hrs:   Temp Pulse Resp BP SpO2   07/04/22 0731 97.7 °F (36.5 °C) 77 16 136/84 100 %   07/04/22 0348 97.5 °F (36.4 °C) 56 16 (!) 156/78 99 %   07/03/22 2330 97.3 °F (36.3 °C) 72 18 124/73 97 %   07/03/22 1929 97.5 °F (36.4 °C) 90 20 (!) 129/59 100 %   07/03/22 1533 97.9 °F (36.6 °C) 89 22 (!) 143/70 100 %   07/03/22 1121 97.7 °F (36.5 °C) 88 20 (!) 155/79 98 %       Oxygen Therapy  SpO2: 100 %  Pulse Oximetry Type: Intermittent  Pulse Oximeter Device Mode: Intermittent  Pulse Oximeter Device Location: Right,Left,Finger  O2 Device: None (Room air)  O2 Flow Rate (L/min):  (weaned from 2 L)    Estimated body mass index is 27.01 kg/m² as calculated from the following:    Height as of this encounter: 5' 0.98\" (1.549 m). Weight as of this encounter: 142 lb 14.4 oz (64.8 kg). Intake/Output Summary (Last 24 hours) at 7/4/2022 1040  Last data filed at 7/4/2022 0814  Gross per 24 hour   Intake 120 ml   Output 250 ml   Net -130 ml         Physical Exam:     Blood pressure 136/84, pulse 77, temperature 97.7 °F (36.5 °C), temperature source Oral, resp. rate 16, height 5' 0.98\" (1.549 m), weight 142 lb 14.4 oz (64.8 kg), SpO2 100 %. General:    Chronically ill appearing. Alert and oriented back to baseline  Head:  Normocephalic, atraumatic  Eyes:  Sclerae appear normal.  Pupils equally round. ENT:  Nares appear normal, no drainage. Moist oral mucosa  Neck:  No restricted ROM. Trachea midline   CV:   Regular rate, irregular rhythm. No m/r/g. No jugular venous distension. Lungs:   CTAB. No wheezing, rhonchi, or rales. Respirations even, unlabored  Abdomen: Bowel sounds present. Soft, nontender, nondistended. Extremities: No cyanosis or clubbing. No edema  Skin:     No rashes and normal coloration. Warm and dry. Neuro:  CN II-XII grossly intact. Sensation intact.   A&Ox to person, place and . Follows commands well.    Psych:  Pleasantly demented    I have reviewed ordered lab tests and independently visualized imaging below:    Recent Labs:  Recent Results (from the past 48 hour(s))   EKG 12 Lead    Collection Time: 22  3:34 PM   Result Value Ref Range    Ventricular Rate 92 BPM    Atrial Rate 92 BPM    P-R Interval 158 ms    QRS Duration 98 ms    Q-T Interval 380 ms    QTc Calculation (Bazett) 469 ms    P Axis 61 degrees    R Axis -70 degrees    T Axis 50 degrees    Diagnosis       Sinus rhythm with  Premature atrial complexes in a pattern of bigeminy   Basic Metabolic Panel    Collection Time: 22  5:09 AM   Result Value Ref Range    Sodium 139 136 - 145 mmol/L    Potassium 4.6 3.5 - 5.1 mmol/L    Chloride 103 98 - 107 mmol/L    CO2 31 21 - 32 mmol/L    Anion Gap 5 (L) 7 - 16 mmol/L    Glucose 97 65 - 100 mg/dL    BUN 31 (H) 8 - 23 MG/DL    CREATININE 1.30 0.8 - 1.5 MG/DL    GFR African American >60 >60 ml/min/1.73m2    GFR Non- 56 (L) >60 ml/min/1.73m2    Calcium 8.9 8.3 - 10.4 MG/DL   CBC with Auto Differential    Collection Time: 22  5:09 AM   Result Value Ref Range    WBC 5.4 4.3 - 11.1 K/uL    RBC 4.13 (L) 4.23 - 5.6 M/uL    Hemoglobin 12.8 (L) 13.6 - 17.2 g/dL    Hematocrit 39.8 (L) 41.1 - 50.3 %    MCV 96.4 79.6 - 97.8 FL    MCH 31.0 26.1 - 32.9 PG    MCHC 32.2 31.4 - 35.0 g/dL    RDW 12.8 11.9 - 14.6 %    Platelets 304 683 - 868 K/uL    MPV 9.0 (L) 9.4 - 12.3 FL    nRBC 0.00 0.0 - 0.2 K/uL    Differential Type AUTOMATED      Seg Neutrophils 58 43 - 78 %    Lymphocytes 31 13 - 44 %    Monocytes 8 4.0 - 12.0 %    Eosinophils % 2 0.5 - 7.8 %    Basophils 1 0.0 - 2.0 %    Immature Granulocytes 0 0.0 - 5.0 %    Segs Absolute 3.1 1.7 - 8.2 K/UL    Absolute Lymph # 1.7 0.5 - 4.6 K/UL    Absolute Mono # 0.4 0.1 - 1.3 K/UL    Absolute Eos # 0.1 0.0 - 0.8 K/UL    Basophils Absolute 0.0 0.0 - 0.2 K/UL    Absolute Immature Granulocyte 0.0 0.0 - 0.5 K/UL       Other Studies:  CT HEAD WO CONTRAST   Final Result   No acute intracranial abnormality. XR CHEST 1 VIEW   Final Result      XR CHEST 1 VIEW   Final Result   No acute abnormality.       CT HEAD WO CONTRAST   Final Result   MILD ATROPHY AND SMALL VESSEL ISCHEMIC DISEASE WITH NO ACUTE   INTRACRANIAL ABNORMALITY IDENTIFIED AT NONCONTRAST CT.               XR CHEST PORTABLE   Final Result   No acute findings in the chest.             Current Meds:  Current Facility-Administered Medications   Medication Dose Route Frequency    enoxaparin (LOVENOX) injection 40 mg  40 mg SubCUTAneous Q24H    Vitamin D (CHOLECALCIFEROL) tablet 1,000 Units  1,000 Units Oral Daily    amLODIPine (NORVASC) tablet 5 mg  5 mg Oral Daily    lidocaine 4 % external patch 2 patch  2 patch TransDERmal Daily    glucose chewable tablet 16 g  4 tablet Oral PRN    dextrose bolus 10% 125 mL  125 mL IntraVENous PRN    Or    dextrose bolus 10% 250 mL  250 mL IntraVENous PRN    glucagon injection 1 mg  1 mg IntraMUSCular PRN    dextrose 5 % solution  100 mL/hr IntraVENous PRN    hydrALAZINE (APRESOLINE) injection 5 mg  5 mg IntraVENous Q6H PRN    clonazePAM (KLONOPIN) tablet 1 mg  1 mg Oral Daily    carbidopa-levodopa (PARCOPA)  MG per disintegrating tablet 1 tablet  1 tablet Oral 4x Daily    levalbuterol (XOPENEX) nebulizer solution 1.25 mg  1.25 mg Nebulization Q8H PRN    carbidopa-levodopa (SINEMET CR)  MG per extended release tablet 2 tablet  2 tablet Oral Nightly    escitalopram (LEXAPRO) tablet 10 mg  10 mg Oral Daily    gabapentin (NEURONTIN) capsule 100 mg  100 mg Oral TID    latanoprost (XALATAN) 0.005 % ophthalmic solution 1 drop  1 drop Ophthalmic Nightly    levothyroxine (SYNTHROID) tablet 125 mcg  125 mcg Oral QAM AC    lisinopril (PRINIVIL;ZESTRIL) tablet 5 mg  5 mg Oral Daily    melatonin tablet 9 mg  9 mg Oral Nightly    trospium (SANCTURA) tablet 20 mg  20 mg Oral Daily    pantoprazole (PROTONIX) tablet 40 mg  40 mg Oral QAM AC    rivastigmine (EXELON) capsule 6 mg  6 mg Oral BID WC    sodium chloride flush 0.9 % injection 5-40 mL  5-40 mL IntraVENous 2 times per day    sodium chloride flush 0.9 % injection 5-40 mL  5-40 mL IntraVENous PRN    0.9 % sodium chloride infusion   IntraVENous PRN    ondansetron (ZOFRAN-ODT) disintegrating tablet 4 mg  4 mg Oral Q8H PRN    Or    ondansetron (ZOFRAN) injection 4 mg  4 mg IntraVENous Q6H PRN    polyethylene glycol (GLYCOLAX) packet 17 g  17 g Oral Daily PRN    acetaminophen (TYLENOL) tablet 650 mg  650 mg Oral Q6H PRN    Or    acetaminophen (TYLENOL) suppository 650 mg  650 mg Rectal Q6H PRN    magnesium sulfate 2000 mg in 50 mL IVPB premix  2,000 mg IntraVENous PRN    potassium chloride (KLOR-CON M) extended release tablet 40 mEq  40 mEq Oral PRN    Or    potassium bicarb-citric acid (EFFER-K) effervescent tablet 40 mEq  40 mEq Oral PRN    Or    potassium chloride 10 mEq/100 mL IVPB (Peripheral Line)  10 mEq IntraVENous PRN       Signed: Beulah Weaver, DO    Part of this note may have been written by using a voice dictation software. The note has been proof read but may still contain some grammatical/other typographical errors.

## 2022-07-04 NOTE — PROGRESS NOTES
Neurology  We had signed off the patient as he was improving. Unfortunately Geodon was given for agitation 1 evening.   This as anticipated set him back severely and was accompanied by territory and other difficulties noted in the chart    The usage of any neuroleptic in this or any other Parkinson patient clearly should be absolutely avoided

## 2022-07-05 VITALS
BODY MASS INDEX: 26.98 KG/M2 | WEIGHT: 142.9 LBS | OXYGEN SATURATION: 100 % | HEIGHT: 61 IN | SYSTOLIC BLOOD PRESSURE: 115 MMHG | RESPIRATION RATE: 15 BRPM | HEART RATE: 72 BPM | DIASTOLIC BLOOD PRESSURE: 66 MMHG | TEMPERATURE: 97.3 F

## 2022-07-05 LAB
SARS-COV-2 RDRP RESP QL NAA+PROBE: NOT DETECTED
SOURCE: NORMAL

## 2022-07-05 PROCEDURE — 6370000000 HC RX 637 (ALT 250 FOR IP): Performed by: FAMILY MEDICINE

## 2022-07-05 PROCEDURE — 97530 THERAPEUTIC ACTIVITIES: CPT

## 2022-07-05 PROCEDURE — 97112 NEUROMUSCULAR REEDUCATION: CPT

## 2022-07-05 PROCEDURE — 87635 SARS-COV-2 COVID-19 AMP PRB: CPT

## 2022-07-05 PROCEDURE — 6370000000 HC RX 637 (ALT 250 FOR IP): Performed by: STUDENT IN AN ORGANIZED HEALTH CARE EDUCATION/TRAINING PROGRAM

## 2022-07-05 PROCEDURE — APPSS45 APP SPLIT SHARED TIME 31-45 MINUTES: Performed by: NURSE PRACTITIONER

## 2022-07-05 PROCEDURE — 6370000000 HC RX 637 (ALT 250 FOR IP): Performed by: PSYCHIATRY & NEUROLOGY

## 2022-07-05 PROCEDURE — 99232 SBSQ HOSP IP/OBS MODERATE 35: CPT | Performed by: PSYCHIATRY & NEUROLOGY

## 2022-07-05 PROCEDURE — 92526 ORAL FUNCTION THERAPY: CPT

## 2022-07-05 PROCEDURE — 97535 SELF CARE MNGMENT TRAINING: CPT

## 2022-07-05 RX ORDER — LEVALBUTEROL INHALATION SOLUTION 1.25 MG/3ML
1.25 SOLUTION RESPIRATORY (INHALATION) EVERY 8 HOURS PRN
Qty: 180 ML | Refills: 1 | Status: SHIPPED | OUTPATIENT
Start: 2022-07-05

## 2022-07-05 RX ORDER — CLONAZEPAM 1 MG/1
1 TABLET ORAL DAILY
Qty: 5 TABLET | Refills: 0 | Status: SHIPPED | OUTPATIENT
Start: 2022-07-05 | End: 2022-10-11

## 2022-07-05 RX ORDER — VITAMIN B COMPLEX
1000 TABLET ORAL DAILY
Qty: 60 TABLET | Refills: 0
Start: 2022-07-05

## 2022-07-05 RX ORDER — AMLODIPINE BESYLATE 5 MG/1
5 TABLET ORAL DAILY
Qty: 30 TABLET | Refills: 1
Start: 2022-07-05 | End: 2022-08-10

## 2022-07-05 RX ADMIN — LISINOPRIL 5 MG: 5 TABLET ORAL at 08:23

## 2022-07-05 RX ADMIN — VITAMIN D, TAB 1000IU (100/BT) 1000 UNITS: 25 TAB at 08:24

## 2022-07-05 RX ADMIN — PANTOPRAZOLE SODIUM 40 MG: 40 TABLET, DELAYED RELEASE ORAL at 05:08

## 2022-07-05 RX ADMIN — ESCITALOPRAM OXALATE 10 MG: 10 TABLET ORAL at 08:23

## 2022-07-05 RX ADMIN — LEVOTHYROXINE SODIUM 125 MCG: 0.07 TABLET ORAL at 05:08

## 2022-07-05 RX ADMIN — RIVASTIGMINE TARTRATE 6 MG: 3 CAPSULE ORAL at 08:24

## 2022-07-05 RX ADMIN — CARBIDOPA AND LEVODOPA 1 TABLET: 25; 250 TABLET, ORALLY DISINTEGRATING ORAL at 08:27

## 2022-07-05 RX ADMIN — GABAPENTIN 100 MG: 100 CAPSULE ORAL at 08:24

## 2022-07-05 RX ADMIN — CARBIDOPA AND LEVODOPA 1 TABLET: 25; 250 TABLET, ORALLY DISINTEGRATING ORAL at 11:55

## 2022-07-05 RX ADMIN — AMLODIPINE BESYLATE 5 MG: 5 TABLET ORAL at 08:23

## 2022-07-05 RX ADMIN — TROSPIUM CHLORIDE 20 MG: 20 TABLET, FILM COATED ORAL at 08:24

## 2022-07-05 NOTE — DISCHARGE SUMMARY
Hospitalist Discharge Summary   Admit Date:  2022 11:43 AM   DC Note date: 2022  Name:  Vielka Davidson. Age:  [de-identified] y.o. Sex:  male  :  1940   MRN:  [id number]   Room:  [facility] [place]  PCP:  None Provider    Presenting Complaint: Altered Mental Status     Initial Admission Diagnosis: Hallucinations [R44.3]     Problem List for this Hospitalization (present on admission):    Principal Problem:    INO (acute kidney injury) ([address]  Active Problems:    Hallucinations    Hypothyroidism    Acute respiratory failure with hypoxia (HCC)    PAC (premature atrial contraction)    Polyneuropathy    Parkinson's disease ([address]    Frequent falls    Episode of confusion    GERD (gastroesophageal reflux disease)    Insomnia    HTN (hypertension)    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 *    Did Patient have Sepsis (YES OR NO): No    Hospital Course:    [de-identified] y. o. male with medical history of Parkinson's, hypertension, insomnia, hypothyroidism who presented with confusion and episodes of hallucinations.  Patient lives in [facility] assisted living.  Since  family has stated that he has been more confused and hallucinating. Wilma Joseph observed patient talking to the wall at one point.  Patient just saw his neurologist 2 weeks prior that started him on Remeron.  According to family he had not started taking that medication.  Only antidepressant patient is on currently is Lexapro.  According to facility staff patient had not been taking his medications for 1 to 2 days GILMA Ling Guardian had said that  patient was less responsive and more rigid.  In the emergency department patient was found to have INO with creatinine of 2.0.  No source of infection found.  Patient also was found to be hypertensive with blood pressure of 179/89.  CT head showed mild atrophy and small vessel ischemic disease without acute abnormalities.  Chest x-ray also without any acute abnormalities.  Patient not febrile and no severe rigidity observed on admission.  Patient was treated with Valium in the ED.  Patient rigidity can be consistent with poorly controlled Parkinson's.  Per family patient also received Eligard for his prostate cancer on Thursday.  It was after that fact that patient started to act \"different\".  Family had also stated that patient had fallen a few times on Sunday 6/26.     Patient is admitted for INO and hallucinations with acute metabolic encephalopathy in setting of Parkinson's disease. Trisha Gallery most likely due to decreased p.o. intake and encephalopathy most likely due to worsening Parkinson's. Trisha Gallery resolved with IVF. CT head on admission was unremarkable for acute findings; only mild atrophy with small vessel ischemic disease.  Neurology was consulted. Challenging and management from a behavioral standpoint.  Has had amantadine in the past but would avoid this drug in the future secondary to hallucinations.  Somewhat paradoxical response due to stabilizers and was started on Clonazepam 1 mg qd chronically. Raven Bhandari will need to follow-up with neurology Dr. Dinora Fischer. Discussed with neurology, patient was okay to be discharged from their standpoint to follow-up with neurology Dr. Ascencio outpatient.     Patient initially had episodes of hypoglycemia on admission but this was resolved with dextrose and improvement in p.o. intake. He continues to progress well. He was planned for discharge on 7/1 but was noted to be minimally responsive in the morning after receiving Geodon night prior for agitation. Rapid response was called with persistent difficulty to arouse as well as hypoxia in 80s and at times unable to obtain pulse ox. BG 95 at bedside. Patient was placed on nonrebreather. ABG stat was overall unremarkable 7.41/45/542/28. He was taken off of nonrebreather and placed back on 2LO2 NC.  CXR stat unremarkable.   CT head was unremarkable. EKG shows NSR with frequent PACs. He became alert and oriented, talking and following commands shortly after rapid response. Acute episode most likely due to sedation from meds. He was weaned down to room air. The usage of any neuroleptic in this or any other Parkinson patient clearly should be absolutely avoided. PT/OT recommended SNF for patient. He continues to progress well and was discharged in hemodynamically stable condition. He will need to follow-up with his PCP in 3-5 days and follow-up with neurology in 2-4 weeks. Return precautions given. Disposition: SNF  Diet: ADULT DIET; Regular; set up assistance  ADULT ORAL NUTRITION SUPPLEMENT; Breakfast, Lunch, Dinner; Standard High Calorie/High Protein Oral Supplement  Code Status: Full Code    Follow Ups:   Contact information for follow-up providers     None Provider In 1 week. Why: Hospital follow up                 Contact information for after-discharge care     Discharge Destination     [address] [facility]). Service: Skilled Nursing  Contact information:  [facility] [address] [id number] [phone]                           Follow up labs/diagnostics (ultimately defer to outpatient provider):  PCP in 1 week  Neurology Dr. Rosa Kumar in 2-4 weeks      Time spent in patient discharge and coordination 45 minutes. Plan was discussed with patient and daughter, sister. All questions answered. Patient was stable at time of discharge. Instructions given to call a physician or return if any concerns. Current Discharge Medication List      START taking these medications    Details   levalbuterol (XOPENEX) 1.25 MG/3ML nebulizer solution Take 3 mLs by nebulization every 8 hours as needed for Wheezing  Qty: 180 mL, Refills: 1      clonazePAM (KLONOPIN) 1 MG tablet Take 1 tablet by mouth daily for 5 days.   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 25 mcg=1000 Units. Please double check dosages. CONTINUE these medications which have NOT CHANGED    Details   escitalopram (LEXAPRO) 10 MG tablet TAKE 1 TABLET BY MOUTH  DAILY  Qty: 90 tablet, Refills: 3    Comments: Requesting 1 year supply      mirtazapine (REMERON) 15 MG tablet Take 1 tablet by mouth nightly  Qty: 90 tablet, Refills: 3      atropine 1 % ophthalmic solution 2-3 drops in a tablespoon of water and swish and spit up to 4 times a day for drooling. carbidopa-levodopa (SINEMET)  MG per tablet TAKE 1 TABLET BY MOUTH AT 6AM, 10AM, 2PM, AND 6PM STRICT TIMES DUE TO ADVANCED PD      carbidopa-levodopa (SINEMET CR)  MG per extended release tablet 1 tab at 10 pm strict time      gabapentin (NEURONTIN) 100 MG capsule Take 100 mg by mouth 3 times daily.       latanoprost (XALATAN) 0.005 % ophthalmic solution Apply 1 drop to eye      levothyroxine (SYNTHROID) 125 MCG tablet Take 125 mcg by mouth every morning (before breakfast)      lisinopril (PRINIVIL;ZESTRIL) 5 MG tablet Take 5 mg by mouth daily      melatonin 10 MG CAPS capsule Take 10 mg by mouth      mirabegron (MYRBETRIQ) 50 MG TB24 Take 50 mg by mouth      omeprazole (PRILOSEC) 40 MG delayed release capsule Take 40 mg by mouth every morning      polyethylene glycol (GLYCOLAX) 17 GM/SCOOP powder Take 17 g by mouth daily      rivastigmine (EXELON) 6 MG capsule Take 6 mg by mouth 2 times daily (with meals)         STOP taking these medications       Amantadine (SYMMETREL) 100 MG TABS tablet Comments:   Reason for Stopping:               Procedures done this admission:  * No surgery found *    Consults this admission:  IP CONSULT TO NEUROLOGY  FOLLOW UP VISIT  FOLLOW UP VISIT    Echocardiogram results:  No results found for this or any previous visit. Diagnostic Imaging/Tests:   CT HEAD WO CONTRAST    Result Date: 6/28/2022  NONCONTRAST HEAD CT CLINICAL HISTORY:  Hallucinations. TECHNIQUE:  Axial images were obtained with spiral technique. Radiation dose reduction was achieved using one or all of the following techniques: automated exposure control, weight-based dosing, iterative reconstruction. COMPARISON:  March 18, 2022. REPORT:   Standard noncontrast head CT demonstrates no definite intracranial mass effect, hemorrhage, or evidence of acute geographic infarction. White matter hypodensities are most consistent with small vessel ischemic disease. There is mild atrophy. Orbits  and paranasal sinuses are clear where imaged. Bone windows demonstrate no definite fracture or destruction. MILD ATROPHY AND SMALL VESSEL ISCHEMIC DISEASE WITH NO ACUTE INTRACRANIAL ABNORMALITY IDENTIFIED AT NONCONTRAST CT.     XR CHEST PORTABLE    Result Date: 6/28/2022  Chest X-ray INDICATION: Hallucinations. COMPARISON: Chest x-ray 3/18/2022. A portable AP view of the chest was obtained. FINDINGS: The lungs are clear. The heart is normal in size. No pneumothorax. No pleural effusions. No acute findings in the chest.     XR CHEST 1 VIEW    Result Date: 6/29/2022  Portable chest x-ray CLINICAL INDICATION: Hypoxia FINDINGS: Single AP view the chest compared to a similar exam dated 6/28/2022 show the lungs to be expanded and clear. No pleural effusion or pneumothorax. The cardiac silhouette and mediastinum are unremarkable. No acute abnormality. Labs: Results:       BMP, Mg, Phos Recent Labs     07/04/22  0509      K 4.6      CO2 31   BUN 31*      CBC Recent Labs     07/04/22  0509   WBC 5.4   RBC 4.13*   HGB 12.8*   HCT 39.8*         LFT No results for input(s): ALT, TP, ALB, GLOB in the last 72 hours.     Invalid input(s): SGOT, TBIL, AP, 105/83 99 %   22 0008 97.7 °F (36.5 °C) 93 18 112/64 99 %   22 2100 97.8 °F (36.6 °C) 68 18 (!) 147/81 98 %   22 1538 98.3 °F (36.8 °C) 72 16 128/74 --   22 1315 -- 92 -- 128/69 --   22 1148 97.6 °F (36.4 °C) 60 14 124/71 94 %       Oxygen Therapy  SpO2: 98 %  Pulse Oximetry Type: Intermittent  Pulse Oximeter Device Mode: Intermittent  Pulse Oximeter Device Location: Left,Hand,Finger  O2 Device: None (Room air)  O2 Flow Rate (L/min):  (weaned from 2 L)    Estimated body mass index is 27.01 kg/m² as calculated from the following:    Height as of this encounter: 5' 0.98\" (1.549 m). Weight as of this encounter: 142 lb 14.4 oz (64.8 kg). Intake/Output Summary (Last 24 hours) at 2022 0850  Last data filed at 2022 1832  Gross per 24 hour   Intake 120 ml   Output --   Net 120 ml         Physical Exam:  General:          Chronically ill appearing. Moves all extremities spontaneously. Head:               Normocephalic, atraumatic  Eyes:               Sclerae appear normal.  Pupils equally round. ENT:                Nares appear normal, no drainage.  Moist oral mucosa  Neck:               No restricted ROM.  Trachea midline   CV:                  RRR.  No m/r/g.  No jugular venous distension. Lungs:             CTAB.  No wheezing, rhonchi, or rales.  Respirations even, unlabored  Abdomen:        Bowel sounds present.  Soft, nontender, nondistended. Extremities:     No cyanosis or clubbing.  No edema  Skin:                No rashes and normal coloration.   Warm and dry.    Neuro:             CN II-XII grossly intact.  Sensation intact.  A&Ox to person, place and . Follows commands well. Psych:             Pleasantly demented    Signed: Alex Pradhan DO    Part of this note may have been written by using a voice dictation software. The note has been proof read but may still contain some grammatical/other typographical errors.

## 2022-07-05 NOTE — PROGRESS NOTES
PHYSICAL THERAPY Daily Note and AM  (Link to Caseload Tracking: PT Visit Days : 5  Time In/Out PT Charge Capture  Rehab Caseload Tracker  Orders      Rebeccavermeenakshi Olson is a [de-identified] y.o. male   PRIMARY DIAGNOSIS: INO (acute kidney injury) ([facility] [address]  Hallucinations [R44.3]       Inpatient: Payor: Eleanor Hallman / Plan: MEDICARE PART A AND B / Product Type: *No Product type* /     ASSESSMENT:     REHAB RECOMMENDATIONS:   Recommendation to date pending progress:  Setting:   Short-term Rehab    Equipment:     To Be Determined     ASSESSMENT:  Mr. Soren Worthington was received with lap belt on, initially upset about not seeing fireworks last night but eventually calmed down and was agreeable to therapy. Today he progressed to sitting at EOB and standing with CGA. Pt noted to be significantly less stiff today with decreased tone in all limbs as compared to yesterday. He was able to ambulate 5 ft x 2 with RW and Vj of 2. Pt continues to demonstrate short, shuffled steps with posterior lean but improved balance and gait speed noted today. He was able to stand at the sink and participate in self care activities initially with Vj for balance, but did have a LOB and had to be assisted to chair with maxA. Pt reported feeling fine, denied feeling lightheaded or dizzy. BP noted to be 85/54 despite pt being asymptomatic. RN notified, pt assisted back to supine with modA. RN ok with lap belt being left off. BP in supine improved to 96/64. Good progress today, will continue to follow.       SUBJECTIVE:   Mr. Soren Worthington states, \"I'll do whatever you want me to do\"     Social/Functional Lives With: Alone  Type of Home: Assisted living  Home Layout: One level  ADL Assistance: Independent  Ambulation Assistance: Needs assistance  Active : No  OBJECTIVE:     PAIN: VITALS / O2: PRECAUTION / [facility] / DRAINS:   Pre Treatment:   Pain Assessment: None - Denies Pain      Post Treatment: 0 Vitals   Vitals  BP: 96/64  Patient Position: Supine    Oxygen None    RESTRICTIONS/PRECAUTIONS:  Restrictions/Precautions  Restrictions/Precautions: Fall Risk  Restrictions/Precautions: Fall Risk     MOBILITY: I Mod I S SBA CGA Min Mod Max Total  NT x2 Comments:   Bed Mobility    Rolling [] [] [] [] [x] [] [] [] [] [] []    Supine to Sit [] [] [] [] [x] [] [] [] [] [] []    Scooting [] [] [] [] [x] [] [] [] [] [] []    Sit to Supine [] [] [] [] [x] [] [] [] [] [] []    Transfers    Sit to Stand [] [] [] [] [x] [] [] [] [] [] []    Bed to Chair [] [] [] [] [] [x] [] [] [] [] [x]    Stand to Sit [] [] [] [] [x] [] [] [] [] [] []     [] [] [] [] [] [] [] [] [] [] []    I=Independent, Mod I=Modified Independent, S=Supervision, SBA=Standby Assistance, CGA=Contact Guard Assistance,   Min=Minimal Assistance, Mod=Moderate Assistance, Max=Maximal Assistance, Total=Total Assistance, NT=Not Tested    BALANCE: Good Fair+ Fair Fair- Poor NT Comments   Sitting Static [x] [] [] [] [] []    Sitting Dynamic [] [x] [] [] [] []              Standing Static [] [x] [] [] [] []    Standing Dynamic [] [] [x] [] [] []      GAIT: I Mod I S SBA CGA Min Mod Max Total  NT x2 Comments:   Level of Assistance [] [] [] [] [] [] [x] [x] [] [] [x]    Distance 5 x 2 feet    DME Gait Belt and Rolling Walker    Gait Quality Decreased step length, Shuffling  and Trunk sway increased    Weightbearing Status      Stairs      I=Independent, Mod I=Modified Independent, S=Supervision, SBA=Standby Assistance, CGA=Contact Guard Assistance,   Min=Minimal Assistance, Mod=Moderate Assistance, Max=Maximal Assistance, Total=Total Assistance, NT=Not Tested    PLAN:   ACUTE PHYSICAL THERAPY GOALS:   (Developed with and agreed upon by patient and/or caregiver. )  LTG:  (1.)Mr. Singleton will move from supine to sit and sit to supine  in bed with MODIFIED INDEPENDENCE within 7 treatment day(s). (2.)Mr. Singleton will transfer from bed to chair and chair to bed with MODIFIED INDEPENDENCE using the least restrictive device within 7 treatment day(s). (3.)Mr. Singleton will ambulate with MODIFIED INDEPENDENCE for 150   feet with the least restrictive device within 7 treatment day(s). (4.)Mr. Singleton will perform standing static and dynamic balance activities x 25 minutes with SUPERVISION to improve safety within 7 treatment day(s). (5.)Mr. Singleton will ambulate and/or perform functional activities for  25 consecutive minutes with stable vital signs and no rests required to improve activity tolerance within 7 treatment days. FREQUENCY AND DURATION: 3 times/week for duration of hospital stay or until stated goals are met, whichever comes first.    TREATMENT:   TREATMENT:   Co-Treatment PT/OT necessary due to patient's decreased overall endurance/tolerance levels, as well as need for high level skilled assistance to complete functional transfers/mobility and functional tasks  Therapeutic Activity (29 Minutes): Therapeutic activity included Rolling, Supine to Sit, Sit to Supine, Scooting, Transfer Training, Ambulation on level ground, Sitting balance  and Standing balance to improve functional Activity tolerance, Balance, Mobility and Strength. TREATMENT GRID:  N/A    AFTER TREATMENT PRECAUTIONS: Alarm Activated, Bed, Bed/Chair Locked, Call light within reach, Needs within reach, RN notified and Visitors at bedside    INTERDISCIPLINARY COLLABORATION:  RN/ PCT and OT/ ROYAL    EDUCATION: Education Given To: Patient  Education Provided: Role of Therapy;Plan of Care;Precautions;Transfer Training; Fall Prevention Strategies; Energy Conservation; Family Education;Equipment;Orientation  Education Method: Verbal  Barriers to Learning: Cognition  Education Outcome: Continued education needed    TIME IN/OUT:  Time In: 1050  Time Out: 1119  Minutes: 300 Dario CLEMENTE PT

## 2022-07-05 NOTE — PROGRESS NOTES
Report called to [facility], transport arranged for 1200, will update sister Magdalena Burrell when patient is picked up.

## 2022-07-05 NOTE — PROGRESS NOTES
SPEECH LANGUAGE PATHOLOGY: DYSPHAGIA  Daily Note #1    NAME: Kenneth Singleton Jr. : 1940  MRN: [id number]    ADMISSION DATE: 2022  ADMITTING DIAGNOSIS: has Psychosis due to Parkinson's disease ([address] Hashimoto's thyroiditis; Neurologic orthostatic hypotension ([address] Polyneuropathy; Parkinson's disease ([address] Casas's esophagus with esophagitis; Leg edema, left; Hip pain, chronic, left; Frequent falls; Dementia due to Parkinson's disease without behavioral disturbance ([address] Parkinson disease ([address] Subdural hematoma ([address] Moderate protein-calorie malnutrition ([address] Postoperative hypothyroidism; Orthostatic hypotension; Tremor; Episode of confusion; Chronic fatigue syndrome; Unsteady gait; Inguinal hernia; Peyronie's disease; AAA (abdominal aortic aneurysm) ([address] Tibial neuropathy; Hyponatremia; Cognitive deficits; Cerebrovascular accident (CVA) ([address] Neuropathy involving both lower extremities; GERD (gastroesophageal reflux disease); Lesion of lateral popliteal nerve; Impaired cognition; Insomnia; Nocturia; HTN (hypertension); Erectile dysfunction; 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 ([address] Mixed anxiety depressive disorder; Enthesopathy of ankle and tarsus; Hallucinations; INO (acute kidney injury) ([address] Hypothyroidism; Acute respiratory failure with hypoxia ([address] and PAC (premature atrial contraction) on their problem list.    ICD-10: Treatment Diagnosis: R13.11 Dysphagia, Oral Phase    RECOMMENDATIONS   Diet:  Diet Solids Recommendation: Regular  Liquid Consistency Recommendation: Thin    Medications:   PO         Compensatory Swallowing Strategies:Small bites/sips   Therapeutic Intervention:    Patient continues to require skilled intervention: No   D/C Recommendations:  (No further speech therapy services required during acute phase of care.  Patient planned for SNF; patient may benefit from [address] [place] [address] Dr services at next level of care.)     ASSESSMENT    Dysphagia Diagnosis: Swallow function [facility]  Patient's oral and pharyngeal phases of swallow were unremarkable. Appropriate to continue regular diet with thin liquids. No further speech therapy needs identified during acute phase of care; ST will sign off. Patient planned to d/c to SNF, per report, and patient may benefit from [address] [place] [address] services at next level of care. GENERAL    History of Present Injury/Illness: Mr. Heidi Johnston  has a past medical history of AAA (abdominal aortic aneurysm) ([facility] Casas's esophagus with esophagitis, Cerebrovascular accident (CVA) ([address] Chronic fatigue syndrome, Cognitive deficits, Colon polyps, Elevated PSA, Enthesopathy of ankle and tarsus, GERD (gastroesophageal reflux disease), Hashimoto's thyroiditis, History of nuclear stress test, Hypotension, Impaired cognition, Inguinal hernia, Insomnia, Lesion of lateral popliteal nerve, Mixed anxiety depressive disorder, Neuropathy involving both lower extremities, Parkinson's disease ([address] Peyronie's disease, Polyneuropathy, Postoperative hypothyroidism, Tibial neuropathy, and Unsteady gait. Uriel Ballard He also  has a past surgical history that includes other surgical history; other surgical history (2006); Cataract removal (Right, 2012); hernia repair (Left, 2011); Thyroidectomy; Breast biopsy (Bilateral); and Colonoscopy (2000 & 2005). Chart Reviewed: Yes  Subjective: Patient awake, alert, and agreeable to speech therapy services. When engaged, patient denied pain. Behavior/Cognition: Alert; Cooperative (Patient oriented to self and place only.)  Communication Observation: Functional  Follows Directions: Simple  Respiratory Status: Room air                    Current Diet : Regular  Current Liquid Diet : Thin  Pain:                                            OBJECTIVE        Oral Motor   Labial: No impairment  Oral Hygiene: Clean  Lingual: No impairment              Oropharyngeal Phase:     Vocal Quality: No Impairment  Consistency Presented: Thin;Regular  Oral Residue: Less than 10% of bolus (Trace residual on lingual surface with solids/geno cracker.)  Aspiration Signs/Symptoms: None  Pharyngeal Phase Characteristics: No impairment, issues, or problems        Oral Phase - Comment: Patient's oral phase of swallow was unremarkable. Pharyngeal Phase: Pharyngeal phase of swallow was unremarkable, as able to be assessed at bedside. PLAN    Duration/Frequency: No treatment indicated at this time    Dysphagia Outcome and Severity Scale (JOAN)  Dysphagia Outcome Severity Scale: Level 6: Within functional limits/Modified independence  Interpretation of Tool: The Dysphagia Outcome and Severity Scale (JOAN) is a simple, easy-to-use, 7-point scale developed to systematically rate the functional severity of dysphagia based on objective assessment and make recommendations for diet level, independence level, and type of nutrition. Normal(7), Functional(6), Mild(5), Mild-Moderate(4), Moderate(3), Moderate-Severe(2), Severe(1)    Speech Therapy Prognosis  Prognosis: Good    Education: RN   Patient Education: Provided as to role of ST services, diet recommendation. Patient agreeable to continue regular diet and denied additional speech therapy services at this time.       Current Medications:   No current facility-administered medications on file prior to encounter. Current Outpatient Medications on File Prior to Encounter   Medication Sig Dispense Refill    mirtazapine (REMERON) 15 MG tablet Take 1 tablet by mouth nightly 90 tablet 3    atropine 1 % ophthalmic solution 2-3 drops in a tablespoon of water and swish and spit up to 4 times a day for drooling.  carbidopa-levodopa (SINEMET)  MG per tablet TAKE 1 TABLET BY MOUTH AT 6AM, 10AM, 2PM, AND 6PM STRICT TIMES DUE TO ADVANCED PD      carbidopa-levodopa (SINEMET CR)  MG per extended release tablet 1 tab at 10 pm strict time      gabapentin (NEURONTIN) 100 MG capsule Take 100 mg by mouth 3 times daily.       latanoprost (XALATAN) 0.005 % ophthalmic solution Apply 1 drop to eye      levothyroxine (SYNTHROID) 125 MCG tablet Take 125 mcg by mouth every morning (before breakfast)      lisinopril (PRINIVIL;ZESTRIL) 5 MG tablet Take 5 mg by mouth daily      melatonin 10 MG CAPS capsule Take 10 mg by mouth      mirabegron (MYRBETRIQ) 50 MG TB24 Take 50 mg by mouth      omeprazole (PRILOSEC) 40 MG delayed release capsule Take 40 mg by mouth every morning      polyethylene glycol (GLYCOLAX) 17 GM/SCOOP powder Take 17 g by mouth daily      rivastigmine (EXELON) 6 MG capsule Take 6 mg by mouth 2 times daily (with meals)         PRECAUTIONS/ALLERGIES: Brompheniramine, Carisoprodol, Meperidine, Penicillins, Phenylephrine hcl, Zaleplon, Donepezil, Haloperidol lactate, Quetiapine, and Rofecoxib        Therapy Time  SLP Individual Minutes  Time In: 1010  Time Out: 1020  Minutes: [address], SLP  7/5/2022 10:52 AM

## 2022-07-05 NOTE — PROGRESS NOTES
Neurology Daily Progress Note     Assessment:     [de-identified]year old male with history of Parkinson's disease, initially seen for hallucinations/confusion. We were asked to re-see due to an acute worsening, which was felt to be related to Geodon administration. Plan:     Continue home PD medications    Continue clonazepam    Neuroleptics/antipsychotics should be avoided and can lead to Parkinson's hyperpyrexia syndrome    Follow up with Dr. Sravan Lewis as an outpatient     Subjective: Interval history:    Patient doing well. No issues noted. Tone is good. History:    Issac De La Paz is a [de-identified] y.o. male who is being seen for PD. Unable to obtain ROS due to mental status.        Objective:     Vitals:    07/04/22 2100 07/05/22 0008 07/05/22 0325 07/05/22 0740   BP: (!) 147/81 112/64 105/83 113/63   Pulse: 68 93 54 81   Resp: 18 18 18 16   Temp: 97.8 °F (36.6 °C) 97.7 °F (36.5 °C) 97.5 °F (36.4 °C) 97.3 °F (36.3 °C)   TempSrc: Oral Axillary Axillary Oral   SpO2: 98% 99% 99% 98%   Weight:       Height:              Current Facility-Administered Medications:     enoxaparin (LOVENOX) injection 40 mg, 40 mg, SubCUTAneous, Q24H, Thu Moreno, DO, 40 mg at 07/04/22 2119    Vitamin D (CHOLECALCIFEROL) tablet 1,000 Units, 1,000 Units, Oral, Daily, Thu Moreno, DO, 1,000 Units at 07/05/22 0824    amLODIPine (NORVASC) tablet 5 mg, 5 mg, Oral, Daily, Thu Moreno, DO, 5 mg at 07/05/22 6557    lidocaine 4 % external patch 2 patch, 2 patch, TransDERmal, Daily, Thu Moreno, DO, 2 patch at 06/30/22 1522    glucose chewable tablet 16 g, 4 tablet, Oral, PRN, Thu Moreno, DO    dextrose bolus 10% 125 mL, 125 mL, IntraVENous, PRN **OR** dextrose bolus 10% 250 mL, 250 mL, IntraVENous, PRN, Thu Moreno, DO    glucagon injection 1 mg, 1 mg, IntraMUSCular, PRN, Thu Moreno, DO    dextrose 5 % solution, 100 mL/hr, IntraVENous, PRN, Thu Moreno, DO    hydrALAZINE (APRESOLINE) injection 5 mg, 5 mg, IntraVENous, Q6H PRN, Thu Moreno, DO    clonazePAM [facility].) tablet 1 mg, 1 mg, Oral, Daily, Lucita Wilson MD, 1 mg at 07/04/22 2113    carbidopa-levodopa (PARCOPA)  MG per disintegrating tablet 1 tablet, 1 tablet, Oral, 4x Daily, Lucita Wilson MD, 1 tablet at 07/05/22 0827    levalbuterol (Mariusz Axon) nebulizer solution 1.25 mg, 1.25 mg, Nebulization, Q8H PRN, Thu Moreno, DO    carbidopa-levodopa (SINEMET CR)  MG per extended release tablet 2 tablet, 2 tablet, Oral, Nightly, Tavia Rivera MD, 2 tablet at 07/04/22 2113    escitalopram (LEXAPRO) tablet 10 mg, 10 mg, Oral, Daily, Tavia Rivera MD, 10 mg at 07/05/22 8058    gabapentin (NEURONTIN) capsule 100 mg, 100 mg, Oral, TID, Tavia Rivera MD, 100 mg at 07/05/22 0824    latanoprost (XALATAN) 0.005 % ophthalmic solution 1 drop, 1 drop, Ophthalmic, Nightly, Tavia Rivera MD, 1 drop at 07/03/22 5966    levothyroxine (SYNTHROID) tablet 125 mcg, 125 mcg, Oral, QAM AC, Tavia Rivera MD, 125 mcg at 07/05/22 0508    lisinopril (PRINIVIL;ZESTRIL) tablet 5 mg, 5 mg, Oral, Daily, Tavia Rivera MD, 5 mg at 07/05/22 4770    melatonin tablet 9 mg, 9 mg, Oral, Nightly, Tavia Rivera MD, 9 mg at 07/04/22 2113    trospium (SANCTURA) tablet 20 mg, 20 mg, Oral, Daily, Tavia Rivera MD, 20 mg at 07/05/22 0824    pantoprazole (PROTONIX) tablet 40 mg, 40 mg, Oral, QAM AC, Tavia Rivera MD, 40 mg at 07/05/22 0508    rivastigmine (EXELON) capsule 6 mg, 6 mg, Oral, BID WC, Tavia Rivera MD, 6 mg at 07/05/22 3062    sodium chloride flush 0.9 % injection 5-40 mL, 5-40 mL, IntraVENous, 2 times per day, Tavia Rivera MD, 5 mL at 07/04/22 2119    sodium chloride flush 0.9 % injection 5-40 mL, 5-40 mL, IntraVENous, PRN, Tavia Rivera MD    0.9 % sodium chloride infusion, , IntraVENous, PRN, Tavia Rivera MD    ondansetron (ZOFRAN-ODT) disintegrating tablet 4 mg, 4 mg, Oral, Q8H PRN **OR** ondansetron (ZOFRAN) injection 4 mg, 4 mg, IntraVENous, Q6H PRN, Tavia Rivera MD    polyethylene glycol (GLYCOLAX) packet 17 g, 17 g, Oral, Daily PRN, Minerva Rivera MD    acetaminophen (TYLENOL) tablet 650 mg, 650 mg, Oral, Q6H PRN, 650 mg at 07/02/22 1958 **OR** acetaminophen (TYLENOL) suppository 650 mg, 650 mg, Rectal, Q6H PRN, Minerva Rivera MD    magnesium sulfate 2000 mg in 50 mL IVPB premix, 2,000 mg, IntraVENous, PRN, Minerva Rivera MD    potassium chloride (KLOR-CON M) extended release tablet 40 mEq, 40 mEq, Oral, PRN **OR** potassium bicarb-citric acid (EFFER-K) effervescent tablet 40 mEq, 40 mEq, Oral, PRN **OR** potassium chloride 10 mEq/100 mL IVPB (Peripheral Line), 10 mEq, IntraVENous, PRN, Minerva Rivera MD    Recent Results (from the past 12 hour(s))   COVID-19, Rapid    Collection Time: 07/05/22  8:58 AM    Specimen: Nasopharyngeal   Result Value Ref Range    Source NASAL      SARS-CoV-2, Rapid Not detected NOTD           Physical Exam:  General - Well developed, well nourished, in no apparent distress. Pleasant and conversent. HEENT - Normocephalic, atraumatic. Conjunctiva are clear. Neck - Supple without masses  Extremities - Peripheral pulses intact. No edema and no rashes. Neurological examination - Comprehension, attention, memory and reasoning are impaired. Language and speech are normal. Bradyphrenic and bradykinetic. On cranial nerve examination, (II, III, IV, VI) pupils are equal, round, and reactive to light. Visual acuity is adequate. Visual fields are full to finger confrontation. Extraocular motility is normal. (V, VII) Face is symmetric and sensation is intact to light touch. (VIII) Hearing is intact. (IX, X) Palate and uvula elevate symmetrically. Voice is hypophonic. (XI) Shoulder shrug is strong and equal bilaterally. (XII)Tongue is midline. Motor examination - There is mildly increased muscle tone in BUE. Power is full throughout. Muscle stretch reflexes are normoactive and there are no pathological reflexes present. Sensation is intact to light touch, pinprick, vibration and proprioception in all extremities.  Cerebellar examination is normal.    Signed By: PANFILO Uribe     July 5, 2022

## 2022-07-07 RX ORDER — GABAPENTIN 100 MG/1
CAPSULE ORAL
Qty: 270 CAPSULE | Refills: 3 | Status: SHIPPED | OUTPATIENT
Start: 2022-07-07 | End: 2023-07-07

## 2022-07-10 RX ORDER — CARBIDOPA/LEVODOPA 25MG-250MG
TABLET ORAL
Qty: 360 TABLET | Refills: 3 | Status: SHIPPED | OUTPATIENT
Start: 2022-07-10

## 2022-07-15 RX ORDER — AMANTADINE HYDROCHLORIDE 100 MG/1
TABLET ORAL
Qty: 180 TABLET | Refills: 3 | Status: SHIPPED | OUTPATIENT
Start: 2022-07-15 | End: 2022-08-10 | Stop reason: SDUPTHER

## 2022-08-10 ENCOUNTER — OFFICE VISIT (OUTPATIENT)
Dept: NEUROLOGY | Age: 82
End: 2022-08-10
Payer: MEDICARE

## 2022-08-10 VITALS
HEIGHT: 70 IN | WEIGHT: 142 LBS | HEART RATE: 79 BPM | SYSTOLIC BLOOD PRESSURE: 89 MMHG | DIASTOLIC BLOOD PRESSURE: 60 MMHG | BODY MASS INDEX: 20.33 KG/M2

## 2022-08-10 DIAGNOSIS — R25.1 TREMOR: ICD-10-CM

## 2022-08-10 DIAGNOSIS — G47.00 INSOMNIA, UNSPECIFIED TYPE: ICD-10-CM

## 2022-08-10 DIAGNOSIS — G20 DEMENTIA DUE TO PARKINSON'S DISEASE WITHOUT BEHAVIORAL DISTURBANCE (HCC): Primary | ICD-10-CM

## 2022-08-10 DIAGNOSIS — F02.80 DEMENTIA DUE TO PARKINSON'S DISEASE WITHOUT BEHAVIORAL DISTURBANCE (HCC): Primary | ICD-10-CM

## 2022-08-10 DIAGNOSIS — F41.8 MIXED ANXIETY DEPRESSIVE DISORDER: ICD-10-CM

## 2022-08-10 DIAGNOSIS — G24.9 DYSKINESIA DUE TO PARKINSON'S DISEASE (HCC): ICD-10-CM

## 2022-08-10 DIAGNOSIS — G90.3 NEUROLOGIC ORTHOSTATIC HYPOTENSION (HCC): ICD-10-CM

## 2022-08-10 DIAGNOSIS — G47.52 RBD (REM BEHAVIORAL DISORDER): ICD-10-CM

## 2022-08-10 DIAGNOSIS — Z79.899 ENCOUNTER FOR LONG-TERM (CURRENT) USE OF HIGH-RISK MEDICATION: ICD-10-CM

## 2022-08-10 DIAGNOSIS — R29.3 POSTURAL IMBALANCE: ICD-10-CM

## 2022-08-10 DIAGNOSIS — G20 PARKINSON DISEASE (HCC): ICD-10-CM

## 2022-08-10 DIAGNOSIS — G20 DYSKINESIA DUE TO PARKINSON'S DISEASE (HCC): ICD-10-CM

## 2022-08-10 PROCEDURE — 1123F ACP DISCUSS/DSCN MKR DOCD: CPT | Performed by: PSYCHIATRY & NEUROLOGY

## 2022-08-10 PROCEDURE — G8427 DOCREV CUR MEDS BY ELIG CLIN: HCPCS | Performed by: PSYCHIATRY & NEUROLOGY

## 2022-08-10 PROCEDURE — 1036F TOBACCO NON-USER: CPT | Performed by: PSYCHIATRY & NEUROLOGY

## 2022-08-10 PROCEDURE — 99215 OFFICE O/P EST HI 40 MIN: CPT | Performed by: PSYCHIATRY & NEUROLOGY

## 2022-08-10 PROCEDURE — G8420 CALC BMI NORM PARAMETERS: HCPCS | Performed by: PSYCHIATRY & NEUROLOGY

## 2022-08-10 RX ORDER — ESCITALOPRAM OXALATE 20 MG/1
20 TABLET ORAL DAILY
Qty: 90 TABLET | Refills: 3 | Status: SHIPPED | OUTPATIENT
Start: 2022-08-10

## 2022-08-10 RX ORDER — MIRTAZAPINE 30 MG/1
TABLET, FILM COATED ORAL
Qty: 90 TABLET | Refills: 1 | Status: SHIPPED | OUTPATIENT
Start: 2022-08-10 | End: 2022-08-10 | Stop reason: SDUPTHER

## 2022-08-10 RX ORDER — CLONAZEPAM 1 MG/1
1 TABLET ORAL DAILY PRN
COMMUNITY
End: 2022-10-11

## 2022-08-10 RX ORDER — MIDODRINE HYDROCHLORIDE 2.5 MG/1
2.5 TABLET ORAL 2 TIMES DAILY
COMMUNITY

## 2022-08-10 RX ORDER — ESCITALOPRAM OXALATE 20 MG/1
20 TABLET ORAL DAILY
Qty: 90 TABLET | Refills: 3 | Status: SHIPPED | OUTPATIENT
Start: 2022-08-10 | End: 2022-08-10 | Stop reason: SDUPTHER

## 2022-08-10 RX ORDER — AMANTADINE HYDROCHLORIDE 100 MG/1
TABLET ORAL
Qty: 180 TABLET | Refills: 3 | Status: SHIPPED | OUTPATIENT
Start: 2022-08-10 | End: 2022-08-10 | Stop reason: SDUPTHER

## 2022-08-10 RX ORDER — MIRTAZAPINE 30 MG/1
TABLET, FILM COATED ORAL
Qty: 90 TABLET | Refills: 1 | Status: SHIPPED | OUTPATIENT
Start: 2022-08-10

## 2022-08-10 RX ORDER — AMANTADINE HYDROCHLORIDE 100 MG/1
TABLET ORAL
Qty: 180 TABLET | Refills: 3 | Status: SHIPPED | OUTPATIENT
Start: 2022-08-10 | End: 2022-10-25 | Stop reason: SDUPTHER

## 2022-08-10 NOTE — PROGRESS NOTES
08/10/22  Elijah Singleton Jr. Chief Complaint:  Chief Complaint   Patient presents with    Neurologic Problem     Hospital follow up Parkinson's       Parkinson's Disease Diagnosed: He began with slower gait in 2013 and bilateral hand tremor in 2014. HPI:   Fatoumata Wang., 82 y.o.,male here in clinic follow up for continued management of Parkinson's Disease. Here for follow up post admission for INO and hallucinations with acute metabolic encephalopathy in setting of Parkinson's disease. INO most likely due to decreased p.o. intake and encephalopathy most likely due to worsening Parkinson's. INO resolved with IVF. CT head on admission was unremarkable for acute findings; only mild atrophy with small vessel ischemic disease. Per family patient also received Eligard for his prostate cancer the day before. He has been taken off amantadine at her facility. He was also taken off remeron. Sister, who is his caregiver, has no idea why this was done. He is now back on Remeron 15mg but still not sleeping well. He is up at night and this is what staff is perceiving as hallucinations but are likely RBD related. No sleep or poor sleep means the next day he is worse and can be confused. He has no hallucnations in the day. He is also having more depression again. He is on lexapro 10mg and it helped in the past.   He is having persistent dyskinesia since the amantadine was stoppped. He is still getting PT at the [facility]. He does his exercise class as well.      Current Neuro related meds:  Sinemet 25/250mg 1 tab at 6am, 10am, 2pm, and 6pm  Sinemet CR 50/200mg 1 tab at 10pm  Amantadine 100mg 1 tab BID at 6am and 2pm (D/C by facility)  Exelon 6mg 1 tab BID  Myrbetriq 50mg 1 tab daily  Mirtazapine 15 mg 1 tab QHS  Gabapentin 100 mg 1 cap TID  Atropine drops for sialorrhea  Melatonin 10 1 tab QHS    Review of Systems:    Review of Systems     Patient denies:  dizziness or light headedness,  drooling or swallowing issues  constipation,   hallucinations/ visual illusions or impulse control disorder   recent falls. RBD     RLS    Past Medical History:   Diagnosis Date    AAA (abdominal aortic aneurysm) ([facility] 7/8/2016    pt is not aware of this-     Casas's esophagus with esophagitis 7/8/2016    no meds- not daily    Cerebrovascular accident (CVA) ([facility] 7/8/2016    They thought I did but they didn't find anything on the tests \"I blacked out\" no admission to the hospital    Chronic fatigue syndrome 2/26/2016    Cognitive deficits 7/8/2016    Colon polyps 2007    Elevated PSA     Enthesopathy of ankle and tarsus 5/29/2014    GERD (gastroesophageal reflux disease) 7/8/2016    Hashimoto's thyroiditis 7/8/2016    History of nuclear stress test 1997, 5/2005    Hypotension 8/25/2015    Impaired cognition 7/8/2016    Inguinal hernia 7/8/2016    Insomnia 7/8/2016    Lesion of lateral popliteal nerve 10/10/2015    Mixed anxiety depressive disorder 7/20/2015    Neuropathy involving both lower extremities 11/3/2015    Parkinson's disease ([facility] 07/08/2016    dx 2014    Peyronie's disease 7/8/2016    Polyneuropathy 2/26/2016    Postoperative hypothyroidism 6/9/2016    Last Assessment & Plan:  Postsurgical hypothyroidism for benign goiter, continue present dose of levothyroxine. Tibial neuropathy 11/3/2015    Unsteady gait 8/25/2015    Overview: With 2 falls in past month. Past Surgical History:   Procedure Laterality Date    BREAST BIOPSY Bilateral     sterotactic    CATARACT REMOVAL Right 2012    COLONOSCOPY  2000 & 2005    HERNIA REPAIR Left 2011    OTHER SURGICAL HISTORY      deep leg/ankle tumor excision benign leg dumor    OTHER SURGICAL HISTORY  2006    esophagogastroduodenoscopy    THYROIDECTOMY      total       Family History   Problem Relation Age of Onset    Heart Failure Father     No Known Problems Sister     Heart Disease Father     Heart Attack Brother         MI at 52 y.o.     Cancer Mother Pancreas CA    Osteoarthritis Sister     Cancer Father         Brain CA    Diabetes Paternal Uncle     Thyroid Disease Neg Hx     Diabetes Maternal Uncle        Social History     Socioeconomic History    Marital status:      Spouse name: Not on file    Number of children: Not on file    Years of education: Not on file    Highest education level: Not on file   Occupational History    Not on file   Tobacco Use    Smoking status: Never    Smokeless tobacco: Never   Substance and Sexual Activity    Alcohol use: No    Drug use: No    Sexual activity: Not on file   Other Topics Concern    Not on file   Social History Narrative    Not on file     Social Determinants of Health     Financial Resource Strain: Not on file   Food Insecurity: Not on file   Transportation Needs: Not on file   Physical Activity: Not on file   Stress: Not on file   Social Connections: Not on file   Intimate Partner Violence: Not on file   Housing Stability: Not on file       Current Outpatient Medications on File Prior to Visit   Medication Sig Dispense Refill    midodrine (PROAMATINE) 2.5 MG tablet Take 2.5 mg by mouth in the morning and 2.5 mg before bedtime. clonazePAM (KLONOPIN) 1 MG tablet Take 1 mg by mouth daily as needed. carbidopa-levodopa (SINEMET)  MG per tablet TAKE 1 TABLET BY MOUTH AT 6AM, 10AM, 2PM, AND 6PM STRICT TIMES DUE TO ADVANCED  tablet 3    gabapentin (NEURONTIN) 100 MG capsule TAKE 1 CAPSULE BY MOUTH 3  TIMES DAILY 270 capsule 3    levalbuterol (XOPENEX) 1.25 MG/3ML nebulizer solution Take 3 mLs by nebulization every 8 hours as needed for Wheezing 180 mL 1    Vitamin D (CHOLECALCIFEROL) 25 MCG (1000 UT) TABS tablet Take 1 tablet by mouth daily 60 tablet 0    atropine 1 % ophthalmic solution 2-3 drops in a tablespoon of water and swish and spit up to 4 times a day for drooling.       carbidopa-levodopa (SINEMET CR)  MG per extended release tablet 1 tab at 10 pm strict time      latanoprost (XALATAN) 0.005 % ophthalmic solution Apply 1 drop to eye      levothyroxine (SYNTHROID) 125 MCG tablet Take 125 mcg by mouth every morning (before breakfast)      melatonin 10 MG CAPS capsule Take 10 mg by mouth      mirabegron (MYRBETRIQ) 50 MG TB24 Take 50 mg by mouth      omeprazole (PRILOSEC) 40 MG delayed release capsule Take 40 mg by mouth every morning      polyethylene glycol (GLYCOLAX) 17 GM/SCOOP powder Take 17 g by mouth daily      rivastigmine (EXELON) 6 MG capsule Take 6 mg by mouth 2 times daily (with meals)      clonazePAM (KLONOPIN) 1 MG tablet Take 1 tablet by mouth daily for 5 days. 5 tablet 0     No current facility-administered medications on file prior to visit. Allergies   Allergen Reactions    Brompheniramine Other (See Comments)     Pt does not recall med. Carisoprodol Other (See Comments)     Other reaction(s): Rash-A, Unknown-A  Pt does not recall med. Meperidine Other (See Comments)     Pt does not recall med. Penicillins Rash    Phenylephrine Hcl Other (See Comments)     Pt does not recall med. Zaleplon Other (See Comments)     Other reaction(s): Rash-A, Unknown-A  Pt does not recall med. Donepezil Other (See Comments)     Altered mental state    Haloperidol Lactate Other (See Comments)     Avoid in PD    Quetiapine Other (See Comments)     Avoid in PD    Rofecoxib Rash           Physical Examination    Vitals:    08/10/22 1426 08/10/22 1435   BP: 101/66 89/60   Site: Right Upper Arm Right Upper Arm   Position: Sitting Standing   Pulse: 85 79   Weight: 142 lb (64.4 kg)    Height: 5' 10\" (1.778 m)          General: No acute distress  Psychiatric: well oriented, normal mood and affect  Cardiovascular: no peripheral edema, no JVD, no carotid bruits, RRR  Pulmonary: CTAB  Skin: No rashes, lesions or ulcers  Ext: no C/C/E      Neurologic Exam  Patient oriented to Person, Place and Time. Language and fund of knowledge intact.    CN:  Extraocular movements are intact,facial sensation  Intact and strength is intact, , palate elevates normally, tongue protrudes midline, shoulder shrug is normal.    Motor:RUE 5/5, RLE 5/5, LUE 5/5, LLE 5/5 ,  normal bulk and tone    Reflexes: Patellar reflexes are +2 , Achilles reflexes are 1+ , toes are downgoing. Sensation: Normal  light touch, temperature and vibration throughout     Cerebellar: FTN, HTS intact    Motor Examination: Last dose of sinemet was 2 hrs ago. Voice tremor       Chin tremor         RIGHT LEFT   Tremor at rest 0 0   Postural Tremor of hands 0 0   Action Tremor of hands 0 0   Tremor of Lower extremity 0 0   Open/Close 1 1   Rapid Alternating Movements of Hands 2 2   Finger Taps 1 1   Rigidity - Upper extremity 1 2   Rigidity- Lower extremity  1 1   Foot tapping/LE agility 1 1            Hypophonia 3   Hypomimia 2   Arising from Chair In wheelchair but able to stand with arm rests   Posture WNL   Gait improved   Pull test deferred   Dyskinesia  Absent   Body Bradykinsesia 3          Assessment/Plan:   Diagnosis Orders   1. Dementia due to Parkinson's disease without behavioral disturbance ([address]        2. Parkinson disease ([address]        3. Tremor        4. Insomnia, unspecified type        5. 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 ([address]        6. Mixed anxiety depressive disorder        7. Encounter for long-term (current) use of high-risk medication        8. RBD (REM behavioral disorder)        9. Neurologic orthostatic hypotension (HCC)        10. Postural imbalance          Tremor predominant PD with dyskinesia that are not well controlled. He needs to get back on amantadine. He is not having tremors, the movements that were noted with dyskinesia. I also would like to increase remeron to improve his quality of sleep and tohelp depression. I will also increase his dose of Lexapro for depression. At night the confusion and hallucinations are related to his RBD and not psychosis.    If he is having a hard time staying hydrated, then I am happy to write for IVF 1-2 times a week if this can be done at his facility. I have spent greater than 50% of the patient's 60 minute visit  in counseling for importance of exercise,  medications and education of disease and disease progression. Patient is to continue all other medications as directed by prescribing physicians unless addressed above in plan. Continuation of these medications from today's visit are made based on the patient's report of current medications.      Kirti Sibley MD  [facility] Neurology  Director Movement Disorders Program  [facility]. [address]  [place], [address]  Phone: [phone]  Fax: [phone]

## 2022-09-16 RX ORDER — CARBIDOPA AND LEVODOPA 50; 200 MG/1; MG/1
TABLET, EXTENDED RELEASE ORAL
Qty: 90 TABLET | Refills: 3 | Status: SHIPPED | OUTPATIENT
Start: 2022-09-16

## 2022-10-11 ENCOUNTER — OFFICE VISIT (OUTPATIENT)
Dept: NEUROLOGY | Age: 82
End: 2022-10-11
Payer: MEDICARE

## 2022-10-11 VITALS
DIASTOLIC BLOOD PRESSURE: 69 MMHG | HEART RATE: 89 BPM | SYSTOLIC BLOOD PRESSURE: 115 MMHG | WEIGHT: 142 LBS | BODY MASS INDEX: 20.37 KG/M2

## 2022-10-11 DIAGNOSIS — G62.9 POLYNEUROPATHY: ICD-10-CM

## 2022-10-11 DIAGNOSIS — R25.1 TREMOR: ICD-10-CM

## 2022-10-11 DIAGNOSIS — R41.89 COGNITIVE DEFICITS: ICD-10-CM

## 2022-10-11 DIAGNOSIS — R29.3 POSTURAL IMBALANCE: ICD-10-CM

## 2022-10-11 DIAGNOSIS — G20 PSYCHOSIS DUE TO PARKINSON'S DISEASE (HCC): ICD-10-CM

## 2022-10-11 DIAGNOSIS — G20 PARKINSON DISEASE (HCC): ICD-10-CM

## 2022-10-11 DIAGNOSIS — G24.9 DYSKINESIA DUE TO PARKINSON'S DISEASE (HCC): ICD-10-CM

## 2022-10-11 DIAGNOSIS — G57.93 NEUROPATHY INVOLVING BOTH LOWER EXTREMITIES: ICD-10-CM

## 2022-10-11 DIAGNOSIS — G20 DYSKINESIA DUE TO PARKINSON'S DISEASE (HCC): ICD-10-CM

## 2022-10-11 DIAGNOSIS — G47.52 RBD (REM BEHAVIORAL DISORDER): Primary | ICD-10-CM

## 2022-10-11 DIAGNOSIS — G47.00 INSOMNIA, UNSPECIFIED TYPE: ICD-10-CM

## 2022-10-11 DIAGNOSIS — F02.80 DEMENTIA DUE TO PARKINSON'S DISEASE WITHOUT BEHAVIORAL DISTURBANCE (HCC): ICD-10-CM

## 2022-10-11 DIAGNOSIS — G20 DEMENTIA DUE TO PARKINSON'S DISEASE WITHOUT BEHAVIORAL DISTURBANCE (HCC): ICD-10-CM

## 2022-10-11 DIAGNOSIS — G90.3 NEUROLOGIC ORTHOSTATIC HYPOTENSION (HCC): ICD-10-CM

## 2022-10-11 PROCEDURE — 99215 OFFICE O/P EST HI 40 MIN: CPT | Performed by: PSYCHIATRY & NEUROLOGY

## 2022-10-11 PROCEDURE — G8420 CALC BMI NORM PARAMETERS: HCPCS | Performed by: PSYCHIATRY & NEUROLOGY

## 2022-10-11 PROCEDURE — 1036F TOBACCO NON-USER: CPT | Performed by: PSYCHIATRY & NEUROLOGY

## 2022-10-11 PROCEDURE — 1123F ACP DISCUSS/DSCN MKR DOCD: CPT | Performed by: PSYCHIATRY & NEUROLOGY

## 2022-10-11 PROCEDURE — G8427 DOCREV CUR MEDS BY ELIG CLIN: HCPCS | Performed by: PSYCHIATRY & NEUROLOGY

## 2022-10-11 PROCEDURE — G8484 FLU IMMUNIZE NO ADMIN: HCPCS | Performed by: PSYCHIATRY & NEUROLOGY

## 2022-10-11 RX ORDER — PYRIDOSTIGMINE BROMIDE 60 MG/1
TABLET ORAL
Qty: 135 TABLET | Refills: 3 | Status: SHIPPED | OUTPATIENT
Start: 2022-10-11

## 2022-10-11 RX ORDER — CLONAZEPAM 1 MG/1
TABLET ORAL
Qty: 90 TABLET | Refills: 1 | Status: SHIPPED | OUTPATIENT
Start: 2022-10-11 | End: 2022-10-11 | Stop reason: SDUPTHER

## 2022-10-11 RX ORDER — CLONAZEPAM 1 MG/1
TABLET ORAL
Qty: 90 TABLET | Refills: 1 | Status: SHIPPED | OUTPATIENT
Start: 2022-10-11 | End: 2023-01-11

## 2022-10-11 RX ORDER — PYRIDOSTIGMINE BROMIDE 60 MG/1
TABLET ORAL
Qty: 135 TABLET | Refills: 3 | Status: SHIPPED | OUTPATIENT
Start: 2022-10-11 | End: 2022-10-11 | Stop reason: SDUPTHER

## 2022-10-11 ASSESSMENT — ENCOUNTER SYMPTOMS
ALLERGIC/IMMUNOLOGIC NEGATIVE: 1
EYES NEGATIVE: 1
RESPIRATORY NEGATIVE: 1
GASTROINTESTINAL NEGATIVE: 1

## 2022-10-11 NOTE — PROGRESS NOTES
10/11/22  Savannah Singleton Jr. Chief Complaint:  Chief Complaint   Patient presents with    Follow-up    Neurologic Problem     Parkinson's disease       Parkinson's Disease Diagnosed: He began with slower gait in 2013 and bilateral hand tremor in 2014. HPI:   Huy Roche., 82 y.o.,male here in clinic follow up for continued management of Parkinson's Disease. He is still having a lot of issues with his OH. And he is taking midodrine and this is held if BP is too high. He is still dropping too much and often in the evenings his BP may be high an it is held but I don't know the target range of BP to hold the midodrine. He tried to drink 6 of the 8oz glasses a day. He is tired all the time and has no energy but he tries to exercise daily. His left foot is still swelling all the time and he has had work up with vascular and no known cause. He is using compression socks. He feels his sinemet is working well and denies wearing off between doses. He has some dyskinesia but since we restarted the amantadine he has better control of this. He feels it is good right now. He is not sleeping well. He is often unable to fall asleep easily. At his last visit I had increased his remeron to 30mg but it does not seem like the facility increased it and he is still on 15mg. Current Neuro related meds:  Sinemet 25/250mg 1 tab at 6am, 10am, 2pm, and 6pm  Sinemet CR 50/200mg 1 tab at 10pm  Amantadine 100mg 1 tab BID at 6am and 2pm   Exelon 6mg 1 tab BID  Myrbetriq 50mg 1 tab daily  Mirtazapine 15 mg 1 tab QHS  Gabapentin 100 mg 1 cap TID  Atropine drops for sialorrhea  Melatonin 10mg 1 tab QHS  Midodrine 2.5mg BID       Review of Systems:    Review of Systems   Constitutional: Negative. HENT: Negative. Eyes: Negative. Respiratory: Negative. Cardiovascular: Negative. Gastrointestinal: Negative. Endocrine: Negative. Genitourinary: Negative. Musculoskeletal: Negative. Skin: Negative. Allergic/Immunologic: Negative. Neurological:  Positive for speech difficulty. Hematological: Negative. Psychiatric/Behavioral:  Positive for hallucinations and sleep disturbance. Patient denies:  dizziness or light headedness,  drooling or swallowing issues  constipation,   hallucinations/ visual illusions or impulse control disorder   recent falls. RBD     RLS    Past Medical History:   Diagnosis Date    AAA (abdominal aortic aneurysm) 7/8/2016    pt is not aware of this-     Casas's esophagus with esophagitis 7/8/2016    no meds- not daily    Cerebrovascular accident (CVA) (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 [address] 07/08/2016    dx 2014    Peyronie's disease 7/8/2016    Polyneuropathy 2/26/2016    Postoperative hypothyroidism 6/9/2016    Last Assessment & Plan:  Postsurgical hypothyroidism for benign goiter, continue present dose of levothyroxine. Tibial neuropathy 11/3/2015    Unsteady gait 8/25/2015    Overview: With 2 falls in past month.          Past Surgical History:   Procedure Laterality Date    BREAST BIOPSY Bilateral     sterotactic    CATARACT REMOVAL Right 2012    COLONOSCOPY  2000 & 2005    HERNIA REPAIR Left 2011    OTHER SURGICAL HISTORY      deep leg/ankle tumor excision benign leg dumor    OTHER SURGICAL HISTORY  2006 esophagogastroduodenoscopy    THYROIDECTOMY      total       Family History   Problem Relation Age of Onset    Heart Failure Father     No Known Problems Sister     Heart Disease Father     Heart Attack Brother         MI at 52 y.o. Cancer Mother         Pancreas CA    Osteoarthritis Sister     Cancer Father         Brain CA    Diabetes Paternal Uncle     Thyroid Disease Neg Hx     Diabetes Maternal Uncle        Social History     Socioeconomic History    Marital status:      Spouse name: Not on file    Number of children: Not on file    Years of education: Not on file    Highest education level: Not on file   Occupational History    Not on file   Tobacco Use    Smoking status: Never    Smokeless tobacco: Never   Substance and Sexual Activity    Alcohol use: No    Drug use: No    Sexual activity: Not on file   Other Topics Concern    Not on file   Social History Narrative    Not on file     Social Determinants of Health     Financial Resource Strain: Not on file   Food Insecurity: Not on file   Transportation Needs: Not on file   Physical Activity: Not on file   Stress: Not on file   Social Connections: Not on file   Intimate Partner Violence: Not on file   Housing Stability: Not on file       Current Outpatient Medications on File Prior to Visit   Medication Sig Dispense Refill    carbidopa-levodopa (SINEMET CR)  MG per extended release tablet TAKE 1 TABLET BY MOUTH AT  10 PM STRICT TIME 90 tablet 3    midodrine (PROAMATINE) 2.5 MG tablet Take 2.5 mg by mouth in the morning and 2.5 mg before bedtime. Amantadine (SYMMETREL) 100 MG TABS tablet TAKE 1 TABLET BY MOUTH AT 8 AM AND 2  tablet 3    mirtazapine (REMERON) 30 MG tablet 1 tab Qhs 90 tablet 1    escitalopram (LEXAPRO) 20 MG tablet Take 1 tablet by mouth in the morning.  90 tablet 3    carbidopa-levodopa (SINEMET)  MG per tablet TAKE 1 TABLET BY MOUTH AT 6AM, 10AM, 2PM, AND 6PM STRICT TIMES DUE TO ADVANCED  tablet 3 gabapentin (NEURONTIN) 100 MG capsule TAKE 1 CAPSULE BY MOUTH 3  TIMES DAILY 270 capsule 3    levalbuterol (XOPENEX) 1.25 MG/3ML nebulizer solution Take 3 mLs by nebulization every 8 hours as needed for Wheezing 180 mL 1    Vitamin D (CHOLECALCIFEROL) 25 MCG (1000 UT) TABS tablet Take 1 tablet by mouth daily 60 tablet 0    atropine 1 % ophthalmic solution 2-3 drops in a tablespoon of water and swish and spit up to 4 times a day for drooling. latanoprost (XALATAN) 0.005 % ophthalmic solution Apply 1 drop to eye      levothyroxine (SYNTHROID) 125 MCG tablet Take 125 mcg by mouth every morning (before breakfast)      melatonin 10 MG CAPS capsule Take 10 mg by mouth      mirabegron (MYRBETRIQ) 50 MG TB24 Take 50 mg by mouth      omeprazole (PRILOSEC) 40 MG delayed release capsule Take 40 mg by mouth every morning      polyethylene glycol (GLYCOLAX) 17 GM/SCOOP powder Take 17 g by mouth daily      rivastigmine (EXELON) 6 MG capsule Take 6 mg by mouth 2 times daily (with meals)       No current facility-administered medications on file prior to visit. Allergies   Allergen Reactions    Brompheniramine Other (See Comments)     Pt does not recall med. Carisoprodol Other (See Comments)     Other reaction(s): Rash-A, Unknown-A  Pt does not recall med. Meperidine Other (See Comments)     Pt does not recall med. Penicillins Rash    Phenylephrine Hcl Other (See Comments)     Pt does not recall med. Zaleplon Other (See Comments)     Other reaction(s): Rash-A, Unknown-A  Pt does not recall med.      Donepezil Other (See Comments)     Altered mental state    Haloperidol Lactate Other (See Comments)     Avoid in PD    Quetiapine Other (See Comments)     Avoid in PD    Rofecoxib Rash           Physical Examination    Vitals:    10/11/22 1443 10/11/22 1444   BP: (!) 159/89 115/69   Site: Right Upper Arm Right Upper Arm   Position: Sitting Standing   Pulse: 84 89   Weight: 142 lb (64.4 kg)          General: No acute distress  Psychiatric: well oriented, normal mood and affect  Cardiovascular: no peripheral edema, no JVD, no carotid bruits, RRR  Pulmonary: CTAB  Skin: No rashes, lesions or ulcers  Ext: no C/C/E      Neurologic Exam  Patient oriented to Person, Place and Time. Language and fund of knowledge intact. CN:  Extraocular movements are intact,facial sensation  Intact and strength is intact, , palate elevates normally, tongue protrudes midline, shoulder shrug is normal.    Motor:RUE 5/5, RLE 5/5, LUE 5/5, LLE 5/5 ,  normal bulk and tone    Reflexes: Patellar reflexes are +2 , Achilles reflexes are 1+ , toes are downgoing. Sensation: Normal  light touch, temperature and vibration throughout     Cerebellar: FTN, HTS intact    Motor Examination: Last dose of sinemet was 1 hrs 15 mins ago. Voice tremor       Chin tremor         RIGHT LEFT   Tremor at rest 0 0   Postural Tremor of hands 0 0   Action Tremor of hands 0 0   Tremor of Lower extremity 0 0   Open/Close 1 1   Rapid Alternating Movements of Hands 2 2   Finger Taps 1 1   Rigidity - Upper extremity 1 2   Rigidity- Lower extremity  1 1   Foot tapping/LE agility 2 1            Hypophonia 3   Hypomimia 2   Arising from Chair In wheelchair but able to stand with arm rests   Posture WNL   Gait Did not ambulate today   Pull test deferred   Dyskinesia  BLE > upper body but present with distraction. Body Bradykinsesia 3          Assessment/Plan:   Diagnosis Orders   1. RBD (REM behavioral disorder)  clonazePAM (KLONOPIN) 1 MG tablet    DISCONTINUED: clonazePAM (KLONOPIN) 1 MG tablet      2. Parkinson disease ([address]        3. Tremor        4. Postural imbalance        5. 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 Polyneuropathy        8. Dementia due to Parkinson's disease without behavioral disturbance ([address]        9. Neuropathy involving both lower extremities        10.  Cognitive deficits 11. 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 ([facility] [address]        12. Insomnia, unspecified type            Tremor predominant PD with dyskinesia. He is still having dyskinesia. He should be getting amantadine now BID (6am and 2pm)   He is better with his anxiety now on higher dose of lexapro. He has trouble sleeping/ falling asleep. He should be on remeron 30mg. But if he isn't then we will ensure he is icnreased to this dose. If he is already taking it then I would recommend that he   Can take his clonazepam at bedtime. He has BRD and he has \"hallucinations\" only at night and I suspect this is RBD. I will also add mestinon for his drops in BP. Start 30mg TID. I have spent greater than 50% of the patient's 60 minute visit  in counseling for importance of exercise,  medications and education of disease and disease progression. Patient is to continue all other medications as directed by prescribing physicians unless addressed above in plan. Continuation of these medications from today's visit are made based on the patient's report of current medications.      Katy Whitaker MD  [facility] Neurology  Director Movement Disorders Program  [facility]. [address]  [place], [address]  Phone: [phone]  Fax: [phone]

## 2022-10-24 DIAGNOSIS — G20 PARKINSON DISEASE (HCC): Primary | ICD-10-CM

## 2022-10-25 RX ORDER — AMANTADINE HYDROCHLORIDE 100 MG/1
TABLET ORAL
Qty: 180 TABLET | Refills: 3 | Status: SHIPPED | OUTPATIENT
Start: 2022-10-25

## 2022-12-20 RX ORDER — MIRTAZAPINE 30 MG/1
TABLET, FILM COATED ORAL
Qty: 90 TABLET | Refills: 3 | Status: SHIPPED | OUTPATIENT
Start: 2022-12-20

## 2023-01-01 ENCOUNTER — APPOINTMENT (RX ONLY)
Dept: URBAN - METROPOLITAN AREA CLINIC 330 | Facility: CLINIC | Age: 83
Setting detail: DERMATOLOGY
End: 2023-01-01

## 2023-01-01 DIAGNOSIS — L82.1 OTHER SEBORRHEIC KERATOSIS: ICD-10-CM | Status: STABLE

## 2023-01-01 DIAGNOSIS — L57.8 OTHER SKIN CHANGES DUE TO CHRONIC EXPOSURE TO NONIONIZING RADIATION: ICD-10-CM

## 2023-01-01 DIAGNOSIS — D22 MELANOCYTIC NEVI: ICD-10-CM | Status: STABLE

## 2023-01-01 DIAGNOSIS — L81.4 OTHER MELANIN HYPERPIGMENTATION: ICD-10-CM | Status: STABLE

## 2023-01-01 PROCEDURE — ? FULL BODY SKIN EXAM - DECLINED

## 2023-01-01 PROCEDURE — ? COUNSELING

## 2023-01-01 PROCEDURE — 99213 OFFICE O/P EST LOW 20 MIN: CPT

## 2023-01-01 PROCEDURE — ? TREATMENT REGIMEN

## 2023-01-01 PROCEDURE — ? SUNSCREEN RECOMMENDATIONS

## 2023-01-01 ASSESSMENT — LOCATION SIMPLE DESCRIPTION DERM
LOCATION SIMPLE: LEFT CHEEK
LOCATION SIMPLE: RIGHT UPPER BACK
LOCATION SIMPLE: POSTERIOR NECK
LOCATION SIMPLE: RIGHT CHEEK

## 2023-01-01 ASSESSMENT — LOCATION ZONE DERM
LOCATION ZONE: NECK
LOCATION ZONE: TRUNK
LOCATION ZONE: FACE

## 2023-01-01 ASSESSMENT — LOCATION DETAILED DESCRIPTION DERM
LOCATION DETAILED: RIGHT MEDIAL UPPER BACK
LOCATION DETAILED: LEFT INFERIOR POSTERIOR NECK
LOCATION DETAILED: LEFT INFERIOR CENTRAL MALAR CHEEK
LOCATION DETAILED: RIGHT INFERIOR LATERAL MALAR CHEEK

## 2023-01-24 ENCOUNTER — OFFICE VISIT (OUTPATIENT)
Dept: NEUROLOGY | Age: 83
End: 2023-01-24
Payer: MEDICARE

## 2023-01-24 VITALS
OXYGEN SATURATION: 98 % | WEIGHT: 152 LBS | HEART RATE: 79 BPM | BODY MASS INDEX: 21.81 KG/M2 | DIASTOLIC BLOOD PRESSURE: 70 MMHG | SYSTOLIC BLOOD PRESSURE: 80 MMHG

## 2023-01-24 DIAGNOSIS — G20 DYSKINESIA DUE TO PARKINSON'S DISEASE (HCC): ICD-10-CM

## 2023-01-24 DIAGNOSIS — R29.3 POSTURAL IMBALANCE: ICD-10-CM

## 2023-01-24 DIAGNOSIS — R29.3 ABNORMAL POSTURE: ICD-10-CM

## 2023-01-24 DIAGNOSIS — F02.B18 MODERATE DEMENTIA DUE TO PARKINSON'S DISEASE, WITH OTHER BEHAVIORAL DISTURBANCE (HCC): ICD-10-CM

## 2023-01-24 DIAGNOSIS — R25.1 TREMOR: ICD-10-CM

## 2023-01-24 DIAGNOSIS — Z79.899 ENCOUNTER FOR LONG-TERM (CURRENT) USE OF HIGH-RISK MEDICATION: ICD-10-CM

## 2023-01-24 DIAGNOSIS — G20 PSYCHOSIS DUE TO PARKINSON'S DISEASE (HCC): ICD-10-CM

## 2023-01-24 DIAGNOSIS — G20 MODERATE DEMENTIA DUE TO PARKINSON'S DISEASE, WITH OTHER BEHAVIORAL DISTURBANCE (HCC): ICD-10-CM

## 2023-01-24 DIAGNOSIS — G24.9 DYSKINESIA DUE TO PARKINSON'S DISEASE (HCC): ICD-10-CM

## 2023-01-24 DIAGNOSIS — G90.3 NEUROLOGIC ORTHOSTATIC HYPOTENSION (HCC): Primary | ICD-10-CM

## 2023-01-24 DIAGNOSIS — G20 PARKINSON'S DISEASE (HCC): ICD-10-CM

## 2023-01-24 PROCEDURE — 3074F SYST BP LT 130 MM HG: CPT | Performed by: PSYCHIATRY & NEUROLOGY

## 2023-01-24 PROCEDURE — G8427 DOCREV CUR MEDS BY ELIG CLIN: HCPCS | Performed by: PSYCHIATRY & NEUROLOGY

## 2023-01-24 PROCEDURE — 3078F DIAST BP <80 MM HG: CPT | Performed by: PSYCHIATRY & NEUROLOGY

## 2023-01-24 PROCEDURE — 1123F ACP DISCUSS/DSCN MKR DOCD: CPT | Performed by: PSYCHIATRY & NEUROLOGY

## 2023-01-24 PROCEDURE — G2212 PROLONG OUTPT/OFFICE VIS: HCPCS | Performed by: PSYCHIATRY & NEUROLOGY

## 2023-01-24 PROCEDURE — 1036F TOBACCO NON-USER: CPT | Performed by: PSYCHIATRY & NEUROLOGY

## 2023-01-24 PROCEDURE — 99215 OFFICE O/P EST HI 40 MIN: CPT | Performed by: PSYCHIATRY & NEUROLOGY

## 2023-01-24 PROCEDURE — G8484 FLU IMMUNIZE NO ADMIN: HCPCS | Performed by: PSYCHIATRY & NEUROLOGY

## 2023-01-24 PROCEDURE — G8420 CALC BMI NORM PARAMETERS: HCPCS | Performed by: PSYCHIATRY & NEUROLOGY

## 2023-01-24 RX ORDER — MIDODRINE HYDROCHLORIDE 2.5 MG/1
TABLET ORAL
Qty: 180 TABLET | Refills: 3 | Status: SHIPPED | OUTPATIENT
Start: 2023-01-24

## 2023-01-24 RX ORDER — RIVAROXABAN 20 MG/1
TABLET, FILM COATED ORAL
COMMUNITY
Start: 2022-12-22

## 2023-01-24 ASSESSMENT — ENCOUNTER SYMPTOMS
BACK PAIN: 0
PHOTOPHOBIA: 0
CONSTIPATION: 0

## 2023-01-24 NOTE — PROGRESS NOTES
01/24/23  Mireille Singleton Jr. Chief Complaint:  Chief Complaint   Patient presents with    Follow-up     Parkinson's Disease       Parkinson's Disease Diagnosed: He began with slower gait in 2013 and bilateral hand tremor in 2014. HPI:   Placido Teague., [de-identified] y.o.,male here in clinic follow up for continued management of Parkinson's Disease with dyskinesia and PD related cognitive impairment. Last week he had 3 falls in 3 days and he was sent to the ER. He denies hitting his head or hurting himself. But ER imaging showed no acute changes. His sister is here with him again and states that since this ER visit he has not \"bounced back\" and he is slower physically and cognitively. He is on Xarelto for a DVT and so they keep a close eye on him with his falls. He states that his falls are a controlled fall and letting himself down after feeling lightheaded. Today when he is sitting he is 122/82 and on standing he is 80/70. The facility is checking his BP but only sitting. He has midodrine listed for PRN dosing for SBP less than 100. I added mestinon 30mg TID for OH at his last visit. But his target BP was written in the past for a target to hold. Not to give the midodrine. On 1/13/23, he was taken to the ER after a fall and he was found to have an acute kidney function issue due to dehydration and he felt faint and passed out or got weak and fell. This is not a new issue. This is chronic issue. The facility wants him to only stay in the wheelchair but then he will be unable to walk due to weakness and deconditioning. This has happened before. Yet they allow him to walk in your room. So he will need to stay hydrated and work consistently with PT. His PD symptoms seem controlled with sinemet without wearing off between doses. His Remeron was lowered from 30mg yesterday to 15mg. We can watch to see how he does.  I had increased it for better control of anxiety and sleep in the past and it had helped. Current Neuro related meds:Sinemet 25/250mg 1 tab at 6am, 10am, 2pm, and 6pm  Sinemet CR 50/200mg 1 tab at 10pm  Amantadine 100mg 1 tab BID at 6am and 2pm   Exelon 6mg 1 tab BID  Myrbetriq 50mg 1 tab daily  Mirtazapine 15 mg 1 tab QHS  Gabapentin 100 mg 1 cap TID  Atropine drops for sialorrhea  Melatonin 10mg 1 tab QHS  Midodrine 2.5mg BID         Review of Systems:    Review of Systems   HENT:  Negative for hearing loss and tinnitus. Eyes:  Negative for photophobia. Blurred and double vision. Also has glycoma   Gastrointestinal:  Negative for constipation. Musculoskeletal:  Negative for back pain, myalgias and neck pain. 6 falls within the last 6 days   Neurological:  Positive for weakness (sometimes). Negative for dizziness, tremors, seizures, speech difficulty, numbness and headaches. Psychiatric/Behavioral:  Positive for confusion (a lot) and hallucinations (has some). Negative for sleep disturbance and suicidal ideas. The patient is nervous/anxious (a lot). Some memory loss. Some depression      Patient denies:  dizziness or light headedness,  drooling or swallowing issues  constipation,   hallucinations/ visual illusions or impulse control disorder   recent falls.    RBD     RLS    Past Medical History:   Diagnosis Date    AAA (abdominal aortic aneurysm) 7/8/2016    pt is not aware of this-     Casas's esophagus with esophagitis 7/8/2016    no meds- not daily    Cerebrovascular accident (CVA) ([facility] [address] 7/8/2016    They thought I did but they didn't find anything on the tests \"I blacked out\" no admission to the hospital    Chronic fatigue syndrome 2/26/2016    Cognitive deficits 7/8/2016    Colon polyps 2007    Elevated PSA     Enthesopathy of ankle and tarsus 5/29/2014    GERD (gastroesophageal reflux disease) 7/8/2016    Hashimoto's thyroiditis 7/8/2016    History of nuclear stress test 1997, 5/2005    Hypotension 8/25/2015    Impaired cognition 7/8/2016 Inguinal hernia 7/8/2016    Insomnia 7/8/2016    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 Tibial neuropathy 11/3/2015    Unsteady gait 8/25/2015    Overview: With 2 falls in past month. Past Surgical History:   Procedure Laterality Date    BREAST BIOPSY Bilateral     sterotactic    CATARACT REMOVAL Right 2012    COLONOSCOPY  2000 & 2005    HERNIA REPAIR Left 2011    OTHER SURGICAL HISTORY      deep leg/ankle tumor excision benign leg dumor    OTHER SURGICAL HISTORY  2006    esophagogastroduodenoscopy    THYROIDECTOMY      total       Family History   Problem Relation Age of Onset    Heart Failure Father     No Known Problems Sister     Heart Disease Father     Heart Attack Brother         MI at 52 y.o.     Cancer Mother         Pancreas CA    Osteoarthritis Sister     Cancer Father         Brain CA    Diabetes Paternal Uncle     Thyroid Disease Neg Hx     Diabetes Maternal Uncle        Social History     Socioeconomic History    Marital status:      Spouse name: Not on file    Number of children: Not on file    Years of education: Not on file    Highest education level: Not on file   Occupational History    Not on file   Tobacco Use    Smoking status: Never    Smokeless tobacco: Never   Substance and Sexual Activity    Alcohol use: No    Drug use: No    Sexual activity: Not on file   Other Topics Concern    Not on file   Social History Narrative    Not on file     Social Determinants of Health     Financial Resource Strain: Not on file   Food Insecurity: Not on file   Transportation Needs: Not on file   Physical Activity: Not on file   Stress: Not on file   Social Connections: Not on file   Intimate Partner Violence: Not on file   Housing Stability: Not on file       Current Outpatient Medications on File Prior to Visit   Medication Sig Dispense Refill    XARELTO 20 MG TABS tablet       mirtazapine (REMERON) 30 MG tablet TAKE 1 TABLET BY MOUTH  EVERY NIGHT AT BEDTIME 90 tablet 3    Amantadine (SYMMETREL) 100 MG TABS tablet TAKE 1 TABLET BY MOUTH AT 8 AM AND 2  tablet 3    pyridostigmine (MESTINON) 60 MG tablet 1/2 tb TID (6am, 2pm, 6pm) 135 tablet 3    clonazePAM (KLONOPIN) 1 MG tablet 1/2 tab Qhs for RBD and 1/2 tab as needed Q day for anxiety 90 tablet 1    carbidopa-levodopa (SINEMET CR)  MG per extended release tablet TAKE 1 TABLET BY MOUTH AT  10 PM STRICT TIME 90 tablet 3    escitalopram (LEXAPRO) 20 MG tablet Take 1 tablet by mouth in the morning. 90 tablet 3    carbidopa-levodopa (SINEMET)  MG per tablet TAKE 1 TABLET BY MOUTH AT 6AM, 10AM, 2PM, AND 6PM STRICT TIMES DUE TO ADVANCED  tablet 3    gabapentin (NEURONTIN) 100 MG capsule TAKE 1 CAPSULE BY MOUTH 3  TIMES DAILY 270 capsule 3    Vitamin D (CHOLECALCIFEROL) 25 MCG (1000 UT) TABS tablet Take 1 tablet by mouth daily 60 tablet 0    atropine 1 % ophthalmic solution 2-3 drops in a tablespoon of water and swish and spit up to 4 times a day for drooling.       latanoprost (XALATAN) 0.005 % ophthalmic solution Apply 1 drop to eye      levothyroxine (SYNTHROID) 125 MCG tablet Take 125 mcg by mouth every morning (before breakfast)      melatonin 10 MG CAPS capsule Take 10 mg by mouth      mirabegron (MYRBETRIQ) 50 MG TB24 Take 50 mg by mouth      omeprazole (PRILOSEC) 40 MG delayed release capsule Take 40 mg by mouth every morning      polyethylene glycol (GLYCOLAX) 17 GM/SCOOP powder Take 17 g by mouth daily      rivastigmine (EXELON) 6 MG capsule Take 6 mg by mouth 2 times daily (with meals)      levalbuterol (XOPENEX) 1.25 MG/3ML nebulizer solution Take 3 mLs by nebulization every 8 hours as needed for Wheezing (Patient not taking: Reported on 1/24/2023) 180 mL 1     No current facility-administered medications on file prior to visit. Allergies   Allergen Reactions    Brompheniramine Other (See Comments)     Pt does not recall med. Carisoprodol Other (See Comments)     Other reaction(s): Rash-A, Unknown-A  Pt does not recall med. Meperidine Other (See Comments)     Pt does not recall med. Penicillins Rash    Phenylephrine Hcl Other (See Comments)     Pt does not recall med. Zaleplon Other (See Comments)     Other reaction(s): Rash-A, Unknown-A  Pt does not recall med. Donepezil Other (See Comments)     Altered mental state    Haloperidol Lactate Other (See Comments)     Avoid in PD    Quetiapine Other (See Comments)     Avoid in PD    Rofecoxib Rash           Physical Examination    Vitals:    01/24/23 1540 01/24/23 1544   BP: 122/82 80/70   Pulse: 79    SpO2: 98%    Weight: 152 lb (68.9 kg)          General: No acute distress  Psychiatric: well oriented, normal mood and affect  Cardiovascular: no peripheral edema, no JVD, no carotid bruits, RRR  Pulmonary: CTAB  Skin: No rashes, lesions or ulcers  Ext: no C/C/E      Neurologic Exam  Patient oriented to Person, Place and Time. Language and fund of knowledge intact. CN:  Extraocular movements are intact,facial sensation  Intact and strength is intact, , palate elevates normally, tongue protrudes midline, shoulder shrug is normal.    Motor:RUE 5/5, RLE 5/5, LUE 5/5, LLE 5/5 ,  normal bulk and tone    Reflexes: Patellar reflexes are +2 , Achilles reflexes are 1+ , toes are downgoing. Sensation: Normal  light touch, temperature and vibration throughout     Cerebellar: FTN, HTS intact    Motor Examination: Last dose of sinemet was 2 hrs ago.      Voice tremor       Chin tremor         RIGHT LEFT   Tremor at rest 0 0   Postural Tremor of hands 0 0   Action Tremor of hands 0 0   Tremor of Lower extremity 0 0   Open/Close 1 1   Rapid Alternating Movements of Hands 2 2   Finger Taps 1 1   Rigidity - Upper extremity 1 2   Rigidity- Lower extremity  1 1   Foot tapping/LE agility 2 1            Hypophonia 3   Hypomimia 2   Arising from Chair In wheelchair but able to stand with arm rests   Posture WNL   Gait Did not ambulate today   Pull test Deferred due to imbalance   Dyskinesia  BLE > upper body but present with distraction. Body Bradykinsesia 3             Assessment/Plan:   Diagnosis Orders   1. Neurologic orthostatic hypotension (HCC)        2. Psychosis due to Parkinson's disease ([address]        3. Parkinson's disease ([address]        4. Tremor        5. 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 ([address]        6. Encounter for long-term (current) use of high-risk medication        7. Moderate dementia due to Parkinson's disease, with other behavioral disturbance ([address]        8. Postural imbalance        9. Abnormal posture            Mr. Óscar Guajardo is falling and is more confused at times and is also more fatigued at those times. But he is also been found to be dehydrated in the ER after his last fall and states to me today that he is lightheaded again at times. His BP today dropped significantly upon standing. He is getting his midodrine as needed for SBP lower than 100. This is too low of a target to give it and he needs it daily to avoid dropping BP and causing his falls. I will ask that they check orthostatics BID for the next several days and send me the readings. And go ahead and start the midodrine 2.5mg at 6am and 2pm. Since he has higher BP readings in the past in the evenings, he can hold on the 3 rd dose. Also hold midodrine for any BP reading >140/90. I will also send in a referral for PT. He is still active and wants to walk and can do so with his walker.  Keeping him in a wheelchair may be safe in certain settings but with optimizing his PT, meds and ensuring his BP is stable, this should still minimize that falls. Plus he is allowed to get up in his room on his own, so he will need to remain safe if this is still allowed. Cognitively, he has good and bad days. He has some trouble with complex tasks on exam today and it shows in executive function and planning. But no worsening. I have spent greater than 50% of the patient's 90 minute visit  in counseling for importance of exercise,  medications and education of disease and disease progression. Patient is to continue all other medications as directed by prescribing physicians unless addressed above in plan. Continuation of these medications from today's visit are made based on the patient's report of current medications.      Reno Chua MD  [facility] Neurology  Director Movement Disorders Program  [address] [place] [address] [address], [address] [facility]  Phone: [phone]  Fax: [phone]

## 2023-01-25 ENCOUNTER — TELEPHONE (OUTPATIENT)
Dept: NEUROLOGY | Age: 83
End: 2023-01-25

## 2023-01-25 NOTE — TELEPHONE ENCOUNTER
Demar [place] [facility] [place]'s last ov notes. Ov notes faxed.

## 2023-01-27 ENCOUNTER — TELEPHONE (OUTPATIENT)
Dept: NEUROLOGY | Age: 83
End: 2023-01-27

## 2023-01-27 NOTE — TELEPHONE ENCOUNTER
Jennifer Rico from [facility] called in stating that Pt is doing Physical Therapy 2x a week and OT once a week due to some insurance issues.

## 2023-02-01 ENCOUNTER — TELEPHONE (OUTPATIENT)
Dept: NEUROLOGY | Age: 83
End: 2023-02-01

## 2023-02-01 NOTE — TELEPHONE ENCOUNTER
Nurse from patient's facility left a vm and stated that per Dr. Gale Jones orders she has started midodrine 2.5 mg BID and doing orthostatics. She reports that the patient's BP levels in the evening have been very elevated. She would like a call back to discuss [phone].

## 2023-02-02 NOTE — TELEPHONE ENCOUNTER
Spoke to Venus and she said his bp has been all over the place    194/100 last night    This morning lying down it  was 135/75  Sitting 109/73  Standing 82/56

## 2023-02-03 NOTE — TELEPHONE ENCOUNTER
If his BP is high in the evenings do not give him the midodrine. Just give it to him in the AM.  He cant lay flat for 2 hrs after a dose. Sowhen he gets this he has to stay up. Not go to bed. Ensure that he is hydrating well and wearing compression socks. I cant give advice based on one BP reading. I need several to look for a pattern.

## 2023-02-16 PROBLEM — L82.1 OTHER SEBORRHEIC KERATOSIS: Status: ACTIVE | Noted: 2023-01-01

## 2023-02-16 PROBLEM — L81.4 OTHER MELANIN HYPERPIGMENTATION: Status: ACTIVE | Noted: 2023-01-01

## 2023-02-16 PROBLEM — D22.4 MELANOCYTIC NEVI OF SCALP AND NECK: Status: ACTIVE | Noted: 2023-01-01

## 2023-02-16 PROBLEM — L57.8 OTHER SKIN CHANGES DUE TO CHRONIC EXPOSURE TO NONIONIZING RADIATION: Status: ACTIVE | Noted: 2023-01-01

## 2023-02-16 NOTE — PROCEDURE: MIPS QUALITY
Detail Level: Detailed
Quality 47: Advance Care Plan: Advance care planning not documented, reason not otherwise specified.
Quality 110: Preventive Care And Screening: Influenza Immunization: Influenza Immunization Administered during Influenza season
Quality 130: Documentation Of Current Medications In The Medical Record: Current Medications Documented
Quality 226: Preventive Care And Screening: Tobacco Use: Screening And Cessation Intervention: Patient screened for tobacco use and is an ex/non-smoker
Quality 431: Preventive Care And Screening: Unhealthy Alcohol Use - Screening: Patient not identified as an unhealthy alcohol user when screened for unhealthy alcohol use using a systematic screening method
Quality 402: Tobacco Use And Help With Quitting Among Adolescents: Patient screened for tobacco and never smoked

## 2023-02-16 NOTE — HPI: EVALUATION OF SKIN LESION(S)
Hpi Title: Evaluation of Skin Lesions
How Severe Are Your Spot(S)?: mild
Additional History: Patient has no loc today.

## 2023-03-17 ENCOUNTER — APPOINTMENT (OUTPATIENT)
Dept: CT IMAGING | Age: 83
End: 2023-03-17
Payer: MEDICARE

## 2023-03-17 ENCOUNTER — HOSPITAL ENCOUNTER (EMERGENCY)
Age: 83
Discharge: HOME | End: 2023-03-17
Attending: EMERGENCY MEDICINE
Payer: MEDICARE

## 2023-03-17 VITALS
HEART RATE: 54 BPM | WEIGHT: 157 LBS | SYSTOLIC BLOOD PRESSURE: 189 MMHG | OXYGEN SATURATION: 99 % | HEIGHT: 70 IN | RESPIRATION RATE: 16 BRPM | DIASTOLIC BLOOD PRESSURE: 77 MMHG | TEMPERATURE: 97.5 F | BODY MASS INDEX: 22.48 KG/M2

## 2023-03-17 DIAGNOSIS — S09.90XA CLOSED HEAD INJURY, INITIAL ENCOUNTER: Primary | ICD-10-CM

## 2023-03-17 PROCEDURE — 99284 EMERGENCY DEPT VISIT MOD MDM: CPT

## 2023-03-17 PROCEDURE — 72125 CT NECK SPINE W/O DYE: CPT

## 2023-03-17 PROCEDURE — 70450 CT HEAD/BRAIN W/O DYE: CPT

## 2023-03-17 ASSESSMENT — PAIN - FUNCTIONAL ASSESSMENT: PAIN_FUNCTIONAL_ASSESSMENT: NONE - DENIES PAIN

## 2023-03-17 NOTE — ED NOTES
I have reviewed discharge instructions with the patient. The patient verbalized understanding. Patient left ED via Discharge Method: ambulatory to Home with son. Opportunity for questions and clarification provided. Patient given 0 scripts. To continue your aftercare when you leave the hospital, you may receive an automated call from our care team to check in on how you are doing. This is a free service and part of our promise to provide the best care and service to meet your aftercare needs.  If you have questions, or wish to unsubscribe from this service please call [phone]. Thank you for Choosing our [facility] Emergency Department.       Zo Garcia, TENNILLE  03/17/23 7445

## 2023-03-17 NOTE — ED TRIAGE NOTES
Pt arrives to ed via ems, coming from assisted living facility. Suffered a fall around 6am. Ems not called due to pt not wanting ems. Suffered lac on left forehead. On blood thinners, no loc reported,has hx of brain bleed.  Vs wnl, pupils chronically uneven

## 2023-03-17 NOTE — DISCHARGE INSTRUCTIONS
Please follow-up with primary care physician, neurology. Return to ED if symptoms worsen or progress in any way.

## 2023-03-17 NOTE — ED PROVIDER NOTES
Emergency Department Provider Note                   PCP:                On File Not (Inactive)               Age: [de-identified] y.o. Sex: male     DISPOSITION Decision To Discharge 03/17/2023 12:09:29 PM       ICD-10-CM    1. Closed head injury, initial encounter  S09.90XA           MEDICAL DECISION MAKING  Complexity of Problems Addressed:  1 or more acute illnesses that pose a threat to life or bodily function. Data Reviewed and Analyzed:  Category 1:   I reviewed records from an external source: ED records from outside this hospital.  I reviewed records from an external source: provider visit notes from PCP. I reviewed records from an external source: provider visit notes from outside specialist.  I reviewed records from an external source: previous lab results from outside ED. I reviewed records from an external source: previous imaging studies from outside ED. I ordered each unique test.  I interpreted the results of each unique test.    The patients assessment required an independent historian: Given patient with history of Parkinson's discussed plan with patient's son who presented to bedside. States the patient is at baseline mental status. Given return precautions. Category 2:   I independently ordered and interpreted the CT Scan. CT head with no acute intracranial abnormality. No subdural hemorrhage noted. In agreement with radiologist interpretation. Cervical spine CT with no acute bony abnormality noted. In agreement with radiologist interpretation. Category 3: Discussion of management or test interpretation. 72-year-old man with history of Parkinson's disease, neurologic orthostatic hypotension, GERD, hypothyroidism, hypertension, CVA, hx of DVT on Xarelto, history of traumatic SDH presents to ED via EMS from assisted living facility status post mechanical trip and fall around 6 AM.  Initially patient declined that he wanted transport to ED.   Patient with monitor laceration/abrasion to forehead that has already been treated by nurse at facility. Steri-Strips were applied. Patient reports several falls around 2 weeks ago that resulted in abrasions and bruising to left arm. Denies any left arm pain. FROM of left upper extremity. No deformities present. Patient states that incident was mechanical trip and fall while getting up to use restroom. Denies dizziness, syncopal episode, chest pain, shortness of breath. Denies abdominal pain. Patient states tetanus up-to-date. Vital signs stable. Slightly hypertensive. Differential diagnosis includes subarachnoid hemorrhage, subdural hemorrhage, epidural hemorrhage, cervical spine fracture, C-spine injury, scalp laceration, CVA, etc.  Forehead wound has already been cleaned and Steri-Strips have been applied. No indication for suture placement at this time. Tetanus up-to-date. At this time no indication for labs, EKG. Given history of previous SDH on Xarelto with closed head injury will obtain CT head, CT C-spine. CT head with no acute intracranial hemorrhage or injury noted. CT C-spine with no acute bony injury noted. Multilevel spondylosis noted. Patient with no complaints at this time. Discussed plan with patient's son who is present at bedside. Given close head injury precautions and instructed return to ED if symptoms worsen or progress in any way. ED Course as of 03/19/23 0827   Fri Mar 17, 2023   1207 CT head Findings:  The ventricular system, basilar cisterns, and cortical sulci all are  normal in size and position for the patient's age. There are no abnormal  intracranial masses, mass effect, nor extra-axial collections seen. There are  no abnormal areas of attenuation that would indicate a recent intracranial  hemorrhage or infarction. Bone windows show no fractures nor foreign bodies. Small scalp hematoma is seen anteriorly to the left. IMPRESSION:  No intracranial hemorrhage or injury seen.  [DF]   5221 CT C-spine FINDINGS: Axial images were taken through the cervical spine with sagittal and  coronal and angled axial reconstructions. Radiation dose reduction techniques  were used for this study using one or more of the following: automated exposure  control; adjustment of mA and/or kV (according to patient size);  iterative  reconstruction. C2-C3 shows facet hypertrophy with mild right but no left neural  foraminal narrowing and no canal stenosis. C3-C4 shows disc space narrowing and  spurring with severe right and moderate to severe left neural foraminal  narrowing but no canal stenosis. C4-C5 shows facet hypertrophy and spurring with  moderate to severe right and moderate left neural foraminal narrowing but no  canal stenosis. C5-C6 shows facet hypertrophy and spurring with moderate  bilateral neural foraminal narrowing but no canal stenosis. C6-C7 shows facet  hypertrophy and spurring with mild right and mild to moderate left neural  foraminal narrowing but no canal stenosis. C7-T1 shows no canal or foraminal  stenosis. Surgical clips are seen in the neck. IMPRESSION:  Multilevel spondylosis, no acute bony injury seen. [DF]      ED Course User Index  [DF] Chris Lopez MD       Risk of Complications and/or Morbidity of Patient Management:  OTC drug management performed and Shared medical decision making was utilized in creating the patients health plan today. Michaelle Rodrigues is a [de-identified] y.o. male who presents to the Emergency Department with chief complaint of fall. Chief Complaint   Patient presents with    Fall    Head Injury      74-year-old man with history of Parkinson's disease, neurologic orthostatic hypotension, GERD, hypothyroidism, hypertension, CVA, hx of DVT on Xarelto, history of traumatic SDH presents to ED via EMS from assisted living facility status post mechanical trip and fall around 6 AM.  Initially patient declined that he wanted transport to ED.   Patient with monitor laceration/abrasion to forehead that has already been treated by nurse at facility. Steri-Strips were applied. Denies any significant bleeding from site. States tetanus up-to-date. Patient denies loss of consciousness. Denies back pain, chest pain, shortness of breath, numbness, tingling, weakness, fever, chills, urinary symptoms. Denies bowel or bladder incontinence. Denies seizure-like activity. Patient is reportedly at baseline mental status. Denies any significant neck pain at this time. The history is provided by the patient. No  was used. Review of Systems   Constitutional:  Negative for chills, fatigue and fever. HENT:  Negative for facial swelling and nosebleeds. Eyes:  Negative for visual disturbance. Respiratory:  Negative for cough and shortness of breath. Cardiovascular:  Negative for chest pain. Gastrointestinal:  Negative for abdominal pain, nausea and vomiting. Genitourinary:  Negative for dysuria and flank pain. Musculoskeletal:  Negative for back pain, joint swelling, neck pain and neck stiffness. Skin:  Positive for wound. Negative for pallor and rash. Neurological:  Positive for headaches. Negative for dizziness, seizures, syncope, facial asymmetry, speech difficulty, weakness, light-headedness and numbness. Psychiatric/Behavioral:  Negative for confusion. Vitals signs and nursing note reviewed. No data found. Physical Exam  Vitals and nursing note reviewed. Constitutional:       Appearance: Normal appearance. HENT:      Head: Normocephalic. Comments: Small abrasion noted to forehead with 3 Steri-Strips present. No active bleeding from site. No evidence of basilar skull fracture. Right Ear: Tympanic membrane and ear canal normal.      Left Ear: Tympanic membrane and ear canal normal.      Ears:      Comments: No hemotympanum.      Nose: Nose normal.      Mouth/Throat:      Mouth: Mucous membranes are moist. Eyes:      Extraocular Movements: Extraocular movements intact. Conjunctiva/sclera: Conjunctivae normal.      Pupils: Pupils are equal, round, and reactive to light. Neck:      Comments: Full range of motion. No midline C-spine tenderness. No step-off. Cardiovascular:      Rate and Rhythm: Normal rate. Pulses: Normal pulses. Heart sounds: Normal heart sounds. Pulmonary:      Effort: Pulmonary effort is normal.      Breath sounds: Normal breath sounds. Abdominal:      General: Bowel sounds are normal.      Palpations: Abdomen is soft. Tenderness: There is no abdominal tenderness. There is no guarding or rebound. Comments: Soft, nontender, nondistended. No pulsatile mass noted. Musculoskeletal:         General: No deformity. Normal range of motion. Cervical back: Normal range of motion. No rigidity. Right lower leg: No edema. Left lower leg: No edema. Comments: Bruises with different stages of healing noted to left upper extremity. Full range of motion of left shoulder, left elbow, left wrist.  Full range of motion of all left digits. No midline T-spine or L-spine tenderness to palpation. No deformity noted. Skin:     General: Skin is warm. Neurological:      General: No focal deficit present. Mental Status: He is alert and oriented to person, place, and time. Mental status is at baseline. Cranial Nerves: No cranial nerve deficit. Sensory: No sensory deficit. Motor: No weakness. Comments: Patient at baseline mental status. No focal deficits. Strength 5 out of 5 throughout. Normal sensory exam.  No facial droop. No dysarthria.         Procedures     Orders Placed This Encounter   Procedures    CT HEAD WO CONTRAST    CT CERVICAL SPINE WO CONTRAST        Medications - No data to display    Discharge Medication List as of 3/17/2023 12:27 PM           Past Medical History:   Diagnosis Date    AAA (abdominal aortic aneurysm) 7/8/2016    pt is not aware of this-     Casas's esophagus with esophagitis 7/8/2016    no meds- not daily    Cerebrovascular accident (CVA) ([facility] [address] 7/8/2016    They thought I did but they didn't find anything on the tests \"I blacked out\" no admission to the hospital    Chronic fatigue syndrome 2/26/2016    Cognitive deficits 7/8/2016    Colon polyps 2007    Elevated PSA     Enthesopathy of ankle and tarsus 5/29/2014    GERD (gastroesophageal reflux disease) 7/8/2016    Hashimoto's thyroiditis 7/8/2016    History of nuclear stress test 1997, 5/2005    Hypotension 8/25/2015    Impaired cognition 7/8/2016    Inguinal hernia 7/8/2016    Insomnia 7/8/2016    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 Tibial neuropathy 11/3/2015    Unsteady gait 8/25/2015    Overview: With 2 falls in past month. Past Surgical History:   Procedure Laterality Date    BREAST BIOPSY Bilateral     sterotactic    CATARACT REMOVAL Right 2012    COLONOSCOPY  2000 & 2005    HERNIA REPAIR Left 2011    OTHER SURGICAL HISTORY      deep leg/ankle tumor excision benign leg dumor    OTHER SURGICAL HISTORY  2006    esophagogastroduodenoscopy    THYROIDECTOMY      total        Family History   Problem Relation Age of Onset    Heart Failure Father     No Known Problems Sister     Heart Disease Father     Heart Attack Brother         MI at 52 y.o.     Cancer Mother         Pancreas CA    Osteoarthritis Sister     Cancer Father         Brain CA    Diabetes Paternal Uncle     Thyroid Disease Neg Hx     Diabetes Maternal Uncle         Social History     Socioeconomic History Marital status:    Tobacco Use    Smoking status: Never    Smokeless tobacco: Never   Substance and Sexual Activity    Alcohol use: No    Drug use: No        Allergies: Brompheniramine, Carisoprodol, Meperidine, Penicillins, Phenylephrine hcl, Zaleplon, Donepezil, Haloperidol lactate, Quetiapine, and Rofecoxib    Discharge Medication List as of 3/17/2023 12:27 PM        CONTINUE these medications which have NOT CHANGED    Details   XARELTO 20 MG TABS tablet DAWHistorical Med      !! UNABLE TO FIND Please check orthostatics at 6 am, 12pm, 6pm from 1/25/23 until 2/1/23. Send readings to Dr. Cindy Beck Neurology, Disp-1 Act, R-0Print      midodrine (PROAMATINE) 2.5 MG tablet 1 tab at 6am, 2pm. Hold for SBP >140/90, Disp-180 tablet, R-3Print      !! UNABLE TO FIND PT for gait/posture and balance 3 times a week for 3 months.  Dx: PD, Disp-1 Act, R-0Print      mirtazapine (REMERON) 30 MG tablet TAKE 1 TABLET BY MOUTH  EVERY NIGHT AT BEDTIME, Disp-90 tablet, R-3Requesting 1 year supplyNormal      Amantadine (SYMMETREL) 100 MG TABS tablet TAKE 1 TABLET BY MOUTH AT 8 AM AND 2 PM, Disp-180 tablet, R-3Requesting 1 year supplyNormal      pyridostigmine (MESTINON) 60 MG tablet 1/2 tb TID (6am, 2pm, 6pm), Disp-135 tablet, R-3Print      clonazePAM (KLONOPIN) 1 MG tablet 1/2 tab Qhs for RBD and 1/2 tab as needed Q day for anxiety, Disp-90 tablet, R-1Print      carbidopa-levodopa (SINEMET CR)  MG per extended release tablet TAKE 1 TABLET BY MOUTH AT  10 PM STRICT TIME, Disp-90 tablet, R-3Requesting 1 year supplyNormal      escitalopram (LEXAPRO) 20 MG tablet Take 1 tablet by mouth in the morning., Disp-90 tablet, R-3Print      carbidopa-levodopa (SINEMET)  MG per tablet TAKE 1 TABLET BY MOUTH AT 6AM, 10AM, 2PM, AND 6PM STRICT TIMES DUE TO ADVANCED PD, Disp-360 tablet, R-3Normal      gabapentin (NEURONTIN) 100 MG capsule TAKE 1 CAPSULE BY MOUTH 3  TIMES DAILY, Disp-270 capsule, R-3Normal levalbuterol (XOPENEX) 1.25 MG/3ML nebulizer solution Take 3 mLs by nebulization every 8 hours as needed for Wheezing, Disp-180 mL, R-1Print      Vitamin D (CHOLECALCIFEROL) 25 MCG (1000 UT) TABS tablet Take 1 tablet by mouth daily, Disp-60 tablet, R-0Labeling may look different. 25 mcg=1000 Units. Please double check dosages. NO PRINT      atropine 1 % ophthalmic solution 2-3 drops in a tablespoon of water and swish and spit up to 4 times a day for drooling. Historical Med      latanoprost (XALATAN) 0.005 % ophthalmic solution Apply 1 drop to eyeHistorical Med      levothyroxine (SYNTHROID) 125 MCG tablet Take 125 mcg by mouth every morning (before breakfast)Historical Med      melatonin 10 MG CAPS capsule Take 10 mg by mouthHistorical Med      mirabegron (MYRBETRIQ) 50 MG TB24 Take 50 mg by mouthHistorical Med      omeprazole (PRILOSEC) 40 MG delayed release capsule Take 40 mg by mouth every morningHistorical Med      polyethylene glycol (GLYCOLAX) 17 GM/SCOOP powder Take 17 g by mouth dailyHistorical Med      rivastigmine (EXELON) 6 MG capsule Take 6 mg by mouth 2 times daily (with meals)Historical Med       !! - Potential duplicate medications found. Please discuss with provider. Results for orders placed or performed during the hospital encounter of 03/17/23   CT HEAD WO CONTRAST    Narrative    CT Brain Without Contrast Dated  17 March, 2023    Reference Exam: 1 July, 2022    Indication: Abigail Babin this a.m., on blood thinners    Technique: Radiation dose reduction techniques were used for this study using  one or more of the following: automated exposure control; adjustment of mA  and/or kV (according to patient size);  iterative reconstruction. Routine CT of  the brain was performed using 5 mm axial images obtained. Images are presumed to  have been obtained less than 24 hours from presentation.     Findings:  The ventricular system, basilar cisterns, and cortical sulci all are  normal in size and position for the patient's age. There are no abnormal  intracranial masses, mass effect, nor extra-axial collections seen. There are  no abnormal areas of attenuation that would indicate a recent intracranial  hemorrhage or infarction. Bone windows show no fractures nor foreign bodies. Small scalp hematoma is seen anteriorly to the left. Impression    No intracranial hemorrhage or injury seen. CT CERVICAL SPINE WO CONTRAST    Narrative    CT cervical spine 17 March, 2023    Reference exam: None    INDICATION: Lyndsay Lemon this a.m. FINDINGS: Axial images were taken through the cervical spine with sagittal and  coronal and angled axial reconstructions. Radiation dose reduction techniques  were used for this study using one or more of the following: automated exposure  control; adjustment of mA and/or kV (according to patient size);  iterative  reconstruction. C2-C3 shows facet hypertrophy with mild right but no left neural  foraminal narrowing and no canal stenosis. C3-C4 shows disc space narrowing and  spurring with severe right and moderate to severe left neural foraminal  narrowing but no canal stenosis. C4-C5 shows facet hypertrophy and spurring with  moderate to severe right and moderate left neural foraminal narrowing but no  canal stenosis. C5-C6 shows facet hypertrophy and spurring with moderate  bilateral neural foraminal narrowing but no canal stenosis. C6-C7 shows facet  hypertrophy and spurring with mild right and mild to moderate left neural  foraminal narrowing but no canal stenosis. C7-T1 shows no canal or foraminal  stenosis. Surgical clips are seen in the neck. Impression    Multilevel spondylosis, no acute bony injury seen. CT HEAD WO CONTRAST   Final Result   No intracranial hemorrhage or injury seen. CT CERVICAL SPINE WO CONTRAST   Final Result   Multilevel spondylosis, no acute bony injury seen.                         Voice dictation software was used during the making of this note. This software is not perfect and grammatical and other typographical errors may be present. This note has not been completely proofread for errors.      Merlinda Muslim., MD  03/19/23 0169

## 2023-03-19 ASSESSMENT — ENCOUNTER SYMPTOMS
BACK PAIN: 0
FACIAL SWELLING: 0
ABDOMINAL PAIN: 0
VOMITING: 0
NAUSEA: 0
COUGH: 0
SHORTNESS OF BREATH: 0

## (undated) DEVICE — SOLUTION IRRIGATION BAL SALT SOLUTION 500 ML BTL 6/CA BSS +

## (undated) DEVICE — SURGICAL PROCEDURE PACK EYE CDS

## (undated) DEVICE — NEEDLE HYPO 27GA L1.25IN GRY POLYPR HUB S STL REG BVL STR

## (undated) DEVICE — 1010 S-DRAPE TOWEL DRAPE 10/BX: Brand: STERI-DRAPE™

## (undated) DEVICE — Z DISCONTINUED NO SUB IDED SHIELD EYE PLAS RT BGE M 7.5CMX5.7CM VISITEC

## (undated) DEVICE — Device: Brand: 25G STRAIGHT ENDOPROBE® BOX OF 6

## (undated) DEVICE — LENS VITRCTMY OCU FLAT DISP

## (undated) DEVICE — 1060 S-DRAPE SPCL INCISE 10/BX 4BX/CS: Brand: STERI-DRAPE™

## (undated) DEVICE — SOLUTION IRRIG 1000ML H2O STRL BLT

## (undated) DEVICE — COVER,MAYO STAND,STERILE: Brand: MEDLINE

## (undated) DEVICE — 25+® REVOLUTION DSP ILM FORCEPS: Brand: ALCON GRIESHABER REVOLUTION 25+

## (undated) DEVICE — SYR 10ML LUER LOK 1/5ML GRAD --

## (undated) DEVICE — CONSTELLATION VISION SYSTEM 5000 CPM ULTRAVIT PROBE STRAIGHT ENDOILLUMINATOR 25+ TOTALPLUS VITRECTOMY PAK EDGEPLUS BLADE VALVED ENTRY SYSTEM: Brand: CONSTELLATION, ULTRAVIT, 25+, TOTALPLUS, EDGEPLUS

## (undated) DEVICE — CARDINAL HEALTH FLEXIBLE LIGHT HANDLE COVER: Brand: CARDINAL HEALTH

## (undated) DEVICE — PAD,EYE,1-5/8X2 5/8,STERILE,LF,1/PK: Brand: MEDLINE

## (undated) DEVICE — EYE SPEAR / FINE DISSECTOR: Brand: DEROYAL

## (undated) DEVICE — CANNULA OPHTH 2 BOR 25 GA 0.5 MM SS

## (undated) DEVICE — Device

## (undated) DEVICE — (D)STRIP SKN CLSR 0.5X4IN WHT --